# Patient Record
Sex: MALE | Race: WHITE | NOT HISPANIC OR LATINO | Employment: OTHER | URBAN - METROPOLITAN AREA
[De-identification: names, ages, dates, MRNs, and addresses within clinical notes are randomized per-mention and may not be internally consistent; named-entity substitution may affect disease eponyms.]

---

## 2018-01-05 ENCOUNTER — OFFICE VISIT CONVERTED (OUTPATIENT)
Dept: FAMILY MEDICINE CLINIC | Facility: CLINIC | Age: 73
End: 2018-01-05
Attending: NURSE PRACTITIONER

## 2018-01-31 ENCOUNTER — OFFICE VISIT CONVERTED (OUTPATIENT)
Dept: UROLOGY | Facility: CLINIC | Age: 73
End: 2018-01-31
Attending: UROLOGY

## 2018-01-31 ENCOUNTER — CONVERSION ENCOUNTER (OUTPATIENT)
Dept: SURGERY | Facility: CLINIC | Age: 73
End: 2018-01-31

## 2018-02-16 ENCOUNTER — OFFICE VISIT CONVERTED (OUTPATIENT)
Dept: UROLOGY | Facility: CLINIC | Age: 73
End: 2018-02-16
Attending: UROLOGY

## 2018-04-06 ENCOUNTER — OFFICE VISIT CONVERTED (OUTPATIENT)
Dept: FAMILY MEDICINE CLINIC | Facility: CLINIC | Age: 73
End: 2018-04-06
Attending: NURSE PRACTITIONER

## 2018-05-09 ENCOUNTER — OFFICE VISIT CONVERTED (OUTPATIENT)
Dept: NEUROSURGERY | Facility: CLINIC | Age: 73
End: 2018-05-09
Attending: PHYSICIAN ASSISTANT

## 2018-05-23 ENCOUNTER — OFFICE VISIT CONVERTED (OUTPATIENT)
Dept: UROLOGY | Facility: CLINIC | Age: 73
End: 2018-05-23
Attending: UROLOGY

## 2018-05-23 ENCOUNTER — CONVERSION ENCOUNTER (OUTPATIENT)
Dept: SURGERY | Facility: CLINIC | Age: 73
End: 2018-05-23

## 2018-07-20 ENCOUNTER — OFFICE VISIT CONVERTED (OUTPATIENT)
Dept: UROLOGY | Facility: CLINIC | Age: 73
End: 2018-07-20
Attending: UROLOGY

## 2018-09-17 ENCOUNTER — OFFICE VISIT CONVERTED (OUTPATIENT)
Dept: FAMILY MEDICINE CLINIC | Facility: CLINIC | Age: 73
End: 2018-09-17
Attending: NURSE PRACTITIONER

## 2018-10-08 ENCOUNTER — OFFICE VISIT CONVERTED (OUTPATIENT)
Dept: FAMILY MEDICINE CLINIC | Facility: CLINIC | Age: 73
End: 2018-10-08
Attending: NURSE PRACTITIONER

## 2018-10-23 ENCOUNTER — OFFICE VISIT CONVERTED (OUTPATIENT)
Dept: FAMILY MEDICINE CLINIC | Facility: CLINIC | Age: 73
End: 2018-10-23
Attending: NURSE PRACTITIONER

## 2018-10-31 ENCOUNTER — OFFICE VISIT CONVERTED (OUTPATIENT)
Dept: ORTHOPEDIC SURGERY | Facility: CLINIC | Age: 73
End: 2018-10-31
Attending: ORTHOPAEDIC SURGERY

## 2018-11-30 ENCOUNTER — CONVERSION ENCOUNTER (OUTPATIENT)
Dept: OTOLARYNGOLOGY | Facility: CLINIC | Age: 73
End: 2018-11-30

## 2018-11-30 ENCOUNTER — OFFICE VISIT CONVERTED (OUTPATIENT)
Dept: OTOLARYNGOLOGY | Facility: CLINIC | Age: 73
End: 2018-11-30
Attending: OTOLARYNGOLOGY

## 2018-12-14 ENCOUNTER — OFFICE VISIT CONVERTED (OUTPATIENT)
Dept: FAMILY MEDICINE CLINIC | Facility: CLINIC | Age: 73
End: 2018-12-14
Attending: NURSE PRACTITIONER

## 2018-12-14 ENCOUNTER — CONVERSION ENCOUNTER (OUTPATIENT)
Dept: FAMILY MEDICINE CLINIC | Facility: CLINIC | Age: 73
End: 2018-12-14

## 2019-01-15 ENCOUNTER — OFFICE VISIT CONVERTED (OUTPATIENT)
Dept: NEUROLOGY | Facility: CLINIC | Age: 74
End: 2019-01-15
Attending: PSYCHIATRY & NEUROLOGY

## 2019-01-15 ENCOUNTER — HOSPITAL ENCOUNTER (OUTPATIENT)
Dept: LAB | Facility: HOSPITAL | Age: 74
Discharge: HOME | End: 2019-01-15

## 2019-01-15 LAB
ALBUMIN SERPL-MCNC: 4.3 G/DL (ref 3.5–5)
ALBUMIN/GLOB SERPL: 1.4 {RATIO} (ref 1.4–2.6)
ALP SERPL-CCNC: 102 U/L (ref 56–155)
ALT SERPL-CCNC: 29 U/L (ref 10–40)
ANION GAP SERPL CALC-SCNC: 17 MMOL/L (ref 8–19)
AST SERPL-CCNC: 21 U/L (ref 15–50)
BASOPHILS # BLD AUTO: 0.05 10*3/UL (ref 0–0.2)
BASOPHILS NFR BLD AUTO: 0.64 % (ref 0–3)
BILIRUB SERPL-MCNC: 0.3 MG/DL (ref 0.2–1.3)
BUN SERPL-MCNC: 15 MG/DL (ref 5–25)
BUN/CREAT SERPL: 17 {RATIO} (ref 6–20)
CALCIUM SERPL-MCNC: 9.1 MG/DL (ref 8.7–10.4)
CHLORIDE SERPL-SCNC: 99 MMOL/L (ref 99–111)
CONV CO2: 28 MMOL/L (ref 22–32)
CONV TOTAL PROTEIN: 7.4 G/DL (ref 6.3–8.2)
CREAT UR-MCNC: 0.86 MG/DL (ref 0.7–1.2)
EOSINOPHIL # BLD AUTO: 0.33 10*3/UL (ref 0–0.7)
EOSINOPHIL # BLD AUTO: 4.18 % (ref 0–7)
ERYTHROCYTE [DISTWIDTH] IN BLOOD BY AUTOMATED COUNT: 11.8 % (ref 11.5–14.5)
GFR SERPLBLD BASED ON 1.73 SQ M-ARVRAT: >60 ML/MIN/{1.73_M2}
GLOBULIN UR ELPH-MCNC: 3.1 G/DL (ref 2–3.5)
GLUCOSE SERPL-MCNC: 106 MG/DL (ref 70–99)
HBA1C MFR BLD: 14.8 G/DL (ref 14–18)
HCT VFR BLD AUTO: 42.8 % (ref 42–52)
LYMPHOCYTES # BLD AUTO: 1.6 10*3/UL (ref 1–5)
MCH RBC QN AUTO: 32.3 PG (ref 27–31)
MCHC RBC AUTO-ENTMCNC: 34.6 G/DL (ref 33–37)
MCV RBC AUTO: 93.3 FL (ref 80–96)
MONOCYTES # BLD AUTO: 0.71 10*3/UL (ref 0.2–1.2)
MONOCYTES NFR BLD AUTO: 8.95 % (ref 3–10)
NEUTROPHILS # BLD AUTO: 5.23 10*3/UL (ref 2–8)
NEUTROPHILS NFR BLD AUTO: 66.1 % (ref 30–85)
NRBC BLD AUTO-RTO: 0 % (ref 0–0.01)
OSMOLALITY SERPL CALC.SUM OF ELEC: 291 MOSM/KG (ref 273–304)
PLATELET # BLD AUTO: 270 10*3/UL (ref 130–400)
PMV BLD AUTO: 7.3 FL (ref 7.4–10.4)
POTASSIUM SERPL-SCNC: 3.8 MMOL/L (ref 3.5–5.3)
RBC # BLD AUTO: 4.59 10*6/UL (ref 4.7–6.1)
SODIUM SERPL-SCNC: 140 MMOL/L (ref 135–147)
TSH SERPL-ACNC: 5.35 M[IU]/L (ref 0.27–4.2)
VARIANT LYMPHS NFR BLD MANUAL: 20.2 % (ref 20–45)
WBC # BLD AUTO: 7.92 10*3/UL (ref 4.8–10.8)

## 2019-02-08 ENCOUNTER — OFFICE VISIT CONVERTED (OUTPATIENT)
Dept: UROLOGY | Facility: CLINIC | Age: 74
End: 2019-02-08
Attending: UROLOGY

## 2019-02-08 ENCOUNTER — HOSPITAL ENCOUNTER (OUTPATIENT)
Dept: LAB | Facility: HOSPITAL | Age: 74
Discharge: HOME | End: 2019-02-08
Attending: UROLOGY

## 2019-02-08 LAB — PSA SERPL-MCNC: 1.4 NG/ML (ref 0–4)

## 2019-03-29 ENCOUNTER — CONVERSION ENCOUNTER (OUTPATIENT)
Dept: FAMILY MEDICINE CLINIC | Facility: CLINIC | Age: 74
End: 2019-03-29

## 2019-03-29 ENCOUNTER — OFFICE VISIT CONVERTED (OUTPATIENT)
Dept: FAMILY MEDICINE CLINIC | Facility: CLINIC | Age: 74
End: 2019-03-29
Attending: NURSE PRACTITIONER

## 2019-04-10 ENCOUNTER — CONVERSION ENCOUNTER (OUTPATIENT)
Dept: FAMILY MEDICINE CLINIC | Facility: CLINIC | Age: 74
End: 2019-04-10

## 2019-04-10 ENCOUNTER — HOSPITAL ENCOUNTER (OUTPATIENT)
Dept: GENERAL RADIOLOGY | Facility: HOSPITAL | Age: 74
Discharge: HOME | End: 2019-04-10
Attending: NURSE PRACTITIONER

## 2019-04-10 ENCOUNTER — OFFICE VISIT CONVERTED (OUTPATIENT)
Dept: FAMILY MEDICINE CLINIC | Facility: CLINIC | Age: 74
End: 2019-04-10
Attending: NURSE PRACTITIONER

## 2019-05-16 ENCOUNTER — CONVERSION ENCOUNTER (OUTPATIENT)
Dept: NEUROLOGY | Facility: CLINIC | Age: 74
End: 2019-05-16

## 2019-06-26 ENCOUNTER — HOSPITAL ENCOUNTER (OUTPATIENT)
Dept: LAB | Facility: HOSPITAL | Age: 74
Discharge: HOME | End: 2019-06-26
Attending: NURSE PRACTITIONER

## 2019-06-26 ENCOUNTER — CONVERSION ENCOUNTER (OUTPATIENT)
Dept: FAMILY MEDICINE CLINIC | Facility: CLINIC | Age: 74
End: 2019-06-26

## 2019-06-26 ENCOUNTER — OFFICE VISIT CONVERTED (OUTPATIENT)
Dept: FAMILY MEDICINE CLINIC | Facility: CLINIC | Age: 74
End: 2019-06-26
Attending: NURSE PRACTITIONER

## 2019-06-26 LAB
BASOPHILS # BLD AUTO: 0.08 10*3/UL (ref 0–0.2)
BASOPHILS NFR BLD AUTO: 0.9 % (ref 0–3)
CHOLEST SERPL-MCNC: 241 MG/DL (ref 107–200)
CHOLEST/HDLC SERPL: 6.2 {RATIO} (ref 3–6)
CONV ABS IMM GRAN: 0.08 10*3/UL (ref 0–0.2)
CONV IMMATURE GRAN: 0.9 % (ref 0–1.8)
DEPRECATED RDW RBC AUTO: 43.8 FL (ref 35.1–43.9)
EOSINOPHIL # BLD AUTO: 0.4 10*3/UL (ref 0–0.7)
EOSINOPHIL # BLD AUTO: 4.6 % (ref 0–7)
ERYTHROCYTE [DISTWIDTH] IN BLOOD BY AUTOMATED COUNT: 12.9 % (ref 11.6–14.4)
HBA1C MFR BLD: 14.4 G/DL (ref 14–18)
HCT VFR BLD AUTO: 43.1 % (ref 42–52)
HDLC SERPL-MCNC: 39 MG/DL (ref 40–60)
LDLC SERPL CALC-MCNC: 170 MG/DL (ref 70–100)
LYMPHOCYTES # BLD AUTO: 1.5 10*3/UL (ref 1–5)
MCH RBC QN AUTO: 31.1 PG (ref 27–31)
MCHC RBC AUTO-ENTMCNC: 33.4 G/DL (ref 33–37)
MCV RBC AUTO: 93.1 FL (ref 80–96)
MONOCYTES # BLD AUTO: 0.72 10*3/UL (ref 0.2–1.2)
MONOCYTES NFR BLD AUTO: 8.3 % (ref 3–10)
NEUTROPHILS # BLD AUTO: 5.9 10*3/UL (ref 2–8)
NEUTROPHILS NFR BLD AUTO: 68 % (ref 30–85)
NRBC CBCN: 0 % (ref 0–0.7)
PLATELET # BLD AUTO: 303 10*3/UL (ref 130–400)
PMV BLD AUTO: 10.3 FL (ref 9.4–12.4)
RBC # BLD AUTO: 4.63 10*6/UL (ref 4.7–6.1)
TRIGL SERPL-MCNC: 158 MG/DL (ref 40–150)
VARIANT LYMPHS NFR BLD MANUAL: 17.3 % (ref 20–45)
VLDLC SERPL-MCNC: 32 MG/DL (ref 5–37)
WBC # BLD AUTO: 8.68 10*3/UL (ref 4.8–10.8)

## 2019-07-03 ENCOUNTER — HOSPITAL ENCOUNTER (OUTPATIENT)
Dept: GENERAL RADIOLOGY | Facility: HOSPITAL | Age: 74
Discharge: HOME | End: 2019-07-03
Attending: NURSE PRACTITIONER

## 2019-07-03 LAB
CREAT BLD-MCNC: 0.8 MG/DL (ref 0.6–1.4)
GFR SERPLBLD BASED ON 1.73 SQ M-ARVRAT: >60 ML/MIN/{1.73_M2}

## 2019-07-31 ENCOUNTER — OFFICE VISIT CONVERTED (OUTPATIENT)
Dept: ORTHOPEDIC SURGERY | Facility: CLINIC | Age: 74
End: 2019-07-31
Attending: ORTHOPAEDIC SURGERY

## 2019-07-31 ENCOUNTER — CONVERSION ENCOUNTER (OUTPATIENT)
Dept: ORTHOPEDIC SURGERY | Facility: CLINIC | Age: 74
End: 2019-07-31

## 2019-08-16 ENCOUNTER — OFFICE VISIT CONVERTED (OUTPATIENT)
Dept: UROLOGY | Facility: CLINIC | Age: 74
End: 2019-08-16
Attending: UROLOGY

## 2019-08-20 ENCOUNTER — HOSPITAL ENCOUNTER (OUTPATIENT)
Dept: LAB | Facility: HOSPITAL | Age: 74
Discharge: HOME | End: 2019-08-20
Attending: UROLOGY

## 2019-08-20 LAB — CORTIS AM PEAK SERPL-MCNC: <1 UG/DL (ref 6–18.4)

## 2019-08-26 ENCOUNTER — HOSPITAL ENCOUNTER (OUTPATIENT)
Dept: GENERAL RADIOLOGY | Facility: HOSPITAL | Age: 74
Discharge: HOME | End: 2019-08-26
Attending: UROLOGY

## 2019-08-26 LAB
CREAT BLD-MCNC: 0.7 MG/DL (ref 0.6–1.4)
GFR SERPLBLD BASED ON 1.73 SQ M-ARVRAT: >60 ML/MIN/{1.73_M2}

## 2019-08-27 ENCOUNTER — HOSPITAL ENCOUNTER (OUTPATIENT)
Dept: PHYSICAL THERAPY | Facility: CLINIC | Age: 74
Setting detail: RECURRING SERIES
Discharge: HOME | End: 2019-11-08
Attending: ORTHOPAEDIC SURGERY

## 2019-08-27 LAB
METANEPH FREE SERPL-SCNC: <10 PG/ML (ref 0–62)
NORMETANEPHRINE, PL: 10 PG/ML (ref 0–145)

## 2019-09-09 ENCOUNTER — OFFICE VISIT CONVERTED (OUTPATIENT)
Dept: GASTROENTEROLOGY | Facility: CLINIC | Age: 74
End: 2019-09-09
Attending: PHYSICIAN ASSISTANT

## 2019-10-02 ENCOUNTER — OFFICE VISIT CONVERTED (OUTPATIENT)
Dept: UROLOGY | Facility: CLINIC | Age: 74
End: 2019-10-02
Attending: UROLOGY

## 2019-10-18 ENCOUNTER — HOSPITAL ENCOUNTER (OUTPATIENT)
Dept: LAB | Facility: HOSPITAL | Age: 74
Discharge: HOME | End: 2019-10-18
Attending: UROLOGY

## 2019-10-18 LAB — PSA SERPL-MCNC: 0.83 NG/ML (ref 0–4)

## 2019-10-22 ENCOUNTER — HOSPITAL ENCOUNTER (OUTPATIENT)
Dept: GASTROENTEROLOGY | Facility: HOSPITAL | Age: 74
Setting detail: HOSPITAL OUTPATIENT SURGERY
Discharge: HOME | End: 2019-10-22
Attending: INTERNAL MEDICINE

## 2019-12-27 ENCOUNTER — OFFICE VISIT CONVERTED (OUTPATIENT)
Dept: SURGERY | Facility: CLINIC | Age: 74
End: 2019-12-27
Attending: PHYSICIAN ASSISTANT

## 2019-12-27 ENCOUNTER — HOSPITAL ENCOUNTER (OUTPATIENT)
Dept: SURGERY | Facility: CLINIC | Age: 74
Discharge: HOME | End: 2019-12-27
Attending: PHYSICIAN ASSISTANT

## 2019-12-27 ENCOUNTER — CONVERSION ENCOUNTER (OUTPATIENT)
Dept: SURGERY | Facility: CLINIC | Age: 74
End: 2019-12-27

## 2019-12-29 LAB — BACTERIA UR CULT: NORMAL

## 2019-12-30 ENCOUNTER — OFFICE VISIT CONVERTED (OUTPATIENT)
Dept: FAMILY MEDICINE CLINIC | Facility: CLINIC | Age: 74
End: 2019-12-30
Attending: NURSE PRACTITIONER

## 2020-01-29 ENCOUNTER — HOSPITAL ENCOUNTER (OUTPATIENT)
Dept: LAB | Facility: HOSPITAL | Age: 75
Discharge: HOME | End: 2020-01-29
Attending: UROLOGY

## 2020-01-29 LAB — PSA SERPL-MCNC: 0.91 NG/ML (ref 0–4)

## 2020-01-30 ENCOUNTER — OFFICE VISIT CONVERTED (OUTPATIENT)
Dept: OTOLARYNGOLOGY | Facility: CLINIC | Age: 75
End: 2020-01-30
Attending: OTOLARYNGOLOGY

## 2020-02-05 ENCOUNTER — OFFICE VISIT CONVERTED (OUTPATIENT)
Dept: UROLOGY | Facility: CLINIC | Age: 75
End: 2020-02-05
Attending: UROLOGY

## 2020-04-02 ENCOUNTER — TELEMEDICINE CONVERTED (OUTPATIENT)
Dept: GASTROENTEROLOGY | Facility: CLINIC | Age: 75
End: 2020-04-02
Attending: PHYSICIAN ASSISTANT

## 2020-06-30 ENCOUNTER — OFFICE VISIT CONVERTED (OUTPATIENT)
Dept: GASTROENTEROLOGY | Facility: CLINIC | Age: 75
End: 2020-06-30
Attending: PHYSICIAN ASSISTANT

## 2020-07-02 ENCOUNTER — HOSPITAL ENCOUNTER (OUTPATIENT)
Dept: GENERAL RADIOLOGY | Facility: HOSPITAL | Age: 75
Discharge: HOME | End: 2020-07-02
Attending: PHYSICIAN ASSISTANT

## 2020-08-10 ENCOUNTER — HOSPITAL ENCOUNTER (OUTPATIENT)
Dept: LAB | Facility: HOSPITAL | Age: 75
Discharge: HOME | End: 2020-08-10
Attending: UROLOGY

## 2020-08-10 LAB — PSA SERPL-MCNC: 3.56 NG/ML (ref 0–4)

## 2020-08-20 ENCOUNTER — OFFICE VISIT CONVERTED (OUTPATIENT)
Dept: INTERNAL MEDICINE | Facility: CLINIC | Age: 75
End: 2020-08-20
Attending: INTERNAL MEDICINE

## 2020-08-20 ENCOUNTER — HOSPITAL ENCOUNTER (OUTPATIENT)
Dept: OTHER | Facility: HOSPITAL | Age: 75
Discharge: HOME | End: 2020-08-20
Attending: INTERNAL MEDICINE

## 2020-08-20 LAB
APPEARANCE UR: CLEAR
BILIRUB UR QL: NEGATIVE
COLOR UR: YELLOW
CONV COLLECTION SOURCE (UA): ABNORMAL
CONV UROBILINOGEN IN URINE BY AUTOMATED TEST STRIP: 0.2 {EHRLICHU}/DL (ref 0.1–1)
GLUCOSE UR QL: >=1000 MG/DL
HGB UR QL STRIP: NEGATIVE
KETONES UR QL STRIP: NEGATIVE MG/DL
LEUKOCYTE ESTERASE UR QL STRIP: NEGATIVE
NITRITE UR QL STRIP: NEGATIVE
PH UR STRIP.AUTO: 5.5 [PH] (ref 5–8)
PROT UR QL: NEGATIVE MG/DL
SP GR UR: 1.03 (ref 1–1.03)

## 2020-08-21 ENCOUNTER — HOSPITAL ENCOUNTER (OUTPATIENT)
Dept: GENERAL RADIOLOGY | Facility: HOSPITAL | Age: 75
Discharge: HOME | End: 2020-08-21
Attending: INTERNAL MEDICINE

## 2020-08-21 LAB
ALBUMIN SERPL-MCNC: 4.3 G/DL (ref 3.5–5)
ALBUMIN/GLOB SERPL: 1.4 {RATIO} (ref 1.4–2.6)
ALP SERPL-CCNC: 103 U/L (ref 56–155)
ALT SERPL-CCNC: 30 U/L (ref 10–40)
ANION GAP SERPL CALC-SCNC: 19 MMOL/L (ref 8–19)
AST SERPL-CCNC: 23 U/L (ref 15–50)
BILIRUB SERPL-MCNC: 0.37 MG/DL (ref 0.2–1.3)
BUN SERPL-MCNC: 17 MG/DL (ref 5–25)
BUN/CREAT SERPL: 18 {RATIO} (ref 6–20)
CALCIUM SERPL-MCNC: 10 MG/DL (ref 8.7–10.4)
CHLORIDE SERPL-SCNC: 97 MMOL/L (ref 99–111)
CHOLEST SERPL-MCNC: 244 MG/DL (ref 107–200)
CHOLEST/HDLC SERPL: 6.6 {RATIO} (ref 3–6)
CONV CO2: 26 MMOL/L (ref 22–32)
CONV TOTAL PROTEIN: 7.4 G/DL (ref 6.3–8.2)
CREAT BLD-MCNC: 0.8 MG/DL (ref 0.6–1.4)
CREAT UR-MCNC: 0.92 MG/DL (ref 0.7–1.2)
GFR SERPLBLD BASED ON 1.73 SQ M-ARVRAT: >60 ML/MIN/{1.73_M2}
GFR SERPLBLD BASED ON 1.73 SQ M-ARVRAT: >60 ML/MIN/{1.73_M2}
GLOBULIN UR ELPH-MCNC: 3.1 G/DL (ref 2–3.5)
GLUCOSE SERPL-MCNC: 279 MG/DL (ref 70–99)
HDLC SERPL-MCNC: 37 MG/DL (ref 40–60)
LDLC SERPL CALC-MCNC: 173 MG/DL (ref 70–100)
OSMOLALITY SERPL CALC.SUM OF ELEC: 296 MOSM/KG (ref 273–304)
POTASSIUM SERPL-SCNC: 4.8 MMOL/L (ref 3.5–5.3)
SODIUM SERPL-SCNC: 137 MMOL/L (ref 135–147)
TRIGL SERPL-MCNC: 169 MG/DL (ref 40–150)
TSH SERPL-ACNC: 5.91 M[IU]/L (ref 0.27–4.2)
VLDLC SERPL-MCNC: 34 MG/DL (ref 5–37)

## 2020-08-22 LAB — BACTERIA UR CULT: NORMAL

## 2020-08-24 ENCOUNTER — OFFICE VISIT CONVERTED (OUTPATIENT)
Dept: UROLOGY | Facility: CLINIC | Age: 75
End: 2020-08-24
Attending: UROLOGY

## 2020-08-24 ENCOUNTER — CONVERSION ENCOUNTER (OUTPATIENT)
Dept: SURGERY | Facility: CLINIC | Age: 75
End: 2020-08-24

## 2020-08-28 ENCOUNTER — HOSPITAL ENCOUNTER (OUTPATIENT)
Dept: CARDIOLOGY | Facility: HOSPITAL | Age: 75
Discharge: HOME | End: 2020-08-28
Attending: INTERNAL MEDICINE

## 2020-09-08 ENCOUNTER — HOSPITAL ENCOUNTER (OUTPATIENT)
Dept: LAB | Facility: HOSPITAL | Age: 75
Discharge: HOME | End: 2020-09-08
Attending: INTERNAL MEDICINE

## 2020-09-08 LAB
EST. AVERAGE GLUCOSE BLD GHB EST-MCNC: 229 MG/DL
HBA1C MFR BLD: 9.6 % (ref 3.5–5.7)

## 2020-09-14 ENCOUNTER — PROCEDURE VISIT CONVERTED (OUTPATIENT)
Dept: UROLOGY | Facility: CLINIC | Age: 75
End: 2020-09-14
Attending: UROLOGY

## 2020-09-21 ENCOUNTER — OFFICE VISIT CONVERTED (OUTPATIENT)
Dept: INTERNAL MEDICINE | Facility: CLINIC | Age: 75
End: 2020-09-21
Attending: INTERNAL MEDICINE

## 2020-09-25 ENCOUNTER — PROCEDURE VISIT CONVERTED (OUTPATIENT)
Dept: UROLOGY | Facility: CLINIC | Age: 75
End: 2020-09-25
Attending: UROLOGY

## 2020-09-25 ENCOUNTER — HOSPITAL ENCOUNTER (OUTPATIENT)
Dept: SURGERY | Facility: CLINIC | Age: 75
Discharge: HOME | End: 2020-09-25
Attending: UROLOGY

## 2020-10-02 ENCOUNTER — OFFICE VISIT CONVERTED (OUTPATIENT)
Dept: INTERNAL MEDICINE | Facility: CLINIC | Age: 75
End: 2020-10-02
Attending: INTERNAL MEDICINE

## 2020-10-06 ENCOUNTER — OFFICE VISIT CONVERTED (OUTPATIENT)
Dept: UROLOGY | Facility: CLINIC | Age: 75
End: 2020-10-06
Attending: UROLOGY

## 2020-10-19 ENCOUNTER — OFFICE VISIT CONVERTED (OUTPATIENT)
Dept: INTERNAL MEDICINE | Facility: CLINIC | Age: 75
End: 2020-10-19
Attending: INTERNAL MEDICINE

## 2020-10-19 ENCOUNTER — CONVERSION ENCOUNTER (OUTPATIENT)
Dept: INTERNAL MEDICINE | Facility: CLINIC | Age: 75
End: 2020-10-19

## 2020-11-11 ENCOUNTER — HOSPITAL ENCOUNTER (OUTPATIENT)
Dept: LAB | Facility: HOSPITAL | Age: 75
Discharge: HOME | End: 2020-11-11
Attending: INTERNAL MEDICINE

## 2020-11-11 LAB
ALBUMIN SERPL-MCNC: 4.3 G/DL (ref 3.5–5)
ALBUMIN/GLOB SERPL: 1.5 {RATIO} (ref 1.4–2.6)
ALP SERPL-CCNC: 102 U/L (ref 56–155)
ALT SERPL-CCNC: 24 U/L (ref 10–40)
ANION GAP SERPL CALC-SCNC: 17 MMOL/L (ref 8–19)
AST SERPL-CCNC: 16 U/L (ref 15–50)
BASOPHILS # BLD AUTO: 0.06 10*3/UL (ref 0–0.2)
BASOPHILS NFR BLD AUTO: 0.8 % (ref 0–3)
BILIRUB SERPL-MCNC: 0.27 MG/DL (ref 0.2–1.3)
BUN SERPL-MCNC: 23 MG/DL (ref 5–25)
BUN/CREAT SERPL: 21 {RATIO} (ref 6–20)
CALCIUM SERPL-MCNC: 9.1 MG/DL (ref 8.7–10.4)
CHLORIDE SERPL-SCNC: 103 MMOL/L (ref 99–111)
CHOLEST SERPL-MCNC: 168 MG/DL (ref 107–200)
CHOLEST/HDLC SERPL: 4.5 {RATIO} (ref 3–6)
CONV ABS IMM GRAN: 0.06 10*3/UL (ref 0–0.2)
CONV CO2: 22 MMOL/L (ref 22–32)
CONV IMMATURE GRAN: 0.8 % (ref 0–1.8)
CONV TOTAL PROTEIN: 7.2 G/DL (ref 6.3–8.2)
CREAT UR-MCNC: 1.1 MG/DL (ref 0.7–1.2)
DEPRECATED RDW RBC AUTO: 43.3 FL (ref 35.1–43.9)
EOSINOPHIL # BLD AUTO: 0.39 10*3/UL (ref 0–0.7)
EOSINOPHIL # BLD AUTO: 5.2 % (ref 0–7)
ERYTHROCYTE [DISTWIDTH] IN BLOOD BY AUTOMATED COUNT: 12.6 % (ref 11.6–14.4)
EST. AVERAGE GLUCOSE BLD GHB EST-MCNC: 217 MG/DL
GFR SERPLBLD BASED ON 1.73 SQ M-ARVRAT: >60 ML/MIN/{1.73_M2}
GLOBULIN UR ELPH-MCNC: 2.9 G/DL (ref 2–3.5)
GLUCOSE SERPL-MCNC: 161 MG/DL (ref 70–99)
HBA1C MFR BLD: 9.2 % (ref 3.5–5.7)
HCT VFR BLD AUTO: 40.9 % (ref 42–52)
HDLC SERPL-MCNC: 37 MG/DL (ref 40–60)
HGB BLD-MCNC: 13.5 G/DL (ref 14–18)
LDLC SERPL CALC-MCNC: 98 MG/DL (ref 70–100)
LYMPHOCYTES # BLD AUTO: 1.17 10*3/UL (ref 1–5)
LYMPHOCYTES NFR BLD AUTO: 15.5 % (ref 20–45)
MCH RBC QN AUTO: 30.9 PG (ref 27–31)
MCHC RBC AUTO-ENTMCNC: 33 G/DL (ref 33–37)
MCV RBC AUTO: 93.6 FL (ref 80–96)
MONOCYTES # BLD AUTO: 0.66 10*3/UL (ref 0.2–1.2)
MONOCYTES NFR BLD AUTO: 8.7 % (ref 3–10)
NEUTROPHILS # BLD AUTO: 5.21 10*3/UL (ref 2–8)
NEUTROPHILS NFR BLD AUTO: 69 % (ref 30–85)
NRBC CBCN: 0 % (ref 0–0.7)
OSMOLALITY SERPL CALC.SUM OF ELEC: 293 MOSM/KG (ref 273–304)
PLATELET # BLD AUTO: 273 10*3/UL (ref 130–400)
PMV BLD AUTO: 10.2 FL (ref 9.4–12.4)
POTASSIUM SERPL-SCNC: 4.1 MMOL/L (ref 3.5–5.3)
RBC # BLD AUTO: 4.37 10*6/UL (ref 4.7–6.1)
SODIUM SERPL-SCNC: 138 MMOL/L (ref 135–147)
TRIGL SERPL-MCNC: 163 MG/DL (ref 40–150)
TSH SERPL-ACNC: 1.61 M[IU]/L (ref 0.27–4.2)
VLDLC SERPL-MCNC: 33 MG/DL (ref 5–37)
WBC # BLD AUTO: 7.55 10*3/UL (ref 4.8–10.8)

## 2020-11-19 ENCOUNTER — OFFICE VISIT CONVERTED (OUTPATIENT)
Dept: INTERNAL MEDICINE | Facility: CLINIC | Age: 75
End: 2020-11-19
Attending: INTERNAL MEDICINE

## 2020-12-21 ENCOUNTER — OFFICE VISIT CONVERTED (OUTPATIENT)
Dept: INTERNAL MEDICINE | Facility: CLINIC | Age: 75
End: 2020-12-21
Attending: INTERNAL MEDICINE

## 2021-01-11 ENCOUNTER — HOSPITAL ENCOUNTER (OUTPATIENT)
Dept: GENERAL RADIOLOGY | Facility: HOSPITAL | Age: 76
Discharge: HOME | End: 2021-01-11
Attending: INTERNAL MEDICINE

## 2021-02-15 ENCOUNTER — OFFICE VISIT CONVERTED (OUTPATIENT)
Dept: UROLOGY | Facility: CLINIC | Age: 76
End: 2021-02-15
Attending: UROLOGY

## 2021-02-19 ENCOUNTER — OFFICE VISIT CONVERTED (OUTPATIENT)
Dept: UROLOGY | Facility: CLINIC | Age: 76
End: 2021-02-19
Attending: UROLOGY

## 2021-02-23 ENCOUNTER — OFFICE VISIT CONVERTED (OUTPATIENT)
Dept: UROLOGY | Facility: CLINIC | Age: 76
End: 2021-02-23
Attending: UROLOGY

## 2021-02-24 ENCOUNTER — HOSPITAL ENCOUNTER (OUTPATIENT)
Dept: SURGERY | Facility: CLINIC | Age: 76
Discharge: HOME | End: 2021-02-24
Attending: UROLOGY

## 2021-02-27 LAB
BACTERIA UR CULT: ABNORMAL
CIPROFLOXACIN SUSC ISLT: 2
CIPROFLOXACIN SUSC ISLT: <=0.5
DAPTOMYCIN SUSC ISLT: 0.5
DAPTOMYCIN SUSC ISLT: 1
DOXYCYCLINE SUSC ISLT: 1
DOXYCYCLINE SUSC ISLT: <=0.5
ERYTHROMYCIN SUSC ISLT: <=0.25
ERYTHROMYCIN SUSC ISLT: >=8
GENTAMICIN SUSC ISLT: <=0.5
GENTAMICIN SUSC ISLT: <=0.5
LEVOFLOXACIN SUSC ISLT: 0.25
LEVOFLOXACIN SUSC ISLT: 0.25
NITROFURANTOIN SUSC ISLT: <=16
NITROFURANTOIN SUSC ISLT: <=16
OXACILLIN SUSC ISLT: <=0.25
OXACILLIN SUSC ISLT: >=4
RIFAMPIN SUSC ISLT: <=0.5
RIFAMPIN SUSC ISLT: <=0.5
TETRACYCLINE SUSC ISLT: 2
TETRACYCLINE SUSC ISLT: <=1
TIGECYCLINE SUSC ISLT: 0.25
TIGECYCLINE SUSC ISLT: <=0.12
TMP SMX SUSC ISLT: <=10
TMP SMX SUSC ISLT: <=10
VANCOMYCIN SUSC ISLT: 1
VANCOMYCIN SUSC ISLT: 2

## 2021-03-19 ENCOUNTER — HOSPITAL ENCOUNTER (OUTPATIENT)
Dept: PREADMISSION TESTING | Facility: HOSPITAL | Age: 76
Discharge: HOME | End: 2021-03-19
Attending: UROLOGY

## 2021-03-21 LAB — SARS-COV-2 RNA SPEC QL NAA+PROBE: NOT DETECTED

## 2021-03-25 ENCOUNTER — HOSPITAL ENCOUNTER (OUTPATIENT)
Dept: PERIOP | Facility: HOSPITAL | Age: 76
Setting detail: HOSPITAL OUTPATIENT SURGERY
Discharge: HOME | End: 2021-03-26
Attending: UROLOGY

## 2021-03-25 LAB
ANION GAP SERPL CALC-SCNC: 14 MMOL/L (ref 8–19)
BUN SERPL-MCNC: 16 MG/DL (ref 5–25)
BUN/CREAT SERPL: 17 {RATIO} (ref 6–20)
CALCIUM SERPL-MCNC: 10 MG/DL (ref 8.7–10.4)
CHLORIDE SERPL-SCNC: 100 MMOL/L (ref 99–111)
CONV CO2: 26 MMOL/L (ref 22–32)
CREAT UR-MCNC: 0.96 MG/DL (ref 0.7–1.2)
GFR SERPLBLD BASED ON 1.73 SQ M-ARVRAT: >60 ML/MIN/{1.73_M2}
GLUCOSE BLD-MCNC: 145 MG/DL (ref 70–99)
GLUCOSE BLD-MCNC: 95 MG/DL (ref 70–99)
GLUCOSE SERPL-MCNC: 104 MG/DL (ref 70–99)
OSMOLALITY SERPL CALC.SUM OF ELEC: 281 MOSM/KG (ref 273–304)
POTASSIUM SERPL-SCNC: 4.6 MMOL/L (ref 3.5–5.3)
SODIUM SERPL-SCNC: 135 MMOL/L (ref 135–147)

## 2021-03-26 LAB — GLUCOSE BLD-MCNC: 146 MG/DL (ref 70–99)

## 2021-04-06 ENCOUNTER — HOSPITAL ENCOUNTER (OUTPATIENT)
Dept: GENERAL RADIOLOGY | Facility: HOSPITAL | Age: 76
Discharge: HOME | End: 2021-04-06
Attending: UROLOGY

## 2021-04-09 ENCOUNTER — OFFICE VISIT CONVERTED (OUTPATIENT)
Dept: UROLOGY | Facility: CLINIC | Age: 76
End: 2021-04-09
Attending: UROLOGY

## 2021-04-09 ENCOUNTER — CONVERSION ENCOUNTER (OUTPATIENT)
Dept: SURGERY | Facility: CLINIC | Age: 76
End: 2021-04-09

## 2021-04-13 ENCOUNTER — OFFICE VISIT CONVERTED (OUTPATIENT)
Dept: INTERNAL MEDICINE | Facility: CLINIC | Age: 76
End: 2021-04-13
Attending: INTERNAL MEDICINE

## 2021-04-14 ENCOUNTER — HOSPITAL ENCOUNTER (OUTPATIENT)
Dept: INTERNAL MEDICINE | Facility: CLINIC | Age: 76
Discharge: HOME | End: 2021-04-14
Attending: INTERNAL MEDICINE

## 2021-04-23 ENCOUNTER — OFFICE VISIT CONVERTED (OUTPATIENT)
Dept: UROLOGY | Facility: CLINIC | Age: 76
End: 2021-04-23
Attending: UROLOGY

## 2021-05-10 NOTE — H&P
History and Physical      Patient Name: Jamir Porter   Patient ID: [id number]   Sex: Male   YOB: 1945    Primary Care Provider: Juan GONZALEZ   Referring Provider: Tonya Nelson PA-C    Visit Date: August 20, 2020    Provider: Amaury Mcdonough MD   Location: [facility]   Location Address: [address] [place] [address]  [id number]   Location Phone: [phone]          Chief Complaint  · New Patient/ Establish care  · Balance and memory      History Of Present Illness  Jamir Porter is a 75 year old /White male who presents for evaluation and treatment of:      Last PCP:Juan Chin  Last: labs:6/2019  PSA:8/2020 WNL  Flu 19/20: yes  PCV13 done 12/2019  Colonoscopy:9/2019    pt reports having difficulty with balance frequently. pt reports trying to work out at [employer] occassionally. pt reports unable to walk across a room without getting dizzy. pt has to stand slowly due to dizziness.    depression - pt reports Prozac helps a lot. pt denies HI and SI.   prostate Ca- pt f/u with Dr. Ray. pt reports having difficulty with urinary incontinence. pt reports having gas problems as well as difficulty with BMs. pt is taking psyllium without much help. pt has had prostate biopsies, but has not had prostate removed. pt reports frequent urination during day and night. pt previously on finasteride, but is no longer taking.    elevated BP - pt has not been taking HCTZ for BP. pt reports home BPs 130-140s/80-90s. pt denies HAs, CP.    insomnia- pt take ibuprofen nearly every evening as well as during day to help with pain in back and knees. pt denies any help with melatonin and antihistamines.       Past Medical History  Disease Name Date Onset Notes   Allergic rhinitis --  --    Arthritis --  --    Balance problem --  --    Broken Bones --  --    Cancer --  --    Cataracts, bilateral --  --    Chronic Sinusitis --  --    Colon Polyps --  --     Degenerative Disc Disease  --  --    Diverticulitis --  --    Forgetfulness --  --    Hyperlipemia --  --    Hypertension --  --    Limb Swelling --  --    Lumbago --  --    Pacemaker --  --    Prostate CA --  --    Prostate Disorder --  --    Shortness of Breath --  --    Sleep apnea --  --          Past Surgical History  Procedure Name Date Notes   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   Lumbar puncture --  --    Penile implant --  --    Pilonidal Cyst Excision --  --    Rotator Cuff repair --  --    UPPP (uvulopalatopharyngoplasty) --  --          Medication List  Name Date Started Instructions   fluoxetine 40 mg oral capsule 06/03/2020 TAKE 1 CAPSULE(40 MG) BY MOUTH EVERY DAY IN THE EVENING   Focus Factor  --    ibuprofen 600 mg oral tablet  take 1 tablet (600 mg) by oral route 3 times per day with food   VSL#3 112.5 billion cell oral capsule 06/30/2020 take 1 capsule by oral route daily for 30 days         Allergy List  Allergen Name Date Reaction Notes   Demerol --  --  --    SULFA (SULFONAMIDES) --  --  --        Allergies Reconciled  Family Medical History  Disease Name Relative/Age Notes   Family history of Arthritis Brother/  Father/  Mother/   Mother; Brother   Family history of cancer Brother/  Father/  Mother/   Mother; Father; Brother         Social History  Finding Status Start/Stop Quantity Notes   Alcohol Use Current some day --/-- --  rarely drinks   Claustophobic Unknown --/-- --  yes   lives with spouse --  --/-- --  --    . --  --/-- --  --    Recreational Drug Use Never --/-- --  no  05/16/2019 - 01/15/2019 - no   Retired. --  --/-- --  --    Tobacco Former --/-- 1.5 ppd former smoker quit 1979, 17 years total         Immunizations  NameDate Admin  "Mfg Trade Name Lot Number Route Inj VIS Given VIS Publication   Xjgyoajid15/01/2019 SKB Fluarix, quadrivalent, preservative free [id number] NE NE 12/30/2019    Comments:    Prevnar 1312/30/2019 [facility] PREVNAR 13 [id number] IM  12/30/2019    Comments: Pt tolerated the injection well.         Review of Systems  · Constitutional  o Denies  o : fever, fatigue, weight loss, weight gain  · HENT  o Admits  o : lightheadedness  o Denies  o : headaches  · Cardiovascular  o Denies  o : lower extremity edema, chest pressure, palpitations  · Respiratory  o Denies  o : shortness of breath, wheezing, frequent cough, dyspnea on exertion  · Gastrointestinal  o Denies  o : nausea, vomiting, diarrhea, constipation, abdominal pain  · Psychiatric  o Admits  o : depression  o Denies  o : suicidal ideation, homicidal ideation      Vitals  Date Time BP Position Site L\R Cuff Size HR RR TEMP (F) WT  HT  BMI kg/m2 BSA m2 O2 Sat HC       02/05/2020 09:57 AM       16  240lbs 0oz 5'  10\" 34.44 2.32     06/30/2020 01:08 /72 Sitting    103 - R 16  246lbs 0oz 5'  10\" 35.3 2.35 99 %    08/20/2020 08:23 /84 Sitting    86 - R  97.6 243lbs 6oz 5'  10\" 34.92 2.33 92 %          Physical Examination  · Constitutional  o Appearance  o : no acute distress, well-nourished  · Head and Face  o Head  o :   § Inspection  § : atraumatic, normocephalic  · Eyes  o Eyes  o : extraocular movements intact, no scleral icterus, no conjunctival injection  · Ears, Nose, Mouth and Throat  o Ears  o :   § External Ears  § : normal  o Nose  o :   § Intranasal Exam  § : nares patent  o Oral Cavity  o :   § Oral Mucosa  § : moist mucous membranes  · Respiratory  o Respiratory Effort  o : breathing comfortably, symmetric chest rise  o Auscultation of Lungs  o : clear to asculatation bilaterally, no wheezes, rales, or rhonchii  · Cardiovascular  o Heart  o :   § Auscultation of Heart  § : regular rate and rhythm, 2/6 FARA heard best at RUSB. No rubs, or gallops  o Peripheral " Vascular System  o :   § Extremities  § : no edema  · Neurologic  o Mental Status Examination  o :   § Orientation  § : grossly oriented to person, place and time  o Gait and Station  o :   § Gait Screening  § : normal gait  · Psychiatric  o General  o : normal mood and affect          Results  · In-Office Procedures  o Lab procedure  § IOP - Urinalysis without Microscopy (Clinitek) [facility] (32329)   § Color Ur: Yellow   § Clarity Ur: Clear   § Glucose Ur Ql Strip: 500 mg/dL   § Bilirub Ur Ql Strip: Negative   § Ketones Ur Ql Strip: Negative   § Sp Gr Ur Qn: 1.025   § Hgb Ur Ql Strip: Negative   § pH Ur-LsCnc: 6.0   § Prot Ur Ql Strip: Negative   § Urobilinogen Ur Strip-mCnc: 0.2 E.U./dL   § Nitrite Ur Ql Strip: Negative   § WBC Est Ur Ql Strip: Negative       Assessment  · Prostate CA     185/C61  with symptoms of prostate enlargement. will Rx Flomax and finasteride to help. cont f/u with urology for management.   · Hyperlipemia     272.4/E78.5  check labs.   · Hypertension     401.9/I10  elevated today in clinic. hope for improvement with initiation of Flomax. f/u in 1month for repeat starr;/   · Depression     311/F32.9  doing wlel on Prozac. pt denies HI and SI.   · GERD (gastroesophageal reflux disease)     530.81/K21.9  gastric distress may be due to multiple use of NSAIDs. pt to stop Celebrex. may cont ibuprofen as needed for pain.   · Insomnia, unspecified     780.52/G47.00  antihistamine usage may be associated with multiple side effects including pt's dry mouth, bowel/bladder issues, and dizziness. will Rx silenor and pt to stop use of OTC sleep aids. f/u in 1 month for repeat eval.   · Dizziness     780.4/R42  obtain labs to eval. echo pending given exam findings. given duration and h/o cancer, will also obtain brain MRI to further eval. consider carotid US if workup negative.   · Murmur     785.2/R01.1  exam concerning for possible AS and possible etiology for dizziness. will obtain echo to further  eval  · Glucose found in urine on examination     791.5/R81    Problems Reconciled  Plan  · Orders  o Physical, Primary Care Panel (CBC, CMP, Lipid, TSH) [facility] (19217, 72453, 79322, 42900) - 272.4/E78.5, 401.9/I10, 185/C61 - 08/20/2020  o Urinalysis with Reflex Microscopy if abnormal ([facility]) (87827) - 791.5/R81 - 08/20/2020  o Urine culture (27029, 36664) - 791.5/R81 - 08/20/2020  o ACO-39: Current medications updated and reviewed () - - 08/20/2020  o ACO-15: Pneumococcal Vaccine Administered or Previously Received (4040F) - - 08/20/2020  o ACO-19: Colorectal cancer screening results documented and reviewed (3017F) - - 08/20/2020  o MRI brain multi-sequence wo then w contrast (05227) - 272.4/E78.5, 401.9/I10, 185/C61, 780.4/R42 - 08/20/2020   Decision Support Number: [id number] NPI: [id number] Ordering Provider Name: Hussein Mcdonough Patient Age: 75 Patient Gender: Male Selected Service: MR HEAD/BRAIN W WO CONTRAST Selected Indication(s): Dizziness, persistent/recurrent, cardiac or vascular cause suspected Appropriateness Score: 5 Consultation Results: Adheres [facility] Code:  HCP Modifier: ME  o Echocardiogram - Complete [facility] (90008, 00038, 84519) - 780.4/R42, 785.2/R01.1 - 08/20/2020  o Hgb A1c [facility] (46812) - 791.5/R81 - 08/20/2020  · Medications  o tamsulosin 0.4 mg oral capsule   SIG: take 1 capsule (0.4 mg) by oral route once daily 1/2 hour following the same meal each day for 90 days   DISP: (90) capsules with 1 refills  Prescribed on 08/20/2020     o finasteride 5 mg oral tablet   SIG: take 1 tablet (5 mg) by oral route once daily for 90 days   DISP: (90) tablets with 1 refills  Prescribed on 08/20/2020     o Silenor 3 mg oral tablet   SIG: take 1 tablet by oral route once a day (at bedtime) for 30 days   DISP: (30) tablets with 1 refills  Prescribed on 08/20/2020     o celecoxib 200 mg oral capsule   SIG: TAKE 1 CAPSULE(200 MG) BY MOUTH EVERY DAY   DISP: (90) Capsule with 1 refills  Discontinued on 08/20/2020      o Medications have been Reconciled  o Transition of Care or Provider Policy  · Instructions  o Patient was educated/instructed on their diagnosis, treatment and medications prior to discharge from the clinic today.  o 40 Minutes spent with patient including greater than 50% in Education/Counseling/Care Coordination.  · Disposition  o f/u in 1 month  o labs done in clinic            Electronically Signed by: Amaury Mcdonough MD -Author on August 20, 2020 11:42:41 AM

## 2021-05-10 NOTE — PROCEDURES
Procedure Note      Patient Name: Jamir Porter   Patient ID: [id number]   Sex: Male   YOB: 1945    Primary Care Provider: Juan GONZALEZ    Visit Date: September 14, 2020    Provider: Lenard Ray MD   Location: [facility]   Location Address: [address] [place] [address]  [id number]   Location Phone: [phone]          Cystoscopy Procedure:  The patients urine was viewe d under a microscope during his clinical visit: no RBC present, no WBC present, no Bacteria present.          PROCEDURE: Flexible cystoscope was passed per urethra into the bladder without difficulty after proper consent.     4 centimeters prostate with obstructive looking lateral lobes    Moderate trabeculations throughout.    The bladder was inspected in a systematic meridian fashion. There were no tumors, lesions, stones, or other abnormalities noted within the bladder. Of note, there was no increased vascularity as well. Both ureteral orifices were identified and were normal in appearance. The flexible cystoscope was removed. The patient tolerated the procedure well.           Assessment  · BPH (benign prostatic hyperplasia)     600.00/N40.0      Plan  · Orders  o Cystoscopy (05114) - 600.00/N40.0 - 09/14/2020  · Medications  o Medications have been Reconciled  o Transition of Care or Provider Policy            Electronically Signed by: Lenard Ray MD -Author on September 14, 2020 02:58:48 PM

## 2021-05-10 NOTE — PROCEDURES
Procedure Note      Patient Name: Jamir Porter   Patient ID: [id number]   Sex: Male   YOB: 1945    Primary Care Provider: Juan GONZALEZ    Visit Date: September 25, 2020    Provider: Lenard Ray MD   Location: [facility]   Location Address: [address] [place] [address]  [id number]   Location Phone: [phone]          --------------------Transrectal Ultrasound of the Prostate and/or Prostate Needle Biopsy------------------    The procedure is done for the indication of an elevated PSA. The PSA is known. The patient received an enema prior to the procedure. The patient has also taken Cipro prior to the procedure. Medications that have been stopped include no medications.   He was placed in the left lateral decubitus position. Rectal exam revealed no suspicious lesions. A transrectal ultrasound probe was then lubricated, covered with a condom, and placed into the rectum. The [employer] ultrasound machine at 7.5 MHz was used to perform the ultrasound exam. The prostate was scanned in both transverse and longitudinal fashion. Multiple images were obtained. Local anesthesia was used to infiltrate the neurovascular bundles bilaterally. This consisted of 10 cc of 1% Lidocaine.   The prostate height is 4.4 mm.   The prostate width is 4.7 mm.   The prostate length is 4.8 mm.   The total prostate volume is 53.5 gms.   The appearance of the prostate is normal.   Biopsies were done. 12 biopsies are done using the Biopty gun with an 18 gauge needle, representing the peripheral and transitional zones.   The patient tolerated the procedure well without apparent complications. There was no bleeding at the end of the procedure.   The patients urine was viewed under a microscope during his clinical visit: no RBC present, no WBC present, no Bacteria present.           Assessment  · Elevated prostate specific antigen (PSA)     790.93/R97.20      Plan  · Orders  o Ultrasound guided  biopsy of extremity (96130) - 790.93/R97.20 - 09/25/2020  o Transrectal ultrasound examination (95311) - 790.93/R97.20 - 09/25/2020  o Needle biopsy of prostate (91361) - 790.93/R97.20 - 09/25/2020  o Rocephin Injection 1 Gram (1000mg) (-1) - 790.93/R97.20 - 09/25/2020   Injection - Rocephin 1 Gram; Dose: 1 gram; Site: Right Gluteus; Route: intramuscular; Date: 09/25/2020 13:15:10; Exp: 12/01/2021; Lot: [id number]; Mfg: SANDOZ; TradeName: ceftriaxone; Location: [facility]; Administered By: Esha Marie RN; Comment: Pt tolerated well. AS  o IM/SQ - Injection Fee [facility] (51579) - 790.93/R97.20 - 09/25/2020  · Medications  o Transition of Care or Provider Policy  · Instructions  o Post-op instructions: Avoid strenuous activity and intercourse for 48 hours, resume aspirin-like products in 24 hours as needed and call for any problems.            Electronically Signed by: Lenard Ray MD -Author on September 25, 2020 02:26:53 PM

## 2021-05-10 NOTE — H&P
History and Physical      Patient Name: Jamir Porter   Patient ID: [id number]   Sex: Male   YOB: 1945    Primary Care Provider: Juan GONZALEZ    Visit Date: February 23, 2021    Provider: Lenard Ray MD   Location: [facility]   Location Address: [address] [place] [address]  [id number]   Location Phone: [phone]          Chief Complaint  · Outpatient History & Physical / Surgical Orders      History Of Present Illness  [facility]  Outpatient History and Physical Surgical Orders  Preadmission Location: Phone Preadmission Time: 09:00 AM   Which Facility: [facility] Date: 03/25/2021 Preadmission Testing Date: 02/18/2021   Patient's Name: Jamir Porter YOB: 1945   Chief complaint/history present illness: prostate cancer and urinary retention   Current Medication List: Blood Glucose Monitoring miscellaneous kit, Blood Glucose Test miscellaneous strip, Cipro 500 mg oral tablet, cyclobenzaprine 10 mg oral tablet, FAMOTIDINE 40MG TABLETS, finasteride 5 mg oral tablet, fluoxetine 40 mg oral capsule, Jardiance 25 mg oral tablet, lisinopril 20 mg oral tablet, Synthroid 75 mcg oral tablet, tamsulosin 0.4 mg oral capsule, Trulicity 1.5 mg/0.5 mL subcutaneous pen injector, VSL#3 112.5 billion cell oral capsule, and Zetia 10 mg oral tablet   Allergies: Demerol and SULFA (SULFONAMIDES)   Significant past medical: Allergic rhinitis, Arthritis, Balance problem, Broken Bones, Cancer, Cataracts, bilateral, Chronic Sinusitis, Colon Polyps, Degenerative Disc Disease , Diverticulitis, Forgetfulness, Headache, Hyperlipemia, Hypertension, Limb Swelling, Lumbago, Pacemaker, Prostate CA, Prostate Disorder, Shortness of Breath, and Sleep apnea   Past Surgical History: Amputation, Artificial Joints/Limbs, Back surgery, Colonoscopy, Dental Surgery, Eye Implant, Foot surgery, Joint Surgery, Knee replacement, right, Lumbar fusion, Lumbar  "puncture, Penile implant, Pilonidal Cyst Excision, Rotator Cuff repair, and UPPP (uvulopalatopharyngoplasty)   Examination of heart and lungs: Regular rate, rhythm, no murmur, gallop, rub, Breath sounds normal, no distress, and Abdomen soft, non-tender, BSx4 are positive         Allergy List    Allergies Reconciled  Vitals  Date Time BP Position Site L\R Cuff Size HR RR TEMP (F) WT  HT  BMI kg/m2 BSA m2 O2 Sat FR L/min FiO2        02/23/2021 02:23 PM       15  231lbs 4oz 5'  10\" 33.18 2.28                 Assessment  · Pre-Surgical Orders     V72.84      Plan  · Orders  o General Urology Surgery Order ([facility]) - V72.84 - 03/25/2021  o [facility] Pre-Op Covid-19 Screening (59847) - V72.84 - 03/19/2021  · Medications  o Medications have been Reconciled  o Transition of Care or Provider Policy  · Instructions  o *****Surgical Orders******  o Pre-Operative Orders: Sign permit for Transurethral Resection of the Prostate  o Outpatient   o Apply NATALIO hose Pre-Operatively.  o Levaquin 500 mg IV OCTOR.  o RISK AND BENEFITS:  o Possible risks/complications, benefits and alternatives to surgical or invasive procedure have been explained to patient and/or legal guardian.            Electronically Signed by: Lenard Ray MD -Author on February 23, 2021 04:07:33 PM  "

## 2021-05-12 NOTE — PROGRESS NOTES
Quick Note      Patient Name: Jamir Porter   Patient ID: [id number]   Sex: Male   YOB: 1945    Primary Care Provider: Juan GONZALEZ   Referring Provider: Tonya Nelson PA-C    Visit Date: April 2, 2020    Provider: Syd Gutierrez PA-C   Location: [facility]   Location Address: [address] [place] [address]  [id number]   Location Phone: [phone]          History Of Present Illness  TELEHEALTH VISIT  Chief Complaint: Follow up colonoscopy   Jamir Porter is a 75 year old /White male who is presenting for evaluation via telehealth visit. Verbal consent obtained before beginning visit.   Provider spent 11 minutes with the patient during telehealth visit.   The following staff were present during this visit: Richard Tai MA   Past Medical History/Overview of Patient Symptoms     75-year-old male with history of colon polyps, diverticulitis, and sleep apnea agrees to telehealth visit for follow-up of his colonoscopy results.  He underwent the procedure for bloating, constipation, and a history of colon polyps. Review of the colonoscopy by Dr. Moreno 10/22/2019 showed internal hemorrhoids, long redundant colon, and a 10 mm tubular adenoma successfully removed from the rectum.  Recommended colonoscopy in 3 years.  Overall, the patient reports that he is doing well and denies any GI issues today.           Assessment  · Personal history of colonic polyps     V12.72/Z86.010      Plan  · Orders  o Physician Telephone Evaluation, 11-20 minutes (24125) - V12.72/Z86.010 - 04/02/2020  · Medications  o Medications have been Reconciled  o Transition of Care or Provider Policy  · Instructions  o Plan Of Care:   o Chronic conditions reviewed and taken into consideration for today's treatment plan.  o Patient instructed to seek medical attention urgently for new or worsening symptoms.  o Patient was educated/instructed on their diagnosis, treatment and medications prior to discharge from  the clinic today.  o Overall, the patient is doing well and denies any new GI issues. He will be due for a colon cancer screening in 3 years. He will follow-up as needed.            Electronically Signed by: Syd Gutierrez PA-C -Author on April 2, 2020 08:12:33 AM  Electronically Co-signed by: Demetri Moreno MD -Reviewer on April 6, 2020 10:57:01 AM

## 2021-05-13 NOTE — PROGRESS NOTES
Progress Note      Patient Name: Jamir Porter   Patient ID: [id number]   Sex: Male   YOB: 1945    Primary Care Provider: Juan GONZALEZ    Visit Date: September 21, 2020    Provider: Amaury Mcdonough MD   [facility]   Location Address: [address] [place] [address], [address]  [place], [place]  [id number]   Location Phone: [phone]          Chief Complaint  · Annual Wellness Exam      History Of Present Illness  The patient is a 75 year old /White male who has come to this office for his Annual Wellness Visit.   His Primary Care Provider is Amaury Mcdonough MD. His comprehensive Care Team list, including suppliers, has been updated on the Facesheet. His medical/family history, height, weight, BMI, and blood pressure have been reviewed and are in the chart. The Health Risk Assessment has been completed and scanned in the chart.   Medications are listed in the medication list.   The active problem list includes: Allergic rhinitis, Arthritis, Balance problem, Cataracts, bilateral, Chronic Sinusitis, Degenerative Disc Disease , Hyperlipemia, Hypertension, Prostate CA, and Prostate Disorder   The patient does not have a history of substance use.   Patient reports his diet is adequate.   The Mini-Cog has been administered and is scanned in chart. The results are negative. His cognitive function is without limitation.   A hearing loss screen was completed today and the result is negative.   Patient does not have any risk factors for depression. Patient completed the PHQ-9 today and it has been scanned in the chart. The total score is 1-4.   The Timed Up and Go screen was administered today and the result is positive: Loss of balance with turning.   The [place] Index of [place] in ADLs indicated moderate impairment (score of 3-5).   A Falls Risk Assessment has been completed, including a review of home fall hazards and medication review.   Overall, the patient's  functional ability is noted by this provider to be within normal limits. His level of safety is noted to be within normal limits. His balance/gait is within normal limits. There have been two or more falls in the past year. Patient-specific home safety recommendations have been reviewed and a copy has been given to patient.   He admits issues with leaking urine. This happens frequently and he would not like to discuss this further today.   There are no additional risk factors identified.   Living Will/Advanced Directive previously executed but not in chart.   Personalized health advice was given to the patient and a written health screening schedule was established; see Plan for details.       Past Medical History  Disease Name Date Onset Notes   Allergic rhinitis --  --    Arthritis --  --    Balance problem --  --    Broken Bones --  --    Cancer --  --    Cataracts, bilateral --  --    Chronic Sinusitis --  --    Colon Polyps --  --    Degenerative Disc Disease  --  --    Diverticulitis --  --    Forgetfulness --  --    Headache --  --    Hyperlipemia --  --    Hypertension --  --    Limb Swelling --  --    Lumbago --  --    Pacemaker --  --    Prostate CA --  --    Prostate Disorder --  --    Shortness of Breath --  --    Sleep apnea --  --          Past Surgical History  Procedure Name Date Notes   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    Artificial Joints/Limbs 2012 rt knee   Back surgery --  --    Colonoscopy 2019 --    Dental Surgery --  --    Eye Implant --  yes   Foot surgery --  left   Joint Surgery --  --    Knee replacement, right --  --    Lumbar fusion 2016 [facility]   Lumbar puncture --  --    Penile implant --  --    Pilonidal Cyst Excision --  --    Rotator Cuff repair --  --    UPPP (uvulopalatopharyngoplasty) --  --          Medication List  Name Date Started Instructions   finasteride 5 mg oral tablet 08/20/2020 take 1 tablet (5 mg) by oral route once daily for 90 days   fluoxetine 40 mg oral capsule  06/03/2020 TAKE 1 CAPSULE(40 MG) BY MOUTH EVERY DAY IN THE EVENING   Focus Factor  --    lisinopril 10 mg oral tablet 09/21/2020 take 1 tablet (10 mg) by oral route once daily for 90 days   metformin 500 mg oral tablet 09/21/2020 take 1 tablet (500 mg) by oral route 2 times per day with morning and evening meals for 90 days   Synthroid 75 mcg oral tablet 09/21/2020 take 1 tablet (75 mcg) by oral route once daily for 90 days   tamsulosin 0.4 mg oral capsule 08/20/2020 take 1 capsule (0.4 mg) by oral route once daily 1/2 hour following the same meal each day for 90 days   VSL#3 112.5 billion cell oral capsule 06/30/2020 take 1 capsule by oral route daily for 30 days   Zetia 10 mg oral tablet 09/21/2020 take 1 tablet (10 mg) by oral route once daily for 90 days         Allergy List  Allergen Name Date Reaction Notes   Demerol --  --  --    SULFA (SULFONAMIDES) --  --  --        Allergies Reconciled  Family Medical History  Disease Name Relative/Age Notes   Family history of Arthritis Brother/  Father/  Mother/   Mother; Brother   Family history of cancer Brother/  Father/  Mother/   Mother; Father; Brother         Social History  Finding Status Start/Stop Quantity Notes   Alcohol Use Current some day --/-- --  rarely drinks   Claustophobic Unknown --/-- --  yes   lives with spouse --  --/-- --  --    . --  --/-- --  --    Recreational Drug Use Never --/-- --  no  05/16/2019 - 01/15/2019 - no   Retired. --  --/-- --  --    Tobacco Former --/-- 1.5 ppd former smoker quit 1979, 17 years total         Immunizations  NameDate Admin Mfg Trade Name Lot Number Route Inj VIS Given VIS Publication   Dsyewssor60/21/2020 PMC FLUZONE-HIGH DOSE [id number] IM RD 09/21/2020    Comments: Pt tolerated well and left office in stable condition   Dbynscfxo77/01/2019 SKB Fluarix, quadrivalent, preservative free [id number] NE NE 12/30/2019    Comments:    Prevnar 1312/30/2019 [facility] PREVNAR 13 [id number] IM  12/30/2019    Comments: Pt tolerated the  "injection well.         Vitals  Date Time BP Position Site L\R Cuff Size HR RR TEMP (F) WT  HT  BMI kg/m2 BSA m2 O2 Sat HC       09/21/2020 10:27 /90 Sitting    93 - R  97.1 245lbs 4oz 5'  10\" 35.19 2.34 94 %              Assessment  · Prostate CA     185/C61  · Chronic Sinusitis     473.8  · Prostate Disorder     602.9/N42.9  · Arthritis     V13.4/V13.4  · Cataracts, bilateral     366.9/H26.9  · Hyperlipemia     272.4/E78.5  · Allergic rhinitis     477.9/J30.9  · Degenerative Disc Disease      722.6  · Balance problem     781.99/R26.89  · Hypertension     401.9/I10  · Encounter for Medicare annual wellness exam     V70.0/Z00.00  · Screening for depression     V79.0/Z13.89  · Screening for alcoholism     V79.1/Z13.39  · Advanced care planning/counseling discussion     V65.49/Z71.89    Problems Reconciled  Plan  · Orders  o Falls Risk Assessment Completed (3288F) - V70.0/Z00.00 - 09/21/2020  o Brief hearing screening (written) [facility] () - V70.0/Z00.00 - 09/21/2020  o Annual Wellness Visit-includes a Personalized Prevention Plan of Service (PPS), SUBSEQUENT VISIT (Medicare) () - V70.0/Z00.00 - 09/21/2020  o ACO-13: Fall Risk Screening with 2 or more falls in past year or any fall with injury in the past year (1100F) - V70.0/Z00.00 - 09/21/2020  o Presence or absence of urinary incontinence assessed (ESE) (1090F) - V70.0/Z00.00 - 09/21/2020  o ACO-18: Negative screen for clinical depression using a standardized tool () - V79.0/Z13.89 - 09/21/2020  o Negative alcohol screening () - V79.1/Z13.39 - 09/21/2020  o ACO-39: Current medications updated and reviewed () - - 09/21/2020  o ACO-19: Colorectal cancer screening results documented and reviewed (3017F) - - 09/21/2020  o Influenza immunization was ordered or administered () - - 09/21/2020  o ACO-15: Pneumococcal Vaccine Administered or Previously Received (4040F) - - 09/21/2020  o ACO - Pt declines to or was not able to provide an " Advance Care Plan or name a Surrogate Decision Maker (1124F) - - 09/21/2020   has but not scanned in chart  · Medications  o Medications have been Reconciled  o Transition of Care or Provider Policy  · Instructions  o Health Risk Assessment has been reviewed with the patient.  o Written health screening schedule for next 5-10 years was established with patient; information scanned in chart and given/mailed to patient.  o Fall prevention methods discussed and a copy of recommendations given/mailed to patient.  o Today's PHQ-9 score is: 3___            Electronically Signed by: Amaury Mcdonough MD -Author on September 21, 2020 04:40:43 PM

## 2021-05-13 NOTE — PROGRESS NOTES
"   Progress Note      Patient Name: Jamir Porter   Patient ID: [id number]   Sex: Male   YOB: 1945    Primary Care Provider: Juan GONZALEZ    Visit Date: September 21, 2020    Provider: Amaury Mcdonough MD   Location: [facility]   Location Address: [address] [place] [address], [address]  [place], [place]  [id number]   Location Phone: [phone]          Chief Complaint  · \" high a1c and concerned with the number \"      History Of Present Illness  Jamir Porter is a 75 year old /White male who presents for evaluation and treatment of:      HTN- pt previously on HCTZ, but stopped it 2/2 side effects. pt denies HAs, but is dizzy. no CP.    DM2- recent Dx. not previously on meds.   HLD- pt previously on statins x3. pt not previously on zetia.   hypothyroid- pt has not started synthroid.   prostate Ca- cont f/u with urology. recent check-up most reassuring. pt reports possible upcoming prostate Bx.   lumbago - s/p spinal laminectomy in 2015. cont inued intermittent back pain.    pt cont to have balance issues. echo and brain MRI were reassuring.       Past Medical History  Disease Name Date Onset Notes   Allergic rhinitis --  --    Arthritis --  --    Balance problem --  --    Broken Bones --  --    Cancer --  --    Cataracts, bilateral --  --    Chronic Sinusitis --  --    Colon Polyps --  --    Degenerative Disc Disease  --  --    Diverticulitis --  --    Forgetfulness --  --    Headache --  --    Hyperlipemia --  --    Hypertension --  --    Limb Swelling --  --    Lumbago --  --    Pacemaker --  --    Prostate CA --  --    Prostate Disorder --  --    Shortness of Breath --  --    Sleep apnea --  --          Past Surgical History  Procedure Name Date Notes   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    Artificial Joints/Limbs 2012 rt knee   Back surgery --  --    Colonoscopy 2019 --    Dental Surgery --  --    Eye Implant --  yes   Foot surgery --  left   Joint Surgery --  --    Knee replacement, " right --  --    Lumbar fusion 2016 [facility]   Lumbar puncture --  --    Penile implant --  --    Pilonidal Cyst Excision --  --    Rotator Cuff repair --  --    UPPP (uvulopalatopharyngoplasty) --  --          Medication List  Name Date Started Instructions   finasteride 5 mg oral tablet 08/20/2020 take 1 tablet (5 mg) by oral route once daily for 90 days   fluoxetine 40 mg oral capsule 06/03/2020 TAKE 1 CAPSULE(40 MG) BY MOUTH EVERY DAY IN THE EVENING   Focus Factor  --    tamsulosin 0.4 mg oral capsule 08/20/2020 take 1 capsule (0.4 mg) by oral route once daily 1/2 hour following the same meal each day for 90 days   VSL#3 112.5 billion cell oral capsule 06/30/2020 take 1 capsule by oral route daily for 30 days         Allergy List  Allergen Name Date Reaction Notes   Demerol --  --  --    SULFA (SULFONAMIDES) --  --  --          Family Medical History  Disease Name Relative/Age Notes   Family history of Arthritis Brother/  Father/  Mother/   Mother; Brother   Family history of cancer Brother/  Father/  Mother/   Mother; Father; Brother         Social History  Finding Status Start/Stop Quantity Notes   Alcohol Use Current some day --/-- --  rarely drinks   Claustophobic Unknown --/-- --  yes   lives with spouse --  --/-- --  --    . --  --/-- --  --    Recreational Drug Use Never --/-- --  no  05/16/2019 - 01/15/2019 - no   Retired. --  --/-- --  --    Tobacco Former --/-- 1.5 ppd former smoker quit 1979, 17 years total         Immunizations  NameDate Admin Mfg Trade Name Lot Number Route Inj VIS Given VIS Publication   Mpcmcomyz18/21/2020 PMC FLUZONE-HIGH DOSE [id number] IM RD 09/21/2020    Comments: Pt tolerated well and left office in stable condition   Cidclhmoz97/01/2019 SKB Fluarix, quadrivalent, preservative free 2A2KX NE NE 12/30/2019    Comments:    Prevnar 1312/30/2019 [facility] PREVNAR 13 [id number] IM  12/30/2019    Comments: Pt tolerated the injection well.         Review of  "Systems  · Constitutional  o Denies  o : fever, fatigue, weight loss, weight gain  · HENT  o Admits  o : lightheadedness  o Denies  o : headaches  · Cardiovascular  o Denies  o : lower extremity edema, chest pressure, palpitations  · Respiratory  o Denies  o : shortness of breath, wheezing, frequent cough, dyspnea on exertion  · Gastrointestinal  o Denies  o : nausea, vomiting, diarrhea, constipation, abdominal pain      Vitals  Date Time BP Position Site L\R Cuff Size HR RR TEMP (F) WT  HT  BMI kg/m2 BSA m2 O2 Sat        09/21/2020 10:27 /90 Sitting    93 - R  97.1 245lbs 4oz 5'  10\" 35.19 2.34 94 %          Physical Examination  · Constitutional  o Appearance  o : no acute distress, well-nourished  · Head and Face  o Head  o :   § Inspection  § : atraumatic, normocephalic  · Eyes  o Eyes  o : extraocular movements intact, no scleral icterus, no conjunctival injection  · Ears, Nose, Mouth and Throat  o Ears  o :   § External Ears  § : normal  o Nose  o :   § Intranasal Exam  § : nares patent  o Oral Cavity  o :   § Oral Mucosa  § : moist mucous membranes  · Respiratory  o Respiratory Effort  o : breathing comfortably, symmetric chest rise  o Auscultation of Lungs  o : clear to asculatation bilaterally, no wheezes, rales, or rhonchii  · Cardiovascular  o Heart  o :   § Auscultation of Heart  § : regular rate and rhythm, no murmurs, rubs, or gallops  o Peripheral Vascular System  o :   § Extremities  § : no edema  · Neurologic  o Mental Status Examination  o :   § Orientation  § : grossly oriented to person, place and time  o Gait and Station  o :   § Gait Screening  § : normal gait  · Psychiatric  o General  o : normal mood and affect              Assessment  · Prostate CA     185/C61  cont f/u with urology. cont finasteride and Flomax.   · Hyperlipemia     272.4/E78.5  intolerant to statins previously. will Rx zetia and monitor for effectiveness.   · Hypertension     401.9/I10  uncontrolled today. will " start Lisinopril with concurrent DM and monitor. f/u in 2-3 weeks for repeat eval. consider repeat BMP at that time.   · Need for influenza vaccination     V04.81/Z23  · Diabetes mellitus, type 2     250.00/E11.9  start metformin. 500mg po BID with meals. prolonged discussion regarding disease, prognosis, and diet goals with intention for eight loss. f/u in 2 weeks for repeat eval.   · Hypothyroidism     244.9/E03.9  start synthroid. recheck TSH in 2 months.   · Imbalance     781.2/R26.89  possibly 2/2 DM, dehydration, HTN, hypothyroid. will work to correct these issues. brain MRI and echo reviewed and reassuring. cont monitoring.     Problems Reconciled  Plan  · Orders  o Immunization Admin Fee (Single) ([facility]) (49748) - V04.81/Z23 - 09/21/2020  o Fluzone High Dose Flu Vaccine (35142) - V04.81/Z23 - 09/21/2020   Vaccine - Influenza; Dose: 0.5; Site: Right Deltoid; Route: Intramuscular; Date: 09/21/2020 11:55:00; Exp: 06/30/2021; Lot: [id number]; Mfg: sanofi pasteur; TradeName: FLUZONE-HIGH DOSE; Administered By: Dede Mitchell MA; Comment: Pt tolerated well and left office in stable condition  o ACO-14: Influenza immunization administered or previously received () - V04.81/Z23 - 09/21/2020  o Diabetic Dilated Eye Exam Consult with Ophthalmology/Optometry (DMEYE) - 250.00/E11.9 - 09/21/2020  o ACO-39: Current medications updated and reviewed () - - 09/21/2020  o ACO-15: Pneumococcal Vaccine Administered or Previously Received (4040F) - - 09/21/2020  o ACO-19: Colorectal cancer screening results documented and reviewed (3017F) - - 09/21/2020  · Medications  o metformin 500 mg oral tablet   SIG: take 1 tablet (500 mg) by oral route 2 times per day with morning and evening meals for 90 days   DISP: (180) tablets with 3 refills  Prescribed on 09/21/2020     o Zetia 10 mg oral tablet   SIG: take 1 tablet (10 mg) by oral route once daily for 90 days   DISP: (90) tablets with 3 refills  Prescribed on 09/21/2020      o Synthroid 75 mcg oral tablet   SIG: take 1 tablet (75 mcg) by oral route once daily for 90 days   DISP: (90) tablets with 1 refills  Prescribed on 09/21/2020     o lisinopril 10 mg oral tablet   SIG: take 1 tablet (10 mg) by oral route once daily for 90 days   DISP: (90) tablets with 1 refills  Prescribed on 09/21/2020     o Cipro 500 mg oral tablet   SIG: take 1 tablet (500 mg) by oral route 2 times per day for 3 days   DISP: (6) tablets with 0 refills  Discontinued on 09/21/2020     o Medications have been Reconciled  o Transition of Care or Provider Policy  · Instructions  o Patient was educated/instructed on their diagnosis, treatment and medications prior to discharge from the clinic today.  o 40 Minutes spent with patient including greater than 50% in Education/Counseling/Care Coordination.  · Disposition  o f/u 2 weeks  o Care Transition  o [facility] Sent            Electronically Signed by: Amaury Mcdonough MD -Author on September 21, 2020 04:48:48 PM

## 2021-05-13 NOTE — PROGRESS NOTES
"   Progress Note      Patient Name: Jamir Porter   Patient ID: [id number]   Sex: Male   YOB: 1945    Primary Care Provider: Juan GONZALEZ   Referring Provider: Tonya Nelson PA-C    Visit Date: August 24, 2020    Provider: Lenard Ray MD   Location: [facility]   Location Address: [address] [place] [address]  [id number]   Location Phone: [phone]          Chief Complaint  · pt here for urologic issues      History Of Present Illness     60    PVR    5/18   115  2/18   84    H/o prostate CA      8/20  3.56  8/20  MRI  prostate - 58 g , neg  1/20  0.91    8/28/2019 MRI ogfkerjj02 g -negative    2/19  1.40   7/18  2.32 -on finasteride  7/18 MRI gwnylues22 g, no targetable lesion seen  5/18  5.09  1/18  5.41  10/17 5.5  4/17  prostate dtyoas27 g - left, 3+3, 50% of core, 5 mm; right - high-grade PIN  1/17  5.5  10/16 4.9    10/16 MRI cjrksmjg60 g, no targetable lesions seen.    5/16 4.4  4/16 prostate voxryo61 g   Pathology  Right basenegative  Right base lateral 3+3, 20%, 4 mm  Right mid, mid lateral, apex, apex lateralnegative  Left basenegative  Left mid3+3, 10%  Left mid lateralnegative  Left apex3+3, 15%   left apex3+3, 10%    2/16 5.39    1/14 3.68    \">75-year-old  gentleman with a history of prostate cancer clinical T1c, on active surveillance, patient also with ED, penile prosthesis and recently found right adrenal nodule    Patient is still having some trouble with frequency and had some episodes of incontinence.   still on Flomax and  finasteride.  Nocturia  X 3.  InContinence is bothersome,  Is not wearing pads at this time    2 caffeinated beverages daily    oxybutynin  - did stop his incontinence but did give him side effects that he could not tolerate, GI.  Myrebetriq Cause diarrhea    Did not try Vesicare    Patient is very bothered by urination at this time, things are little worse.  Wanting something more aggressive done with her knees treating his " prostate cancer.      PVR        25    Previous    2019 CT abdomen with and withoutadrenal mass protocolsmall right 0.9 cm adrenal nodule.  Favor adrenal adenoma.   cortisol less than 1.0, plasma metanephrines negative  7/3/2019 CT abdomen/pelvis with9 mm right adrenal nodule.  Which demonstrates enhancement above the background of the adrenal gland.  Penile prosthesis reservoir partially herniates the left inguinal ring.      flomax - side effects, could not tolerated    Father  pancreatic CA,  brother with prostate cancer.      Penile prothesis - functions, he does not use.    No cardiopulmonary history.  Patient does not smoke.  He is on ibuprofen for back pain. No DM.  Father  at 64, grandfather  at 85      creatinine 0.9, GFR > 60    PVR       115     84    H/o prostate CA        3.56    MRI  prostate - 58 g , neg    0.91    2019 MRI hfreohov36 g -negative      1.40     2.32 -on finasteride   MRI ukeunajd30 g, no targetable lesion seen    5.09    5.41  10/17 5.5    prostate wmirvw30 g - left, 3+3, 50% of core, 5 mm; right - high-grade PIN    5.5  10/16 4.9    10/16 MRI msnjessr26 g, no targetable lesions seen.     4.4   prostate zogaij44 g   Pathology  Right basenegative  Right base lateral 3+3, 20%, 4 mm  Right mid, mid lateral, apex, apex lateralnegative  Left basenegative  Left mid3+3, 10%  Left mid lateralnegative  Left apex3+3, 15%   left apex3+3, 10%     5.39     3.68           Past Medical History  Allergic rhinitis; Arthritis; Balance problem; Broken Bones; Cancer; Cataracts, bilateral; Chronic Sinusitis; Colon Polyps; Degenerative Disc Disease ; Diverticulitis; Forgetfulness; Headache; Hyperlipemia; Hypertension; Limb Swelling; Lumbago; Pacemaker; Prostate CA; Prostate Disorder; Shortness of Breath; Sleep apnea         Past Surgical History  Amputation; Artificial Joints/Limbs; Back surgery; Colonoscopy;  "Dental Surgery; Eye Implant; Foot surgery; Joint Surgery; Knee replacement, right; Lumbar fusion; Lumbar puncture; Penile implant; Pilonidal Cyst Excision; Rotator Cuff repair; UPPP (uvulopalatopharyngoplasty)         Medication List  Cipro 500 mg oral tablet; finasteride 5 mg oral tablet; fluoxetine 40 mg oral capsule; Focus Factor; ibuprofen 600 mg oral tablet; Silenor 3 mg oral tablet; tamsulosin 0.4 mg oral capsule; VSL#3 112.5 billion cell oral capsule         Allergy List  Demerol; SULFA (SULFONAMIDES)         Family Medical History  Family history of Arthritis; Family history of cancer         Social History  Alcohol Use (Current some day); Claustophobic (Unknown); lives with spouse; .; Recreational Drug Use (Never); Retired.; Tobacco (Former)         Immunizations  Name Date Admin   Influenza    Prevnar 13          Review of Systems  · Constitutional  o Denies  o : chills  · Respiratory  o Denies  o : cough  · Gastrointestinal  o Denies  o : nausea      Vitals  Date Time BP Position Site L\R Cuff Size HR RR TEMP (F) WT  HT  BMI kg/m2 BSA m2 O2 Sat        08/24/2020 10:07 AM       18  245lbs 0oz 5'  10\" 35.15 2.34           Physical Examination  · Constitutional  o Appearance  o : Well-appearing, well-developed, in no acute distress  · Respiratory  o Respiratory Effort  o : Unlabored breathing  · Neurologic  o Mental Status Examination  o :   § Orientation  § : Grossly oriented to person, place and time, judgment and insight intact, normal mood and affect          Results  · In-Office Procedures  o Surgical procedure  § IOP - Bladder Scan/Residual Urine (05262)   § Specimen vol Ur: 31       Assessment  · Prostate CA     185/C61  · Benign prostatic hyperplasia with weak urinary stream       Benign prostatic hyperplasia with lower urinary tract symptoms     600.01/N40.1  Poor urinary stream     600.01/R39.12  · Urge incontinence     788.31/N39.41  · Elevated PSA     790.93/R97.20  · Adrenal " abnormality     255.9/E27.9    Problems Reconciled  Plan  · Medications  o Medications have been Reconciled  o Transition of Care or Provider Policy  · Instructions  o Electronically Identified Patient Education Materials Provided Electronically             Urge incontinence/BPH    Patient more bothered, continue Flomax and finasteride.  Refilled today    Not wanting any more medication for overactive bladder as he is had some problems with this in the past    Patient is interested in Rezum or TURP.  I did discuss we have to see what is going on with his prostate cancer before we can consider this as this would leave him with only radiation is an option for treating prostate cancer in the future.    Prostate cancer on active surveillance    MRI and PSA reviewed, stable.  I will have patient follow-up for prostate biopsy per active surveillance protocol    Discussed the natural history of prostate cancer and also prostate cancer screening.  We discussed his elevated PSA.  After risk and benefits were discussed the patient would like to proceed with prostate biopsy.  Risk of bleeding in the urine/semen/stool was discussed and also the 3% risk of sepsis.  We discussed the risk of severe rectal bleeding and also the risk of urinary retention. Patient voiced understanding and would like to proceed.    Pt to come in 1 hour early for abx inj    3 days ciprofloxacin given to be taken olaf-procedural    Patient will come in for cystoscopy to assess for BPH/prostate size and candidate for Rezum and also prostate biopsy.  Once his pathology comes back we can discuss whether or not we need to treat his prostate cancer.  Patient has a lot of trouble with urgency and bother from frequency and urgency which would likely give him more trouble if he had radiation unless he had a TURP before.  We also discussed that prostatectomy has  several months to a year of incontinence and patient is  bothered by incontinence so would have to  discuss this in detail before we move forward either course of action.  Patient voiced understanding    adrenal lesion    CT abdomen/pelvis adrenal mass protocol toward the end of this year.      Greater than 20minutes was used in counseling and coordination of care, with greater than 51% of this in face-to-face counseling             Electronically Signed by: Lenard Ray MD -Author on August 24, 2020 04:35:01 PM

## 2021-05-13 NOTE — PROGRESS NOTES
Progress Note      Patient Name: Jamir Porter   Patient ID: [id number]   Sex: Male   YOB: 1945    Primary Care Provider: Juan GONZALEZ   Referring Provider: Tonya Nelson PA-C    Visit Date: June 30, 2020    Provider: Syd Gutierrez PA-C   Location: [facility]   Location Address: [address] [place] [address]  [id number]   Location Phone: [phone]          Chief Complaint  · Follow-up      History Of Present Illness     75-year-old male with history of colon polyps, diverticulitis, sleep apnea returns for follow-up.  He had a recent colonoscopy by Dr. Moreno 10/2019 showed internal hemorrhoids, long redundant colon, and a 10 mm tubular adenoma sessile removed in the rectum.  Recommended colonoscopy in 3 years.  He has developed flatulence over the last few months.  He is on a fiber pill 2 pills a day.  He reports that he is moderately constipated.  He is concerned about possible cancer.  He has a bowel movement daily, but does have occasional constipation.       Past Medical History  Allergic rhinitis; Arthritis; Balance problem; Broken Bones; Cancer; Cataracts, bilateral; Chronic Sinusitis; Colon Polyps; Degenerative Disc Disease ; Diverticulitis; Forgetfulness; Hyperlipemia; Hypertension; Limb Swelling; Lumbago; Prostate CA; Prostate Disorder; Shortness of Breath; Sleep apnea         Past Surgical History  Amputation; Artificial Joints/Limbs; Back surgery; Colonoscopy; Dental Surgery; Eye Implant; Foot surgery; Joint Surgery; Knee replacement, right; Lumbar fusion; Lumbar puncture; Penile implant; Pilonidal Cyst Excision; Rotator Cuff repair; UPPP (uvulopalatopharyngoplasty)         Medication List  celecoxib 200 mg oral capsule; finasteride 5 mg oral tablet; fluoxetine 40 mg oral capsule; hydrochlorothiazide 25 mg oral tablet; ibuprofen 600 mg oral tablet; Prozac 40 mg oral capsule; Vesicare 5 mg oral tablet         Allergy List  Demerol; SULFA (SULFONAMIDES)       Allergies  "Reconciled  Family Medical History  Family history of Arthritis; Family history of cancer         Social History  Alcohol Use (Current some day); Claustophobic (Unknown); lives with spouse; .; Recreational Drug Use (Never); Retired.; Tobacco (Former)         Review of Systems  · Constitutional  o Denies  o : chills, fever  · Cardiovascular  o Denies  o : chest pain  · Respiratory  o Denies  o : shortness of breath  · Gastrointestinal  o Denies  o : nausea, vomiting, dysphagia  · Endocrine  o Denies  o : weight gain, weight loss      Vitals  Date Time BP Position Site L\R Cuff Size HR RR TEMP (F) WT  HT  BMI kg/m2 BSA m2 O2 Sat HC       06/30/2020 01:08 /72 Sitting    103 - R 16  246lbs 0oz 5'  10\" 35.3 2.35 99 %          Physical Examination  · Constitutional  o Appearance  o : Healthy-appearing, awake and alert in no acute distress  · Head and Face  o Head  o : Normocephalic with no worriesome skin lesions  · Eyes  o Vision  o :   § Visual Fields  § : eyes move symmetrical in all directions  o Sclerae  o : sclerae anicteric  · Neck  o Inspection/Palpation  o : Trachea is midline, no adenopathy  · Respiratory  o Respiratory Effort  o : Breathing is unlabored.  o Inspection of Chest  o : normal appearance  o Auscultation of Lungs  o : Chest is clear to auscultation bilaterally.  · Cardiovascular  o Heart  o :   § Auscultation of Heart  § : no murmurs, rubs, or gallops  o Peripheral Vascular System  o :   § Extremities  § : no cyanosis, clubbing or edema;   · Gastrointestinal  o Abdominal Examination  o : Abdomen is soft, nontender to palpation, with normal active bowel sounds, no appreciable hepatosplenomegaly.          Assessment  · Abdominal Pain, Generalized     789.07/R10.84  · Bloating     787.3/R14.0      Plan  · Medications  o VSL#3 112.5 billion cell oral capsule   SIG: take 1 capsule by oral route daily for 30 days   DISP: (30) capsules with 3 refills  Prescribed on 06/30/2020     o Medications " have been Reconciled  o Transition of Care or Provider Policy  · Instructions  o 75-year-old with generalized abdominal pain excessive flatulence. I recommend he increase his fiber to 3 pills daily. I will add a trial of VSL. I will schedule the patient for abdominal ultrasound. His follow-up will be determined off imaging. We discussed etiology of bloating such dietary contributions, constipation, and GERD.             Electronically Signed by: Syd Gutierrez PA-C -Author on June 30, 2020 01:29:52 PM  Electronically Co-signed by: Demetri Moreno MD -Reviewer on July 1, 2020 04:47:06 PM

## 2021-05-13 NOTE — PROGRESS NOTES
"   Progress Note      Patient Name: Jamir Porter   Patient ID: [id number]   Sex: Male   YOB: 1945    Primary Care Provider: Juan GONZALEZ    Visit Date: October 2, 2020    Provider: Amaury Mcdonough MD   Location: [facility]   Location Address: [address] [place] [address], [address]  [place], [place]  [id number]   Location Phone: [phone]          Chief Complaint  · \"were following up on medication\"  · \"the metformin isnt working for me\"      History Of Present Illness  Jamir Porter is a 75 year old /White male who presents for evaluation and treatment of:      DM- pt reports metformin causing GI side effects. pt has been focusing on dietary regimen. pt with goals to lose weight.   HTN- pt denies HAs, dizziness, CP  HLD- doing well on zetia.   hypothyroid- doing well with levothyroxine       Past Medical History  Disease Name Date Onset Notes   Allergic rhinitis --  --    Arthritis --  --    Balance problem --  --    Broken Bones --  --    Cancer --  --    Cataracts, bilateral --  --    Chronic Sinusitis --  --    Colon Polyps --  --    Degenerative Disc Disease  --  --    Diverticulitis --  --    Forgetfulness --  --    Headache --  --    Hyperlipemia --  --    Hypertension --  --    Limb Swelling --  --    Lumbago --  --    Pacemaker --  --    Prostate CA --  --    Prostate Disorder --  --    Shortness of Breath --  --    Sleep apnea --  --          Past Surgical History  Procedure Name Date Notes   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    Artificial Joints/Limbs 2012 rt knee   Back surgery --  --    Colonoscopy 2019 --    Dental Surgery --  --    Eye Implant --  yes   Foot surgery --  left   Joint Surgery --  --    Knee replacement, right --  --    Lumbar fusion 2016 [facility]   Lumbar puncture --  --    Penile implant --  --    Pilonidal Cyst Excision --  --    Rotator Cuff repair --  --    UPPP (uvulopalatopharyngoplasty) --  --          Medication List  Name Date Started " Instructions   finasteride 5 mg oral tablet 08/20/2020 take 1 tablet (5 mg) by oral route once daily for 90 days   fluoxetine 40 mg oral capsule 09/28/2020 TAKE 1 CAPSULE(40 MG) BY MOUTH EVERY DAY IN THE EVENING   Focus Factor  --    lisinopril 10 mg oral tablet 09/21/2020 take 1 tablet (10 mg) by oral route once daily for 90 days   metformin 500 mg oral tablet 09/21/2020 take 1 tablet (500 mg) by oral route 2 times per day with morning and evening meals for 90 days   Synthroid 75 mcg oral tablet 09/21/2020 take 1 tablet (75 mcg) by oral route once daily for 90 days   tamsulosin 0.4 mg oral capsule 08/20/2020 take 1 capsule (0.4 mg) by oral route once daily 1/2 hour following the same meal each day for 90 days   VSL#3 112.5 billion cell oral capsule 06/30/2020 take 1 capsule by oral route daily for 30 days   Zetia 10 mg oral tablet 09/21/2020 take 1 tablet (10 mg) by oral route once daily for 90 days         Allergy List  Allergen Name Date Reaction Notes   Demerol --  --  --    SULFA (SULFONAMIDES) --  --  --        Allergies Reconciled  Family Medical History  Disease Name Relative/Age Notes   Family history of Arthritis Brother/  Father/  Mother/   Mother; Brother   Family history of cancer Brother/  Father/  Mother/   Mother; Father; Brother         Social History  Finding Status Start/Stop Quantity Notes   Alcohol Use Current some day --/-- --  rarely drinks   Claustophobic Unknown --/-- --  yes   lives with spouse --  --/-- --  --    . --  --/-- --  --    Recreational Drug Use Never --/-- --  no  05/16/2019 - 01/15/2019 - no   Retired. --  --/-- --  --    Tobacco Former --/-- 1.5 ppd former smoker quit 1979, 17 years total         Immunizations  NameDate Admin Mfg Trade Name Lot Number Route Inj VIS Given VIS Publication   [employer]urz[employer]x20/21/2020 [facility] FLUZONE-HIGH DOSE [id number] IM RD 09/21/2020    Comments: Pt tolerated well and left office in stable condition   Prevnar 1312/30/2019 [facility] PREVNAR 13 [id number] IM   "12/30/2019    Comments: Pt tolerated the injection well.         Review of Systems  · Constitutional  o Denies  o : fever, fatigue, weight loss, weight gain  · HENT  o Denies  o : headaches, lightheadedness  · Cardiovascular  o Denies  o : lower extremity edema, chest pressure, palpitations  · Respiratory  o Denies  o : shortness of breath, wheezing, cough, dyspnea on exertion  · Gastrointestinal  o Denies  o : nausea, vomiting, diarrhea, constipation, abdominal pain      Vitals  Date Time BP Position Site L\R Cuff Size HR RR TEMP (F) WT  HT  BMI kg/m2 BSA m2 O2 Sat FR L/min FiO2 HC       08/24/2020 10:07 AM       18  245lbs 0oz 5'  10\" 35.15 2.34       09/21/2020 10:27 /90 Sitting    93 - R  97.1 245lbs 4oz 5'  10\" 35.19 2.34 94 %      10/02/2020 09:55 /98 Sitting    98 - R  97.9 242lbs 16oz 5'  10\" 34.87 2.33 99 %  21%          Physical Examination  · Constitutional  o Appearance  o : no acute distress, well-nourished  · Head and Face  o Head  o :   § Inspection  § : atraumatic, normocephalic  · Eyes  o Eyes  o : extraocular movements intact, no scleral icterus, no conjunctival injection  · Ears, Nose, Mouth and Throat  o Ears  o :   § External Ears  § : normal  o Nose  o :   § Intranasal Exam  § : nares patent  o Oral Cavity  o :   § Oral Mucosa  § : moist mucous membranes  · Respiratory  o Respiratory Effort  o : breathing comfortably, symmetric chest rise  o Auscultation of Lungs  o : clear to asculatation bilaterally, no wheezes, rales, or rhonchii  · Cardiovascular  o Heart  o :   § Auscultation of Heart  § : regular rate and rhythm, no murmurs, rubs, or gallops  o Peripheral Vascular System  o :   § Extremities  § : no edema  · Neurologic  o Mental Status Examination  o :   § Orientation  § : grossly oriented to person, place and time  o Gait and Station  o :   § Gait Screening  § : normal gait  · Psychiatric  o General  o : normal mood and affect          Assessment  · Diabetes mellitus, type " 2     250.00/E11.9  add jardiance. educated on potential risk of UTIs and genital skin infections with SGLT-2. side effects with metofrmin IR. encouraged cont diet control and weight loss.   · Hypothyroidism     244.9/E03.9  check TSH with next labs. doing well currently.   · Hyperlipemia     272.4/E78.5  cont zetia. check lipids with next labs  · Hypertension     401.9/I10  elevated again today. will inc Lisinopril to 20mg and hope for improvement with addition of jardiance as well.       Plan  · Orders  o ACO-39: Current medications updated and reviewed (1159F, ) - - 10/02/2020  o ACO-15: Pneumococcal Vaccine Administered or Previously Received [facility] (4040F) - - 10/02/2020  o ACO-14: Influenza immunization administered or previously received [facility] () - - 10/02/2020  · Medications  o lisinopril 20 mg oral tablet   SIG: take 1 tablet (20 mg) by oral route once daily for 90 days   DISP: (90) Tablet with 1 refills  Adjusted on 10/02/2020     o Jardiance 25 mg oral tablet   SIG: take 1 tablet (25 mg) by oral route once daily in the morning for 90 days   DISP: (90) Tablet with 3 refills  Adjusted on 10/02/2020     o Medications have been Reconciled  o Transition of Care or Provider Policy  · Instructions  o Patient was educated/instructed on their diagnosis, treatment and medications prior to discharge from the clinic today.  · Disposition  o f/u 2 weeks            Electronically Signed by: Amaury Mcdonough MD -Author on October 2, 2020 10:38:26 AM

## 2021-05-13 NOTE — PROGRESS NOTES
Progress Note      Patient Name: Jamir Porter   Patient ID: [id number]   Sex: Male   YOB: 1945    Primary Care Provider: Juan GONZALEZ    Visit Date: November 19, 2020    Provider: Amaury Mcdonough MD   Location: [facility]   Location Address: [address] [place] [address], [address]  [place], [place]  [id number]   Location Phone: [phone]          Chief Complaint  · cough x 2 weeks, no other symptoms. mostly at night when lying down      History Of Present Illness  Jamir Porter is a 75 year old /White male who presents for evaluation and treatment of:      pt reports having cough for 2-3 weeks with clear sputum production. pt denies fevers, chest pains, SOB, orthopnea. pt denies sick contacts, but pt attends [employer]. pt notices cough moreso at nighttime when lying down.   DM2- most recent HgbA1c still elevated. pt has lost a few pounds. pt requests glucometer and test strips.   pt cont to reports dizziness intermittently and having poor balance. pt reports worse with quick turn around and orthostatics.    HTN- pt denies HAs, CP. dizziness has been persistent even before starting antihypertensives.    prostate Ca- cont to f/u with urology.       Past Medical History  Disease Name Date Onset Notes   Allergic rhinitis --  --    Arthritis --  --    Balance problem --  --    Broken Bones --  --    Cancer --  --    Cataracts, bilateral --  --    Chronic Sinusitis --  --    Colon Polyps --  --    Degenerative Disc Disease  --  --    Diverticulitis --  --    Forgetfulness --  --    Headache --  --    Hyperlipemia --  --    Hypertension --  --    Limb Swelling --  --    Lumbago --  --    Pacemaker --  --    Prostate CA --  --    Prostate Disorder --  --    Shortness of Breath --  --    Sleep apnea --  --          Past Surgical History  Procedure Name Date Notes   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    Artificial Joints/Limbs 2012 rt knee   Back surgery --  --    Colonoscopy 2019 --    Dental  Surgery --  --    Eye Implant --  yes   Foot surgery --  left   Joint Surgery --  --    Knee replacement, right --  --    Lumbar fusion 2016 [facility]   Lumbar puncture --  --    Penile implant --  --    Pilonidal Cyst Excision --  --    Rotator Cuff repair --  --    UPPP (uvulopalatopharyngoplasty) --  --          Medication List  Name Date Started Instructions   finasteride 5 mg oral tablet 08/20/2020 take 1 tablet (5 mg) by oral route once daily for 90 days   fluoxetine 40 mg oral capsule 09/28/2020 TAKE 1 CAPSULE(40 MG) BY MOUTH EVERY DAY IN THE EVENING   Focus Factor  --    Jardiance 25 mg oral tablet 10/02/2020 take 1 tablet (25 mg) by oral route once daily in the morning for 90 days   lisinopril 20 mg oral tablet 10/02/2020 take 1 tablet (20 mg) by oral route once daily for 90 days   Synthroid 75 mcg oral tablet 09/21/2020 take 1 tablet (75 mcg) by oral route once daily for 90 days   tamsulosin 0.4 mg oral capsule 08/20/2020 take 1 capsule (0.4 mg) by oral route once daily 1/2 hour following the same meal each day for 90 days   Trulicity 1.5 mg/0.5 mL subcutaneous pen injector 11/19/2020 inject 0.5mL (1.5mg) subQ every 7 days in the abdomen, thigh, or upper arm rotating injection sites for 30 days   VSL#3 112.5 billion cell oral capsule 06/30/2020 take 1 capsule by oral route daily for 30 days   Zetia 10 mg oral tablet 09/21/2020 take 1 tablet (10 mg) by oral route once daily for 90 days         Allergy List  Allergen Name Date Reaction Notes   Demerol --  --  --    SULFA (SULFONAMIDES) --  --  --        Allergies Reconciled  Family Medical History  Disease Name Relative/Age Notes   Family history of Arthritis Brother/  Father/  Mother/   Mother; Brother   Family history of cancer Brother/  Father/  Mother/   Mother; Father; Brother         Social History  Finding Status Start/Stop Quantity Notes   Alcohol Use Current some day --/-- --  rarely drinks   Claustophobic Unknown --/-- --  yes   lives with  "spouse --  --/-- --  --    . --  --/-- --  --    Recreational Drug Use Never --/-- --  no  05/16/2019 - 01/15/2019 - no   Retired. --  --/-- --  --    Tobacco Former --/-- 1.5 ppd former smoker quit 1979, 17 years total         Immunizations  NameDate Admin Mfg Trade Name Lot Number Route Inj VIS Given VIS Publication   Whujyomiw81/21/2020 [facility] FLUZONE-HIGH DOSE [id number] IM RD 09/21/2020    Comments: Pt tolerated well and left office in stable condition   Prevnar 1312/30/2019 [facility] PREVNAR 13 [id number]  12/30/2019    Comments: Pt tolerated the injection well.         Review of Systems  · Constitutional  o Denies  o : fever, fatigue, weight loss, weight gain  · HENT  o Admits  o : vertigo  o Denies  o : headaches  · Cardiovascular  o Denies  o : lower extremity edema, chest pressure, palpitations  · Respiratory  o Admits  o : frequent cough  o Denies  o : shortness of breath, wheezing, dyspnea on exertion  · Gastrointestinal  o Denies  o : nausea, vomiting, diarrhea, constipation, abdominal pain      Vitals  Date Time BP Position Site L\R Cuff Size HR RR TEMP (F) WT  HT  BMI kg/m2 BSA m2 O2 Sat FR L/min FiO2 HC       10/02/2020 09:55 /98 Sitting    98 - R  97.9 242lbs 16oz 5'  10\" 34.87 2.33 99 %  21%    10/19/2020 10:00 /82 Sitting    84 - R  98.4 240lbs 0oz 5'  10\" 34.44 2.32 96 %  21%    11/19/2020 10:41 /76 Sitting    56 - R 16 97.1 240lbs 0oz 5'  10\" 34.44 2.32 97 %  21%          Physical Examination  · Constitutional  o Appearance  o : no acute distress, well-nourished  · Head and Face  o Head  o :   § Inspection  § : atraumatic, normocephalic  · Eyes  o Eyes  o : extraocular movements intact, no scleral icterus, no conjunctival injection  · Ears, Nose, Mouth and Throat  o Ears  o :   § External Ears  § : normal  o Nose  o :   § Intranasal Exam  § : nares patent  o Oral Cavity  o :   § Oral Mucosa  § : moist mucous membranes  · Respiratory  o Respiratory Effort  o : breathing comfortably, " symmetric chest rise  o Auscultation of Lungs  o : clear to asculatation bilaterally, no wheezes, rales, or rhonchii  · Cardiovascular  o Heart  o :   § Auscultation of Heart  § : regular rate and rhythm, no murmurs, rubs, or gallops  o Peripheral Vascular System  o :   § Extremities  § : no edema  · Neurologic  o Mental Status Examination  o :   § Orientation  § : grossly oriented to person, place and time  o Gait and Station  o :   § Gait Screening  § : normal gait  · Psychiatric  o General  o : normal mood and affect      Figure 1.0: Diabetic Foot Screen         Assessment  · Prostate CA     185/C61  cont f/u with urology. cont Tamsulosin and finasteride.  · Hyperlipemia     272.4/E78.5  cont zetia. intolerant to statins previously.  · Hypertension     401.9/I10  well controlled on current regimen including ACEi with concurrent DM  · Cough     786.2/R05  thought to be GERD related based on history and exam. ill Rx famotidine and monitor. consider switch off lisniopril if cough persists.   · Diabetes mellitus, type 2     250.00/E11.9  add Trulicity to regimen and increase to treatment dose of 1.5mg weekly. encouraged to check sugars BID. f/u in 1 month for repeat eval.   · BPPV (benign paroxysmal positional vertigo)     386.11/H81.10  labs, echo, and brain MRI all reviewed and reassuring. discussed Dx and prognosis.     Problems Reconciled  Plan  · Orders  o Diabetic Dilated Eye Exam Consult with Ophthalmology/Optometry (DMEYE) - 250.00/E11.9 - 11/19/2020  o Diabetic Foot (Motor and Sensory) Exam Completed [facility] (, , 2028F) - 250.00/E11.9 - 11/19/2020  o ACO-41: Dilated Diabetic eye exam completed this year and results in chart/reviewed (2022F) - 250.00/E11.9 - 11/19/2020  o ACO-39: Current medications updated and reviewed (, 1159F) - - 11/19/2020  o ACO-15: Pneumococcal Vaccine Administered or Previously Received [facility] (6760F) - - 11/19/2020  o ACO-14: Influenza immunization administered or previously  received [facility] () - - 11/19/2020  o ACO-19: Colorectal cancer screening results documented and reviewed (3017F) - - 11/19/2020  o Diabetic Foot Exam (DMFOT) - - 11/19/2020  · Medications  o famotidine 40 mg oral tablet   SIG: take 1 tablet by oral route 2 times a day for 90 days   DISP: (180) Tablet with 1 refills  Prescribed on 11/19/2020     o Blood Glucose Test miscellaneous strip   SIG: use as directed   DISP: (1) Box with 3 refills  Prescribed on 11/19/2020     o Blood Glucose Monitoring miscellaneous kit   SIG: use as directed   DISP: (1) Kit with 0 refills  Prescribed on 11/19/2020     o Trulicity 1.5 mg/0.5 mL subcutaneous pen injector   SIG: inject 0.5mL (1.5mg) subQ every 7 days in the abdomen, thigh, or upper arm rotating injection sites for 30 days   DISP: (13) Milliliter with 3 refills  Adjusted on 11/19/2020     o Medications have been Reconciled  o Transition of Care or Provider Policy  · Instructions  o Patient was educated/instructed on their diagnosis, treatment and medications prior to discharge from the clinic today.  · Disposition  o f/u in 1 month            Electronically Signed by: Amaury Mcdonough MD -Author on November 19, 2020 12:49:23 PM

## 2021-05-13 NOTE — PROGRESS NOTES
"   Progress Note      Patient Name: Jamir Porter   Patient ID: [id number]   Sex: Male   YOB: 1945    Primary Care Provider: Juan GONZALEZ    Visit Date: October 19, 2020    Provider: Amaury Mcdonough MD   [facility]   Location Address: [address] [place] [address], [address]  [place], [place]  [id number]   Location Phone: [phone]          Chief Complaint  · \"im following up with him about my A1C\"      History Of Present Illness  Jamir Porter is a 75 year old /White male who presents for evaluation and treatment of:      DM2- pt has lost 5 lbs. doing well on jardiance. pt has not been checking blood glucose.   HLD- doing well on zetia  HTN- pt denies dizziness, CP. pt reports home readings.   pt reports more frequent HAs recently. HAs described as frontal and dull achy. pt reports intermittent congestion, rhinorrhea, and nonproductive cough.   BPH - pt had f/u with urology with good results. pt doing well on finasteride and tamsulosin.       Past Medical History  Disease Name Date Onset Notes   Allergic rhinitis --  --    Arthritis --  --    Balance problem --  --    Broken Bones --  --    Cancer --  --    Cataracts, bilateral --  --    Chronic Sinusitis --  --    Colon Polyps --  --    Degenerative Disc Disease  --  --    Diverticulitis --  --    Forgetfulness --  --    Headache --  --    Hyperlipemia --  --    Hypertension --  --    Limb Swelling --  --    Lumbago --  --    Pacemaker --  --    Prostate CA --  --    Prostate Disorder --  --    Shortness of Breath --  --    Sleep apnea --  --          Past Surgical History  Procedure Name Date Notes   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    Artificial Joints/Limbs 2012 rt knee   Back surgery --  --    Colonoscopy 2019 --    Dental Surgery --  --    Eye Implant --  yes   Foot surgery --  left   Joint Surgery --  --    Knee replacement, right --  --    Lumbar fusion 2016 [facility]   Lumbar puncture --  --    Penile " implant --  --    Pilonidal Cyst Excision --  --    Rotator Cuff repair --  --    UPPP (uvulopalatopharyngoplasty) --  --          Medication List  Name Date Started Instructions   finasteride 5 mg oral tablet 08/20/2020 take 1 tablet (5 mg) by oral route once daily for 90 days   fluoxetine 40 mg oral capsule 09/28/2020 TAKE 1 CAPSULE(40 MG) BY MOUTH EVERY DAY IN THE EVENING   Focus Factor  --    Jardiance 25 mg oral tablet 10/02/2020 take 1 tablet (25 mg) by oral route once daily in the morning for 90 days   lisinopril 20 mg oral tablet 10/02/2020 take 1 tablet (20 mg) by oral route once daily for 90 days   Synthroid 75 mcg oral tablet 09/21/2020 take 1 tablet (75 mcg) by oral route once daily for 90 days   tamsulosin 0.4 mg oral capsule 08/20/2020 take 1 capsule (0.4 mg) by oral route once daily 1/2 hour following the same meal each day for 90 days   VSL#3 112.5 billion cell oral capsule 06/30/2020 take 1 capsule by oral route daily for 30 days   Zetia 10 mg oral tablet 09/21/2020 take 1 tablet (10 mg) by oral route once daily for 90 days         Allergy List  Allergen Name Date Reaction Notes   Demerol --  --  --    SULFA (SULFONAMIDES) --  --  --        Allergies Reconciled  Family Medical History  Disease Name Relative/Age Notes   Family history of Arthritis Brother/  Father/  Mother/   Mother; Brother   Family history of cancer Brother/  Father/  Mother/   Mother; Father; Brother         Social History  Finding Status Start/Stop Quantity Notes   Alcohol Use Current some day --/-- --  rarely drinks   Claustophobic Unknown --/-- --  yes   lives with spouse --  --/-- --  --    . --  --/-- --  --    Recreational Drug Use Never --/-- --  no  05/16/2019 - 01/15/2019 - no   Retired. --  --/-- --  --    Tobacco Former --/-- 1.5 ppd former smoker quit 1979, 17 years total         Immunizations  NameDate Admin Mfg Trade Name Lot Number Route Inj VIS Given VIS Publication   Ylwgikmtb40/21/2020 [facility] FLUZONE-HIGH  "DOSE [id number] IM RD 09/21/2020    Comments: Pt tolerated well and left office in stable condition   Prevnar 1312/30/2019 [facility] PREVNAR 13 [id number] IM  12/30/2019    Comments: Pt tolerated the injection well.         Review of Systems  · Constitutional  o Denies  o : fever, fatigue, weight loss, weight gain  · HENT  o Admits  o : headaches  o Denies  o : lightheadedness  · Cardiovascular  o Denies  o : lower extremity edema, chest pressure, palpitations  · Respiratory  o Denies  o : shortness of breath, wheezing, cough, dyspnea on exertion  · Gastrointestinal  o Denies  o : nausea, vomiting, diarrhea, constipation, abdominal pain      Vitals  Date Time BP Position Site L\R Cuff Size HR RR TEMP (F) WT  HT  BMI kg/m2 BSA m2 O2 Sat FR L/min FiO2 HC       09/21/2020 10:27 /90 Sitting    93 - R  97.1 245lbs 4oz 5'  10\" 35.19 2.34 94 %      10/02/2020 09:55 /98 Sitting    98 - R  97.9 242lbs 16oz 5'  10\" 34.87 2.33 99 %  21%    10/19/2020 10:00 /82 Sitting    84 - R  98.4 240lbs 0oz 5'  10\" 34.44 2.32 96 %  21%          Physical Examination  · Constitutional  o Appearance  o : no acute distress, well-nourished  · Head and Face  o Head  o :   § Inspection  § : atraumatic, normocephalic  · Eyes  o Eyes  o : extraocular movements intact, no scleral icterus, no conjunctival injection  · Ears, Nose, Mouth and Throat  o Ears  o :   § External Ears  § : normal  o Nose  o :   § Intranasal Exam  § : nares patent  o Oral Cavity  o :   § Oral Mucosa  § : moist mucous membranes  · Respiratory  o Respiratory Effort  o : breathing comfortably, symmetric chest rise  o Auscultation of Lungs  o : clear to asculatation bilaterally, no wheezes, rales, or rhonchii  · Cardiovascular  o Heart  o :   § Auscultation of Heart  § : regular rate and rhythm, no murmurs, rubs, or gallops  o Peripheral Vascular System  o :   § Extremities  § : no edema  · Neurologic  o Mental Status Examination  o :   § Orientation  § : grossly oriented to " person, place and time  o Gait and Station  o :   § Gait Screening  § : normal gait  · Psychiatric  o General  o : normal mood and affect          Assessment  · BPH (benign prostatic hyperplasia)     600.00/N40.0  cont f/u with urology. doing much better on Flomax and finasteride.   · Diabetes mellitus, type 2     250.00/E11.9  encouraged by weight loss. cont low carb, high protein diet. cont jardiance.   · Essential hypertension     401.9/I10  improved control on current regimen. encouraged at-home monitoring.   · Headache     784.0/R51  thought related to addition of medication and adjusting to new BP and blood sugar.   · Hyperlipidemia     272.4/E78.5  cont zetia. check labs at next visit.   · Hypothyroidism     244.9/E03.9  cont synthroid. check TSH with next labs.       Plan  · Orders  o Diabetes 2 Panel (Urine Microalbumin, CMP, Lipid, A1c, ) [facility] (62555, 63712, 98059, 73043) - 250.00/E11.9 - 10/19/2020  o CBC with Auto Diff [facility] (42176) - 250.00/E11.9 - 10/19/2020  o TSH [facility] (51756) - 244.9/E03.9 - 10/19/2020  o ACO-39: Current medications updated and reviewed (, 1159F) - - 10/19/2020  o ACO-14: Influenza immunization administered or previously received [facility] () - - 10/19/2020  · Medications  o Medications have been Reconciled  o Transition of Care or Provider Policy  · Instructions  o Patient was educated/instructed on their diagnosis, treatment and medications prior to discharge from the clinic today.  o 25 Minutes spent with patient including greater than 50% in Education/Counseling/Care Coordination.  · Disposition  o f/u in 1 month  o labs/x-ray ordered, but not done in clinic            Electronically Signed by: Amaury Mcdonough MD -Author on October 19, 2020 10:28:59 AM

## 2021-05-13 NOTE — PROGRESS NOTES
"   Progress Note      Patient Name: Jamir Porter   Patient ID: [id number]   Sex: Male   YOB: 1945    Primary Care Provider: Juan GONZALEZ    Visit Date: October 6, 2020    Provider: Lenard Ray MD   Location: [facility]   Location Address: [address] [place] [address]  [id number]   Location Phone: [phone]          Chief Complaint  · pt here for urologic issues      History Of Present Illness     60    PVR    5/18   115  2/18   84    9/20 cystoscopy4 cm prostate, moderate trabeculations.  Negative otherwise.    H/o prostate CA    9/20 prostate znvkrr02 gright apex3+3, 1/2, 6%, negative otherwise  8/20  3.56  8/20  MRI  prostate - 58 g , neg  1/20  0.91    8/28/2019 MRI ubxrnhed56 g -negative    2/19  1.40   7/18  2.32 -on finasteride  7/18 MRI gcorqgrh50 g, no targetable lesion seen  5/18  5.09  1/18  5.41  10/17 5.5  4/17  prostate mfblke03 g - left, 3+3, 50% of core, 5 mm; right - high-grade PIN  1/17  5.5  10/16 4.9    10/16 MRI tusxvfxx81 g, no targetable lesions seen.    5/16 4.4  4/16 prostate ynmktn98 g   Pathology  Right basenegative  Right base lateral 3+3, 20%, 4 mm  Right mid, mid lateral, apex, apex lateralnegative  Left basenegative  Left mid3+3, 10%  Left mid lateralnegative  Left apex3+3, 15%   left apex3+3, 10%    2/16 5.39    1/14 3.68    \">75-year-old  gentleman with a history of prostate cancer clinical T1c, on active surveillance, patient also with ED, penile prosthesis and recently found right adrenal nodule    Patient is doing better, he is having less frequency.  Nocturia x1.  On Flomax 0.4 mg and finasteride for    Pt was having some numbness, but this is gone away over the last few weeks     2 caffeinated beverages daily    Previous    oxybutynin  - did stop his incontinence but did give him side effects that he could not tolerate, GI.  Myrebetriq Cause diarrhea    Did not try Vesicare    Patient is very bothered by urination at " this time, things are little worse.  Wanting something more aggressive done with her knees treating his prostate cancer.    PVR        25    Previous    2019 CT abdomen with and withoutadrenal mass protocolsmall right 0.9 cm adrenal nodule.  Favor adrenal adenoma.   cortisol less than 1.0, plasma metanephrines negative  7/3/2019 CT abdomen/pelvis with9 mm right adrenal nodule.  Which demonstrates enhancement above the background of the adrenal gland.  Penile prosthesis reservoir partially herniates the left inguinal ring.      flomax - side effects, could not tolerated    Father  pancreatic CA,  brother with prostate cancer.      Penile prothesis - functions, he does not use.    No cardiopulmonary history.  Patient does not smoke.  He is on ibuprofen for back pain. No DM.  Father  at 64, grandfather  at 85      creatinine 0.9, GFR > 60    PVR       115     84     cystoscopy4 cm prostate, moderate trabeculations.  Negative otherwise.    H/o prostate CA     prostate szilqe27 gright apex3+3, 1/, 6%, negative otherwise    3.56    MRI  prostate - 58 g , neg    0.91    2019 MRI zuhpsqxt19 g -negative      1.40     2.32 -on finasteride   MRI acbgmudb08 g, no targetable lesion seen    5.09    5.41  10/17 5.5    prostate pmtrov98 g - left, 3+3, 50% of core, 5 mm; right - high-grade PIN    5.5  10/16 4.9    10/16 MRI zqlzhamt90 g, no targetable lesions seen.     4.4   prostate fapjhy93 g   Pathology  Right basenegative  Right base lateral 3+3, 20%, 4 mm  Right mid, mid lateral, apex, apex lateralnegative  Left basenegative  Left mid3+3, 10%  Left mid lateralnegative  Left apex3+3, 15%   left apex3+3, 10%     5.39     3.68           Past Medical History  Allergic rhinitis; Arthritis; Balance problem; Broken Bones; Cancer; Cataracts, bilateral; Chronic Sinusitis; Colon Polyps; Degenerative Disc Disease ; Diverticulitis;  Forgetfulness; Headache; Hyperlipemia; Hypertension; Limb Swelling; Lumbago; Pacemaker; Prostate CA; Prostate Disorder; Shortness of Breath; Sleep apnea         Past Surgical History  Amputation; Artificial Joints/Limbs; Back surgery; Colonoscopy; Dental Surgery; Eye Implant; Foot surgery; Joint Surgery; Knee replacement, right; Lumbar fusion; Lumbar puncture; Penile implant; Pilonidal Cyst Excision; Rotator Cuff repair; UPPP (uvulopalatopharyngoplasty)         Medication List  finasteride 5 mg oral tablet; fluoxetine 40 mg oral capsule; Focus Factor; Jardiance 25 mg oral tablet; lisinopril 20 mg oral tablet; Synthroid 75 mcg oral tablet; tamsulosin 0.4 mg oral capsule; VSL#3 112.5 billion cell oral capsule; Zetia 10 mg oral tablet         Allergy List  Demerol; SULFA (SULFONAMIDES)         Family Medical History  Family history of Arthritis; Family history of cancer         Social History  Alcohol Use (Current some day); Claustophobic (Unknown); lives with spouse; .; Recreational Drug Use (Never); Retired.; Tobacco (Former)         Immunizations  Name Date Admin   Influenza 09/21/2020   Influenza 10/01/2019   Prevnar 13 12/30/2019         Review of Systems  · Constitutional  o Denies  o : chills, fever  · Gastrointestinal  o Denies  o : nausea, vomiting      Physical Examination  · Constitutional  o Appearance  o : Well-appearing, well-developed, in no acute distress  · Respiratory  o Respiratory Effort  o : Unlabored breathing  · Cardiovascular  o Heart  o :   § Auscultation of Heart  § : Regular rate and rhythm, no murmurs  · Neurologic  o Mental Status Examination  o :   § Orientation  § : Grossly oriented to person, place and time, judgment and insight intact, normal mood and affect              Assessment  · Prostate CA     185/C61  · Benign prostatic hyperplasia with weak urinary stream       Benign prostatic hyperplasia with lower urinary tract symptoms     600.01/N40.1  Poor urinary  stream     600.01/R39.12  · Urge incontinence     788.31/N39.41  · Elevated PSA     790.93/R97.20  · Adrenal abnormality     255.9/E27.9      Plan  · Orders  o CT Abdomen without and with IV Contrast [facility]; no Oral Prep (61143) - 255.9/E27.9 - 04/06/2021   Adrenal Mass Protocol Scheduled at [facility] on 4/6/2021 @ 10:45am   o PSA ultrasensitive DIAGNOSTIC [facility] (66277) - 185/C61, 790.93/R97.20 - 04/06/2021  · Medications  o Medications have been Reconciled  o Transition of Care or Provider Policy  · Instructions  o Electronically Identified Patient Education Materials Provided Electronically         BPH    Patient having less bothersome symptoms recently.  Not interested in procedures currently.  Will continue Flomax and finasteride    Prostate cancer on active surveillance    Reviewed recent biopsy, patient still with low-grade disease and after discussion of risk and benefits would like to stay on active surveillance    Follow-up in 6 months with PSA, will plan on MRI in about a year    Adrenal mass    Follow-up in 6 months with CT abdomen with and without contrast adrenal mass protocol    Greater than 15 minutes was used in counseling and coordination of care, with greater than 51% of this in face-to-face counseling             Electronically Signed by: Lenard Ray MD -Author on October 6, 2020 04:05:22 PM

## 2021-05-14 VITALS
DIASTOLIC BLOOD PRESSURE: 82 MMHG | TEMPERATURE: 98.4 F | SYSTOLIC BLOOD PRESSURE: 132 MMHG | OXYGEN SATURATION: 96 % | HEIGHT: 70 IN | HEART RATE: 84 BPM | WEIGHT: 240 LBS | BODY MASS INDEX: 34.36 KG/M2

## 2021-05-14 VITALS
HEIGHT: 70 IN | RESPIRATION RATE: 16 BRPM | SYSTOLIC BLOOD PRESSURE: 112 MMHG | BODY MASS INDEX: 34.36 KG/M2 | WEIGHT: 240 LBS | OXYGEN SATURATION: 97 % | HEART RATE: 56 BPM | DIASTOLIC BLOOD PRESSURE: 76 MMHG | TEMPERATURE: 97.1 F

## 2021-05-14 VITALS
HEART RATE: 65 BPM | WEIGHT: 237 LBS | SYSTOLIC BLOOD PRESSURE: 100 MMHG | DIASTOLIC BLOOD PRESSURE: 64 MMHG | TEMPERATURE: 96.6 F | OXYGEN SATURATION: 98 % | BODY MASS INDEX: 33.93 KG/M2 | HEIGHT: 70 IN

## 2021-05-14 VITALS
BODY MASS INDEX: 34.84 KG/M2 | SYSTOLIC BLOOD PRESSURE: 160 MMHG | TEMPERATURE: 97.6 F | HEIGHT: 70 IN | WEIGHT: 243.37 LBS | DIASTOLIC BLOOD PRESSURE: 84 MMHG | OXYGEN SATURATION: 92 % | HEART RATE: 86 BPM

## 2021-05-14 VITALS — HEIGHT: 70 IN | RESPIRATION RATE: 13 BRPM | WEIGHT: 232.12 LBS | BODY MASS INDEX: 33.23 KG/M2

## 2021-05-14 VITALS — WEIGHT: 231.25 LBS | HEIGHT: 70 IN | RESPIRATION RATE: 15 BRPM | BODY MASS INDEX: 33.11 KG/M2

## 2021-05-14 VITALS
WEIGHT: 243 LBS | BODY MASS INDEX: 34.79 KG/M2 | OXYGEN SATURATION: 99 % | DIASTOLIC BLOOD PRESSURE: 98 MMHG | SYSTOLIC BLOOD PRESSURE: 162 MMHG | HEIGHT: 70 IN | HEART RATE: 98 BPM | TEMPERATURE: 97.9 F

## 2021-05-14 VITALS
WEIGHT: 245.25 LBS | BODY MASS INDEX: 35.11 KG/M2 | TEMPERATURE: 97.1 F | HEIGHT: 70 IN | HEART RATE: 93 BPM | OXYGEN SATURATION: 94 % | SYSTOLIC BLOOD PRESSURE: 182 MMHG | DIASTOLIC BLOOD PRESSURE: 90 MMHG

## 2021-05-14 VITALS — WEIGHT: 231 LBS | BODY MASS INDEX: 33.07 KG/M2 | HEIGHT: 70 IN | RESPIRATION RATE: 17 BRPM

## 2021-05-14 VITALS — RESPIRATION RATE: 18 BRPM | BODY MASS INDEX: 35.07 KG/M2 | WEIGHT: 245 LBS | HEIGHT: 70 IN

## 2021-05-14 VITALS — HEIGHT: 70 IN | RESPIRATION RATE: 16 BRPM | WEIGHT: 232.37 LBS | BODY MASS INDEX: 33.27 KG/M2

## 2021-05-14 VITALS
DIASTOLIC BLOOD PRESSURE: 64 MMHG | BODY MASS INDEX: 31.78 KG/M2 | OXYGEN SATURATION: 95 % | WEIGHT: 222 LBS | TEMPERATURE: 98.1 F | SYSTOLIC BLOOD PRESSURE: 97 MMHG | HEIGHT: 70 IN | HEART RATE: 70 BPM

## 2021-05-14 NOTE — PROGRESS NOTES
"   Progress Note      Patient Name: Jamir Porter   Patient ID: [id number]   Sex: Male   YOB: 1945    Primary Care Provider: Amaury Mcdonough MD    Visit Date: April 13, 2021    Provider: Amaury Mcdonough MD   Location: [facility] [place]   Location Address: [address] [place] [address]; [address]  [place], [place]  [address]   Location Phone: [phone]          Chief Complaint  · \"F/U Prosthesis RX- loss weight\"      History Of Present Illness  Jamir Porter is a 76 year old /White male who presents for evaluation and treatment of:      with weight loss, pt needs fitted for new RLE prosthesis.   pt with recent labs that revealed elevated liver enzymes. this has not been followed-up. pt denies any recent abdominal imaging.   HTN- pt reports some lightheadedness recently. pt denies HAs, CP.   DM2- pt is encouraged by sugar control. pt denies hypoglycemic events.  hypothyroid- due for recheck.       Vitals  Date Time BP Position Site L\R Cuff Size HR RR TEMP (F) WT  HT  BMI kg/m2 BSA m2 O2 Sat FR L/min FiO2 HC       02/23/2021 02:23 PM       15  231lbs 4oz 5'  10\" 33.18 2.28       04/09/2021 10:16 AM       17  231lbs 0oz 5'  10\" 33.14 2.27       04/13/2021 03:53 PM 97/64 Sitting    70 - R  98.1 222lbs 0oz 5'  10\" 31.85 2.23 95 %  21%          Physical Examination  · Constitutional  o Appearance  o : no acute distress, well-nourished  · Head and Face  o Head  o :   § Inspection  § : atraumatic, normocephalic  · Eyes  o Eyes  o : extraocular movements intact, no scleral icterus, no conjunctival injection  · Ears, Nose, Mouth and Throat  o Ears  o :   § External Ears  § : normal  o Nose  o :   § Intranasal Exam  § : nares patent  o Oral Cavity  o :   § Oral Mucosa  § : moist mucous membranes  · Respiratory  o Respiratory Effort  o : breathing comfortably, symmetric chest rise  · Cardiovascular  o Heart  o :   § Auscultation of Heart  § : regular rate  o Peripheral " Vascular System  o :   § Extremities  § : no edema  · Neurologic  o Mental Status Examination  o :   § Orientation  § : grossly oriented to person, place and time  o Gait and Station  o :   § Gait Screening  § : normal gait  · Psychiatric  o General  o : normal mood and affect          Assessment  · Diabetes mellitus, type 2     250.00/E11.9  encouraged by weight loss. cont regimen. check labs.   · Hyperlipidemia     272.4/E78.5  check labs. intolerant to statin and zetia.   · Hypothyroidism     244.9/E03.9  check TSH and adjust Synthroid accorindgly.   · Hypertension     401.9/I10  possibly too low given weight loss and lightheadedness. will reduce lisinopril. cont monitoring. f/u in 3 months for repeat eval.   · Elevated liver enzymes     790.5/R74.8  recheck along with coags to assess liver function.   · Prosthesis adjustments     V52.9/Z44.9  pt given script for new fitting.     Problems Reconciled  Plan  · Orders  o Diabetes 1 Panel (CMP, Lipid, A1c) [facility] (80346, 90980, 51142) - 250.00/E11.9 - 04/13/2021  o CBC with Auto Diff [facility] (70270) - 250.00/E11.9 - 04/13/2021  o Thyroid Profile (14001, 04687, THYII) - 250.00/E11.9, 244.9/E03.9 - 04/13/2021  o ACO-41: Dilated Diabetic eye exam completed this year and results in chart/reviewed (2022F) - 250.00/E11.9 - 04/15/2021  o ACO-19: Colorectal cancer screening results documented and reviewed (3017F) - - 04/15/2021  o ACO-39: Current medications updated and reviewed (1159F, ) - - 04/13/2021  o PT & PTT (prothrombin time and partial thromboplastin time) (71095) - 790.5/R74.8 - 04/13/2021  · Medications  o lisinopril 10 mg oral tablet   SIG: take 1 tablet (10 mg) by oral route once daily for 90 days   DISP: (90) Tablet with 1 refills  Adjusted on 04/13/2021     o Medications have been Reconciled  o Transition of Care or Provider Policy  · Instructions  o Patient was educated/instructed on their diagnosis, treatment and medications prior to discharge from the clinic  today.  · Disposition  o f/u in 3 months  o labs done in clinic            Electronically Signed by: Amaury Mcdonough MD -Author on April 15, 2021 07:49:23 AM

## 2021-05-14 NOTE — PROGRESS NOTES
"   Progress Note      Patient Name: Jamir Porter   Patient ID: [id number]   Sex: Male   YOB: 1945    Primary Care Provider: Juan GONZALEZ    Visit Date: February 19, 2021    Provider: Lenard Ray MD   Location: [facility]   Location Address: [address] [place] [address]  [id number]   Location Phone: [phone]          Chief Complaint  · urological issues      History Of Present Illness     60    PVR    5/18   115  2/18   84    H/o prostate CA - on finasteride    9/20 cystoscopy -4 cm prostate, moderate trabeculations, negative otherwise    9/20 prostate biopsy -53 g, right apex 3+3, 1/2, 6%, negative otherwise  8/20  3.56  8/20  MRI  prostate - 58 g , neg  1/20  0.91    8/28/2019 MRI mndmwvpj68 g -negative    2/19  1.40   7/18  2.32 -on finasteride  7/18 MRI qigzldxz17 g, no targetable lesion seen  5/18  5.09  1/18  5.41  10/17 5.5  4/17  prostate bbcqiv10 g - left, 3+3, 50% of core, 5 mm; right - high-grade PIN  1/17  5.5  10/16 4.9    10/16 MRI zcpdtmql14 g, no targetable lesions seen.    5/16 4.4  4/16 prostate udqnbf75 g   Pathology  Right basenegative  Right base lateral 3+3, 20%, 4 mm  Right mid, mid lateral, apex, apex lateralnegative  Left basenegative  Left mid3+3, 10%  Left mid lateralnegative  Left apex3+3, 15%   left apex3+3, 10%    2/16 5.39    1/14 3.68    \">76-year-old  gentleman with a history of prostate cancer clinical T1c, on active surveillance, patient also with ED, penile prosthesis and recently found right adrenal nodule    Patient went into retention 2/11/21 , had a catheter placed in the ER, he had not 1.5 L retention.    Orozco catheter in place currently.  Draining without issue.  He did start Flomax 0.8, he was on 0.4 before.  He is also on finasteride 5 mg for    Patient was having a lot of trouble with constipation, he is not having trouble with constipation now    Pain, no gross hematuria.  Catheter draining well.    PVR "        25    Previous    oxybutynin  - did stop his incontinence but did give him side effects that he could not tolerate, GI.  Myrebetriq Cause diarrhea    2019 CT abdomen with and withoutadrenal mass protocolsmall right 0.9 cm adrenal nodule.  Favor adrenal adenoma.   cortisol less than 1.0, plasma metanephrines negative  7/3/2019 CT abdomen/pelvis with9 mm right adrenal nodule.  Which demonstrates enhancement above the background of the adrenal gland.  Penile prosthesis reservoir partially herniates the left inguinal ring.    Father  pancreatic CA,  brother with prostate cancer.      Penile prothesis - functions, he does not use.    No cardiopulmonary history.  Patient does not smoke.  He is on ibuprofen for back pain. No DM.  Father  at 64, grandfather  at 85      creatinine 0.9, GFR > 60    PVR       115     84    H/o prostate CA - on finasteride     cystoscopy -4 cm prostate, moderate trabeculations, negative otherwise     prostate biopsy -53 g, right apex 3+3, 1/, 6%, negative otherwise    3.56    MRI  prostate - 58 g , neg    0.91    2019 MRI eflvoxsx46 g -negative      1.40     2.32 -on finasteride   MRI wfmqeroh92 g, no targetable lesion seen    5.09    5.41  10/17 5.5    prostate khmrdz05 g - left, 3+3, 50% of core, 5 mm; right - high-grade PIN    5.5  10/16 4.9    10/16 MRI nkglmgxk77 g, no targetable lesions seen.     4.4   prostate zsvmys56 g   Pathology  Right basenegative  Right base lateral 3+3, 20%, 4 mm  Right mid, mid lateral, apex, apex lateralnegative  Left basenegative  Left mid3+3, 10%  Left mid lateralnegative  Left apex3+3, 15%   left apex3+3, 10%     5.39     3.68           Past Medical History  Allergic rhinitis; Arthritis; Balance problem; Broken Bones; Cancer; Cataracts, bilateral; Chronic Sinusitis; Colon Polyps; Degenerative Disc Disease ; Diverticulitis; Forgetfulness; Headache;  Hyperlipemia; Hypertension; Limb Swelling; Lumbago; Pacemaker; Prostate CA; Prostate Disorder; Shortness of Breath; Sleep apnea         Past Surgical History  Amputation; Artificial Joints/Limbs; Back surgery; Colonoscopy; Dental Surgery; Eye Implant; Foot surgery; Joint Surgery; Knee replacement, right; Lumbar fusion; Lumbar puncture; Penile implant; Pilonidal Cyst Excision; Rotator Cuff repair; UPPP (uvulopalatopharyngoplasty)         Medication List  Blood Glucose Monitoring miscellaneous kit; Blood Glucose Test miscellaneous strip; cyclobenzaprine 10 mg oral tablet; finasteride 5 mg oral tablet; fluoxetine 40 mg oral capsule; Jardiance 25 mg oral tablet; lisinopril 20 mg oral tablet; Synthroid 75 mcg oral tablet; tamsulosin 0.4 mg oral capsule; Trulicity 1.5 mg/0.5 mL subcutaneous pen injector; VSL#3 112.5 billion cell oral capsule; Zetia 10 mg oral tablet         Allergy List  Demerol; SULFA (SULFONAMIDES)         Family Medical History  Family history of Arthritis; Family history of cancer         Social History  Alcohol Use; Claustophobic (Unknown); lives with spouse; .; Recreational Drug Use (Never); Retired.; Tobacco (Former)         Immunizations  Name Date Admin   Influenza 09/21/2020   Influenza 10/01/2019   Fmtwodojd14 12/21/2020   Prevnar 13 12/30/2019         Review of Systems  · Constitutional  o Denies  o : fatigue, night sweats  · Eyes  o Denies  o : double vision, blurred vision  · HENT  o Denies  o : vertigo, recent head injury  · Breasts  o Denies  o : abnormal changes in breast size, additional breast symptoms except as noted in the HPI  · Cardiovascular  o Denies  o : chest pain, irregular heart beats  · Respiratory  o Denies  o : shortness of breath, productive cough  · Gastrointestinal  o Denies  o : nausea, vomiting  · Genitourinary  o Denies  o : dysuria, urinary retention  · Integument  o Denies  o : hair growth change, new skin lesions  · Neurologic  o Denies  o : altered mental  "status, seizures  · Musculoskeletal  o Denies  o : joint swelling, limitation of motion  · Endocrine  o Denies  o : cold intolerance, heat intolerance  · Heme-Lymph  o Denies  o : petechiae, lymph node enlargement or tenderness  · Allergic-Immunologic  o Denies  o : frequent illnesses      Vitals  Date Time BP Position Site L\R Cuff Size HR RR TEMP (F) WT  HT  BMI kg/m2 BSA m2 O2 Sat FR L/min FiO2        02/19/2021 07:52 AM       13  232lbs 2oz 5'  10\" 33.31 2.28             Physical Examination  · Constitutional  o Appearance  o : Well-appearing, well-developed, in no acute distress  · Respiratory  o Respiratory Effort  o : Unlabored breathing  · Gastrointestinal  o Abdominal Examination  o : Nontender, nondistended, no rigidity or guarding, no hepatosplenomegaly  · Neurologic  o Mental Status Examination  o :   § Orientation  § : Grossly oriented to person, place and time, judgment and insight intact, normal mood and affect              Assessment  · Prostate CA     185/C61  · Benign prostatic hyperplasia with weak urinary stream       Benign prostatic hyperplasia with lower urinary tract symptoms     600.01/N40.1  Poor urinary stream     600.01/R39.12  · Urge incontinence     788.31/N39.41  · Elevated PSA     790.93/R97.20  · Adrenal abnormality     255.9/E27.9  · Urinary retention     788.20/R33.9      Plan  · Medications  o Medications have been Reconciled  o Transition of Care or Provider Policy  · Instructions  o Electronically Identified Patient Education Materials Provided Electronically     Prostate cancer on active surveillance    Patient still with low-grade prostate cancer that we are following.  We have discussed because he is having a lot of trouble with urinary retention we should consider prostatectomy or TURP followed by continued active surveillance and possible XRT in the future.  At this time patient is leaning toward TURP.  Risk and benefits were discussed today.  He understands there is " always some risk of making it hard to follow him on active surveillance with TURP but he is really not interested in a prostatectomy after risk and benefits were discussed.      Urinary retention/BPH    continue Flomax 0.8 mg daily and finasteride 5 mg daily.    Voiding trial today    Patient understands if cannot void to come back to the office    Patient will follow up in 1 to 2 weeks for an oncology appointment to discuss TURP versus prostatectomy    Adrenal mass    Patient will need CT abdomen/pelvis with and without contrast adrenal mass protocol in 3/21    Patient in supine position.  Disconnected catheter from bag.  Instilled 60ml of sterile water into the bladder.  Deflated balloon and removed catheter.  Patient tolerated well.  Informed patient that if unable to void in the next 4-6 hours or discomfort is too much to come back into the office.             Electronically Signed by: Lenard Ray MD -Author on February 19, 2021 08:32:11 AM

## 2021-05-14 NOTE — PROGRESS NOTES
"   Progress Note      Patient Name: Jamir Porter   Patient ID: [id number]   Sex: Male   YOB: 1945    Primary Care Provider: Juan GONZALEZ    Visit Date: April 23, 2021    Provider: Lenard Ray MD   Location: [facility]   Location Address: [address] [place] [address]  [id number]   Location Phone: [phone]          Chief Complaint  · pt here for urologic issues      History Of Present Illness     60    PVR    5/18   115  2/18   84    H/o prostate CA - on finasteride    9/20 cystoscopy -4 cm prostate, moderate trabeculations, negative otherwise    9/20 prostate biopsy -53 g, right apex 3+3, 1/2, 6%, negative otherwise  8/20  3.56  8/20  MRI  prostate - 58 g , neg  1/20  0.91    8/28/2019 MRI aaswkmcx78 g -negative    2/19  1.40   7/18  2.32 -on finasteride  7/18 MRI mreenjzu03 g, no targetable lesion seen  5/18  5.09  1/18  5.41  10/17 5.5  4/17  prostate sdvfky47 g - left, 3+3, 50% of core, 5 mm; right - high-grade PIN  1/17  5.5  10/16 4.9    10/16 MRI burmtksu14 g, no targetable lesions seen.    5/16 4.4  4/16 prostate vcoord54 g   Pathology  Right basenegative  Right base lateral 3+3, 20%, 4 mm  Right mid, mid lateral, apex, apex lateralnegative  Left basenegative  Left mid3+3, 10%  Left mid lateralnegative  Left apex3+3, 15%   left apex3+3, 10%    2/16 5.39    1/14 3.68    \">76-year-old  gentleman with a history of prostate cancer clinical T1c, on active surveillance, s/p TURP patient also with ED, penile prosthesis and recently found right adrenal nodule    Patient is much better than before surgery.  Still with a slow stream.  No burning or dysuria.  Frequency, nocturia x1  Continence.  No pads.  off prostate meds    PVR     3/21  TURP -3+3, 3/130, < 1%  4/21  200  2/20   25    could not void today.    Previous    No longer following adrenal mass.    4/21 CT abdomen/pelvis with and without -9 mm benign lipid poor adrenal adenoma, no change since " .    No erections. Has IPP.    H/o retention  - 1.5 L    oxybutynin  - did stop his incontinence but did give him side effects that he could not tolerate, GI.  Myrebetriq Cause diarrhea    2019 CT abdomen with and withoutadrenal mass protocolsmall right 0.9 cm adrenal nodule.  Favor adrenal adenoma.   cortisol less than 1.0, plasma metanephrines negative  7/3/2019 CT abdomen/pelvis with9 mm right adrenal nodule.  Which demonstrates enhancement above the background of the adrenal gland.  Penile prosthesis reservoir partially herniates the left inguinal ring.    Father  pancreatic CA,  brother with prostate cancer.      Penile prothesis - functions, he does not use.    No cardiopulmonary history.  Patient does not smoke.  He is on ibuprofen for back pain. No DM.  Father  at 64, grandfather  at 85      creatinine 0.9, GFR > 60    PVR       115     84    H/o prostate CA - on finasteride     cystoscopy -4 cm prostate, moderate trabeculations, negative otherwise     prostate biopsy -53 g, right apex 3+3, 1/, 6%, negative otherwise    3.56    MRI  prostate - 58 g , neg    0.91    2019 MRI gqzxohjj69 g -negative      1.40     2.32 -on finasteride   MRI yxyeotxv92 g, no targetable lesion seen    5.09    5.41  10/17 5.5    prostate pbkase77 g - left, 3+3, 50% of core, 5 mm; right - high-grade PIN    5.5  10/16 4.9    10/16 MRI kvjoimng55 g, no targetable lesions seen.     4.4   prostate qyqhyo95 g   Pathology  Right basenegative  Right base lateral 3+3, 20%, 4 mm  Right mid, mid lateral, apex, apex lateralnegative  Left basenegative  Left mid3+3, 10%  Left mid lateralnegative  Left apex3+3, 15%   left apex3+3, 10%     5.39     3.68           Past Medical History  Allergic rhinitis; Arthritis; Balance problem; Broken Bones; Cancer; Cataracts, bilateral; Chronic Sinusitis; Colon Polyps; Degenerative Disc Disease ;  Diverticulitis; Forgetfulness; Headache; Hyperlipemia; Hypertension; Limb Swelling; Lumbago; Pacemaker; Prostate CA; Prostate Disorder; Shortness of Breath; Sleep apnea         Past Surgical History  Amputation; Artificial Joints/Limbs; Back surgery; Colonoscopy; Dental Surgery; Eye Implant; Foot surgery; Joint Surgery; Knee replacement, right; Lumbar fusion; Lumbar puncture; Penile implant; Pilonidal Cyst Excision; Rotator Cuff repair; UPPP (uvulopalatopharyngoplasty)         Medication List  Blood Glucose Monitoring miscellaneous kit; Blood Glucose Test miscellaneous strip; cyclobenzaprine 10 mg oral tablet; fluoxetine 40 mg oral capsule; lisinopril 10 mg oral tablet; solifenacin 5 mg oral tablet; Synthroid 75 mcg oral tablet; Trulicity 1.5 mg/0.5 mL subcutaneous pen injector         Allergy List  Demerol; SULFA (SULFONAMIDES)       Allergies Reconciled  Family Medical History  Family history of Arthritis; Family history of cancer         Social History  Alcohol Use; Claustophobic (Unknown); lives with spouse; .; Recreational Drug Use (Never); Retired.; Tobacco (Former)         Immunizations  Name Date Admin   Influenza 09/21/2020   Influenza 10/01/2019   Kyufyodew04 12/21/2020   Prevnar 13 12/30/2019         Review of Systems  · Constitutional  o Denies  o : chills, fever  · Gastrointestinal  o Denies  o : nausea, vomiting      Physical Examination  · Constitutional  o Appearance  o : Well-appearing, well-developed, in no acute distress  · Respiratory  o Respiratory Effort  o : Unlabored breathing  · Neurologic  o Mental Status Examination  o :   § Orientation  § : Grossly oriented to person, place and time, judgment and insight intact, normal mood and affect          Assessment  · Prostate CA     185/C61  · Benign prostatic hyperplasia with weak urinary stream       Benign prostatic hyperplasia with lower urinary tract symptoms     600.01/N40.1  Poor urinary stream     600.01/R39.12  · Urge  incontinence     788.31/N39.41  · Elevated PSA     790.93/R97.20  · Adrenal abnormality     255.9/E27.9  · Urinary retention     788.20/R33.9      Plan  · Medications  o Medications have been Reconciled  o Transition of Care or Provider Policy  · Instructions  o Electronically Identified Patient Education Materials Provided Electronically     BPH    DOing well after his TURP, cannot void today but feels like he is doing okay    Prostate cancer on active surveillance       Follow-up in 9/21 with MRI prostate PSA    PVR follow-up                 Electronically Signed by: Lenard Ray MD -Author on April 23, 2021 10:35:08 AM

## 2021-05-14 NOTE — PROGRESS NOTES
"   Progress Note      Patient Name: Jamir Porter   Patient ID: [id number]   Sex: Male   YOB: 1945    Primary Care Provider: Juan GONZALEZ    Visit Date: February 15, 2021    Provider: Lenard Ray MD   Location: [facility]   Location Address: [address] [place] [address]  [id number]   Location Phone: [phone]          Chief Complaint  · urological issues      History Of Present Illness     60    PVR    5/18   115  2/18   84    H/o prostate CA - on finasteride    9/20 cystoscopy -4 cm prostate, moderate trabeculations, negative otherwise    9/20 prostate biopsy -53 g, right apex 3+3, 1/2, 6%, negative otherwise  8/20  3.56  8/20  MRI  prostate - 58 g , neg  1/20  0.91    8/28/2019 MRI xkpkpqms80 g -negative    2/19  1.40   7/18  2.32 -on finasteride  7/18 MRI nsvwxdtt31 g, no targetable lesion seen  5/18  5.09  1/18  5.41  10/17 5.5  4/17  prostate xmombq44 g - left, 3+3, 50% of core, 5 mm; right - high-grade PIN  1/17  5.5  10/16 4.9    10/16 MRI yghqgcps42 g, no targetable lesions seen.    5/16 4.4  4/16 prostate cdjyou58 g   Pathology  Right basenegative  Right base lateral 3+3, 20%, 4 mm  Right mid, mid lateral, apex, apex lateralnegative  Left basenegative  Left mid3+3, 10%  Left mid lateralnegative  Left apex3+3, 15%   left apex3+3, 10%    2/16 5.39    1/14 3.68    \">76-year-old  gentleman with a history of prostate cancer clinical T1c, on active surveillance, patient also with ED, penile prosthesis and recently found right adrenal nodule    Patient went into retention last week, had a catheter placed in the ER, he had not 1.5 L retention.    Patient was having a lot of trouble with constipation, he is not having trouble with constipation now    Catheter currently in place, or around the head of the penis.    Before catheter  on Flomax 0.4 and  finasteride.  Patient even before he went into retention was not voiding that well.  He was having a lot of " frequency with slow stream.  Nocturia  X 3.  InContinence is bothersome,  Is not wearing pads at this time      PVR        25    Previous    oxybutynin  - did stop his incontinence but did give him side effects that he could not tolerate, GI.  Myrebetriq Cause diarrhea    2019 CT abdomen with and withoutadrenal mass protocolsmall right 0.9 cm adrenal nodule.  Favor adrenal adenoma.   cortisol less than 1.0, plasma metanephrines negative  7/3/2019 CT abdomen/pelvis with9 mm right adrenal nodule.  Which demonstrates enhancement above the background of the adrenal gland.  Penile prosthesis reservoir partially herniates the left inguinal ring.    Father  pancreatic CA,  brother with prostate cancer.      Penile prothesis - functions, he does not use.    No cardiopulmonary history.  Patient does not smoke.  He is on ibuprofen for back pain. No DM.  Father  at 64, grandfather  at 85      creatinine 0.9, GFR > 60    PVR       115     84    H/o prostate CA - on finasteride     cystoscopy -4 cm prostate, moderate trabeculations, negative otherwise     prostate biopsy -53 g, right apex 3+3, 1/, 6%, negative otherwise    3.56    MRI  prostate - 58 g , neg    0.91    2019 MRI iahmweti68 g -negative      1.40     2.32 -on finasteride   MRI lodeawbv73 g, no targetable lesion seen    5.09    5.41  10/17 5.5    prostate kpslyj25 g - left, 3+3, 50% of core, 5 mm; right - high-grade PIN    5.5  10/16 4.9    10/16 MRI ycvgcwgm50 g, no targetable lesions seen.     4.4   prostate jwydsp17 g   Pathology  Right basenegative  Right base lateral 3+3, 20%, 4 mm  Right mid, mid lateral, apex, apex lateralnegative  Left basenegative  Left mid3+3, 10%  Left mid lateralnegative  Left apex3+3, 15%   left apex3+3, 10%     5.39     3.68           Past Medical History  Allergic rhinitis; Arthritis; Balance problem; Broken Bones; Cancer; Cataracts,  "bilateral; Chronic Sinusitis; Colon Polyps; Degenerative Disc Disease ; Diverticulitis; Forgetfulness; Headache; Hyperlipemia; Hypertension; Limb Swelling; Lumbago; Pacemaker; Prostate CA; Prostate Disorder; Shortness of Breath; Sleep apnea         Past Surgical History  Amputation; Artificial Joints/Limbs; Back surgery; Colonoscopy; Dental Surgery; Eye Implant; Foot surgery; Joint Surgery; Knee replacement, right; Lumbar fusion; Lumbar puncture; Penile implant; Pilonidal Cyst Excision; Rotator Cuff repair; UPPP (uvulopalatopharyngoplasty)         Medication List  Blood Glucose Monitoring miscellaneous kit; Blood Glucose Test miscellaneous strip; cyclobenzaprine 10 mg oral tablet; finasteride 5 mg oral tablet; fluoxetine 40 mg oral capsule; Jardiance 25 mg oral tablet; lisinopril 20 mg oral tablet; Synthroid 75 mcg oral tablet; tamsulosin 0.4 mg oral capsule; Trulicity 1.5 mg/0.5 mL subcutaneous pen injector; VSL#3 112.5 billion cell oral capsule; Zetia 10 mg oral tablet         Allergy List  Demerol; SULFA (SULFONAMIDES)         Family Medical History  Family history of Arthritis; Family history of cancer         Social History  Alcohol Use (Current some day); Claustophobic (Unknown); lives with spouse; .; Recreational Drug Use (Never); Retired.; Tobacco (Former)         Immunizations  Name Date Admin   Influenza 09/21/2020   Influenza 10/01/2019   Opvszvbqq38 12/21/2020   Prevnar 13 12/30/2019         Review of Systems  · Constitutional  o Denies  o : fatigue, chills  · HENT  o Denies  o : headaches  · Respiratory  o Denies  o : shortness of breath  · Gastrointestinal  o Denies  o : nausea, vomiting, diarrhea      Vitals  Date Time BP Position Site L\R Cuff Size HR RR TEMP (F) WT  HT  BMI kg/m2 BSA m2 O2 Sat FR L/min FiO2 HC       02/15/2021 10:59 AM       16  232lbs 6oz 5'  10\" 33.34 2.28             Physical Examination  · Constitutional  o Appearance  o : Well-appearing, well-developed, in no acute " distress  · Respiratory  o Respiratory Effort  o : Unlabored breathing  · Gastrointestinal  o Abdominal Examination  o : Nontender, nondistended, no rigidity or guarding, no hepatosplenomegaly  · Neurologic  o Mental Status Examination  o :   § Orientation  § : Grossly oriented to person, place and time, judgment and insight intact, normal mood and affect       cath in place draining clear/yellow urine           Assessment  · Prostate CA     185/C61  · Benign prostatic hyperplasia with weak urinary stream       Benign prostatic hyperplasia with lower urinary tract symptoms     600.01/N40.1  Poor urinary stream     600.01/R39.12  · Urge incontinence     788.31/N39.41  · Elevated PSA     790.93/R97.20  · Adrenal abnormality     255.9/E27.9  · Urinary retention     788.20/R33.9      Plan  · Medications  o Medications have been Reconciled  o Transition of Care or Provider Policy  · Instructions  o Electronically Identified Patient Education Materials Provided Electronically     Prostate cancer on active surveillance    Patient still with low-grade cancer that we have continued to follow, we did discuss b/c at this point because he is going into retention we probably needing to make a decision about moving forward with some sort of treatment for BPH and prostate cancer.  At this point we did this briefly discuss prostatectomy versus TURP and radiation after .  Patient after discussion is very worried about incontinence and would lean toward TURP with more active surveillance or XRT of the prostate.  We will discuss more in the coming weeks    Urinary retention/BPH    After discussion of risk /benefits patient will follow up on Friday morning 8 AM for voiding trial. Increase Flomax to 0.8 mg daily.  Risks/ benefits and side effects discussed today.  Cont Finasteride  Cont sol    Adrenal mass    will need CT abdomen with and without contrast adrenal mass protocol 3/21                   Electronically Signed by: Lenard BISWAS  MD Cory -Author on February 15, 2021 01:55:33 PM

## 2021-05-14 NOTE — PROGRESS NOTES
"   Progress Note      Patient Name: Jamir Porter   Patient ID: [id number]   Sex: Male   YOB: 1945    Primary Care Provider: Juan GONZALEZ    Visit Date: February 23, 2021    Provider: Lenard Ray MD   Location: [facility]   Location Address: [address] [place] [address]  [id number]   Location Phone: [phone]          Chief Complaint  · urological issues      History Of Present Illness     60    PVR    5/18   115  2/18   84    H/o prostate CA - on finasteride    9/20 cystoscopy -4 cm prostate, moderate trabeculations, negative otherwise    9/20 prostate biopsy -53 g, right apex 3+3, 1/2, 6%, negative otherwise  8/20  3.56  8/20  MRI  prostate - 58 g , neg  1/20  0.91    8/28/2019 MRI nrdsofgn69 g -negative    2/19  1.40   7/18  2.32 -on finasteride  7/18 MRI rbwlensq22 g, no targetable lesion seen  5/18  5.09  1/18  5.41  10/17 5.5  4/17  prostate xkwrbi57 g - left, 3+3, 50% of core, 5 mm; right - high-grade PIN  1/17  5.5  10/16 4.9    10/16 MRI gasedpqz69 g, no targetable lesions seen.    5/16 4.4  4/16 prostate jhfula53 g   Pathology  Right basenegative  Right base lateral 3+3, 20%, 4 mm  Right mid, mid lateral, apex, apex lateralnegative  Left basenegative  Left mid3+3, 10%  Left mid lateralnegative  Left apex3+3, 15%   left apex3+3, 10%    2/16 5.39    1/14 3.68    \">76-year-old  gentleman with a history of prostate cancer clinical T1c, on active surveillance, patient also with ED, penile prosthesis and recently found right adrenal nodule    Patient went into retention last week, had a catheter placed in the ER, he had not 1.5 L retention.  He failed a voiding trial last week.  He came in today this morning has catheter removed and failed another voiding trial today.  Patient was actually much better with catheter in place and was having better function of his balance and sleeping better.    Has had a lot of trouble with constipation urinary " retention recent    We did increase Flomax to 0.8, this made him very dizzy cannot tolerate this    Before catheter  on Flomax 0.4 and  finasteride.  Patient even before he went into retention was not voiding that well.  He was having a lot of frequency with slow stream.  Nocturia  X 3.  InContinence is bothersome,  Is not wearing pads at this time    No erections. Has IPP.    PVR        25    Previous    oxybutynin  - did stop his incontinence but did give him side effects that he could not tolerate, GI.  Myrebetriq Cause diarrhea    2019 CT abdomen with and withoutadrenal mass protocolsmall right 0.9 cm adrenal nodule.  Favor adrenal adenoma.   cortisol less than 1.0, plasma metanephrines negative  7/3/2019 CT abdomen/pelvis with9 mm right adrenal nodule.  Which demonstrates enhancement above the background of the adrenal gland.  Penile prosthesis reservoir partially herniates the left inguinal ring.    Father  pancreatic CA,  brother with prostate cancer.      Penile prothesis - functions, he does not use.    No cardiopulmonary history.  Patient does not smoke.  He is on ibuprofen for back pain. No DM.  Father  at 64, grandfather  at 85      creatinine 0.9, GFR > 60    PVR       115     84    H/o prostate CA - on finasteride     cystoscopy -4 cm prostate, moderate trabeculations, negative otherwise     prostate biopsy -53 g, right apex 3+3, 1/2, 6%, negative otherwise    3.56    MRI  prostate - 58 g , neg    0.91    2019 MRI krdcvsph02 g -negative      1.40     2.32 -on finasteride   MRI oqgszioh96 g, no targetable lesion seen    5.09    5.41  10/17 5.5    prostate bcztak55 g - left, 3+3, 50% of core, 5 mm; right - high-grade PIN    5.5  10/16 4.9    10/16 MRI ewxlbykz22 g, no targetable lesions seen.     4.4   prostate uaxerl81 g   Pathology  Right basenegative  Right base lateral 3+3, 20%, 4 mm  Right mid, mid lateral,  apex, apex lateralnegative  Left basenegative  Left mid3+3, 10%  Left mid lateralnegative  Left apex3+3, 15%   left apex3+3, 10%    2/16 5.39    1/14 3.68           Past Medical History  Allergic rhinitis; Arthritis; Balance problem; Broken Bones; Cancer; Cataracts, bilateral; Chronic Sinusitis; Colon Polyps; Degenerative Disc Disease ; Diverticulitis; Forgetfulness; Headache; Hyperlipemia; Hypertension; Limb Swelling; Lumbago; Pacemaker; Prostate CA; Prostate Disorder; Shortness of Breath; Sleep apnea         Past Surgical History  Amputation; Artificial Joints/Limbs; Back surgery; Colonoscopy; Dental Surgery; Eye Implant; Foot surgery; Joint Surgery; Knee replacement, right; Lumbar fusion; Lumbar puncture; Penile implant; Pilonidal Cyst Excision; Rotator Cuff repair; UPPP (uvulopalatopharyngoplasty)         Medication List  Blood Glucose Monitoring miscellaneous kit; Blood Glucose Test miscellaneous strip; Cipro 500 mg oral tablet; cyclobenzaprine 10 mg oral tablet; FAMOTIDINE 40MG TABLETS; finasteride 5 mg oral tablet; fluoxetine 40 mg oral capsule; Jardiance 25 mg oral tablet; lisinopril 20 mg oral tablet; Synthroid 75 mcg oral tablet; tamsulosin 0.4 mg oral capsule; Trulicity 1.5 mg/0.5 mL subcutaneous pen injector; VSL#3 112.5 billion cell oral capsule; Zetia 10 mg oral tablet         Allergy List  Demerol; SULFA (SULFONAMIDES)       Allergies Reconciled  Family Medical History  Family history of Arthritis; Family history of cancer         Social History  Alcohol Use; Claustophobic (Unknown); lives with spouse; .; Recreational Drug Use (Never); Retired.; Tobacco (Former)         Immunizations  Name Date Admin   Influenza 09/21/2020   Influenza 10/01/2019   Unvqxywpg14 12/21/2020   Prevnar 13 12/30/2019         Review of Systems  · Constitutional  o Denies  o : fatigue  · HENT  o Admits  o : lightheadedness  o Denies  o : headaches  · Cardiovascular  o Denies  o : chest pain  · Respiratory  o Denies  o :  "shortness of breath  · Gastrointestinal  o Denies  o : nausea, vomiting, diarrhea      Vitals  Date Time BP Position Site L\R Cuff Size HR RR TEMP (F) WT  HT  BMI kg/m2 BSA m2 O2 Sat FR L/min FiO2 HC       02/23/2021 02:23 PM       15  231lbs 4oz 5'  10\" 33.18 2.28             Physical Examination  · Constitutional  o Appearance  o : Well-appearing, well-developed, in no acute distress  · Respiratory  o Respiratory Effort  o : Unlabored breathing  · Cardiovascular  o Heart  o :   § Auscultation of Heart  § : Regular rate and rhythm, no murmurs  · Neurologic  o Mental Status Examination  o :   § Orientation  § : Grossly oriented to person, place and time, judgment and insight intact, normal mood and affect          Results  · In-Office Procedures  o Surgical procedure  § IOP - Bladder Scan/Residual Urine (91823)   § Specimen vol Ur: 450       Assessment  · Prostate CA     185/C61  · Benign prostatic hyperplasia with weak urinary stream       Benign prostatic hyperplasia with lower urinary tract symptoms     600.01/N40.1  Poor urinary stream     600.01/R39.12  · Urge incontinence     788.31/N39.41  · Elevated PSA     790.93/R97.20  · Adrenal abnormality     255.9/E27.9  · Urinary retention     788.20/R33.9      Plan  · Orders  o EKG (Recording only) [facility] ([id number]) - 185/C61, 255.9/E27.9 - 02/23/2021  o Urine Culture (Clean Catch) [facility] ([id number]) - 600.01/N40.1, 600.01/R39.12, 185/C61, 788.20/R33.9 - 02/23/2021  o BMP Non-fasting [facility] ([id number]) - 185/C61, 790.93/R97.20, 255.9/E27.9, 788.20/R33.9 - 02/23/2021  · Medications  o Medications have been Reconciled  o Transition of Care or Provider Policy  · Instructions  o Electronically Identified Patient Education Materials Provided Electronically     Urinary retention/BPH    Patient failed 2 voiding trials.  He is very bothered, we went and placed the catheter today.  After discussion risk benefits patient like to give his catheter in until surgery      History of prostate cancer " on active surveillance    We did again discuss options including radical prostatectomy for cancer versus TURP and continued active surveillance.  We did discuss if he does a TURP we would likely consider radiation in the future if he needed treatment for his prostate cancer.  After discussion of risk and benefits of all complication side effects and problems with each he is decided he would rather have a TURP and stay on active surveillance.      Continue Orozco catheter, start Cipro 5 mg p.o. twice daily x500 days leading up to surgery    Adrenal mass    Will need CT abdomen with and without contrast adrenal mass protocol 3/21             Electronically Signed by: Lenard Ray MD -Author on February 27, 2021 08:28:46 AM

## 2021-05-14 NOTE — PROGRESS NOTES
Progress Note      Patient Name: Jamir Porter   Patient ID: [id number]   Sex: Male   YOB: 1945    Primary Care Provider: Juan GONZALEZ    Visit Date: December 21, 2020    Provider: Amaury Mcdonough MD   Location: [facility]   Location Address: [address] [place] [address], [address]  [place], [place]  [id number]   Location Phone: [phone]          Chief Complaint  · Follow up  · Lower back pain on the right side      History Of Present Illness  Jamir Porter is a 75 year old /White male who presents for evaluation and treatment of:      DM2- pt has lost 10 lbs over last 6 months. pt reports home readings typically . pt doing well on Trulicity and jardiance.   HTN- pt denies HAs, dizziness, CP  HLD- doing well on zetia. intolerant to statins previously.   h/o spinal laminectomy in 2011. pt reports having 5 discs repaired. pt reports continued back pain despite surgical correction. pt reports having exacerbation recently when taking something out of the fridge. pt has been taking ibuprofen for pain intermittently. pt reports limited ambulation due to pain.       Past Medical History  Disease Name Date Onset Notes   Allergic rhinitis --  --    Arthritis --  --    Balance problem --  --    Broken Bones --  --    Cancer --  --    Cataracts, bilateral --  --    Chronic Sinusitis --  --    Colon Polyps --  --    Degenerative Disc Disease  --  --    Diverticulitis --  --    Forgetfulness --  --    Headache --  --    Hyperlipemia --  --    Hypertension --  --    Limb Swelling --  --    Lumbago --  --    Pacemaker --  --    Prostate CA --  --    Prostate Disorder --  --    Shortness of Breath --  --    Sleep apnea --  --          Past Surgical History  Procedure Name Date Notes   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    Artificial Joints/Limbs 2012 rt knee   Back surgery --  --    Colonoscopy 2019 --    Dental Surgery --  --    Eye Implant --  yes   Foot surgery --  left   Joint Surgery --   --    Knee replacement, right --  --    Lumbar fusion 2016 [facility]   Lumbar puncture --  --    Penile implant --  --    Pilonidal Cyst Excision --  --    Rotator Cuff repair --  --    UPPP (uvulopalatopharyngoplasty) --  --          Medication List  Name Date Started Instructions   Blood Glucose Monitoring miscellaneous kit 11/19/2020 use as directed   Blood Glucose Test miscellaneous strip 11/19/2020 use as directed   finasteride 5 mg oral tablet 08/20/2020 take 1 tablet (5 mg) by oral route once daily for 90 days   fluoxetine 40 mg oral capsule 09/28/2020 TAKE 1 CAPSULE(40 MG) BY MOUTH EVERY DAY IN THE EVENING   Focus Factor  --    Jardiance 25 mg oral tablet 10/02/2020 take 1 tablet (25 mg) by oral route once daily in the morning for 90 days   lisinopril 20 mg oral tablet 10/02/2020 take 1 tablet (20 mg) by oral route once daily for 90 days   Synthroid 75 mcg oral tablet 09/21/2020 take 1 tablet (75 mcg) by oral route once daily for 90 days   tamsulosin 0.4 mg oral capsule 08/20/2020 take 1 capsule (0.4 mg) by oral route once daily 1/2 hour following the same meal each day for 90 days   Trulicity 1.5 mg/0.5 mL subcutaneous pen injector 11/19/2020 inject 0.5mL (1.5mg) subQ every 7 days in the abdomen, thigh, or upper arm rotating injection sites for 30 days   VSL#3 112.5 billion cell oral capsule 06/30/2020 take 1 capsule by oral route daily for 30 days   Zetia 10 mg oral tablet 09/21/2020 take 1 tablet (10 mg) by oral route once daily for 90 days         Allergy List  Allergen Name Date Reaction Notes   Demerol --  --  --    SULFA (SULFONAMIDES) --  --  --          Family Medical History  Disease Name Relative/Age Notes   Family history of Arthritis Brother/  Father/  Mother/   Mother; Brother   Family history of cancer Brother/  Father/  Mother/   Mother; Father; Brother         Social History  Finding Status Start/Stop Quantity Notes   Alcohol Use Current some day --/-- --  rarely drinks  "  Claustophobic Unknown --/-- --  yes   lives with spouse --  --/-- --  --    . --  --/-- --  --    Recreational Drug Use Never --/-- --  no  05/16/2019 - 01/15/2019 - no   Retired. --  --/-- --  --    Tobacco Former --/-- 1.5 ppd former smoker quit 1979, 17 years total         Immunizations  NameDate Admin Mfg Trade Name Lot Number Route Inj VIS Given VIS Publication   Zbxklmmhm54/21/2020 PMC FLUZONE-HIGH DOSE [id number] IM RD 09/21/2020    Comments: Pt tolerated well and left office in stable condition   Cgiepqvck9782/21/2020 [facility]B PNEUMOVAX 23 [id number] IM LD 12/21/2020    Comments: patient tolerated well. left office in stable condition.   Prevnar 1312/30/2019 [facility] PREVNAR 13 [id number] IM  12/30/2019    Comments: Pt tolerated the injection well.         Review of Systems  · Constitutional  o Denies  o : fever, fatigue, weight loss, weight gain  · Cardiovascular  o Denies  o : lower extremity edema, chest pressure, palpitations  · Respiratory  o Denies  o : shortness of breath, wheezing, cough, dyspnea on exertion  · Gastrointestinal  o Denies  o : nausea, vomiting, diarrhea, constipation, abdominal pain      Vitals  Date Time BP Position Site L\R Cuff Size HR RR TEMP (F) WT  HT  BMI kg/m2 BSA m2 O2 Sat FR L/min FiO2 HC       10/19/2020 10:00 /82 Sitting    84 - R  98.4 240lbs 0oz 5'  10\" 34.44 2.32 96 %  21%    11/19/2020 10:41 /76 Sitting    56 - R 16 97.1 240lbs 0oz 5'  10\" 34.44 2.32 97 %  21%    12/21/2020 10:41 /64 Sitting    65 - R  96.6 237lbs 0oz 5'  10\" 34.01 2.3 98 %  21%          Physical Examination  · Constitutional  o Appearance  o : no acute distress, well-nourished  · Head and Face  o Head  o :   § Inspection  § : atraumatic, normocephalic  · Eyes  o Eyes  o : extraocular movements intact, no scleral icterus, no conjunctival injection  · Ears, Nose, Mouth and Throat  o Ears  o :   § External Ears  § : normal  o Nose  o :   § Intranasal Exam  § : nares patent  o Oral Cavity  o : "   § Oral Mucosa  § : moist mucous membranes  · Respiratory  o Respiratory Effort  o : breathing comfortably, symmetric chest rise  · Cardiovascular  o Heart  o :   § Auscultation of Heart  § : regular rate  o Peripheral Vascular System  o :   § Extremities  § : no edema  · Neurologic  o Mental Status Examination  o :   § Orientation  § : grossly oriented to person, place and time  o Gait and Station  o :   § Gait Screening  § : normal gait  · Psychiatric  o General  o : normal mood and affect              Assessment  · Diabetes mellitus, type 2     250.00/E11.9  home readings indicate good control. recheck labs with next blood draw. due for ophtho exam.   · Hyperlipidemia     272.4/E78.5  cont zetia. check lipids with next draw  · Hypothyroidism     244.9/E03.9  check TSH and adjust Synthroid accordingly  · Lumbago     724.2/M54.5  will Rx flexeril and monitor for effectiveness. obtain MRI and refer to PT. also cont NSAIDs as needed.   · Prostate CA     185/C61  f/u with urology.   · Hypertension     401.9/I10  well controlled. cont regimen.     Problems Reconciled  Plan  · Orders  o Diabetes 2 Panel (Urine Microalbumin, CMP, Lipid, A1c, ) [facility] (05077, 09698, 94474, 26629) - 250.00/E11.9 - 12/21/2020  o CBC with Auto Diff [facility] (56723) - 250.00/E11.9 - 12/21/2020  o Diabetic Dilated Eye Exam Consult with Ophthalmology/Optometry (DMEYE) - 250.00/E11.9 - 12/21/2020  o ACO-41: Dilated Diabetic eye exam completed this year and results in chart/reviewed (2022F) - 250.00/E11.9 - 12/21/2020  o Thyroid Profile (54299, 68341, THYII) - 244.9/E03.9 - 12/21/2020  o MRI lumbar spine w/o contrast (33014) - 724.2/M54.5 - 12/21/2020  o PHYSICAL THERAPY/OCCUPATIONAL THERAPY CONSULTATION (Evaluate and Treat) (PTOTR) - 724.2/M54.5 - 12/21/2020  o ACO-39: Current medications updated and reviewed (, 1159F) - - 12/21/2020  o Qgxeqmoan57 Vaccine (30402) - - 12/21/2020   Vaccine - Mxpgfbhwh06; Dose: 0.5; Site: Left Deltoid; Route:  Intramuscular; Date: 12/21/2020 11:55:00; Exp: 05/08/2022; Lot: [id number]; Mfg: UniYu; TradeName: PNEUMOVAX 23; Administered By: Brandy Chambers MA; Comment: patient tolerated well. left office in stable condition.  · Medications  o cyclobenzaprine 10 mg oral tablet   SIG: take 1 tablet by oral route 3 times a day prn pain   DISP: (60) Tablet with 1 refills  Prescribed on 12/21/2020     o Medications have been Reconciled  o Transition of Care or Provider Policy  · Instructions  o Patient was educated/instructed on their diagnosis, treatment and medications prior to discharge from the clinic today.  · Disposition  o f/u in 3 months  o Care Transition  o NISH Sent            Electronically Signed by: Amaury Mcdonough MD -Author on December 21, 2020 07:50:11 PM

## 2021-05-14 NOTE — PROGRESS NOTES
"   Progress Note      Patient Name: Jamir Porter   Patient ID: [id number]   Sex: Male   YOB: 1945    Primary Care Provider: Juan GONZALEZ    Visit Date: April 9, 2021    Provider: Lenard Ray MD   Location: [facility]   Location Address: [address] [place] [address]  [id number]   Location Phone: [phone]          Chief Complaint  · pt here for urologic issues      History Of Present Illness     60    PVR    5/18   115  2/18   84    H/o prostate CA - on finasteride    9/20 cystoscopy -4 cm prostate, moderate trabeculations, negative otherwise    9/20 prostate biopsy -53 g, right apex 3+3, 1/2, 6%, negative otherwise  8/20  3.56  8/20  MRI  prostate - 58 g , neg  1/20  0.91    8/28/2019 MRI lowmdwuh29 g -negative    2/19  1.40   7/18  2.32 -on finasteride  7/18 MRI lnybsuix60 g, no targetable lesion seen  5/18  5.09  1/18  5.41  10/17 5.5  4/17  prostate oupfjo37 g - left, 3+3, 50% of core, 5 mm; right - high-grade PIN  1/17  5.5  10/16 4.9    10/16 MRI fakogiif26 g, no targetable lesions seen.    5/16 4.4  4/16 prostate dmuyrt89 g   Pathology  Right basenegative  Right base lateral 3+3, 20%, 4 mm  Right mid, mid lateral, apex, apex lateralnegative  Left basenegative  Left mid3+3, 10%  Left mid lateralnegative  Left apex3+3, 15%   left apex3+3, 10%    2/16 5.39    1/14 3.68    \">76-year-old  gentleman with a history of prostate cancer clinical T1c, on active surveillance, patient also with ED, penile prosthesis and recently found right adrenal nodule    Status post TURP, he been having some slow stream, this may be marginally better than before surgery.  No gross hematuria.  Some minor burning and dysuria.  Nocturia x2.  No night daytime frequency.    PVR today greater than 200    3/21  TURP -3+3, 3/130, < 1%    4/21 CT abdomen/pelvis with and without -9 mm benign lipid poor adrenal adenoma, no change since 8/19.    off prostate meds    PVR     2/20   " 25    Previous    No erections. Has IPP.    H/o retention  - 1.5 L    oxybutynin  - did stop his incontinence but did give him side effects that he could not tolerate, GI.  Myrebetriq Cause diarrhea    2019 CT abdomen with and withoutadrenal mass protocolsmall right 0.9 cm adrenal nodule.  Favor adrenal adenoma.   cortisol less than 1.0, plasma metanephrines negative  7/3/2019 CT abdomen/pelvis with9 mm right adrenal nodule.  Which demonstrates enhancement above the background of the adrenal gland.  Penile prosthesis reservoir partially herniates the left inguinal ring.    Father  pancreatic CA,  brother with prostate cancer.      Penile prothesis - functions, he does not use.    No cardiopulmonary history.  Patient does not smoke.  He is on ibuprofen for back pain. No DM.  Father  at 64, grandfather  at 85      creatinine 0.9, GFR > 60    PVR       115     84    H/o prostate CA - on finasteride     cystoscopy -4 cm prostate, moderate trabeculations, negative otherwise     prostate biopsy -53 g, right apex 3+3, 1/, 6%, negative otherwise    3.56    MRI  prostate - 58 g , neg    0.91    2019 MRI xzwbgfti45 g -negative      1.40     2.32 -on finasteride   MRI vxgtvluc07 g, no targetable lesion seen    5.09    5.41  10/17 5.5    prostate ryqdzw29 g - left, 3+3, 50% of core, 5 mm; right - high-grade PIN    5.5  10/16 4.9    10/16 MRI xxrgyxyz52 g, no targetable lesions seen.     4.4   prostate whwhlf06 g   Pathology  Right basenegative  Right base lateral 3+3, 20%, 4 mm  Right mid, mid lateral, apex, apex lateralnegative  Left basenegative  Left mid3+3, 10%  Left mid lateralnegative  Left apex3+3, 15%   left apex3+3, 10%     5.39     3.68           Past Medical History  Allergic rhinitis; Arthritis; Balance problem; Broken Bones; Cancer; Cataracts, bilateral; Chronic Sinusitis; Colon Polyps; Degenerative Disc Disease ;  "Diverticulitis; Forgetfulness; Headache; Hyperlipemia; Hypertension; Limb Swelling; Lumbago; Pacemaker; Prostate CA; Prostate Disorder; Shortness of Breath; Sleep apnea         Past Surgical History  Amputation; Artificial Joints/Limbs; Back surgery; Colonoscopy; Dental Surgery; Eye Implant; Foot surgery; Joint Surgery; Knee replacement, right; Lumbar fusion; Lumbar puncture; Penile implant; Pilonidal Cyst Excision; Rotator Cuff repair; UPPP (uvulopalatopharyngoplasty)         Medication List  Blood Glucose Monitoring miscellaneous kit; Blood Glucose Test miscellaneous strip; cyclobenzaprine 10 mg oral tablet; doxycycline hyclate 100 mg oral tablet; finasteride 5 mg oral tablet; fluoxetine 40 mg oral capsule; Jardiance 25 mg oral tablet; lisinopril 20 mg oral tablet; Synthroid 75 mcg oral tablet; tamsulosin 0.4 mg oral capsule; Trulicity 1.5 mg/0.5 mL subcutaneous pen injector; VSL#3 112.5 billion cell oral capsule; Zetia 10 mg oral tablet         Allergy List  Demerol; SULFA (SULFONAMIDES)       Allergies Reconciled  Family Medical History  Family history of Arthritis; Family history of cancer         Social History  Alcohol Use; Claustophobic (Unknown); lives with spouse; .; Recreational Drug Use (Never); Retired.; Tobacco (Former)         Immunizations  Name Date Admin   Influenza 09/21/2020   Influenza 10/01/2019   Femsomkbl57 12/21/2020   Prevnar 13 12/30/2019         Review of Systems  · Constitutional  o Denies  o : chills, fever  · Gastrointestinal  o Denies  o : nausea, vomiting      Vitals  Date Time BP Position Site L\R Cuff Size HR RR TEMP (F) WT  HT  BMI kg/m2 BSA m2 O2 Sat FR L/min FiO2 HC       04/09/2021 10:16 AM       17  231lbs 0oz 5'  10\" 33.14 2.27             Physical Examination  · Constitutional  o Appearance  o : Well-appearing, well-developed, in no acute distress  · Respiratory  o Respiratory Effort  o : Unlabored breathing  · Neurologic  o Mental Status Examination  o : "   § Orientation  § : Grossly oriented to person, place and time, judgment and insight intact, normal mood and affect              Assessment  · Prostate CA     185/C61  · Benign prostatic hyperplasia with weak urinary stream       Benign prostatic hyperplasia with lower urinary tract symptoms     600.01/N40.1  Poor urinary stream     600.01/R39.12  · Urge incontinence     788.31/N39.41  · Elevated PSA     790.93/R97.20  · Adrenal abnormality     255.9/E27.9  · Urinary retention     788.20/R33.9      Plan  · Orders  o PSA ultrasensitive DIAGNOSTIC [facility] (83066) - 185/C61, 790.93/R97.20 - 09/09/2021  o MRI prostate wo then w contrast (55557) - 185/C61, 790.93/R97.20 - 09/07/2021   Scheduled on 9/7/21 @ 12:00 with an 11:30 arrival time at [facility]. When you arrive, park in parking garage, up to second floor entrance and they will take you to registration for your MRI  · Medications  o Medications have been Reconciled  o Transition of Care or Provider Policy  · Instructions  o Electronically Identified Patient Education Materials Provided Electronically     I will see him back in a few weeks with bladder scan because of his higher residual today      BPH/ urinary retention    off meds    urine ctx today.    Prostate cancer on active surveillance    Follow-up in 9/21 with MRI prostate and PSA    PVR at follow-up    Adrenal mass    Stable and benign looking.  Patient given reassurance.  No further follow-up needed.             Electronically Signed by: Lenard Ray MD -Author on April 10, 2021 06:59:11 AM

## 2021-05-15 VITALS — BODY MASS INDEX: 34.5 KG/M2 | WEIGHT: 241 LBS | RESPIRATION RATE: 14 BRPM | HEIGHT: 70 IN

## 2021-05-15 VITALS
DIASTOLIC BLOOD PRESSURE: 88 MMHG | HEIGHT: 69 IN | HEART RATE: 68 BPM | OXYGEN SATURATION: 94 % | TEMPERATURE: 96.7 F | BODY MASS INDEX: 35.55 KG/M2 | RESPIRATION RATE: 16 BRPM | SYSTOLIC BLOOD PRESSURE: 132 MMHG | WEIGHT: 240 LBS

## 2021-05-15 VITALS
HEART RATE: 92 BPM | HEIGHT: 70 IN | SYSTOLIC BLOOD PRESSURE: 130 MMHG | TEMPERATURE: 96.6 F | BODY MASS INDEX: 34.65 KG/M2 | OXYGEN SATURATION: 91 % | DIASTOLIC BLOOD PRESSURE: 78 MMHG | WEIGHT: 242 LBS | RESPIRATION RATE: 16 BRPM

## 2021-05-15 VITALS
HEART RATE: 75 BPM | HEIGHT: 70 IN | OXYGEN SATURATION: 97 % | DIASTOLIC BLOOD PRESSURE: 63 MMHG | SYSTOLIC BLOOD PRESSURE: 149 MMHG | BODY MASS INDEX: 35 KG/M2 | WEIGHT: 244.44 LBS | RESPIRATION RATE: 22 BRPM | TEMPERATURE: 95.6 F

## 2021-05-15 VITALS — WEIGHT: 242 LBS | BODY MASS INDEX: 34.65 KG/M2 | HEIGHT: 70 IN | RESPIRATION RATE: 16 BRPM

## 2021-05-15 VITALS
DIASTOLIC BLOOD PRESSURE: 81 MMHG | BODY MASS INDEX: 35.84 KG/M2 | HEART RATE: 73 BPM | SYSTOLIC BLOOD PRESSURE: 135 MMHG | WEIGHT: 242 LBS | HEIGHT: 69 IN

## 2021-05-15 VITALS
HEIGHT: 70 IN | SYSTOLIC BLOOD PRESSURE: 158 MMHG | HEART RATE: 103 BPM | RESPIRATION RATE: 16 BRPM | DIASTOLIC BLOOD PRESSURE: 72 MMHG | BODY MASS INDEX: 35.22 KG/M2 | OXYGEN SATURATION: 99 % | WEIGHT: 246 LBS

## 2021-05-15 VITALS
BODY MASS INDEX: 34.98 KG/M2 | RESPIRATION RATE: 20 BRPM | HEIGHT: 70 IN | OXYGEN SATURATION: 94 % | TEMPERATURE: 97.4 F | WEIGHT: 244.37 LBS | HEART RATE: 95 BPM

## 2021-05-15 VITALS
SYSTOLIC BLOOD PRESSURE: 151 MMHG | TEMPERATURE: 96.5 F | BODY MASS INDEX: 35.7 KG/M2 | WEIGHT: 241 LBS | HEART RATE: 95 BPM | DIASTOLIC BLOOD PRESSURE: 71 MMHG | OXYGEN SATURATION: 93 % | HEIGHT: 69 IN

## 2021-05-15 VITALS
HEIGHT: 69 IN | TEMPERATURE: 97.1 F | HEART RATE: 66 BPM | SYSTOLIC BLOOD PRESSURE: 100 MMHG | DIASTOLIC BLOOD PRESSURE: 62 MMHG | BODY MASS INDEX: 35.74 KG/M2 | RESPIRATION RATE: 16 BRPM | OXYGEN SATURATION: 95 % | WEIGHT: 241.31 LBS

## 2021-05-15 VITALS — HEIGHT: 70 IN | WEIGHT: 240 LBS | BODY MASS INDEX: 34.36 KG/M2 | RESPIRATION RATE: 16 BRPM

## 2021-05-15 VITALS
SYSTOLIC BLOOD PRESSURE: 158 MMHG | OXYGEN SATURATION: 96 % | RESPIRATION RATE: 16 BRPM | HEART RATE: 101 BPM | HEIGHT: 70 IN | DIASTOLIC BLOOD PRESSURE: 81 MMHG | BODY MASS INDEX: 34.5 KG/M2 | WEIGHT: 241 LBS

## 2021-05-15 VITALS — WEIGHT: 240 LBS | HEIGHT: 69 IN | BODY MASS INDEX: 35.55 KG/M2 | RESPIRATION RATE: 14 BRPM

## 2021-05-15 VITALS — BODY MASS INDEX: 36.36 KG/M2 | HEIGHT: 69 IN | HEART RATE: 106 BPM | OXYGEN SATURATION: 92 % | WEIGHT: 245.5 LBS

## 2021-05-15 VITALS
HEIGHT: 70 IN | WEIGHT: 248 LBS | HEART RATE: 59 BPM | DIASTOLIC BLOOD PRESSURE: 41 MMHG | SYSTOLIC BLOOD PRESSURE: 108 MMHG | BODY MASS INDEX: 35.5 KG/M2

## 2021-05-15 VITALS — RESPIRATION RATE: 16 BRPM | BODY MASS INDEX: 36.05 KG/M2 | WEIGHT: 243.37 LBS | HEIGHT: 69 IN

## 2021-05-16 VITALS
SYSTOLIC BLOOD PRESSURE: 141 MMHG | OXYGEN SATURATION: 97 % | WEIGHT: 247 LBS | BODY MASS INDEX: 36.58 KG/M2 | HEART RATE: 75 BPM | TEMPERATURE: 97.3 F | DIASTOLIC BLOOD PRESSURE: 73 MMHG | RESPIRATION RATE: 16 BRPM | HEIGHT: 69 IN

## 2021-05-16 VITALS
OXYGEN SATURATION: 95 % | TEMPERATURE: 97.1 F | HEIGHT: 69 IN | SYSTOLIC BLOOD PRESSURE: 148 MMHG | WEIGHT: 244 LBS | BODY MASS INDEX: 36.14 KG/M2 | DIASTOLIC BLOOD PRESSURE: 85 MMHG | HEART RATE: 73 BPM

## 2021-05-16 VITALS — HEART RATE: 76 BPM | OXYGEN SATURATION: 97 % | HEIGHT: 69 IN | BODY MASS INDEX: 36.36 KG/M2 | WEIGHT: 245.5 LBS

## 2021-05-16 VITALS
BODY MASS INDEX: 34.36 KG/M2 | SYSTOLIC BLOOD PRESSURE: 112 MMHG | HEIGHT: 69 IN | WEIGHT: 232 LBS | DIASTOLIC BLOOD PRESSURE: 76 MMHG

## 2021-05-16 VITALS
BODY MASS INDEX: 34.86 KG/M2 | RESPIRATION RATE: 18 BRPM | WEIGHT: 243.5 LBS | HEART RATE: 74 BPM | TEMPERATURE: 97.4 F | OXYGEN SATURATION: 97 % | HEIGHT: 70 IN

## 2021-05-16 VITALS
HEIGHT: 69 IN | HEART RATE: 84 BPM | OXYGEN SATURATION: 94 % | TEMPERATURE: 95.8 F | WEIGHT: 240.31 LBS | RESPIRATION RATE: 16 BRPM | DIASTOLIC BLOOD PRESSURE: 77 MMHG | BODY MASS INDEX: 35.59 KG/M2 | SYSTOLIC BLOOD PRESSURE: 130 MMHG

## 2021-05-16 VITALS — HEIGHT: 69 IN | RESPIRATION RATE: 14 BRPM | WEIGHT: 235 LBS | BODY MASS INDEX: 34.8 KG/M2

## 2021-05-16 VITALS
TEMPERATURE: 97.6 F | OXYGEN SATURATION: 96 % | DIASTOLIC BLOOD PRESSURE: 89 MMHG | HEIGHT: 69 IN | SYSTOLIC BLOOD PRESSURE: 146 MMHG | HEART RATE: 73 BPM | RESPIRATION RATE: 16 BRPM | BODY MASS INDEX: 36.29 KG/M2 | WEIGHT: 245 LBS

## 2021-05-16 VITALS
WEIGHT: 245 LBS | HEART RATE: 67 BPM | SYSTOLIC BLOOD PRESSURE: 138 MMHG | BODY MASS INDEX: 35.07 KG/M2 | HEIGHT: 70 IN | DIASTOLIC BLOOD PRESSURE: 69 MMHG

## 2021-05-16 VITALS — BODY MASS INDEX: 34.8 KG/M2 | WEIGHT: 235 LBS | RESPIRATION RATE: 16 BRPM | HEIGHT: 69 IN

## 2021-05-16 VITALS — RESPIRATION RATE: 16 BRPM | HEIGHT: 69 IN | WEIGHT: 243 LBS | BODY MASS INDEX: 35.99 KG/M2

## 2021-05-16 VITALS
SYSTOLIC BLOOD PRESSURE: 131 MMHG | BODY MASS INDEX: 35.15 KG/M2 | RESPIRATION RATE: 16 BRPM | TEMPERATURE: 97.2 F | HEART RATE: 73 BPM | DIASTOLIC BLOOD PRESSURE: 64 MMHG | WEIGHT: 237.31 LBS | OXYGEN SATURATION: 93 % | HEIGHT: 69 IN

## 2021-06-22 ENCOUNTER — TELEPHONE (OUTPATIENT)
Dept: INTERNAL MEDICINE | Facility: CLINIC | Age: 76
End: 2021-06-22

## 2021-06-22 RX ORDER — EMPAGLIFLOZIN 25 MG/1
TABLET, FILM COATED ORAL
COMMUNITY
Start: 2021-04-01 | End: 2022-01-27

## 2021-06-22 RX ORDER — SOLIFENACIN SUCCINATE 5 MG/1
TABLET, FILM COATED ORAL
COMMUNITY
End: 2022-01-27

## 2021-06-22 RX ORDER — EZETIMIBE 10 MG/1
10 TABLET ORAL DAILY
COMMUNITY
Start: 2021-04-01 | End: 2022-02-16 | Stop reason: SDUPTHER

## 2021-06-22 RX ORDER — DULAGLUTIDE 1.5 MG/.5ML
INJECTION, SOLUTION SUBCUTANEOUS
COMMUNITY
Start: 2021-05-05 | End: 2021-06-22 | Stop reason: ALTCHOICE

## 2021-06-22 RX ORDER — DULAGLUTIDE 3 MG/.5ML
3 INJECTION, SOLUTION SUBCUTANEOUS WEEKLY
Qty: 13 PEN | Refills: 3 | Status: SHIPPED | OUTPATIENT
Start: 2021-06-22 | End: 2022-05-08

## 2021-06-22 RX ORDER — FINASTERIDE 5 MG/1
TABLET, FILM COATED ORAL
COMMUNITY
Start: 2021-05-26 | End: 2022-01-27

## 2021-06-22 RX ORDER — FLUOXETINE HYDROCHLORIDE 40 MG/1
CAPSULE ORAL
COMMUNITY
Start: 2021-03-29 | End: 2021-11-01 | Stop reason: SDUPTHER

## 2021-06-22 RX ORDER — LEVOTHYROXINE SODIUM 0.07 MG/1
TABLET ORAL
COMMUNITY
Start: 2021-03-28 | End: 2022-01-27

## 2021-06-22 NOTE — TELEPHONE ENCOUNTER
Caller: Jamir Porter    Relationship: Self    Best call back number: [phone]    What medication are you requesting: DIABETIC MEDICATION     What are your current symptoms:GLUCOSE NUMBERS HAVE BEEN RUNNING BETWEEN 170-173    How long have you been experiencing symptoms: ALMOST A WEEK     Have you had these symptoms before:    [x] Yes  [] No    Have you been treated for these symptoms before:   [x] Yes  [] No    If a prescription is needed, what is your preferred pharmacy and phone number:    [facility] [place] [address]  PHONE NUMBER [phone]    Additional notes:PATIENT STATES THAT HE PREVIOUSLY WAS ON A DIABETIC MEDICATION BUT CAN'T RECALL THE NAME OF IT. PATIENT WANTED PROVIDER TO LOOK IN HIS CHART AND REVIEW THE MEDICATION.

## 2021-06-22 NOTE — TELEPHONE ENCOUNTER
If patient is open to it, trulicity is approved for higher doses now, and he may inc trulicity to 3mg weekly. That would likely be the best option to improve diabetic control. Will Rx.

## 2021-06-23 NOTE — TELEPHONE ENCOUNTER
Called to notify patient.  Left voicemail.      Hub may advise. May increase trulicity to 3mg weekly, Rx has been sent to the pharmacy.

## 2021-06-28 NOTE — TELEPHONE ENCOUNTER
ATTEMPTED TO CONTACT PT PER PROVIDER'S INSTRUCTIONS     NO ANSWER/LEFT VOICEMAIL WITH INSTRUCTIONS TO RETURN CALL TO OFFICE [phone]    OK FOR HUB TO ADVISE

## 2021-06-28 NOTE — TELEPHONE ENCOUNTER
Patient called returning a phone call. Informed patient of message. Patient verbalized understanding.

## 2021-07-01 ENCOUNTER — TRANSCRIBE ORDERS (OUTPATIENT)
Dept: ADMINISTRATIVE | Facility: HOSPITAL | Age: 76
End: 2021-07-01

## 2021-07-01 DIAGNOSIS — D36.10 NEUROMA: Primary | ICD-10-CM

## 2021-07-09 ENCOUNTER — HOSPITAL ENCOUNTER (OUTPATIENT)
Dept: MRI IMAGING | Facility: HOSPITAL | Age: 76
Discharge: HOME | End: 2021-07-09

## 2021-07-09 DIAGNOSIS — D36.10 NEUROMA: ICD-10-CM

## 2021-07-12 ENCOUNTER — HOSPITAL ENCOUNTER (OUTPATIENT)
Dept: MRI IMAGING | Facility: HOSPITAL | Age: 76
Discharge: HOME | End: 2021-07-12
Admitting: PODIATRIST

## 2021-07-12 PROCEDURE — 73718 MRI LOWER EXTREMITY W/O DYE: CPT

## 2021-07-12 PROCEDURE — 73718 MRI LOWER EXTREMITY W/O DYE: CPT | Performed by: RADIOLOGY

## 2021-07-27 RX ORDER — HYDROCHLOROTHIAZIDE 12.5 MG/1
TABLET ORAL
COMMUNITY
End: 2021-07-27 | Stop reason: SDUPTHER

## 2021-07-28 RX ORDER — HYDROCHLOROTHIAZIDE 12.5 MG/1
25 TABLET ORAL DAILY
Qty: 90 TABLET | Refills: 2 | Status: SHIPPED | OUTPATIENT
Start: 2021-07-28 | End: 2021-12-08

## 2021-08-02 ENCOUNTER — HOSPITAL ENCOUNTER (OUTPATIENT)
Dept: GENERAL RADIOLOGY | Facility: HOSPITAL | Age: 76
Discharge: HOME | End: 2021-08-02

## 2021-08-02 ENCOUNTER — HOSPITAL ENCOUNTER (OUTPATIENT)
Dept: CARDIOLOGY | Facility: HOSPITAL | Age: 76
Discharge: HOME | End: 2021-08-02

## 2021-08-02 ENCOUNTER — TRANSCRIBE ORDERS (OUTPATIENT)
Dept: GENERAL RADIOLOGY | Facility: HOSPITAL | Age: 76
End: 2021-08-02

## 2021-08-02 ENCOUNTER — TRANSCRIBE ORDERS (OUTPATIENT)
Dept: LAB | Facility: HOSPITAL | Age: 76
End: 2021-08-02

## 2021-08-02 ENCOUNTER — LAB (OUTPATIENT)
Dept: LAB | Facility: HOSPITAL | Age: 76
End: 2021-08-02

## 2021-08-02 ENCOUNTER — TRANSCRIBE ORDERS (OUTPATIENT)
Dept: CARDIOLOGY | Facility: HOSPITAL | Age: 76
End: 2021-08-02

## 2021-08-02 DIAGNOSIS — M79.672 PAIN IN LEFT FOOT: ICD-10-CM

## 2021-08-02 DIAGNOSIS — R07.9 CHEST PAIN, UNSPECIFIED TYPE: Primary | ICD-10-CM

## 2021-08-02 DIAGNOSIS — R26.2 DIFFICULTY WALKING: Primary | ICD-10-CM

## 2021-08-02 DIAGNOSIS — R07.9 CHEST PAIN, UNSPECIFIED TYPE: ICD-10-CM

## 2021-08-02 DIAGNOSIS — R26.2 DIFFICULTY WALKING: ICD-10-CM

## 2021-08-02 LAB
ALBUMIN SERPL-MCNC: 4.5 G/DL (ref 3.5–5.2)
ALBUMIN/GLOB SERPL: 1.5 G/DL
ALP SERPL-CCNC: 103 U/L (ref 39–117)
ALT SERPL W P-5'-P-CCNC: 34 U/L (ref 1–41)
ANION GAP SERPL CALCULATED.3IONS-SCNC: 9.9 MMOL/L (ref 5–15)
AST SERPL-CCNC: 27 U/L (ref 1–40)
BASOPHILS # BLD AUTO: 0.1 10*3/MM3 (ref 0–0.2)
BASOPHILS NFR BLD AUTO: 1.1 % (ref 0–1.5)
BILIRUB SERPL-MCNC: 0.6 MG/DL (ref 0–1.2)
BUN SERPL-MCNC: 22 MG/DL (ref 8–23)
BUN/CREAT SERPL: 23.7 (ref 7–25)
CALCIUM SPEC-SCNC: 9.5 MG/DL (ref 8.6–10.5)
CHLORIDE SERPL-SCNC: 101 MMOL/L (ref 98–107)
CO2 SERPL-SCNC: 26.1 MMOL/L (ref 22–29)
CREAT SERPL-MCNC: 0.93 MG/DL (ref 0.76–1.27)
DEPRECATED RDW RBC AUTO: 43.3 FL (ref 37–54)
EOSINOPHIL # BLD AUTO: 0.51 10*3/MM3 (ref 0–0.4)
EOSINOPHIL NFR BLD AUTO: 5.5 % (ref 0.3–6.2)
ERYTHROCYTE [DISTWIDTH] IN BLOOD BY AUTOMATED COUNT: 12.5 % (ref 12.3–15.4)
GFR SERPL CREATININE-BSD FRML MDRD: 79 ML/MIN/1.73
GLOBULIN UR ELPH-MCNC: 3.1 GM/DL
GLUCOSE SERPL-MCNC: 87 MG/DL (ref 65–99)
HCT VFR BLD AUTO: 46 % (ref 37.5–51)
HGB BLD-MCNC: 15 G/DL (ref 13–17.7)
IMM GRANULOCYTES # BLD AUTO: 0.05 10*3/MM3 (ref 0–0.05)
IMM GRANULOCYTES NFR BLD AUTO: 0.5 % (ref 0–0.5)
LYMPHOCYTES # BLD AUTO: 1.46 10*3/MM3 (ref 0.7–3.1)
LYMPHOCYTES NFR BLD AUTO: 15.7 % (ref 19.6–45.3)
MCH RBC QN AUTO: 30.7 PG (ref 26.6–33)
MCHC RBC AUTO-ENTMCNC: 32.6 G/DL (ref 31.5–35.7)
MCV RBC AUTO: 94.1 FL (ref 79–97)
MONOCYTES # BLD AUTO: 0.91 10*3/MM3 (ref 0.1–0.9)
MONOCYTES NFR BLD AUTO: 9.8 % (ref 5–12)
NEUTROPHILS NFR BLD AUTO: 6.28 10*3/MM3 (ref 1.7–7)
NEUTROPHILS NFR BLD AUTO: 67.4 % (ref 42.7–76)
NRBC BLD AUTO-RTO: 0 /100 WBC (ref 0–0.2)
PLATELET # BLD AUTO: 263 10*3/MM3 (ref 140–450)
PMV BLD AUTO: 11.1 FL (ref 6–12)
POTASSIUM SERPL-SCNC: 3.7 MMOL/L (ref 3.5–5.2)
PROT SERPL-MCNC: 7.6 G/DL (ref 6–8.5)
RBC # BLD AUTO: 4.89 10*6/MM3 (ref 4.14–5.8)
SODIUM SERPL-SCNC: 137 MMOL/L (ref 136–145)
WBC # BLD AUTO: 9.31 10*3/MM3 (ref 3.4–10.8)

## 2021-08-02 PROCEDURE — 85025 COMPLETE CBC W/AUTO DIFF WBC: CPT

## 2021-08-02 PROCEDURE — 36415 COLL VENOUS BLD VENIPUNCTURE: CPT

## 2021-08-02 PROCEDURE — 93005 ELECTROCARDIOGRAM TRACING: CPT

## 2021-08-02 PROCEDURE — 71046 X-RAY EXAM CHEST 2 VIEWS: CPT

## 2021-08-02 PROCEDURE — 80053 COMPREHEN METABOLIC PANEL: CPT

## 2021-08-10 LAB — QT INTERVAL: 363 MS

## 2021-09-10 PROCEDURE — 88304 TISSUE EXAM BY PATHOLOGIST: CPT | Performed by: PODIATRIST

## 2021-09-11 ENCOUNTER — LAB REQUISITION (OUTPATIENT)
Dept: LAB | Facility: HOSPITAL | Age: 76
End: 2021-09-11

## 2021-09-11 DIAGNOSIS — G57.62 LESION OF PLANTAR NERVE, LEFT LOWER LIMB: ICD-10-CM

## 2021-09-11 DIAGNOSIS — M79.672 PAIN IN LEFT FOOT: ICD-10-CM

## 2021-09-13 PROBLEM — C61 PROSTATE CANCER: Status: ACTIVE | Noted: 2021-09-13

## 2021-09-13 PROBLEM — N40.0 BENIGN PROSTATIC HYPERPLASIA WITHOUT LOWER URINARY TRACT SYMPTOMS: Status: ACTIVE | Noted: 2021-09-13

## 2021-09-14 LAB
LAB AP CASE REPORT: NORMAL
PATH REPORT.FINAL DX SPEC: NORMAL
PATH REPORT.GROSS SPEC: NORMAL

## 2021-09-21 ENCOUNTER — LAB (OUTPATIENT)
Dept: LAB | Facility: HOSPITAL | Age: 76
End: 2021-09-21

## 2021-09-21 DIAGNOSIS — E11.65 TYPE 2 DIABETES MELLITUS WITH HYPERGLYCEMIA, WITHOUT LONG-TERM CURRENT USE OF INSULIN (HCC): ICD-10-CM

## 2021-09-21 DIAGNOSIS — E78.5 HYPERLIPIDEMIA, UNSPECIFIED HYPERLIPIDEMIA TYPE: ICD-10-CM

## 2021-09-21 DIAGNOSIS — E11.65 TYPE 2 DIABETES MELLITUS WITH HYPERGLYCEMIA, WITHOUT LONG-TERM CURRENT USE OF INSULIN (HCC): Primary | ICD-10-CM

## 2021-09-21 LAB — ALBUMIN UR-MCNC: 1.8 MG/DL

## 2021-09-21 PROCEDURE — 82043 UR ALBUMIN QUANTITATIVE: CPT

## 2021-09-22 ENCOUNTER — LAB (OUTPATIENT)
Dept: LAB | Facility: HOSPITAL | Age: 76
End: 2021-09-22

## 2021-09-22 DIAGNOSIS — E78.5 HYPERLIPIDEMIA, UNSPECIFIED HYPERLIPIDEMIA TYPE: ICD-10-CM

## 2021-09-22 DIAGNOSIS — E11.65 TYPE 2 DIABETES MELLITUS WITH HYPERGLYCEMIA, WITHOUT LONG-TERM CURRENT USE OF INSULIN (HCC): ICD-10-CM

## 2021-09-22 LAB
ALBUMIN SERPL-MCNC: 4.1 G/DL (ref 3.5–5.2)
ALBUMIN/GLOB SERPL: 1.4 G/DL
ALP SERPL-CCNC: 92 U/L (ref 39–117)
ALT SERPL W P-5'-P-CCNC: 23 U/L (ref 1–41)
ANION GAP SERPL CALCULATED.3IONS-SCNC: 8.6 MMOL/L (ref 5–15)
AST SERPL-CCNC: 19 U/L (ref 1–40)
BASOPHILS # BLD AUTO: 0.08 10*3/MM3 (ref 0–0.2)
BASOPHILS NFR BLD AUTO: 1 % (ref 0–1.5)
BILIRUB SERPL-MCNC: 0.3 MG/DL (ref 0–1.2)
BUN SERPL-MCNC: 14 MG/DL (ref 8–23)
BUN/CREAT SERPL: 14 (ref 7–25)
CALCIUM SPEC-SCNC: 9.1 MG/DL (ref 8.6–10.5)
CHLORIDE SERPL-SCNC: 101 MMOL/L (ref 98–107)
CHOLEST SERPL-MCNC: 169 MG/DL (ref 0–200)
CO2 SERPL-SCNC: 29.4 MMOL/L (ref 22–29)
CREAT SERPL-MCNC: 1 MG/DL (ref 0.76–1.27)
DEPRECATED RDW RBC AUTO: 43.9 FL (ref 37–54)
EOSINOPHIL # BLD AUTO: 0.77 10*3/MM3 (ref 0–0.4)
EOSINOPHIL NFR BLD AUTO: 9.6 % (ref 0.3–6.2)
ERYTHROCYTE [DISTWIDTH] IN BLOOD BY AUTOMATED COUNT: 12.8 % (ref 12.3–15.4)
GFR SERPL CREATININE-BSD FRML MDRD: 72 ML/MIN/1.73
GLOBULIN UR ELPH-MCNC: 3 GM/DL
GLUCOSE SERPL-MCNC: 102 MG/DL (ref 65–99)
HBA1C MFR BLD: 6.28 % (ref 4.8–5.6)
HCT VFR BLD AUTO: 43.3 % (ref 37.5–51)
HDLC SERPL-MCNC: 36 MG/DL (ref 40–60)
HGB BLD-MCNC: 14.4 G/DL (ref 13–17.7)
IMM GRANULOCYTES # BLD AUTO: 0.05 10*3/MM3 (ref 0–0.05)
IMM GRANULOCYTES NFR BLD AUTO: 0.6 % (ref 0–0.5)
LDLC SERPL CALC-MCNC: 115 MG/DL (ref 0–100)
LDLC/HDLC SERPL: 3.14 {RATIO}
LYMPHOCYTES # BLD AUTO: 1.96 10*3/MM3 (ref 0.7–3.1)
LYMPHOCYTES NFR BLD AUTO: 24.4 % (ref 19.6–45.3)
MCH RBC QN AUTO: 31.2 PG (ref 26.6–33)
MCHC RBC AUTO-ENTMCNC: 33.3 G/DL (ref 31.5–35.7)
MCV RBC AUTO: 93.9 FL (ref 79–97)
MONOCYTES # BLD AUTO: 0.9 10*3/MM3 (ref 0.1–0.9)
MONOCYTES NFR BLD AUTO: 11.2 % (ref 5–12)
NEUTROPHILS NFR BLD AUTO: 4.26 10*3/MM3 (ref 1.7–7)
NEUTROPHILS NFR BLD AUTO: 53.2 % (ref 42.7–76)
NRBC BLD AUTO-RTO: 0 /100 WBC (ref 0–0.2)
PLATELET # BLD AUTO: 283 10*3/MM3 (ref 140–450)
PMV BLD AUTO: 10.3 FL (ref 6–12)
POTASSIUM SERPL-SCNC: 3.7 MMOL/L (ref 3.5–5.2)
PROT SERPL-MCNC: 7.1 G/DL (ref 6–8.5)
RBC # BLD AUTO: 4.61 10*6/MM3 (ref 4.14–5.8)
SODIUM SERPL-SCNC: 139 MMOL/L (ref 136–145)
TRIGL SERPL-MCNC: 99 MG/DL (ref 0–150)
VLDLC SERPL-MCNC: 18 MG/DL (ref 5–40)
WBC # BLD AUTO: 8.02 10*3/MM3 (ref 3.4–10.8)

## 2021-09-22 PROCEDURE — 80061 LIPID PANEL: CPT

## 2021-09-22 PROCEDURE — 85025 COMPLETE CBC W/AUTO DIFF WBC: CPT

## 2021-09-22 PROCEDURE — 80053 COMPREHEN METABOLIC PANEL: CPT

## 2021-09-22 PROCEDURE — 83036 HEMOGLOBIN GLYCOSYLATED A1C: CPT

## 2021-09-22 PROCEDURE — 36415 COLL VENOUS BLD VENIPUNCTURE: CPT

## 2021-09-23 DIAGNOSIS — R97.20 ELEVATED PROSTATE SPECIFIC ANTIGEN (PSA): Primary | ICD-10-CM

## 2021-09-24 ENCOUNTER — OFFICE VISIT (OUTPATIENT)
Dept: INTERNAL MEDICINE | Facility: CLINIC | Age: 76
End: 2021-09-24

## 2021-09-24 VITALS
BODY MASS INDEX: 32.93 KG/M2 | SYSTOLIC BLOOD PRESSURE: 116 MMHG | DIASTOLIC BLOOD PRESSURE: 77 MMHG | TEMPERATURE: 97.1 F | HEART RATE: 65 BPM | WEIGHT: 230 LBS | OXYGEN SATURATION: 96 % | HEIGHT: 70 IN

## 2021-09-24 DIAGNOSIS — L03.317 CELLULITIS OF BUTTOCK: ICD-10-CM

## 2021-09-24 DIAGNOSIS — Z23 ENCOUNTER FOR IMMUNIZATION: ICD-10-CM

## 2021-09-24 DIAGNOSIS — I10 ESSENTIAL HYPERTENSION: ICD-10-CM

## 2021-09-24 DIAGNOSIS — E11.65 TYPE 2 DIABETES MELLITUS WITH HYPERGLYCEMIA, WITHOUT LONG-TERM CURRENT USE OF INSULIN (HCC): ICD-10-CM

## 2021-09-24 DIAGNOSIS — E78.5 HYPERLIPIDEMIA, UNSPECIFIED HYPERLIPIDEMIA TYPE: ICD-10-CM

## 2021-09-24 DIAGNOSIS — Z00.00 ENCOUNTER FOR ANNUAL WELLNESS VISIT (AWV) IN MEDICARE PATIENT: Primary | ICD-10-CM

## 2021-09-24 PROBLEM — M54.50 LOW BACK PAIN: Status: ACTIVE | Noted: 2021-09-24

## 2021-09-24 PROBLEM — IMO0002 DEGENERATION OF INTERVERTEBRAL DISC: Status: ACTIVE | Noted: 2021-09-24

## 2021-09-24 PROBLEM — R26.89 BALANCE PROBLEM: Status: ACTIVE | Noted: 2021-09-24

## 2021-09-24 PROBLEM — K57.92 DIVERTICULITIS: Status: ACTIVE | Noted: 2021-09-24

## 2021-09-24 PROBLEM — M79.89 LIMB SWELLING: Status: ACTIVE | Noted: 2021-09-24

## 2021-09-24 PROBLEM — R06.02 SHORTNESS OF BREATH: Status: ACTIVE | Noted: 2021-09-24

## 2021-09-24 PROBLEM — J32.9 CHRONIC SINUSITIS: Status: ACTIVE | Noted: 2021-09-24

## 2021-09-24 PROBLEM — J30.9 ALLERGIC RHINITIS: Status: ACTIVE | Noted: 2021-09-24

## 2021-09-24 PROBLEM — M19.90 ARTHRITIS: Status: ACTIVE | Noted: 2021-09-24

## 2021-09-24 PROBLEM — T14.8XXA BROKEN BONES: Status: ACTIVE | Noted: 2021-09-24

## 2021-09-24 PROBLEM — K63.5 COLON POLYPS: Status: ACTIVE | Noted: 2021-09-24

## 2021-09-24 PROBLEM — C80.1 CANCER: Status: ACTIVE | Noted: 2021-09-24

## 2021-09-24 PROBLEM — Z95.0 PACEMAKER: Status: ACTIVE | Noted: 2021-09-24

## 2021-09-24 PROBLEM — G47.30 SLEEP APNEA: Status: ACTIVE | Noted: 2021-09-24

## 2021-09-24 PROBLEM — N42.9 PROSTATE DISORDER: Status: ACTIVE | Noted: 2021-09-24

## 2021-09-24 PROBLEM — H26.9 CATARACTS, BILATERAL: Status: ACTIVE | Noted: 2021-09-24

## 2021-09-24 PROBLEM — R51.9 HEADACHE: Status: ACTIVE | Noted: 2021-09-24

## 2021-09-24 PROCEDURE — G0008 ADMIN INFLUENZA VIRUS VAC: HCPCS | Performed by: INTERNAL MEDICINE

## 2021-09-24 PROCEDURE — 90686 IIV4 VACC NO PRSV 0.5 ML IM: CPT | Performed by: INTERNAL MEDICINE

## 2021-09-24 PROCEDURE — G0439 PPPS, SUBSEQ VISIT: HCPCS | Performed by: INTERNAL MEDICINE

## 2021-09-24 PROCEDURE — 99213 OFFICE O/P EST LOW 20 MIN: CPT | Performed by: INTERNAL MEDICINE

## 2021-09-24 RX ORDER — CLINDAMYCIN HYDROCHLORIDE 300 MG/1
300 CAPSULE ORAL 3 TIMES DAILY
COMMUNITY
Start: 2021-09-09 | End: 2021-10-28

## 2021-09-24 RX ORDER — HYDROCODONE BITARTRATE AND ACETAMINOPHEN 5; 325 MG/1; MG/1
1 TABLET ORAL EVERY 6 HOURS
COMMUNITY
Start: 2021-09-09 | End: 2021-10-28

## 2021-09-28 PROBLEM — E11.65 TYPE 2 DIABETES MELLITUS WITH HYPERGLYCEMIA, WITHOUT LONG-TERM CURRENT USE OF INSULIN (HCC): Status: ACTIVE | Noted: 2021-09-28

## 2021-10-11 ENCOUNTER — TELEPHONE (OUTPATIENT)
Dept: INTERNAL MEDICINE | Facility: CLINIC | Age: 76
End: 2021-10-11

## 2021-10-11 NOTE — TELEPHONE ENCOUNTER
Caller: Jamir Porter    Relationship: Self    Best call back number:  [phone]    What medication are you requesting: GABAPENTIN 500MG     What are your current symptoms:  RIGHT LEG,   AMPUTEE NERVE AND PAIN          Have you had these symptoms before:    _ Yes     Have you been treated for these symptoms before:   [x] Yes  [] No    If a prescription is needed, what is your preferred pharmacy and phone number:    [id number] - [place], [place] - [address] AT [facility] - [phone] [facility] [phone]   [phone]    Additional notes: PATIENT STATED HE HAS USED  RX IN PAST, PRESCRIBED [place] [id number] [address] [place] [id number] , AS WELL GOT APPOINTMENT FOR 10/28/2021

## 2021-10-13 NOTE — TELEPHONE ENCOUNTER
PT VERIFIED     CONTACTED PT PER PROVIDER'S INSTRUCTIONS    PT STATES HE HAS AN UPCOMING APPT   Appointment with Amaury Mcdonough Jr., MD (10/28/2021)

## 2021-10-25 RX ORDER — FLUOXETINE HYDROCHLORIDE 40 MG/1
CAPSULE ORAL
Qty: 90 CAPSULE | OUTPATIENT
Start: 2021-10-25

## 2021-10-28 ENCOUNTER — OFFICE VISIT (OUTPATIENT)
Dept: INTERNAL MEDICINE | Facility: CLINIC | Age: 76
End: 2021-10-28

## 2021-10-28 VITALS
SYSTOLIC BLOOD PRESSURE: 122 MMHG | HEIGHT: 69 IN | BODY MASS INDEX: 33.83 KG/M2 | OXYGEN SATURATION: 96 % | DIASTOLIC BLOOD PRESSURE: 72 MMHG | WEIGHT: 228.4 LBS | HEART RATE: 72 BPM

## 2021-10-28 DIAGNOSIS — E78.5 HYPERLIPIDEMIA, UNSPECIFIED HYPERLIPIDEMIA TYPE: ICD-10-CM

## 2021-10-28 DIAGNOSIS — I10 PRIMARY HYPERTENSION: ICD-10-CM

## 2021-10-28 DIAGNOSIS — Z79.899 ENCOUNTER FOR LONG-TERM (CURRENT) USE OF OTHER MEDICATIONS: ICD-10-CM

## 2021-10-28 DIAGNOSIS — G62.9 NEUROPATHY: Primary | ICD-10-CM

## 2021-10-28 DIAGNOSIS — E11.65 TYPE 2 DIABETES MELLITUS WITH HYPERGLYCEMIA, WITHOUT LONG-TERM CURRENT USE OF INSULIN (HCC): ICD-10-CM

## 2021-10-28 PROBLEM — R06.02 SHORTNESS OF BREATH: Status: RESOLVED | Noted: 2021-09-24 | Resolved: 2021-10-28

## 2021-10-28 PROCEDURE — 80305 DRUG TEST PRSMV DIR OPT OBS: CPT | Performed by: INTERNAL MEDICINE

## 2021-10-28 PROCEDURE — 99214 OFFICE O/P EST MOD 30 MIN: CPT | Performed by: INTERNAL MEDICINE

## 2021-10-28 RX ORDER — GABAPENTIN 100 MG/1
100 CAPSULE ORAL 2 TIMES DAILY PRN
Qty: 60 CAPSULE | Refills: 0 | Status: SHIPPED | OUTPATIENT
Start: 2021-10-28 | End: 2022-08-01 | Stop reason: SDUPTHER

## 2021-10-28 NOTE — PROGRESS NOTES
"Chief Complaint  Follow-up, Med Refill, and medication dicuss (want to talk gabapentin 100 mg - he has had it before )    Subjective          Jamir Porter presents to [facility]     History of Present Illness  Pt requests refill for Gabapentin 100 mg that he has had filled by another provider in the past.  States that he takes this at night as needed for limb pain from prosthesis of right leg. Pt reports it works well.   DIABETES MELLITUS- pt is on trulicity and jardiance with good control.       Objective   Vital Signs:   /72 (BP Location: Right arm, Patient Position: Sitting, Cuff Size: Large Adult) Comment: leandra  Pulse 72   Ht 175.3 cm (69\")   Wt 104 kg (228 lb 6.4 oz)   SpO2 96%   BMI 33.73 kg/m²    Wt Readings from Last 3 Encounters:   10/28/21 104 kg (228 lb 6.4 oz)   09/24/21 104 kg (230 lb)   04/13/21 101 kg (222 lb)        Physical Exam   Appearance: No acute distress, well-nourished  Head: normocephalic, atraumatic  Eyes: extraocular movements intact, no scleral icterus, no conjunctival injection  Ears, Nose, and Throat: external ears normal, nares patent, moist mucous membranes  Cardiovascular: regular rate and rhythm. no murmurs, rales, or rhonchi. no edema  Respiratory: breathing comfortably, symmetric chest rise, clear to auscultation bilaterally. No wheezes, rales, or rhonchi.  Neuro: alert and oriented to time, place, and person. Normal gait  Psych: normal mood and affect     Result Review :   The following data was reviewed by: Amaury Mcdonough Jr, MD on 10/28/2021:  Common labs    Common Labsle 8/2/21 8/2/21 9/21/21 9/22/21 9/22/21 9/22/21 9/22/21    1440 1440  0845 0845 0845 0845   Glucose  87     102 (A)   BUN  22     14   Creatinine  0.93     1.00   eGFR Non African Am  79     72   Sodium  137     139   Potassium  3.7     3.7   Chloride  101     101   Calcium  9.5     9.1   Albumin  4.50     4.10   Total Bilirubin  0.6     0.3 "   Alkaline Phosphatase  103     92   AST (SGOT)  27     19   ALT (SGPT)  34     23   WBC 9.31   8.02      Hemoglobin 15.0   14.4      Hematocrit 46.0   43.3      Platelets 263   283      Total Cholesterol      169    Triglycerides      99    HDL Cholesterol      36 (A)    LDL Cholesterol       115 (A)    Hemoglobin A1C     6.28 (A)     Microalbumin, Urine   1.8       (A) Abnormal value            Pain Management Panel     Pain Management Panel Latest Ref Rng & Units 10/28/2021    AMPHETAMINES SCREEN, URINE Negative Negative    BARBITURATES SCREEN Negative Negative    BENZODIAZEPINE SCREEN, URINE Negative Negative    BUPRENORPHINEUR Negative Negative    COCAINE SCREEN, URINE Negative Negative    METHADONE SCREEN, URINE Negative Negative    METHAMPHETAMINEUR Negative Negative            Assessment and Plan    Diagnoses and all orders for this visit:    1. Neuropathy (Primary)  -     gabapentin (NEURONTIN) 100 MG capsule; Take 1 capsule by mouth 2 (Two) Times a Day As Needed (pain).  Dispense: 60 capsule; Refill: 0    2. Hyperlipidemia, unspecified hyperlipidemia type  Comments:  intolerant to statins and added to allergy list. cont zetia.     3. Primary hypertension  Comments:  well controlled today. cont current regimen.     4. Type 2 diabetes mellitus with hyperglycemia, without long-term current use of insulin (HCC)  Comments:  well controlled on current regimen.     5. Encounter for long-term (current) use of other medications  -     POC Urine Drug Screen [facility]        Follow Up   Return in about 3 months (around 1/28/2022) for Recheck.  Patient was given instructions and counseling regarding his condition or for health maintenance advice. Please see specific information pulled into the AVS if appropriate.

## 2021-11-01 NOTE — TELEPHONE ENCOUNTER
FLUoxetine (PROzac) 40 MG capsule (03/29/2021)    PER ISAMAR    fluoxetine 40 mg oral capsule   SIG: TAKE 1 CAPSULE(40 MG) BY MOUTH EVERY DAY IN THE EVENING   DISP: (90) Capsule with -1 refills     Juan GONZALEZ   Last Rx: 9/28/2020              NEW MEDICATION REFILL REQUEST FOR PT WITH CORRECTED PRESCRIBER NEEDED FOR PHARMACY TO REFILL     PROVIDER PLEASE ADVISE

## 2021-11-02 RX ORDER — FLUOXETINE HYDROCHLORIDE 40 MG/1
40 CAPSULE ORAL DAILY
Qty: 90 CAPSULE | Refills: 3 | Status: SHIPPED | OUTPATIENT
Start: 2021-11-02 | End: 2022-11-30

## 2021-11-10 NOTE — PROGRESS NOTES
Chief Complaint    Urologic complaint    Subjective          Jamir Porter presents to [facility] UROLOG[facility]  History of Present Illness     76-year-old  gentleman with a history of prostate cancer clinical T1c, on active surveillance, s/p TURP patient also with ED, penile prosthesis     Patient with okay stream.  Before surgery but still a little weak.  Patient dealing with a lot urge incontinence.  No pads.  Nocturia x2.  Urgency is the most bothersome thing.    Bowels are better since surgery.  Urgency is bothersome but still better since surgery     No burning or dysuria. No GH/UTI  No stress urinary incontinence      PVR       65  3/21  TURP -3+3, 3130, < 1%    200     25      Previous    No longer following adrenal mass.     CT abdomen/pelvis with and without -9 mm benign lipid poor adrenal adenoma, no change since .    No erections. Has IPP.    H/o retention  - 1.5 L    oxybutynin  - did stop his incontinence but did give him side effects that he could not tolerate, GI.  Myrebetriq Cause -  diarrhea    2019 CT abdomen with and withoutadrenal mass protocolsmall right 0.9 cm adrenal nodule.  Favor adrenal adenoma.   cortisol less than 1.0, plasma metanephrines negative  7/3/2019 CT abdomen/pelvis with9 mm right adrenal nodule.  Which demonstrates enhancement above the background of the adrenal gland.  Penile prosthesis reservoir partially herniates the left inguinal ring.    Father  pancreatic CA,  brother with prostate cancer.      Penile prothesis - functions, he does not use.    No cardiopulmonary history.  Patient does not smoke.  He is on ibuprofen for back pain. No DM.  Father  at 64, grandfather  at 85      creatinine 0.9, GFR > 60    PVR       115     84      H/o prostate CA - on finasteride    10/21 MRI prostate-17 g, previously 58, negative otherwise    3/21  TURP -3+3, 3130, < 1%     cystoscopy -4 cm prostate, moderate  trabeculations, negative otherwise    9/20 prostate biopsy -53 g, right apex 3+3, 1/2, 6%, negative otherwise  8/20  3.56  8/20  MRI  prostate - 58 g , neg  1/20  0.91    8/28/2019 MRI iebglsvn20 g -negative    2/19  1.40   7/18  2.32 -on finasteride  7/18 MRI dheliued12 g, no targetable lesion seen  5/18  5.09  1/18  5.41  10/17 5.5  4/17  prostate bvcvtp15 g - left, 3+3, 50% of core, 5 mm; right - high-grade PIN  1/17  5.5  10/16 4.9    10/16 MRI fiaonfck46 g, no targetable lesions seen.    5/16 4.4  4/16 prostate xiwdjt80 g   Pathology  Right basenegative  Right base lateral 3+3, 20%, 4 mm  Right mid, mid lateral, apex, apex lateralnegative  Left basenegative  Left mid3+3, 10%  Left mid lateralnegative  Left apex3+3, 15%   left apex3+3, 10%    2/16 5.39    1/14 3.68       Results for orders placed or performed in visit on 11/12/21   Bladder Scan   Result Value Ref Range    Urine Volume 62    POC Urinalysis Dipstick    Specimen: Urine   Result Value Ref Range    Color Yellow Yellow, Straw, Dark Yellow, Esha    Clarity, UA Clear Clear    Glucose, UA Negative Negative, 1000 mg/dL (3+) mg/dL    Bilirubin Negative Negative    Ketones, UA Negative Negative    Specific Gravity  1.025 1.005 - 1.030    Blood, UA Trace (A) Negative    pH, Urine 5.5 5.0 - 8.0    Protein, POC Negative Negative mg/dL    Urobilinogen, UA Normal Normal    Leukocytes Negative Negative    Nitrite, UA Negative Negative           Past History:  Medical History: has a past medical history of Allergic rhinitis, Arthritis, Balance problem, Broken bones, Cancer (HCC), Cataracts, bilateral, Chronic sinusitis, Claustrophobia, Colon polyps, DDD (degenerative disc disease), cervical, Diverticulitis, Forgetfulness, H/O degenerative disc disease, Headache, Hyperlipemia, Hypertension, Limb swelling, Lumbago, Pacemaker, Prostate CA (HCC), Prostate disorder, Shortness of breath, and Sleep apnea.   Surgical History: has a past surgical history that includes  Other surgical history (Right, 2012); Dental surgery; Eye surgery; Joint replacement; Lumbar fusion (2016); Pilonidal cyst / sinus excision; Amputation (Right, 2012); Other surgical history (Right, 2012); Back surgery; Colonoscopy (2019); Dental surgery; Other surgical history; Replacement total knee (Right); Eye surgery; Foot surgery (Left); Lumbar fusion (2016); Lumbar puncture; Penile prosthesis implant; Excision; Rotator cuff repair; and Uvulopalatopharyngoplasty.   Family History: family history includes Arthritis in his brother, father, and mother; Cancer in his brother, father, and mother.   Social History: reports that he has quit smoking. He smoked 1.50 packs per day. He has quit using smokeless tobacco. He reports current alcohol use. He reports that he does not use drugs.  Allergies: Meperidine, Statins, and Sulfate       Current Outpatient Medications:   •  aspirin (aspirin) 81 MG EC tablet, aspirin 81 mg oral tablet,delayed release (DR/EC) take 1 tablet (81 mg) by oral route once daily   Suspended, Disp: , Rfl:   •  Dulaglutide (Trulicity) 3 MG/0.5ML solution pen-injector, Inject 3 mg under the skin into the appropriate area as directed 1 (One) Time Per Week., Disp: 13 pen, Rfl: 3  •  ezetimibe (ZETIA) 10 MG tablet, , Disp: , Rfl:   •  finasteride (PROSCAR) 5 MG tablet, finasteride 5 mg oral tablet take 1 tablet (5 mg) by oral route once daily for 90 days 5/26/2021  Active, Disp: , Rfl:   •  FLUoxetine (PROzac) 40 MG capsule, Take 1 capsule by mouth Daily., Disp: 90 capsule, Rfl: 3  •  gabapentin (NEURONTIN) 100 MG capsule, Take 1 capsule by mouth 2 (Two) Times a Day As Needed (pain)., Disp: 60 capsule, Rfl: 0  •  hydroCHLOROthiazide (HYDRODIURIL) 12.5 MG tablet, Take 2 tablets by mouth Daily., Disp: 90 tablet, Rfl: 2  •  Jardiance 25 MG tablet, , Disp: , Rfl:   •  levothyroxine (SYNTHROID, LEVOTHROID) 75 MCG tablet, , Disp: , Rfl:   •  solifenacin (VESICARE) 5 MG tablet, , Disp: , Rfl:      Physical  exam       Alert and orient x3  Well appearing, well developed, in no acute distress   Unlabored respirations        Grossly oriented to person, place and time, judgment is intact, normal mood and affect        Objective     Vital Signs:   There were no vitals taken for this visit.             Assessment and Plan    Diagnoses and all orders for this visit:    1. Prostate cancer (HCC) (Primary)    2. Benign prostatic hyperplasia without lower urinary tract symptoms      Urge incontinence     we did discuss trying samples of Myrbetriq again or trying a different anticholinergic.  At this time after discussion of risk and benefits patient is not interested in this because he is worried about side effects.  We will see how things go moving forward.    Prostate cancer on active surveillance     We discussed that active surveillance is an option for a patient with low risk prostate cancer.  The risks of surveillance include progression of disease, failure of clinical staging to detect advanced disease, need for strict followup, need for repeat prostate biopsies, and patient anxiety related to PSA.  We did discuss that the evidence suggests that patient's who progress to treatment on surveillance have survival similar to those treated at diagnosis.     PSA now  Follow-up in 11/22 with MRI prostate and PSA    PVR at  follow-up

## 2021-11-12 ENCOUNTER — OFFICE VISIT (OUTPATIENT)
Dept: UROLOGY | Facility: CLINIC | Age: 76
End: 2021-11-12

## 2021-11-12 ENCOUNTER — LAB (OUTPATIENT)
Dept: LAB | Facility: HOSPITAL | Age: 76
End: 2021-11-12

## 2021-11-12 VITALS — WEIGHT: 230 LBS | HEIGHT: 69 IN | BODY MASS INDEX: 34.07 KG/M2 | RESPIRATION RATE: 18 BRPM

## 2021-11-12 DIAGNOSIS — N40.0 BENIGN PROSTATIC HYPERPLASIA WITHOUT LOWER URINARY TRACT SYMPTOMS: ICD-10-CM

## 2021-11-12 DIAGNOSIS — C61 PROSTATE CANCER (HCC): Primary | ICD-10-CM

## 2021-11-12 DIAGNOSIS — C61 PROSTATE CANCER (HCC): ICD-10-CM

## 2021-11-12 DIAGNOSIS — N39.41 URGE INCONTINENCE: ICD-10-CM

## 2021-11-12 LAB
BILIRUB BLD-MCNC: NEGATIVE MG/DL
CLARITY, POC: CLEAR
COLOR UR: YELLOW
GLUCOSE UR STRIP-MCNC: NEGATIVE MG/DL
KETONES UR QL: NEGATIVE
LEUKOCYTE EST, POC: NEGATIVE
NITRITE UR-MCNC: NEGATIVE MG/ML
PH UR: 5.5 [PH] (ref 5–8)
PROT UR STRIP-MCNC: NEGATIVE MG/DL
PSA SERPL-MCNC: 2.19 NG/ML (ref 0–4)
RBC # UR STRIP: ABNORMAL /UL
SP GR UR: 1.02 (ref 1–1.03)
URINE VOLUME: 62
UROBILINOGEN UR QL: NORMAL

## 2021-11-12 PROCEDURE — 81003 URINALYSIS AUTO W/O SCOPE: CPT | Performed by: UROLOGY

## 2021-11-12 PROCEDURE — 84153 ASSAY OF PSA TOTAL: CPT

## 2021-11-12 PROCEDURE — 51798 US URINE CAPACITY MEASURE: CPT | Performed by: UROLOGY

## 2021-11-12 PROCEDURE — 99213 OFFICE O/P EST LOW 20 MIN: CPT | Performed by: UROLOGY

## 2021-11-12 PROCEDURE — 36415 COLL VENOUS BLD VENIPUNCTURE: CPT

## 2021-12-08 RX ORDER — HYDROCHLOROTHIAZIDE 12.5 MG/1
25 TABLET ORAL DAILY
Qty: 90 TABLET | Refills: 2 | Status: SHIPPED | OUTPATIENT
Start: 2021-12-08

## 2021-12-28 ENCOUNTER — TELEPHONE (OUTPATIENT)
Dept: UROLOGY | Facility: CLINIC | Age: 76
End: 2021-12-28

## 2021-12-28 NOTE — TELEPHONE ENCOUNTER
Called patient to provide scheduled MRI appointment information. LVM for patient to call back to receive information. (MRI Prostate scheduled at [facility] 11/07/2022 at 1140, arrive at 1110).

## 2021-12-28 NOTE — TELEPHONE ENCOUNTER
Patient returned my call regarding MRI Prostate. Provided him with appointment information (NDT 11/07/22 at 1140, arrive at 1110). Reminded him of follow up appointment with Dr Ray already scheduled for 11/14/22 at 1100. Patient verbalized understanding of all information via teach back method.

## 2022-01-27 ENCOUNTER — OFFICE VISIT (OUTPATIENT)
Dept: INTERNAL MEDICINE | Facility: CLINIC | Age: 77
End: 2022-01-27

## 2022-01-27 VITALS
SYSTOLIC BLOOD PRESSURE: 152 MMHG | WEIGHT: 234.5 LBS | OXYGEN SATURATION: 98 % | TEMPERATURE: 97.2 F | BODY MASS INDEX: 34.73 KG/M2 | DIASTOLIC BLOOD PRESSURE: 86 MMHG | HEIGHT: 69 IN | HEART RATE: 63 BPM

## 2022-01-27 DIAGNOSIS — F41.9 ANXIETY: ICD-10-CM

## 2022-01-27 DIAGNOSIS — Z11.59 NEED FOR HEPATITIS C SCREENING TEST: ICD-10-CM

## 2022-01-27 DIAGNOSIS — R79.89 ABNORMAL THYROID BLOOD TEST: ICD-10-CM

## 2022-01-27 DIAGNOSIS — G89.29 CHRONIC RIGHT-SIDED LOW BACK PAIN WITHOUT SCIATICA: ICD-10-CM

## 2022-01-27 DIAGNOSIS — G25.81 RLS (RESTLESS LEGS SYNDROME): ICD-10-CM

## 2022-01-27 DIAGNOSIS — M54.50 CHRONIC RIGHT-SIDED LOW BACK PAIN WITHOUT SCIATICA: ICD-10-CM

## 2022-01-27 DIAGNOSIS — E78.5 HYPERLIPIDEMIA, UNSPECIFIED HYPERLIPIDEMIA TYPE: ICD-10-CM

## 2022-01-27 DIAGNOSIS — E11.65 TYPE 2 DIABETES MELLITUS WITH HYPERGLYCEMIA, WITHOUT LONG-TERM CURRENT USE OF INSULIN: Primary | ICD-10-CM

## 2022-01-27 DIAGNOSIS — I10 PRIMARY HYPERTENSION: ICD-10-CM

## 2022-01-27 LAB
ALBUMIN SERPL-MCNC: 4.2 G/DL (ref 3.5–5.2)
ALBUMIN/GLOB SERPL: 1.3 G/DL
ALP SERPL-CCNC: 96 U/L (ref 39–117)
ALT SERPL W P-5'-P-CCNC: 23 U/L (ref 1–41)
ANION GAP SERPL CALCULATED.3IONS-SCNC: 10.6 MMOL/L (ref 5–15)
AST SERPL-CCNC: 16 U/L (ref 1–40)
BASOPHILS # BLD AUTO: 0.08 10*3/MM3 (ref 0–0.2)
BASOPHILS NFR BLD AUTO: 1 % (ref 0–1.5)
BILIRUB SERPL-MCNC: 0.3 MG/DL (ref 0–1.2)
BUN SERPL-MCNC: 17 MG/DL (ref 8–23)
BUN/CREAT SERPL: 19.1 (ref 7–25)
CALCIUM SPEC-SCNC: 9.3 MG/DL (ref 8.6–10.5)
CHLORIDE SERPL-SCNC: 102 MMOL/L (ref 98–107)
CHOLEST SERPL-MCNC: 264 MG/DL (ref 0–200)
CO2 SERPL-SCNC: 24.4 MMOL/L (ref 22–29)
CREAT SERPL-MCNC: 0.89 MG/DL (ref 0.76–1.27)
DEPRECATED RDW RBC AUTO: 42.1 FL (ref 37–54)
EOSINOPHIL # BLD AUTO: 0.45 10*3/MM3 (ref 0–0.4)
EOSINOPHIL NFR BLD AUTO: 5.8 % (ref 0.3–6.2)
ERYTHROCYTE [DISTWIDTH] IN BLOOD BY AUTOMATED COUNT: 12.5 % (ref 12.3–15.4)
FERRITIN SERPL-MCNC: 203 NG/ML (ref 30–400)
GFR SERPL CREATININE-BSD FRML MDRD: 83 ML/MIN/1.73
GLOBULIN UR ELPH-MCNC: 3.3 GM/DL
GLUCOSE SERPL-MCNC: 68 MG/DL (ref 65–99)
HCT VFR BLD AUTO: 46.8 % (ref 37.5–51)
HCV AB SER DONR QL: NORMAL
HDLC SERPL-MCNC: 38 MG/DL (ref 40–60)
HGB BLD-MCNC: 16.1 G/DL (ref 13–17.7)
IMM GRANULOCYTES # BLD AUTO: 0.05 10*3/MM3 (ref 0–0.05)
IMM GRANULOCYTES NFR BLD AUTO: 0.6 % (ref 0–0.5)
IRON 24H UR-MRATE: 101 MCG/DL (ref 59–158)
IRON SATN MFR SERPL: 34 % (ref 20–50)
LDLC SERPL CALC-MCNC: 198 MG/DL (ref 0–100)
LDLC/HDLC SERPL: 5.15 {RATIO}
LYMPHOCYTES # BLD AUTO: 1.95 10*3/MM3 (ref 0.7–3.1)
LYMPHOCYTES NFR BLD AUTO: 25 % (ref 19.6–45.3)
MAGNESIUM SERPL-MCNC: 2.2 MG/DL (ref 1.6–2.4)
MCH RBC QN AUTO: 31.9 PG (ref 26.6–33)
MCHC RBC AUTO-ENTMCNC: 34.4 G/DL (ref 31.5–35.7)
MCV RBC AUTO: 92.7 FL (ref 79–97)
MONOCYTES # BLD AUTO: 0.88 10*3/MM3 (ref 0.1–0.9)
MONOCYTES NFR BLD AUTO: 11.3 % (ref 5–12)
NEUTROPHILS NFR BLD AUTO: 4.4 10*3/MM3 (ref 1.7–7)
NEUTROPHILS NFR BLD AUTO: 56.3 % (ref 42.7–76)
NRBC BLD AUTO-RTO: 0 /100 WBC (ref 0–0.2)
PLATELET # BLD AUTO: 250 10*3/MM3 (ref 140–450)
PMV BLD AUTO: 11.4 FL (ref 6–12)
POTASSIUM SERPL-SCNC: 4 MMOL/L (ref 3.5–5.2)
PROT SERPL-MCNC: 7.5 G/DL (ref 6–8.5)
RBC # BLD AUTO: 5.05 10*6/MM3 (ref 4.14–5.8)
SODIUM SERPL-SCNC: 137 MMOL/L (ref 136–145)
TIBC SERPL-MCNC: 301 MCG/DL (ref 298–536)
TRANSFERRIN SERPL-MCNC: 202 MG/DL (ref 200–360)
TRIGL SERPL-MCNC: 151 MG/DL (ref 0–150)
TSH SERPL DL<=0.05 MIU/L-ACNC: 4.76 UIU/ML (ref 0.27–4.2)
VLDLC SERPL-MCNC: 28 MG/DL (ref 5–40)
WBC NRBC COR # BLD: 7.81 10*3/MM3 (ref 3.4–10.8)

## 2022-01-27 PROCEDURE — 80053 COMPREHEN METABOLIC PANEL: CPT | Performed by: INTERNAL MEDICINE

## 2022-01-27 PROCEDURE — 99214 OFFICE O/P EST MOD 30 MIN: CPT | Performed by: INTERNAL MEDICINE

## 2022-01-27 PROCEDURE — 83735 ASSAY OF MAGNESIUM: CPT | Performed by: INTERNAL MEDICINE

## 2022-01-27 PROCEDURE — 82728 ASSAY OF FERRITIN: CPT | Performed by: INTERNAL MEDICINE

## 2022-01-27 PROCEDURE — 85025 COMPLETE CBC W/AUTO DIFF WBC: CPT | Performed by: INTERNAL MEDICINE

## 2022-01-27 PROCEDURE — 84443 ASSAY THYROID STIM HORMONE: CPT | Performed by: INTERNAL MEDICINE

## 2022-01-27 PROCEDURE — 86803 HEPATITIS C AB TEST: CPT | Performed by: INTERNAL MEDICINE

## 2022-01-27 PROCEDURE — 84466 ASSAY OF TRANSFERRIN: CPT | Performed by: INTERNAL MEDICINE

## 2022-01-27 PROCEDURE — 83540 ASSAY OF IRON: CPT | Performed by: INTERNAL MEDICINE

## 2022-01-27 PROCEDURE — 80061 LIPID PANEL: CPT | Performed by: INTERNAL MEDICINE

## 2022-01-27 RX ORDER — ASPIRIN 81 MG/1
81 TABLET ORAL DAILY
Qty: 90 TABLET | Refills: 3 | Status: SHIPPED | OUTPATIENT
Start: 2022-01-27 | End: 2022-03-24

## 2022-01-27 RX ORDER — LIDOCAINE 50 MG/G
1 PATCH TOPICAL EVERY 24 HOURS
Qty: 90 PATCH | Refills: 1 | Status: SHIPPED | OUTPATIENT
Start: 2022-01-27 | End: 2022-02-16

## 2022-01-27 NOTE — PROGRESS NOTES
"Chief Complaint  Diabetes, Follow-up, and Back Pain (pt states this is still causing him pain, feels like he can not \"get a \" on this)    Subjective          Jamir FARR Porter presents to [facility]  History of Present Illness  hypertension- patient denies Has, dizziness, chest pain. No recent home readings.   DM2- patient had OdfW0e=5.7 on 12/20/21. Patient is on trulicity. Patient is no longer on jardiance.   hyperlipidemia- patient intolerant to statins. Needs refill on zetia.   Hypothyroid- patient has been off synthroid for approx 4 weeks.  Chronic pain - patient reports gabapentin helps with pain. Patient reports pain seems to be worsening. prozac seems to help with anxiety and pain. History of spinal surgery. Patient reports chronic    Current Outpatient Medications   Medication Instructions   • aspirin 81 mg, Oral, Daily   • ezetimibe (ZETIA) 10 mg, Oral, Daily   • FLUoxetine (PROZAC) 40 mg, Oral, Daily   • gabapentin (NEURONTIN) 100 mg, Oral, 2 Times Daily PRN   • hydroCHLOROthiazide (HYDRODIURIL) 25 mg, Oral, Daily   • lidocaine (LIDODERM) 5 % 1 patch, Transdermal, Every 24 Hours, Remove & Discard patch within 12 hours or as directed by MD   • Trulicity 3 mg, Subcutaneous, Weekly       The following portions of the patient's history were reviewed and updated as appropriate: allergies, current medications, past family history, past medical history, past social history, past surgical history, and problem list.    Objective   Vital Signs:   /86   Pulse 63   Temp 97.2 °F (36.2 °C) (Temporal)   Ht 175.3 cm (69\")   Wt 106 kg (234 lb 8 oz)   SpO2 98%   BMI 34.63 kg/m²     Wt Readings from Last 3 Encounters:   01/27/22 106 kg (234 lb 8 oz)   11/12/21 104 kg (230 lb)   10/28/21 104 kg (228 lb 6.4 oz)     BP Readings from Last 3 Encounters:   01/27/22 152/86   10/28/21 122/72   09/24/21 116/77     Physical Exam   Appearance: No acute distress, " well-nourished  Head: normocephalic, atraumatic  Eyes: extraocular movements intact, no scleral icterus, no conjunctival injection  Ears, Nose, and Throat: external ears normal, nares patent, moist mucous membranes  Cardiovascular: regular rate and rhythm. no murmurs, rales, or rhonchi. no edema  Respiratory: breathing comfortably, symmetric chest rise, clear to auscultation bilaterally. No wheezes, rales, or rhonchi.  Neuro: alert and oriented to time, place, and person. Normal gait  Psych: normal mood and affect     Result Review :   The following data was reviewed by: Amaury Mcdonough Jr, MD on 01/27/2022:  Common labs    Common Labsle 9/21/21 9/22/21 9/22/21 9/22/21 9/22/21 11/12/21     0845 0845 0845 0845    Glucose     102 (A)    BUN     14    Creatinine     1.00    eGFR Non African Am     72    Sodium     139    Potassium     3.7    Chloride     101    Calcium     9.1    Albumin     4.10    Total Bilirubin     0.3    Alkaline Phosphatase     92    AST (SGOT)     19    ALT (SGPT)     23    WBC  8.02       Hemoglobin  14.4       Hematocrit  43.3       Platelets  283       Total Cholesterol    169     Triglycerides    99     HDL Cholesterol    36 (A)     LDL Cholesterol     115 (A)     Hemoglobin A1C   6.28 (A)      Microalbumin, Urine 1.8        PSA      2.190   (A) Abnormal value              Lab Results   Component Value Date    COVID19 NOT DETECTED 03/19/2021    BILIRUBINUR Negative 11/12/2021        Assessment and Plan    Diagnoses and all orders for this visit:    1. Type 2 diabetes mellitus with hyperglycemia, without long-term current use of insulin (HCC) (Primary)  Comments:  off jardiance. doing well. cont trulicity for now. may consider weaning in the future.   Orders:  -     Hemoglobin A1c  -     aspirin (aspirin) 81 MG EC tablet; Take 1 tablet by mouth Daily.  Dispense: 90 tablet; Refill: 3    2. Hyperlipidemia, unspecified hyperlipidemia type  Comments:  check labs. consider DC zetia if  doing well  Orders:  -     Comprehensive Metabolic Panel  -     Lipid Panel  -     aspirin (aspirin) 81 MG EC tablet; Take 1 tablet by mouth Daily.  Dispense: 90 tablet; Refill: 3    3. Primary hypertension  Comments:  elevated today. encouraged home monitoring. goal BP <130/80  Orders:  -     CBC & Differential  -     Comprehensive Metabolic Panel    4. Need for hepatitis C screening test  -     Hepatitis C Antibody    5. Abnormal thyroid blood test  Comments:  check TSH. if normal. may stay without synthroid.   Orders:  -     TSH    6. Anxiety  Comments:  doing well on current regimen.     7. RLS (restless legs syndrome)  Comments:  check iron levels, magnesium, CBC  Orders:  -     Magnesium  -     Ferritin  -     Iron Profile    8. Chronic right-sided low back pain without sciatica  Comments:  trial lidocaine patches  Orders:  -     lidocaine (LIDODERM) 5 %; Place 1 patch on the skin as directed by provider Daily. Remove & Discard patch within 12 hours or as directed by MD  Dispense: 90 patch; Refill: 1        Medications Discontinued During This Encounter   Medication Reason   • aspirin (aspirin) 81 MG EC tablet *Therapy completed   • finasteride (PROSCAR) 5 MG tablet *Therapy completed   • Jardiance 25 MG tablet *Therapy completed   • levothyroxine (SYNTHROID, LEVOTHROID) 75 MCG tablet *Therapy completed   • solifenacin (VESICARE) 5 MG tablet *Therapy completed        Follow Up   Return in about 6 months (around 7/27/2022) for Recheck.  Patient was given instructions and counseling regarding his condition or for health maintenance advice. Please see specific information pulled into the AVS if appropriate.

## 2022-01-28 DIAGNOSIS — E11.65 TYPE 2 DIABETES MELLITUS WITH HYPERGLYCEMIA, WITHOUT LONG-TERM CURRENT USE OF INSULIN: Primary | ICD-10-CM

## 2022-02-16 DIAGNOSIS — E78.5 HYPERLIPIDEMIA, UNSPECIFIED HYPERLIPIDEMIA TYPE: Primary | ICD-10-CM

## 2022-02-16 RX ORDER — EZETIMIBE 10 MG/1
10 TABLET ORAL DAILY
Qty: 90 TABLET | Refills: 3 | Status: SHIPPED | OUTPATIENT
Start: 2022-02-16 | End: 2023-02-22

## 2022-02-16 NOTE — TELEPHONE ENCOUNTER
Spoke with patient regarding MyChart lab results that had not been reviewed.     Patient states that he is feeling more tired/fatigue than normal. Patient is okay with restarting thyroid medication if this will help him Patient stated he could use the boost.     Patient also has not been taking his Zetia [facility] I have pended this order.     Patient is also aware he needs to have his A1c drawn. I told him he can go to outpatient lab or come to the office to have it drawn. I did explain that the better days to come to office are Monday, Tuesday, Wednesday as these are our slower days.

## 2022-03-10 ENCOUNTER — OFFICE VISIT (OUTPATIENT)
Dept: ORTHOPEDIC SURGERY | Facility: CLINIC | Age: 77
End: 2022-03-10

## 2022-03-10 VITALS — HEIGHT: 69 IN | WEIGHT: 214 LBS | BODY MASS INDEX: 31.7 KG/M2 | OXYGEN SATURATION: 97 % | HEART RATE: 69 BPM

## 2022-03-10 DIAGNOSIS — Z96.652 HISTORY OF LEFT KNEE REPLACEMENT: ICD-10-CM

## 2022-03-10 DIAGNOSIS — M25.562 LEFT KNEE PAIN, UNSPECIFIED CHRONICITY: Primary | ICD-10-CM

## 2022-03-10 PROCEDURE — 99213 OFFICE O/P EST LOW 20 MIN: CPT | Performed by: ORTHOPAEDIC SURGERY

## 2022-03-10 NOTE — PROGRESS NOTES
"Chief Complaint  Initial Evaluation of the Left Knee     Subjective      Jamir Porter presents to [facility] for evaluation of the left knee. He has a left knee replacement in 2010. He reports his knee was never great since then. He has stiffness, pain and instability to the knee. He uses volatren gel at night. He ambulates with a cane. Patient has a right leg BK prosthesis. He seen Dr. Godwin in 2018 and was sent for physical therapy and offered to be sent for a second opinion.     Allergies   Allergen Reactions   • Meperidine Unknown - High Severity   • Statins Myalgia   • Sulfate Unknown - High Severity        Social History     Socioeconomic History   • Marital status:    Tobacco Use   • Smoking status: Former Smoker     Packs/day: 1.50   • Smokeless tobacco: Former User   • Tobacco comment: 1.5 ppd quit 1979, 17 years total    Substance and Sexual Activity   • Alcohol use: Yes     Comment: rarely drinks 02/19/2021   • Drug use: Never   • Sexual activity: Defer        Review of Systems     Objective   Vital Signs:   Pulse 69   Ht 175.3 cm (69\")   Wt 97.1 kg (214 lb)   SpO2 97%   BMI 31.60 kg/m²       Physical Exam  Constitutional:       Appearance: Normal appearance. The patient is well-developed and normal weight.   HENT:      Head: Normocephalic.      Right Ear: Hearing and external ear normal.      Left Ear: Hearing and external ear normal.      Nose: Nose normal.   Eyes:      Conjunctiva/sclera: Conjunctivae normal.   Cardiovascular:      Rate and Rhythm: Normal rate.   Pulmonary:      Effort: Pulmonary effort is normal.      Breath sounds: No wheezing or rales.   Abdominal:      Palpations: Abdomen is soft.      Tenderness: There is no abdominal tenderness.   Musculoskeletal:      Cervical back: Normal range of motion.   Skin:     Findings: No rash.   Neurological:      Mental Status: The patient is alert and oriented to person, place, and time.   Psychiatric:        "  Mood and Affect: Mood and affect normal.         Judgment: Judgment normal.       Ortho Exam      Left knee- well healed scar. Neurovascularly intact. Positive EHL, FHL, GS and TA. Sensation intact to all 5 nerves of the foot. Positive pulses. Tender to the medial knee. Antalgic gait. Stable to varus/valgus stress. Stable to anterior/posterior drawer. ROM -3 to 125 degrees.     Procedures    X-Ray Report:  Left knee(s) X-Ray  Indication: Evaluation of left knee pain  AP, Lateral, Standing and Sunrise view(s)  Findings: intact left knee replacement. No signs of loosening or subsidence.   Prior studies available for comparison: no         Imaging Results (Most Recent)     Procedure Component Value Units Date/Time    XR Knee 3 View Left [[id number]] Resulted: 03/10/22 1038     Updated: 03/10/22 1040           Result Review :       No results found.           Assessment and Plan     DX: Left knee pain, History of left knee replacement    Discussed the treatment plan with the patient.  Plan for bone scan to evaluate for replacement failure due to having pain since his knee replacement. The patient expressed understanding and wished to proceed.     Call or return if worsening symptoms.    Follow Up     After Bone scan.       Patient was given instructions and counseling regarding his condition or for health maintenance advice. Please see specific information pulled into the AVS if appropriate.     Scribed for Ken Miranda MD by Keiry Aaron.  03/10/22   11:02 EST    I have personally performed the services described in this document as scribed by the above individual and it is both accurate and complete. Ken Miranda MD 03/10/22

## 2022-03-18 ENCOUNTER — HOSPITAL ENCOUNTER (OUTPATIENT)
Dept: NUCLEAR MEDICINE | Facility: HOSPITAL | Age: 77
Discharge: HOME | End: 2022-03-18

## 2022-03-18 DIAGNOSIS — Z96.652 HISTORY OF LEFT KNEE REPLACEMENT: ICD-10-CM

## 2022-03-18 DIAGNOSIS — M25.562 LEFT KNEE PAIN, UNSPECIFIED CHRONICITY: ICD-10-CM

## 2022-03-18 PROCEDURE — 78306 BONE IMAGING WHOLE BODY: CPT

## 2022-03-18 PROCEDURE — 78315 BONE IMAGING 3 PHASE: CPT

## 2022-03-18 PROCEDURE — A9503 TC99M MEDRONATE: HCPCS | Performed by: ORTHOPAEDIC SURGERY

## 2022-03-18 PROCEDURE — 0 TECHNETIUM MEDRONATE KIT: Performed by: ORTHOPAEDIC SURGERY

## 2022-03-18 RX ORDER — TC 99M MEDRONATE 20 MG/10ML
22.1 INJECTION, POWDER, LYOPHILIZED, FOR SOLUTION INTRAVENOUS
Status: COMPLETED | OUTPATIENT
Start: 2022-03-18 | End: 2022-03-18

## 2022-03-18 RX ADMIN — TC 99M MEDRONATE 22.1 MILLICURIE: 20 INJECTION, POWDER, LYOPHILIZED, FOR SOLUTION INTRAVENOUS at 13:15

## 2022-03-24 ENCOUNTER — OFFICE VISIT (OUTPATIENT)
Dept: ORTHOPEDIC SURGERY | Facility: CLINIC | Age: 77
End: 2022-03-24

## 2022-03-24 VITALS — HEIGHT: 70 IN | BODY MASS INDEX: 34.99 KG/M2 | OXYGEN SATURATION: 96 % | WEIGHT: 244.4 LBS | HEART RATE: 61 BPM

## 2022-03-24 DIAGNOSIS — Z96.652 HISTORY OF LEFT KNEE REPLACEMENT: Primary | ICD-10-CM

## 2022-03-24 DIAGNOSIS — M25.562 LEFT KNEE PAIN, UNSPECIFIED CHRONICITY: ICD-10-CM

## 2022-03-24 PROCEDURE — 99213 OFFICE O/P EST LOW 20 MIN: CPT | Performed by: ORTHOPAEDIC SURGERY

## 2022-03-24 RX ORDER — IBUPROFEN 200 MG
800 TABLET ORAL EVERY 6 HOURS PRN
COMMUNITY

## 2022-03-24 NOTE — PROGRESS NOTES
"Chief Complaint  Pain of the Left Knee (NM BONE SCAN FOLLOW UP.)     Subjective      Jamir Porter presents to [facility] for follow up evaluation of the left knee. The patient recently had a bone scan and is here today for those results. To review, He has a left knee replacement in 2010. He reports his knee was never great since then. He has stiffness, pain and instability to the knee. He uses volatren gel at night. He ambulates with a cane. Patient has a right leg BK prosthesis. He seen Dr. Godwin in 2018 and was sent for physical therapy and offered to be sent for a second opinion.     Allergies   Allergen Reactions   • Meperidine Unknown - High Severity   • Statins Myalgia   • Sulfate Unknown - High Severity        Social History     Socioeconomic History   • Marital status:    Tobacco Use   • Smoking status: Former Smoker     Packs/day: 1.50   • Smokeless tobacco: Former User   • Tobacco comment: 1.5 ppd quit 1979, 17 years total    Substance and Sexual Activity   • Alcohol use: Yes     Comment: rarely drinks 02/19/2021   • Drug use: Never   • Sexual activity: Defer        Review of Systems     Objective   Vital Signs:   Pulse 61   Ht 177.8 cm (70\")   Wt 111 kg (244 lb 6.4 oz)   SpO2 96%   BMI 35.07 kg/m²       Physical Exam  Constitutional:       Appearance: Normal appearance. The patient is well-developed and normal weight.   HENT:      Head: Normocephalic.      Right Ear: Hearing and external ear normal.      Left Ear: Hearing and external ear normal.      Nose: Nose normal.   Eyes:      Conjunctiva/sclera: Conjunctivae normal.   Cardiovascular:      Rate and Rhythm: Normal rate.   Pulmonary:      Effort: Pulmonary effort is normal.      Breath sounds: No wheezing or rales.   Abdominal:      Palpations: Abdomen is soft.      Tenderness: There is no abdominal tenderness.   Musculoskeletal:      Cervical back: Normal range of motion.   Skin:     Findings: No rash. "   Neurological:      Mental Status: The patient is alert and oriented to person, place, and time.   Psychiatric:         Mood and Affect: Mood and affect normal.         Judgment: Judgment normal.       Ortho Exam      Left knee- well healed scar. Neurovascularly intact. Positive EHL, FHL, GS and TA. Sensation intact to all 5 nerves of the foot. Positive pulses. Tender to the medial knee. Antalgic gait. Stable to varus/valgus stress. Stable to anterior/posterior drawer. ROM -3 to 125 degrees.    Procedures    Imaging Results (Most Recent)     None           Result Review :       XR Knee 3 View Left    Result Date: 3/10/2022  Narrative: X-Ray Report: Left knee(s) X-Ray Indication: Evaluation of left knee pain AP, Lateral, Standing and Sunrise view(s) Findings: intact left knee replacement. No signs of loosening or subsidence. Prior studies available for comparison: no     NM Bone Scan 3 Ph Whole Body    Result Date: 3/18/2022  Narrative: PROCEDURE: NM BONE SCAN 3 PH WHOLE BODY  COMPARISON: [facility], CT, CT ABDOMEN W/WO CONTRAST, 4/06/2021, 11:21.  [facility] , CR, XR KNEE 3 VW LEFT, 3/10/2022, 10:42.  INDICATIONS: left knee pain S/P L TKA  FINDINGS:  The patient is post right below knee amputation.  Photopenic defect in the left knee region is consistent with total articular prosthesis seen on left knee series 3/10/2022.  Three-phase evaluation of the knees demonstrates no abnormal activity on the initial blood flow and blood pool phases.  No abnormal activity is seen on the delayed phase.  Mild, symmetrical uptake is seen in the shoulders and sternoclavicular joints, which is probably degenerative. Mild uptake in the wrists enhances consistent with degenerative change.  Mild multifocal uptake in the spine is consistent with degenerative change.   Pedicle screws are seen in the L4, L5, and S1 vertebral elements.  Interbody prostheses are seen at L4-5 and L5 S1 on CT scan 4/6/2021.   Bilateral renal excretion is seen.      Impression:   Total body bone scan with 3 phase evaluation of the knees demonstrating multifocal uptake in the axial and appendicular skeleton consistent with degenerative change.  Post right below knee amputation.      ELZA DAMIAN MD       Electronically Signed and Approved By: ELZA DAMIAN MD on 3/18/2022 at 16:36                      Assessment and Plan     DX: Left knee pain, History of left knee replacement    Discussed the treatment plan with the patient.  I reviewed the bone scan with the patient today. Plan to continue the lidocaine patches, Voltaren gel and treating his symptoms. I do not see anything wrong with his knee replacement.     Call or return if worsening symptoms.    Follow Up     PRN      Patient was given instructions and counseling regarding his condition or for health maintenance advice. Please see specific information pulled into the AVS if appropriate.     Scribed for Ken Miranda MD by Keriy Aaron.  03/24/22   11:29 EDT    I have personally performed the services described in this document as scribed by the above individual and it is both accurate and complete. Ken Miranda MD 03/24/22

## 2022-05-04 ENCOUNTER — TREATMENT (OUTPATIENT)
Dept: PHYSICAL THERAPY | Facility: CLINIC | Age: 77
End: 2022-05-04

## 2022-05-04 DIAGNOSIS — R26.2 DIFFICULTY WALKING: ICD-10-CM

## 2022-05-04 DIAGNOSIS — M54.50 CHRONIC BILATERAL LOW BACK PAIN, UNSPECIFIED WHETHER SCIATICA PRESENT: Primary | ICD-10-CM

## 2022-05-04 DIAGNOSIS — M25.60 STIFFNESS IN JOINT: ICD-10-CM

## 2022-05-04 DIAGNOSIS — G89.29 CHRONIC BILATERAL LOW BACK PAIN, UNSPECIFIED WHETHER SCIATICA PRESENT: Primary | ICD-10-CM

## 2022-05-04 PROCEDURE — 97110 THERAPEUTIC EXERCISES: CPT | Performed by: PHYSICAL THERAPIST

## 2022-05-04 PROCEDURE — 97161 PT EVAL LOW COMPLEX 20 MIN: CPT | Performed by: PHYSICAL THERAPIST

## 2022-05-04 NOTE — PROGRESS NOTES
Physical Therapy Initial Evaluation and Plan of Care    Patient: Jamir Porter   : 1945  Diagnosis/ICD-10 Code:  Chronic bilateral low back pain, unspecified whether sciatica present [M54.50, G89.29]  Referring practitioner: John Thorne MD   Date of Initial Visit: 2022  Today's Date: 2022  Patient seen for 1 sessions           Subjective Questionnaire: Oswestry: 22/45 = 48.9% Disability      Subjective Evaluation    History of Present Illness  Mechanism of injury: The patient presents to physical therapy with complaints of low back pain that has been present for about 20 years. Per patient report, he had a spinal laminectomy performed in 2013 from L1-4 and S1 as well as a fusion from L4-S1. His relief after surgery was minimal. He has tried injections and radiofrequency ablation. He experienced no more than 3-4 days of relief with these interventions. His pain is located mainly in the center of his spine without radicular symptoms. He has a trigger point in the right side of his low back. His pain is at its best in the morning (after sleeping in a recliner). His pain is increased with standing, walking, bending forward, twisting, and lifting items. He denies any numbness/tingling.      Patient Occupation: Retired Pain  Current pain ratin  At best pain ratin  At worst pain rating: 10    Patient Goals  Patient goal: Jose patient would like to have less pain, improved mobility, and be able to stand longer for ADLs in the home and to perform woodworking hobby.           Objective          Static Posture     Lumbar Spine   Flattened.     Tenderness     Additional Tenderness Details  Tenderness in lumbar paraspinals and centrally over spine from L2-L5    Neurological Testing     Additional Neurological Details  Supine passive SLR: (-) for increased sciatic neural tension    Active Range of Motion     Lumbar   Left rotation: Active left lumbar rotation: 15%   Right rotation: Active right lumbar  rotation: 25%     Strength/Myotome Testing     Left Hip   Planes of Motion   Flexion: 4-  Abduction: 3+    Right Hip   Planes of Motion   Flexion: 4-  Abduction: 3+    Left Knee   Flexion: 4  Extension: 4+    Right Knee   Flexion: 4  Extension: 4+    Ambulation     Ambulation: Level Surfaces     Additional Level Surfaces Ambulation Details  The patient ambulates with a single tipped cane with minimal arm swing and minimal trunk rotation.      See Exercise, Manual, and Modality Logs for complete treatment.     Assessment & Plan     Assessment  Impairments: abnormal gait, abnormal muscle firing, abnormal muscle tone, abnormal or restricted ROM, activity intolerance, impaired balance, impaired physical strength, lacks appropriate home exercise program, pain with function and safety issue  Functional Limitations: carrying objects, lifting, sleeping, walking, pulling, pushing, uncomfortable because of pain, moving in bed, sitting, standing, stooping and unable to perform repetitive tasks  Assessment details: The patient presents to physical therapy with complaints of midline low back pain which has been present for years, but recently worsened. He has a history of lumbar fusion from L4-S1. He presents with associated lumbar stiffness, hip weakness, tenderness to palpation, and functional deficits (SHAWN). He would benefit from skilled physical therapy as described below to address these limitations and allow the patient to return to his prior level of function.  Prognosis: good    Goals  Plan Goals: LOW BACK PROBLEMS:    1. The patient complains of low back pain.  LTG 1: 12 weeks:  The patient will report a pain rating of 3/10 or better in order to improve  tolerance to activities of daily living and improve sleep quality.  STATUS:  New  STG 1a: 6 weeks:  The patient will report a pain rating of 5/10 or better.  STATUS:  New  TREATMENT:  Therapeutic exercises, manual therapy, aquatic therapy, home exercise   instruction,  and modalities as needed for pain to include:  electrical stimulation, moist heat, ice,   ultrasound, and diathermy.      2. The patient demonstrates weakness of the bilateral hips.  LTG 2: 12 weeks:  The patient will demonstrate 4+ /5 strength for bilateral hip flexion andabduction  in order to improve hip stability.  STATUS:  New  STG 2a: 6 weeks:  The patient will demonstrate 4 /5 strength for bilateral hip flexion and abduction  STATUS:  New  TREATMENT: Therapeutic exercises, manual therapy, aquatic therapy, home exercise instruction,  and modalities as needed for pain to include:  electrical stimulation, moist heat, ice, ultrasound, and   diathermy.    3. Mobility: Walking/Moving Around Functional Limitation    LTG 3: 12 weeks:  The patient will demonstrate 22.2 % limitation by achieving a score of 10/45 on the SHAWN.  STATUS:  New  STG 3 a: 6 weeks:  The patient will demonstrate 33.3 % limitation by achieving a score of 15/45 on the SHAWN.    STATUS:  New  TREATMENT:  Manual therapy, therapeutic exercise, home exercise instruction, and modalities as needed to include: moist heat, electrical stimulation, and ultrasound.    Plan  Therapy options: will be seen for skilled therapy services  Planned modality interventions: cryotherapy, electrical stimulation/Russian stimulation, TENS, dry needling and traction  Planned therapy interventions: balance/weight-bearing training, ADL retraining, soft tissue mobilization, strengthening, stretching, therapeutic activities, joint mobilization, home exercise program, functional ROM exercises, flexibility, body mechanics training, postural training, neuromuscular re-education, manual therapy, abdominal trunk stabilization, IADL retraining and spinal/joint mobilization  Frequency: 2x week  Duration in weeks: 12  Treatment plan discussed with: patient        Visit Diagnoses:    ICD-10-CM ICD-9-CM   1. Chronic bilateral low back pain, unspecified whether sciatica present  M54.50  724.2    G89.29 338.29   2. Stiffness in joint  M25.60 719.50   3. Difficulty walking  R26.2 719.7       History # of Personal Factors and/or Comorbidities: MODERATE (1-2)  Examination of Body System(s): # of elements: MODERATE (3)  Clinical Presentation: STABLE   Clinical Decision Making: LOW       Timed:         Manual Therapy:    0     mins  38605;     Therapeutic Exercise:    8     mins  74668;     Neuromuscular Atiya:    0    mins  23407;    Therapeutic Activity:     0     mins  27810;     Gait Trainin     mins  15658;     Ultrasound:     0     mins  74083;    Ionto                               0    mins   78041  Self Care                       0     mins   93030  Canalith Repos    0     mins 23283      Un-Timed:  Electrical Stimulation:    0     mins  82792 ( );  Dry Needling     0     mins self-pay  Traction     0     mins 48404  Low Eval     30     Mins  08739  Mod Eval     0     Mins  59228  High Eval                       0     Mins  29836  Re-Eval                           0    mins  34156    Timed Treatment:   8   mins   Total Treatment:     38   mins    PT SIGNATURE: Jeff Mccollum PT    Electronically signed 2022    [place] License: PT - [id number]      Initial Certification  Certification Period: 2022 thru 8/3/2022  I certify that the therapy services are furnished while this patient is under my care.  The services outlined above are required by this patient, and will be reviewed every 90 days.     PHYSICIAN: John Thorne  NPI: [id number]     DATE:     Please sign and return via fax to [phone]. Thank you, [facility].

## 2022-05-06 ENCOUNTER — TRANSCRIBE ORDERS (OUTPATIENT)
Dept: PHYSICAL THERAPY | Facility: CLINIC | Age: 77
End: 2022-05-06

## 2022-05-06 ENCOUNTER — TREATMENT (OUTPATIENT)
Dept: PHYSICAL THERAPY | Facility: CLINIC | Age: 77
End: 2022-05-06

## 2022-05-06 DIAGNOSIS — M25.60 STIFFNESS IN JOINT: ICD-10-CM

## 2022-05-06 DIAGNOSIS — R26.2 DIFFICULTY WALKING: ICD-10-CM

## 2022-05-06 DIAGNOSIS — G89.29 CHRONIC BILATERAL LOW BACK PAIN, UNSPECIFIED WHETHER SCIATICA PRESENT: Primary | ICD-10-CM

## 2022-05-06 DIAGNOSIS — M54.2 CERVICALGIA: ICD-10-CM

## 2022-05-06 DIAGNOSIS — M54.50 CHRONIC BILATERAL LOW BACK PAIN, UNSPECIFIED WHETHER SCIATICA PRESENT: Primary | ICD-10-CM

## 2022-05-06 DIAGNOSIS — M54.50 LOW BACK PAIN, UNSPECIFIED BACK PAIN LATERALITY, UNSPECIFIED CHRONICITY, UNSPECIFIED WHETHER SCIATICA PRESENT: Primary | ICD-10-CM

## 2022-05-06 PROCEDURE — 97530 THERAPEUTIC ACTIVITIES: CPT | Performed by: PHYSICAL THERAPIST

## 2022-05-06 PROCEDURE — 97110 THERAPEUTIC EXERCISES: CPT | Performed by: PHYSICAL THERAPIST

## 2022-05-06 NOTE — PROGRESS NOTES
Physical Therapy Daily Progress Note    Patient: Jamir Porter   : 1945  Diagnosis/ICD-10 Code:  Chronic bilateral low back pain, unspecified whether sciatica present [M54.50, G89.29]  Referring practitioner: John Thorne MD  Date of Initial Visit: Type: THERAPY  Noted: 2022  Today's Date: 2022  Patient seen for 2 sessions           Subjective   The patient reported that his pain level is a 2-3/10 at rest. His pain increases to a 7-8/10 with standing and gets up to a 10/10 with walking.    Objective   See Exercise, Manual, and Modality Logs for complete treatment.     Assessment/Plan  The patient demonstrated good tolerance to functional hip/core strengthening exercise today. Continue to progress per patient tolerance.       Timed:  Manual Therapy:    0     mins  15377;  Therapeutic Exercise:    15     mins  82663;     Neuromuscular Atiya:   0    mins  31914;    Therapeutic Activity:     23     mins  31322;     Gait Trainin     mins  00022;     Aquatics                         0      mins  75860    Un-timed:  Mechanical Traction      0     mins  61215  Dry Needling     0     mins self-pay  Electrical Stimulation:    0     mins  58931 ( );      Timed Treatment:   38   mins   Total Treatment:     38   mins    Jeff Mccollum PT  Physical Therapist    Electronically signed 2022    [place] License: PT - [id number]

## 2022-05-08 RX ORDER — DULAGLUTIDE 3 MG/.5ML
INJECTION, SOLUTION SUBCUTANEOUS
Qty: 6 ML | Refills: 3 | Status: SHIPPED | OUTPATIENT
Start: 2022-05-08 | End: 2022-06-30

## 2022-05-10 ENCOUNTER — TREATMENT (OUTPATIENT)
Dept: PHYSICAL THERAPY | Facility: CLINIC | Age: 77
End: 2022-05-10

## 2022-05-10 DIAGNOSIS — M25.60 STIFFNESS IN JOINT: ICD-10-CM

## 2022-05-10 DIAGNOSIS — M54.50 CHRONIC BILATERAL LOW BACK PAIN, UNSPECIFIED WHETHER SCIATICA PRESENT: Primary | ICD-10-CM

## 2022-05-10 DIAGNOSIS — G89.29 CHRONIC BILATERAL LOW BACK PAIN, UNSPECIFIED WHETHER SCIATICA PRESENT: Primary | ICD-10-CM

## 2022-05-10 DIAGNOSIS — R26.2 DIFFICULTY WALKING: ICD-10-CM

## 2022-05-10 PROCEDURE — 97530 THERAPEUTIC ACTIVITIES: CPT | Performed by: PHYSICAL THERAPIST

## 2022-05-10 PROCEDURE — 97110 THERAPEUTIC EXERCISES: CPT | Performed by: PHYSICAL THERAPIST

## 2022-05-10 NOTE — PROGRESS NOTES
Physical Therapy Daily Progress Note    Patient: Jamir Porter   : 1945  Diagnosis/ICD-10 Code:  Chronic bilateral low back pain, unspecified whether sciatica present [M54.50, G89.29]  Referring practitioner: John Thorne MD  Date of Initial Visit: Type: THERAPY  Noted: 2022  Today's Date: 5/10/2022  Patient seen for 3 sessions           Subjective   The patient reported that his back is hurting more than normal today. He wasn't overly sore after his last PT session.    Objective   See Exercise, Manual, and Modality Logs for complete treatment.     Assessment/Plan  The patient demonstrated good tolerance to all functional core/hip strengthening exercise today. Continue to progress per patient tolerance.       Timed:  Manual Therapy:    0     mins  01041;  Therapeutic Exercise:    18     mins  14750;     Neuromuscular Atiya:   0    mins  88107;    Therapeutic Activity:     20     mins  14287;     Gait Trainin     mins  15844;     Aquatics                         0      mins  17163    Un-timed:  Mechanical Traction      0     mins  42230  Dry Needling     0     mins self-pay  Electrical Stimulation:    0     mins  83018 ( );      Timed Treatment:   38   mins   Total Treatment:     38   mins    Jeff Mccollum PT  Physical Therapist    Electronically signed 5/10/2022    [place] License: PT - [id number]

## 2022-05-12 ENCOUNTER — TREATMENT (OUTPATIENT)
Dept: PHYSICAL THERAPY | Facility: CLINIC | Age: 77
End: 2022-05-12

## 2022-05-12 DIAGNOSIS — R26.2 DIFFICULTY WALKING: ICD-10-CM

## 2022-05-12 DIAGNOSIS — M54.50 CHRONIC BILATERAL LOW BACK PAIN, UNSPECIFIED WHETHER SCIATICA PRESENT: Primary | ICD-10-CM

## 2022-05-12 DIAGNOSIS — M25.60 STIFFNESS IN JOINT: ICD-10-CM

## 2022-05-12 DIAGNOSIS — G89.29 CHRONIC BILATERAL LOW BACK PAIN, UNSPECIFIED WHETHER SCIATICA PRESENT: Primary | ICD-10-CM

## 2022-05-12 PROCEDURE — 97110 THERAPEUTIC EXERCISES: CPT | Performed by: PHYSICAL THERAPIST

## 2022-05-12 PROCEDURE — 97530 THERAPEUTIC ACTIVITIES: CPT | Performed by: PHYSICAL THERAPIST

## 2022-05-12 NOTE — PROGRESS NOTES
Physical Therapy Daily Progress Note    Patient: Jamir Porter   : 1945  Diagnosis/ICD-10 Code:  Chronic bilateral low back pain, unspecified whether sciatica present [M54.50, G89.29]  Referring practitioner: John Thorne MD  Date of Initial Visit: Type: THERAPY  Noted: 2022  Today's Date: 2022  Patient seen for 4 sessions           Subjective   The patient reported that his back pain is about an 8/10 today when he is up on his feet. His knees are both feeling better.     Objective   See Exercise, Manual, and Modality Logs for complete treatment.     Assessment/Plan  The patient demonstrated good tolerance to all hip/core strengthening interventions today. Continue to progress per patient tolerance.       Timed:  Manual Therapy:    0     mins  97609;  Therapeutic Exercise:    18     mins  43857;     Neuromuscular Atiya:   0    mins  19954;    Therapeutic Activity:     10     mins  43173;     Gait Trainin     mins  48696;     Aquatics                         0      mins  30048    Un-timed:  Mechanical Traction      0     mins  95911  Dry Needling     0     mins self-pay  Electrical Stimulation:    0     mins  77495 ( );      Timed Treatment:   28   mins   Total Treatment:     28   mins    Jeff Mccollum PT  Physical Therapist    Electronically signed 2022    [place] License: PT - [id number]

## 2022-05-16 ENCOUNTER — TREATMENT (OUTPATIENT)
Dept: PHYSICAL THERAPY | Facility: CLINIC | Age: 77
End: 2022-05-16

## 2022-05-16 DIAGNOSIS — G89.29 CHRONIC BILATERAL LOW BACK PAIN, UNSPECIFIED WHETHER SCIATICA PRESENT: Primary | ICD-10-CM

## 2022-05-16 DIAGNOSIS — M54.50 CHRONIC BILATERAL LOW BACK PAIN, UNSPECIFIED WHETHER SCIATICA PRESENT: Primary | ICD-10-CM

## 2022-05-16 DIAGNOSIS — M25.60 STIFFNESS IN JOINT: ICD-10-CM

## 2022-05-16 DIAGNOSIS — R26.2 DIFFICULTY WALKING: ICD-10-CM

## 2022-05-16 PROCEDURE — 97110 THERAPEUTIC EXERCISES: CPT | Performed by: PHYSICAL THERAPIST

## 2022-05-16 PROCEDURE — 97530 THERAPEUTIC ACTIVITIES: CPT | Performed by: PHYSICAL THERAPIST

## 2022-05-16 NOTE — PROGRESS NOTES
Physical Therapy Daily Progress Note    Patient: Jamir Porter   : 1945  Diagnosis/ICD-10 Code:  Chronic bilateral low back pain, unspecified whether sciatica present [M54.50, G89.29]  Referring practitioner: John Thorne MD  Date of Initial Visit: Type: THERAPY  Noted: 2022  Today's Date: 2022  Patient seen for 5 sessions           Subjective   The patient reported no new complaints today. He was a little sore after his last visit.    Objective   See Exercise, Manual, and Modality Logs for complete treatment.     Assessment/Plan  The patient demonstrated good tolerance to all functional lower extremity strengthening exercise today. Continue to progress per patient tolerance.       Timed:  Manual Therapy:    0     mins  02000;  Therapeutic Exercise:    18     mins  58944;     Neuromuscular Atiya:   0    mins  25377;    Therapeutic Activity:     20     mins  18991;     Gait Trainin     mins  80698;     Aquatics                         0      mins  30292    Un-timed:  Mechanical Traction      0     mins  62056  Dry Needling     0     mins self-pay  Electrical Stimulation:    0     mins  90056 ( );      Timed Treatment:   38   mins   Total Treatment:     38   mins    Jeff Mccollum PT  Physical Therapist    Electronically signed 2022    [place] License: PT - [id number]

## 2022-05-20 ENCOUNTER — TREATMENT (OUTPATIENT)
Dept: PHYSICAL THERAPY | Facility: CLINIC | Age: 77
End: 2022-05-20

## 2022-05-20 DIAGNOSIS — M54.50 CHRONIC BILATERAL LOW BACK PAIN, UNSPECIFIED WHETHER SCIATICA PRESENT: Primary | ICD-10-CM

## 2022-05-20 DIAGNOSIS — R26.2 DIFFICULTY WALKING: ICD-10-CM

## 2022-05-20 DIAGNOSIS — M25.60 STIFFNESS IN JOINT: ICD-10-CM

## 2022-05-20 DIAGNOSIS — G89.29 CHRONIC BILATERAL LOW BACK PAIN, UNSPECIFIED WHETHER SCIATICA PRESENT: Primary | ICD-10-CM

## 2022-05-20 PROCEDURE — 97110 THERAPEUTIC EXERCISES: CPT | Performed by: PHYSICAL THERAPIST

## 2022-05-20 PROCEDURE — 97530 THERAPEUTIC ACTIVITIES: CPT | Performed by: PHYSICAL THERAPIST

## 2022-05-20 NOTE — PROGRESS NOTES
"   Physical Therapy Daily Progress Note    Patient: Jamir Porter   : 1945  Diagnosis/ICD-10 Code:  Chronic bilateral low back pain, unspecified whether sciatica present [M54.50, G89.29]  Referring practitioner: John Thorne MD  Date of Initial Visit: Type: THERAPY  Noted: 2022  Today's Date: 2022  Patient seen for 6 sessions           Subjective   The patient reported that his pain is \"a good -8/10\" today. He doesn't recall doing anything in particular to bother it.    Objective   See Exercise, Manual, and Modality Logs for complete treatment.     Assessment/Plan  The patient continues to present with a high level of pain, but good tolerance to all functional strengthening exercise. Continue to progress per patient tolerance.       Timed:  Manual Therapy:    0     mins  51803;  Therapeutic Exercise:    18     mins  02771;     Neuromuscular Atiya:   0    mins  65397;    Therapeutic Activity:     20     mins  47374;     Gait Trainin     mins  25917;     Aquatics                         0      mins  02453    Un-timed:  Mechanical Traction      0     mins  02629  Dry Needling     0     mins self-pay  Electrical Stimulation:    0     mins  78638 ( );      Timed Treatment:   38   mins   Total Treatment:     38   mins    Jeff Mccollum PT  Physical Therapist    Electronically signed 2022    [place] License: PT - [id number]                      "

## 2022-05-24 ENCOUNTER — TREATMENT (OUTPATIENT)
Dept: PHYSICAL THERAPY | Facility: CLINIC | Age: 77
End: 2022-05-24

## 2022-05-24 DIAGNOSIS — R26.2 DIFFICULTY WALKING: ICD-10-CM

## 2022-05-24 DIAGNOSIS — M25.60 STIFFNESS IN JOINT: ICD-10-CM

## 2022-05-24 DIAGNOSIS — G89.29 CHRONIC BILATERAL LOW BACK PAIN, UNSPECIFIED WHETHER SCIATICA PRESENT: Primary | ICD-10-CM

## 2022-05-24 DIAGNOSIS — M54.50 CHRONIC BILATERAL LOW BACK PAIN, UNSPECIFIED WHETHER SCIATICA PRESENT: Primary | ICD-10-CM

## 2022-05-24 PROCEDURE — 97110 THERAPEUTIC EXERCISES: CPT | Performed by: PHYSICAL THERAPIST

## 2022-05-24 PROCEDURE — 97530 THERAPEUTIC ACTIVITIES: CPT | Performed by: PHYSICAL THERAPIST

## 2022-05-24 NOTE — PROGRESS NOTES
Physical Therapy Daily Progress Note    Patient: Jamir Porter   : 1945  Diagnosis/ICD-10 Code:  Chronic bilateral low back pain, unspecified whether sciatica present [M54.50, G89.29]  Referring practitioner: John Thorne MD  Date of Initial Visit: Type: THERAPY  Noted: 2022  Today's Date: 2022  Patient seen for 7 sessions           Subjective   The patient reported that he fell yesterday and his right lower back and left shoulder are each sore today. He didn't trip over anything in particular. He just lost his balance.    Objective   See Exercise, Manual, and Modality Logs for complete treatment.     Assessment/Plan  The patient demonstrated good tolerance to all interventions today, even with some increased soreness from falling yesterday. He was educated on strategies for getting back up from the floor in case this happens again. Education was just verbal. The patient did not transition down to the floor to practice.       Timed:  Manual Therapy:    0     mins  25883;  Therapeutic Exercise:    8     mins  73877;     Neuromuscular Atiya:   0    mins  10553;    Therapeutic Activity:     15     mins  06005;     Gait Trainin     mins  51552;     Aquatics                         0      mins  66651    Un-timed:  Mechanical Traction      0     mins  43470  Dry Needling     0     mins self-pay  Electrical Stimulation:    0     mins  52369 ( );      Timed Treatment:   23   mins   Total Treatment:     32   mins    Jeff Mccollum PT  Physical Therapist    Electronically signed 2022    [place] License: PT - [id number]

## 2022-05-27 ENCOUNTER — TREATMENT (OUTPATIENT)
Dept: PHYSICAL THERAPY | Facility: CLINIC | Age: 77
End: 2022-05-27

## 2022-05-27 DIAGNOSIS — R26.2 DIFFICULTY WALKING: ICD-10-CM

## 2022-05-27 DIAGNOSIS — M25.60 STIFFNESS IN JOINT: ICD-10-CM

## 2022-05-27 DIAGNOSIS — G89.29 CHRONIC BILATERAL LOW BACK PAIN, UNSPECIFIED WHETHER SCIATICA PRESENT: Primary | ICD-10-CM

## 2022-05-27 DIAGNOSIS — M54.50 CHRONIC BILATERAL LOW BACK PAIN, UNSPECIFIED WHETHER SCIATICA PRESENT: Primary | ICD-10-CM

## 2022-05-27 PROCEDURE — 97110 THERAPEUTIC EXERCISES: CPT | Performed by: PHYSICAL THERAPIST

## 2022-05-27 PROCEDURE — 97530 THERAPEUTIC ACTIVITIES: CPT | Performed by: PHYSICAL THERAPIST

## 2022-05-27 NOTE — PROGRESS NOTES
Physical Therapy Daily Progress Note    Patient: Jamir Porter   : 1945  Diagnosis/ICD-10 Code:  Chronic bilateral low back pain, unspecified whether sciatica present [M54.50, G89.29]  Referring practitioner: John Thorne MD  Date of Initial Visit: Type: THERAPY  Noted: 2022  Today's Date: 2022  Patient seen for 8 sessions           Subjective   The patient reported no new complaints today. He plans to start working out at [employer] soon.     Objective   See Exercise, Manual, and Modality Logs for complete treatment.     Assessment/Plan  The patient demonstrated good tolerance to all hip/core strengthening interventions today. He will continue with PT for an additional week and then return to a gym based strengthening program.       Timed:  Manual Therapy:    0     mins  32483;  Therapeutic Exercise:    18   mins  21194;     Neuromuscular Atiya:   0    mins  75107;    Therapeutic Activity:     20   mins  92536;     Gait Trainin     mins  66087;     Aquatics                         0      mins  51334    Un-timed:  Mechanical Traction      0     mins  05314  Dry Needling     0     mins self-pay  Electrical Stimulation:    0     mins  79185 ( );      Timed Treatment:   38   mins   Total Treatment:     38   mins    Jeff Mccollum PT  Physical Therapist    Electronically signed 2022    [place] License: PT - [id number]

## 2022-06-01 ENCOUNTER — TREATMENT (OUTPATIENT)
Dept: PHYSICAL THERAPY | Facility: CLINIC | Age: 77
End: 2022-06-01

## 2022-06-01 DIAGNOSIS — M25.60 STIFFNESS IN JOINT: ICD-10-CM

## 2022-06-01 DIAGNOSIS — R26.2 DIFFICULTY WALKING: ICD-10-CM

## 2022-06-01 DIAGNOSIS — M54.50 CHRONIC BILATERAL LOW BACK PAIN, UNSPECIFIED WHETHER SCIATICA PRESENT: Primary | ICD-10-CM

## 2022-06-01 DIAGNOSIS — G89.29 CHRONIC BILATERAL LOW BACK PAIN, UNSPECIFIED WHETHER SCIATICA PRESENT: Primary | ICD-10-CM

## 2022-06-01 PROCEDURE — 97530 THERAPEUTIC ACTIVITIES: CPT | Performed by: PHYSICAL THERAPIST

## 2022-06-01 PROCEDURE — 97110 THERAPEUTIC EXERCISES: CPT | Performed by: PHYSICAL THERAPIST

## 2022-06-01 NOTE — PROGRESS NOTES
Physical Therapy Daily Progress Note    Patient: Jamir Porter   : 1945  Diagnosis/ICD-10 Code:  Chronic bilateral low back pain, unspecified whether sciatica present [M54.50, G89.29]  Referring practitioner: John Thorne MD  Date of Initial Visit: Type: THERAPY  Noted: 2022  Today's Date: 2022  Patient seen for 9 sessions           Subjective   The patient reported no new complaints today. He plans on returning to [employer] to workout next week.    Objective   See Exercise, Manual, and Modality Logs for complete treatment.     Assessment/Plan  The patient demonstrated good tolerance to all physical therapy interventions today. He is progressing well with PT at this time. Continue with current PT plan of care.       Timed:  Manual Therapy:    0     mins  35516;  Therapeutic Exercise:    18     mins  86964;     Neuromuscular Atiya:   0    mins  86638;    Therapeutic Activity:     20     mins  76171;     Gait Trainin     mins  15745;     Aquatics                         0      mins  29780    Un-timed:  Mechanical Traction      0     mins  00799  Dry Needling     0     mins self-pay  Electrical Stimulation:    0     mins  69496 ( );      Timed Treatment:   38   mins   Total Treatment:     38   mins    Jeff Mccollum PT  Physical Therapist    Electronically signed 2022    [place] License: PT - [id number]

## 2022-06-03 ENCOUNTER — TREATMENT (OUTPATIENT)
Dept: PHYSICAL THERAPY | Facility: CLINIC | Age: 77
End: 2022-06-03

## 2022-06-03 DIAGNOSIS — M25.60 STIFFNESS IN JOINT: ICD-10-CM

## 2022-06-03 DIAGNOSIS — M54.50 CHRONIC BILATERAL LOW BACK PAIN, UNSPECIFIED WHETHER SCIATICA PRESENT: Primary | ICD-10-CM

## 2022-06-03 DIAGNOSIS — G89.29 CHRONIC BILATERAL LOW BACK PAIN, UNSPECIFIED WHETHER SCIATICA PRESENT: Primary | ICD-10-CM

## 2022-06-03 DIAGNOSIS — R26.2 DIFFICULTY WALKING: ICD-10-CM

## 2022-06-03 PROCEDURE — 97110 THERAPEUTIC EXERCISES: CPT | Performed by: PHYSICAL THERAPIST

## 2022-06-03 PROCEDURE — 97530 THERAPEUTIC ACTIVITIES: CPT | Performed by: PHYSICAL THERAPIST

## 2022-06-03 NOTE — TELEPHONE ENCOUNTER
Diuretics Protocol Passed 06/03/2022 06:52 AM   Protocol Details  Normal serum potassium in past 12 months    Normal serum sodium in past 12 months    Recent or future visit with authorizing provider    Blood pressure on record    GFR> 30 ml/min in past year

## 2022-06-03 NOTE — PROGRESS NOTES
Progress Note / Discharge      Patient: Jamir Porter   : 1945  Diagnosis/ICD-10 Code:  Chronic bilateral low back pain, unspecified whether sciatica present [M54.50, G89.29]  Referring practitioner: John Thorne MD  Date of Initial Visit: Type: THERAPY  Noted: 2022  Today's Date: 6/3/2022  Patient seen for 10 sessions      Subjective:   Subjective Questionnaire:Oswestry: 25/45 = 55.5% Disability  Clinical Progress: improved  Home Program Compliance: Yes  Treatment has included: therapeutic exercise and therapeutic activity    Subjective   The patient reported that his back is hurting more than normal today due to having to lay in an MRI machine yesterday for his low back. Prior to that, he was feeling a little better. He has noticed improvements in lower extremity strength and general mobility since starting PT. He feels like he is able to transition to a gym based exercise program at this time. He is agreeable to discharge from PT.    Objective          Static Posture     Lumbar Spine   Flattened.     Tenderness     Additional Tenderness Details  Tenderness in lumbar paraspinals and centrally over spine from L2-L5    Neurological Testing     Additional Neurological Details  Supine passive SLR: (-) for increased sciatic neural tension    Active Range of Motion     Lumbar   Left rotation: Active left lumbar rotation: 15%   Right rotation: Active right lumbar rotation: 25%     Strength/Myotome Testing     Left Hip   Planes of Motion   Flexion: 4-  Abduction: 4-    Right Hip   Planes of Motion   Flexion: 4-  Abduction: 4-    Left Knee   Flexion: 4+  Extension: 4+    Right Knee   Flexion: 4+  Extension: 4+    Ambulation     Ambulation: Level Surfaces     Additional Level Surfaces Ambulation Details  The patient ambulates with a single tipped cane with minimal arm swing and minimal trunk rotation.      See Exercise, Manual, and Modality Logs for complete treatment.     Assessment & Plan      Assessment    Assessment details: The patient was re-evaluated today and presents with improvements in hip strength and knee strength compared to his initial evaluation. He has not progressed in terms of pain, gait, or functional mobility (SHAWN). He is appropriate for discharge from skilled PT at this time due to lack of progress. The patient is capable of safely transitioning to an independent gym based exercise program at this time.    Goals  Plan Goals: LOW BACK PROBLEMS:    1. The patient complains of low back pain.  LTG 1: 12 weeks:  The patient will report a pain rating of 3/10 or better in order to improve  tolerance to activities of daily living and improve sleep quality.  STATUS:  Not met  STG 1a: 6 weeks:  The patient will report a pain rating of 5/10 or better.  STATUS:  Not met  TREATMENT:  Therapeutic exercises, manual therapy, aquatic therapy, home exercise   instruction, and modalities as needed for pain to include:  electrical stimulation, moist heat, ice,   ultrasound, and diathermy.      2. The patient demonstrates weakness of the bilateral hips.  LTG 2: 12 weeks:  The patient will demonstrate 4+ /5 strength for bilateral hip flexion andabduction  in order to improve hip stability.  STATUS:  Not met  STG 2a: 6 weeks:  The patient will demonstrate 4 /5 strength for bilateral hip flexion and abduction  STATUS:  Not met  TREATMENT: Therapeutic exercises, manual therapy, aquatic therapy, home exercise instruction,  and modalities as needed for pain to include:  electrical stimulation, moist heat, ice, ultrasound, and   diathermy.    3. Mobility: Walking/Moving Around Functional Limitation                   LTG 3: 12 weeks:  The patient will demonstrate 22.2 % limitation by achieving a score of 10/45 on the SHAWN.  STATUS:  Not met  STG 3 a: 6 weeks:  The patient will demonstrate 33.3 % limitation by achieving a score of 15/45 on the SHAWN.    STATUS:  Not met  TREATMENT:  Manual therapy, therapeutic  exercise, home exercise instruction, and modalities as needed to include: moist heat, electrical stimulation, and ultrasound.    Plan  Therapy options: will not be seen for skilled therapy services      Progress toward previous goals: Not Met      Recommendations: Discharge    Prognosis to achieve goals: fair    PT Signature: Jeff Mccollum PT    Electronically signed 6/3/2022    [place] License: PT - [id number]       Timed:  Manual Therapy:    0     mins  99313;  Therapeutic Exercise:    15     mins  49488;     Neuromuscular Atiya:    0    mins  32407;    Therapeutic Activity:     15     mins  04179;     Gait Trainin     mins  67537;     Aquatics                         0      mins  18135    Un-timed:  Mechanical Traction      0     mins  31881  Dry Needling     0     mins self-pay  Electrical Stimulation:    0     mins  45451 ( );    Timed Treatment:   30   mins   Total Treatment:     30   mins

## 2022-06-06 RX ORDER — HYDROCHLOROTHIAZIDE 25 MG/1
25 TABLET ORAL DAILY
Qty: 90 TABLET | Refills: 3 | OUTPATIENT
Start: 2022-06-06

## 2022-06-30 RX ORDER — DULAGLUTIDE 3 MG/.5ML
INJECTION, SOLUTION SUBCUTANEOUS
Qty: 6 ML | Refills: 3 | Status: SHIPPED | OUTPATIENT
Start: 2022-06-30

## 2022-08-01 ENCOUNTER — OFFICE VISIT (OUTPATIENT)
Dept: INTERNAL MEDICINE | Facility: CLINIC | Age: 77
End: 2022-08-01

## 2022-08-01 VITALS
WEIGHT: 240.4 LBS | OXYGEN SATURATION: 94 % | HEIGHT: 70 IN | HEART RATE: 57 BPM | SYSTOLIC BLOOD PRESSURE: 133 MMHG | BODY MASS INDEX: 34.41 KG/M2 | TEMPERATURE: 97 F | DIASTOLIC BLOOD PRESSURE: 77 MMHG

## 2022-08-01 DIAGNOSIS — M54.41 CHRONIC BILATERAL LOW BACK PAIN WITH BILATERAL SCIATICA: ICD-10-CM

## 2022-08-01 DIAGNOSIS — E11.65 TYPE 2 DIABETES MELLITUS WITH HYPERGLYCEMIA, WITHOUT LONG-TERM CURRENT USE OF INSULIN: ICD-10-CM

## 2022-08-01 DIAGNOSIS — Z23 NEED FOR PNEUMOCOCCAL VACCINATION: ICD-10-CM

## 2022-08-01 DIAGNOSIS — M54.42 CHRONIC BILATERAL LOW BACK PAIN WITH BILATERAL SCIATICA: ICD-10-CM

## 2022-08-01 DIAGNOSIS — G62.9 NEUROPATHY: ICD-10-CM

## 2022-08-01 DIAGNOSIS — E78.5 HYPERLIPIDEMIA, UNSPECIFIED HYPERLIPIDEMIA TYPE: ICD-10-CM

## 2022-08-01 DIAGNOSIS — L03.115 CELLULITIS OF RIGHT LOWER EXTREMITY: ICD-10-CM

## 2022-08-01 DIAGNOSIS — G89.29 CHRONIC BILATERAL LOW BACK PAIN WITH BILATERAL SCIATICA: ICD-10-CM

## 2022-08-01 DIAGNOSIS — I10 PRIMARY HYPERTENSION: Primary | ICD-10-CM

## 2022-08-01 LAB
BASOPHILS # BLD AUTO: 0.06 10*3/MM3 (ref 0–0.2)
BASOPHILS NFR BLD AUTO: 0.8 % (ref 0–1.5)
DEPRECATED RDW RBC AUTO: 47.4 FL (ref 37–54)
EOSINOPHIL # BLD AUTO: 0.44 10*3/MM3 (ref 0–0.4)
EOSINOPHIL NFR BLD AUTO: 5.9 % (ref 0.3–6.2)
ERYTHROCYTE [DISTWIDTH] IN BLOOD BY AUTOMATED COUNT: 13.3 % (ref 12.3–15.4)
HBA1C MFR BLD: 6.2 % (ref 4.8–5.6)
HCT VFR BLD AUTO: 42.6 % (ref 37.5–51)
HGB BLD-MCNC: 14.2 G/DL (ref 13–17.7)
IMM GRANULOCYTES # BLD AUTO: 0.05 10*3/MM3 (ref 0–0.05)
IMM GRANULOCYTES NFR BLD AUTO: 0.7 % (ref 0–0.5)
LYMPHOCYTES # BLD AUTO: 1.43 10*3/MM3 (ref 0.7–3.1)
LYMPHOCYTES NFR BLD AUTO: 19.1 % (ref 19.6–45.3)
MCH RBC QN AUTO: 31.8 PG (ref 26.6–33)
MCHC RBC AUTO-ENTMCNC: 33.3 G/DL (ref 31.5–35.7)
MCV RBC AUTO: 95.5 FL (ref 79–97)
MONOCYTES # BLD AUTO: 0.71 10*3/MM3 (ref 0.1–0.9)
MONOCYTES NFR BLD AUTO: 9.5 % (ref 5–12)
NEUTROPHILS NFR BLD AUTO: 4.81 10*3/MM3 (ref 1.7–7)
NEUTROPHILS NFR BLD AUTO: 64 % (ref 42.7–76)
NRBC BLD AUTO-RTO: 0 /100 WBC (ref 0–0.2)
PLATELET # BLD AUTO: 255 10*3/MM3 (ref 140–450)
PMV BLD AUTO: 10.3 FL (ref 6–12)
RBC # BLD AUTO: 4.46 10*6/MM3 (ref 4.14–5.8)
WBC NRBC COR # BLD: 7.5 10*3/MM3 (ref 3.4–10.8)

## 2022-08-01 PROCEDURE — 83036 HEMOGLOBIN GLYCOSYLATED A1C: CPT | Performed by: INTERNAL MEDICINE

## 2022-08-01 PROCEDURE — 99214 OFFICE O/P EST MOD 30 MIN: CPT | Performed by: INTERNAL MEDICINE

## 2022-08-01 PROCEDURE — G0009 ADMIN PNEUMOCOCCAL VACCINE: HCPCS | Performed by: INTERNAL MEDICINE

## 2022-08-01 PROCEDURE — 80061 LIPID PANEL: CPT | Performed by: INTERNAL MEDICINE

## 2022-08-01 PROCEDURE — 80053 COMPREHEN METABOLIC PANEL: CPT | Performed by: INTERNAL MEDICINE

## 2022-08-01 PROCEDURE — 85025 COMPLETE CBC W/AUTO DIFF WBC: CPT | Performed by: INTERNAL MEDICINE

## 2022-08-01 PROCEDURE — 90677 PCV20 VACCINE IM: CPT | Performed by: INTERNAL MEDICINE

## 2022-08-01 RX ORDER — GABAPENTIN 100 MG/1
100 CAPSULE ORAL 2 TIMES DAILY PRN
Qty: 60 CAPSULE | Refills: 0 | Status: SHIPPED | OUTPATIENT
Start: 2022-08-01 | End: 2023-03-10 | Stop reason: SDUPTHER

## 2022-08-01 RX ORDER — BACLOFEN 10 MG/1
10 TABLET ORAL 3 TIMES DAILY PRN
Qty: 90 TABLET | Refills: 1 | Status: SHIPPED | OUTPATIENT
Start: 2022-08-01

## 2022-08-01 RX ORDER — CLINDAMYCIN HYDROCHLORIDE 300 MG/1
300 CAPSULE ORAL 2 TIMES DAILY
Qty: 14 CAPSULE | Refills: 0 | Status: SHIPPED | OUTPATIENT
Start: 2022-08-01 | End: 2022-08-08

## 2022-08-01 NOTE — PROGRESS NOTES
"Chief Complaint  Diabetes (Type 2 ) and Back Pain (Patient states he is taking 800 MG 4 times daily of ibuprofen )    Subjective          Jamir Porter presents to [facility]  History of Present Illness  hypertension- patient denies Has, dizziness, chest pain. Patient   DM2- patient reports home blood glucose avg about 130.s patient has lost a few lbs since 3/2022.   hyperlipidemia- doing well on zetia  Patient reports back pain is helped with gabapentin. Patient also uses it for neuropathy. Patient reports previously trying lidocaine patches and flexeril without much effect. Patient has had TENS unit previously during physical therapy.   Patient also reports red lesion on proximal RLE that started with a cat scratch. Patient reports he has bad habit of picking at lesions. Patient reports it was nearly healed, but has progressively worsened with reddening.     Current Outpatient Medications   Medication Instructions   • baclofen (LIORESAL) 10 mg, Oral, 3 Times Daily PRN   • clindamycin (CLEOCIN) 300 mg, Oral, 2 Times Daily   • empagliflozin (JARDIANCE) 25 mg, Oral, Daily   • ezetimibe (ZETIA) 10 mg, Oral, Daily   • FLUoxetine (PROZAC) 40 mg, Oral, Daily   • gabapentin (NEURONTIN) 100 mg, Oral, 2 Times Daily PRN   • hydroCHLOROthiazide (HYDRODIURIL) 25 mg, Oral, Daily   • ibuprofen (ADVIL,MOTRIN) 800 mg, Oral, Every 6 Hours PRN   • Trulicity 3 MG/0.5ML solution pen-injector INJECT 3 MG SUBCUTANEOUSLY ONCE WEEKLY       The following portions of the patient's history were reviewed and updated as appropriate: allergies, current medications, past family history, past medical history, past social history, past surgical history, and problem list.    Objective   Vital Signs:   /77 (BP Location: Left arm)   Pulse 57   Temp 97 °F (36.1 °C) (Temporal)   Ht 177.8 cm (70\")   Wt 109 kg (240 lb 6.4 oz)   SpO2 94%   BMI 34.49 kg/m²     Wt Readings from Last 3 Encounters: "   08/01/22 109 kg (240 lb 6.4 oz)   03/24/22 111 kg (244 lb 6.4 oz)   03/10/22 97.1 kg (214 lb)     BP Readings from Last 3 Encounters:   08/01/22 133/77   01/27/22 152/86   10/28/21 122/72     Physical Exam   Appearance: No acute distress, well-nourished  Head: normocephalic, atraumatic  Eyes: extraocular movements intact, no scleral icterus, no conjunctival injection  Ears, Nose, and Throat: external ears normal, nares patent, moist mucous membranes  Cardiovascular: regular rate and rhythm. no murmurs, rubs, or gallops. no edema  Respiratory: breathing comfortably, symmetric chest rise, clear to auscultation bilaterally. No wheezes, rales, or rhonchi.  Neuro: alert and oriented to time, place, and person.   Psych: normal mood and affect   Skin: +macular erythema and induration approx 3cm diameter, round, circular, lesion on proximal RLE with multiple small excoriations as well.     Result Review :   The following data was reviewed by: Amaury Mcdonough Jr, MD on 08/01/2022:  Common labs    Common Labsle 9/22/21 9/22/21 9/22/21 9/22/21 11/12/21 1/27/22 1/27/22 1/27/22    0845 0845 0845 0845  1340 1340 1340   Glucose    102 (A)   68    BUN    14   17    Creatinine    1.00   0.89    eGFR Non  Am    72   83    Sodium    139   137    Potassium    3.7   4.0    Chloride    101   102    Calcium    9.1   9.3    Albumin    4.10   4.20    Total Bilirubin    0.3   0.3    Alkaline Phosphatase    92   96    AST (SGOT)    19   16    ALT (SGPT)    23   23    WBC 8.02     7.81     Hemoglobin 14.4     16.1     Hematocrit 43.3     46.8     Platelets 283     250     Total Cholesterol   169     264 (A)   Triglycerides   99     151 (A)   HDL Cholesterol   36 (A)     38 (A)   LDL Cholesterol    115 (A)     198 (A)   Hemoglobin A1C  6.28 (A)         PSA     2.190      (A) Abnormal value              Lab Results   Component Value Date    COVID19 NOT DETECTED 03/19/2021    BILIRUBINUR Negative 11/12/2021          Assessment  and Plan    Diagnoses and all orders for this visit:    1. Primary hypertension (Primary)  Comments:  well controlled.   Orders:  -     CBC & Differential  -     Comprehensive Metabolic Panel    2. Hyperlipidemia, unspecified hyperlipidemia type  Comments:  check lipids. consider trial of statin. cont zetia  Orders:  -     Comprehensive Metabolic Panel  -     Lipid Panel    3. Type 2 diabetes mellitus with hyperglycemia, without long-term current use of insulin (HCC)  Comments:  cont regimen. discussed use of higher dose trulicity if needed.   Orders:  -     empagliflozin (JARDIANCE) 25 MG tablet tablet; Take 1 tablet by mouth Daily.  Dispense: 90 tablet; Refill: 3  -     Hemoglobin A1c    4. Neuropathy  Comments:  cont gabapentin. radha reviewed.   Orders:  -     gabapentin (NEURONTIN) 100 MG capsule; Take 1 capsule by mouth 2 (Two) Times a Day As Needed (pain).  Dispense: 60 capsule; Refill: 0    5. Need for pneumococcal vaccination  -     Pneumococcal Conjugate Vaccine 20-Valent All    6. Chronic bilateral low back pain with bilateral sciatica  Comments:  will Rx baclofen to use along with gabpentin. discussed may cause somnolence.   Orders:  -     baclofen (LIORESAL) 10 MG tablet; Take 1 tablet by mouth 3 (Three) Times a Day As Needed for Muscle Spasms.  Dispense: 90 tablet; Refill: 1    7. Cellulitis of right lower extremity  -     clindamycin (CLEOCIN) 300 MG capsule; Take 1 capsule by mouth 2 (Two) Times a Day for 7 days.  Dispense: 14 capsule; Refill: 0          Medications Discontinued During This Encounter   Medication Reason   • gabapentin (NEURONTIN) 100 MG capsule Reorder   • empagliflozin (JARDIANCE) 10 MG tablet tablet Reorder          Follow Up   Return in about 6 months (around 2/1/2023) for HTN, DM.  Patient was given instructions and counseling regarding his condition or for health maintenance advice. Please see specific information pulled into the AVS if appropriate.       Amaury Flanagan  Jr Mcdonough MD  08/01/22  09:55 EDT

## 2022-08-02 LAB
ALBUMIN SERPL-MCNC: 4.2 G/DL (ref 3.5–5.2)
ALBUMIN/GLOB SERPL: 1.6 G/DL
ALP SERPL-CCNC: 74 U/L (ref 39–117)
ALT SERPL W P-5'-P-CCNC: 24 U/L (ref 1–41)
ANION GAP SERPL CALCULATED.3IONS-SCNC: 13.7 MMOL/L (ref 5–15)
AST SERPL-CCNC: 24 U/L (ref 1–40)
BILIRUB SERPL-MCNC: 0.4 MG/DL (ref 0–1.2)
BUN SERPL-MCNC: 16 MG/DL (ref 8–23)
BUN/CREAT SERPL: 17.2 (ref 7–25)
CALCIUM SPEC-SCNC: 8.8 MG/DL (ref 8.6–10.5)
CHLORIDE SERPL-SCNC: 104 MMOL/L (ref 98–107)
CHOLEST SERPL-MCNC: 189 MG/DL (ref 0–200)
CO2 SERPL-SCNC: 22.3 MMOL/L (ref 22–29)
CREAT SERPL-MCNC: 0.93 MG/DL (ref 0.76–1.27)
EGFRCR SERPLBLD CKD-EPI 2021: 84 ML/MIN/1.73
GLOBULIN UR ELPH-MCNC: 2.6 GM/DL
GLUCOSE SERPL-MCNC: 109 MG/DL (ref 65–99)
HDLC SERPL-MCNC: 38 MG/DL (ref 40–60)
LDLC SERPL CALC-MCNC: 129 MG/DL (ref 0–100)
LDLC/HDLC SERPL: 3.33 {RATIO}
POTASSIUM SERPL-SCNC: 3.9 MMOL/L (ref 3.5–5.2)
PROT SERPL-MCNC: 6.8 G/DL (ref 6–8.5)
SODIUM SERPL-SCNC: 140 MMOL/L (ref 136–145)
TRIGL SERPL-MCNC: 123 MG/DL (ref 0–150)
VLDLC SERPL-MCNC: 22 MG/DL (ref 5–40)

## 2022-08-08 ENCOUNTER — TELEPHONE (OUTPATIENT)
Dept: INTERNAL MEDICINE | Facility: CLINIC | Age: 77
End: 2022-08-08

## 2022-08-08 RX ORDER — CIPROFLOXACIN 750 MG/1
750 TABLET, FILM COATED ORAL 2 TIMES DAILY
Qty: 14 TABLET | Refills: 0 | Status: SHIPPED | OUTPATIENT
Start: 2022-08-08 | End: 2022-08-15

## 2022-08-08 NOTE — TELEPHONE ENCOUNTER
Will order ciprofloxacin instead. If no better after this round of Antibiotics, may be good to re-evaluate.

## 2022-08-08 NOTE — TELEPHONE ENCOUNTER
Caller: Jamir Porter    Relationship: Self    Best call back number: [phone]    What medications are you currently taking:   Current Outpatient Medications on File Prior to Visit   Medication Sig Dispense Refill   • baclofen (LIORESAL) 10 MG tablet Take 1 tablet by mouth 3 (Three) Times a Day As Needed for Muscle Spasms. 90 tablet 1   • clindamycin (CLEOCIN) 300 MG capsule Take 1 capsule by mouth 2 (Two) Times a Day for 7 days. 14 capsule 0   • empagliflozin (JARDIANCE) 25 MG tablet tablet Take 1 tablet by mouth Daily. 90 tablet 3   • ezetimibe (ZETIA) 10 MG tablet Take 1 tablet by mouth Daily. 90 tablet 3   • FLUoxetine (PROzac) 40 MG capsule Take 1 capsule by mouth Daily. 90 capsule 3   • gabapentin (NEURONTIN) 100 MG capsule Take 1 capsule by mouth 2 (Two) Times a Day As Needed (pain). 60 capsule 0   • hydroCHLOROthiazide (HYDRODIURIL) 12.5 MG tablet TAKE 2 TABLETS BY MOUTH DAILY (Patient taking differently: Take 12.5 mg by mouth Daily.) 90 tablet 2   • ibuprofen (ADVIL,MOTRIN) 200 MG tablet Take 800 mg by mouth Every 6 (Six) Hours As Needed for Mild Pain .     • Trulicity 3 MG/0.5ML solution pen-injector INJECT 3 MG SUBCUTANEOUSLY ONCE WEEKLY 6 mL 3     No current facility-administered medications on file prior to visit.          When did you start taking these medications: 08/01/22    Which medication are you concerned about: Clindamycin HCl 300 mg Oral 2 Times Daily      Who prescribed you this medication: DR. BRIONES    What are your concerns: THE PATIENT STATED HE WAS PUT ON A 7 DAY MEDICATION FOR HIS CELLULITIS ON HIS RIGHT LEG BUT IT IS NOT IMPROVING. HE WOULD LIKE A CALL BACK WITH PCP RECOMMENDATION FOR AN ALTERNATE MEDICATION.    [id number] - [place], [place] - [address] AT [facility] - [phone] [facility] [phone]   [phone]    How long have you had these concerns: 08/08/22

## 2022-08-18 ENCOUNTER — OFFICE VISIT (OUTPATIENT)
Dept: INTERNAL MEDICINE | Facility: CLINIC | Age: 77
End: 2022-08-18

## 2022-08-18 VITALS
HEART RATE: 59 BPM | DIASTOLIC BLOOD PRESSURE: 81 MMHG | SYSTOLIC BLOOD PRESSURE: 154 MMHG | WEIGHT: 240.6 LBS | BODY MASS INDEX: 34.44 KG/M2 | HEIGHT: 70 IN | TEMPERATURE: 97.5 F | OXYGEN SATURATION: 95 %

## 2022-08-18 DIAGNOSIS — L30.9 DERMATITIS: Primary | ICD-10-CM

## 2022-08-18 PROCEDURE — 99213 OFFICE O/P EST LOW 20 MIN: CPT | Performed by: INTERNAL MEDICINE

## 2022-08-18 RX ORDER — CLOTRIMAZOLE AND BETAMETHASONE DIPROPIONATE 10; .64 MG/G; MG/G
1 CREAM TOPICAL 2 TIMES DAILY
Qty: 45 G | Refills: 0 | Status: SHIPPED | OUTPATIENT
Start: 2022-08-18 | End: 2022-09-01

## 2022-08-18 NOTE — PROGRESS NOTES
"Chief Complaint  Rash (On right upper thigh )    Subjective          Jamir Porter presents to [facility]  History of Present Illness  Patient has completed round of clindamycin and ciprofloxacin. Patient ha snot noticed much improved. Rash has calmed from itching. No fevers.     Current Outpatient Medications   Medication Instructions   • baclofen (LIORESAL) 10 mg, Oral, 3 Times Daily PRN   • clotrimazole-betamethasone (Lotrisone) 1-0.05 % cream 1 application, Topical, 2 Times Daily   • empagliflozin (JARDIANCE) 25 mg, Oral, Daily   • ezetimibe (ZETIA) 10 mg, Oral, Daily   • FLUoxetine (PROZAC) 40 mg, Oral, Daily   • gabapentin (NEURONTIN) 100 mg, Oral, 2 Times Daily PRN   • hydroCHLOROthiazide (HYDRODIURIL) 25 mg, Oral, Daily   • ibuprofen (ADVIL,MOTRIN) 800 mg, Oral, Every 6 Hours PRN   • Trulicity 3 MG/0.5ML solution pen-injector INJECT 3 MG SUBCUTANEOUSLY ONCE WEEKLY       The following portions of the patient's history were reviewed and updated as appropriate: allergies, current medications, past family history, past medical history, past social history, past surgical history, and problem list.    Objective   Vital Signs:   /81 (BP Location: Left arm)   Pulse 59   Temp 97.5 °F (36.4 °C) (Temporal)   Ht 177.8 cm (70\")   Wt 109 kg (240 lb 9.6 oz)   SpO2 95%   BMI 34.52 kg/m²     Wt Readings from Last 3 Encounters:   08/18/22 109 kg (240 lb 9.6 oz)   08/01/22 109 kg (240 lb 6.4 oz)   03/24/22 111 kg (244 lb 6.4 oz)     BP Readings from Last 3 Encounters:   08/18/22 154/81   08/01/22 133/77   01/27/22 152/86     Physical Exam   Appearance: No acute distress, well-nourished  Head: normocephalic, atraumatic  Eyes: extraocular movements intact, no scleral icterus, no conjunctival injection  Ears, Nose, and Throat: external ears normal, nares patent, moist mucous membranes  Cardiovascular: regular rate and rhythm.  Respiratory: breathing comfortably, " symmetric chest rise,   Neuro: alert and oriented to time, place, and person.  Psych: normal mood and affect .  Skin: macular erythema with distinct borders and induration on proximal RUE. Nearly circular approx 4cm in diameter. +multiple small, scattered excoriations centrally.     Result Review :   The following data was reviewed by: Amaury Mcdonough Jr, MD on 08/18/2022:  Common labs    Common Labsle 11/12/21 1/27/22 1/27/22 1/27/22 8/1/22 8/1/22 8/1/22 8/1/22     1340 1340 1340 0938 0938 0938 0938   Glucose   68    109 (A)    BUN   17    16    Creatinine   0.89    0.93    eGFR Non African Am   83        Sodium   137    140    Potassium   4.0    3.9    Chloride   102    104    Calcium   9.3    8.8    Albumin   4.20    4.20    Total Bilirubin   0.3    0.4    Alkaline Phosphatase   96    74    AST (SGOT)   16    24    ALT (SGPT)   23    24    WBC  7.81   7.50      Hemoglobin  16.1   14.2      Hematocrit  46.8   42.6      Platelets  250   255      Total Cholesterol    264 (A)    189   Triglycerides    151 (A)    123   HDL Cholesterol    38 (A)    38 (A)   LDL Cholesterol     198 (A)    129 (A)   Hemoglobin A1C      6.20 (A)     PSA 2.190          (A) Abnormal value               Assessment and Plan    Diagnoses and all orders for this visit:    1. Dermatitis (Primary)  Comments:  no improvement with braod ABx coverage. will Rx lotrisone. If no improvement, consider fixed drug eruption.   Orders:  -     clotrimazole-betamethasone (Lotrisone) 1-0.05 % cream; Apply 1 application topically to the appropriate area as directed 2 (Two) Times a Day for 14 days.  Dispense: 45 g; Refill: 0          There are no discontinued medications.     Follow Up   No follow-ups on file.  Patient was given instructions and counseling regarding his condition or for health maintenance advice. Please see specific information pulled into the AVS if appropriate.       Amaury Mcdonough Jr, MD  08/18/22  13:12 EDT

## 2022-08-23 ENCOUNTER — HOSPITAL ENCOUNTER (EMERGENCY)
Facility: HOSPITAL | Age: 77
Discharge: HOME | End: 2022-08-23
Attending: EMERGENCY MEDICINE | Admitting: EMERGENCY MEDICINE

## 2022-08-23 VITALS
SYSTOLIC BLOOD PRESSURE: 137 MMHG | DIASTOLIC BLOOD PRESSURE: 85 MMHG | HEIGHT: 71 IN | OXYGEN SATURATION: 93 % | BODY MASS INDEX: 33.56 KG/M2 | HEART RATE: 65 BPM | TEMPERATURE: 98.6 F | RESPIRATION RATE: 18 BRPM

## 2022-08-23 DIAGNOSIS — K59.00 CONSTIPATION, UNSPECIFIED CONSTIPATION TYPE: ICD-10-CM

## 2022-08-23 DIAGNOSIS — R33.8 ACUTE URINARY RETENTION: Primary | ICD-10-CM

## 2022-08-23 LAB
ALBUMIN SERPL-MCNC: 4.6 G/DL (ref 3.5–5.2)
ALBUMIN/GLOB SERPL: 1.4 G/DL
ALP SERPL-CCNC: 92 U/L (ref 39–117)
ALT SERPL W P-5'-P-CCNC: 21 U/L (ref 1–41)
ANION GAP SERPL CALCULATED.3IONS-SCNC: 13.1 MMOL/L (ref 5–15)
AST SERPL-CCNC: 21 U/L (ref 1–40)
BACTERIA UR QL AUTO: ABNORMAL /HPF
BASOPHILS # BLD AUTO: 0.08 10*3/MM3 (ref 0–0.2)
BASOPHILS NFR BLD AUTO: 0.7 % (ref 0–1.5)
BILIRUB SERPL-MCNC: 0.5 MG/DL (ref 0–1.2)
BILIRUB UR QL STRIP: NEGATIVE
BUN SERPL-MCNC: 18 MG/DL (ref 8–23)
BUN/CREAT SERPL: 20.2 (ref 7–25)
CALCIUM SPEC-SCNC: 9.4 MG/DL (ref 8.6–10.5)
CHLORIDE SERPL-SCNC: 103 MMOL/L (ref 98–107)
CLARITY UR: CLEAR
CO2 SERPL-SCNC: 22.9 MMOL/L (ref 22–29)
COLOR UR: YELLOW
CREAT SERPL-MCNC: 0.89 MG/DL (ref 0.76–1.27)
D-LACTATE SERPL-SCNC: 0.9 MMOL/L (ref 0.5–2)
DEPRECATED RDW RBC AUTO: 43.5 FL (ref 37–54)
EGFRCR SERPLBLD CKD-EPI 2021: 87.7 ML/MIN/1.73
EOSINOPHIL # BLD AUTO: 0.45 10*3/MM3 (ref 0–0.4)
EOSINOPHIL NFR BLD AUTO: 3.9 % (ref 0.3–6.2)
ERYTHROCYTE [DISTWIDTH] IN BLOOD BY AUTOMATED COUNT: 12.9 % (ref 12.3–15.4)
GLOBULIN UR ELPH-MCNC: 3.2 GM/DL
GLUCOSE SERPL-MCNC: 93 MG/DL (ref 65–99)
GLUCOSE UR STRIP-MCNC: ABNORMAL MG/DL
HCT VFR BLD AUTO: 42.6 % (ref 37.5–51)
HGB BLD-MCNC: 15.2 G/DL (ref 13–17.7)
HGB UR QL STRIP.AUTO: ABNORMAL
HOLD SPECIMEN: NORMAL
HOLD SPECIMEN: NORMAL
HYALINE CASTS UR QL AUTO: ABNORMAL /LPF
IMM GRANULOCYTES # BLD AUTO: 0.07 10*3/MM3 (ref 0–0.05)
IMM GRANULOCYTES NFR BLD AUTO: 0.6 % (ref 0–0.5)
KETONES UR QL STRIP: NEGATIVE
LEUKOCYTE ESTERASE UR QL STRIP.AUTO: NEGATIVE
LIPASE SERPL-CCNC: 56 U/L (ref 13–60)
LYMPHOCYTES # BLD AUTO: 1.71 10*3/MM3 (ref 0.7–3.1)
LYMPHOCYTES NFR BLD AUTO: 14.9 % (ref 19.6–45.3)
MCH RBC QN AUTO: 32.8 PG (ref 26.6–33)
MCHC RBC AUTO-ENTMCNC: 35.7 G/DL (ref 31.5–35.7)
MCV RBC AUTO: 91.8 FL (ref 79–97)
MONOCYTES # BLD AUTO: 0.89 10*3/MM3 (ref 0.1–0.9)
MONOCYTES NFR BLD AUTO: 7.8 % (ref 5–12)
NEUTROPHILS NFR BLD AUTO: 72.1 % (ref 42.7–76)
NEUTROPHILS NFR BLD AUTO: 8.26 10*3/MM3 (ref 1.7–7)
NITRITE UR QL STRIP: NEGATIVE
NRBC BLD AUTO-RTO: 0 /100 WBC (ref 0–0.2)
PH UR STRIP.AUTO: 5.5 [PH] (ref 5–8)
PLATELET # BLD AUTO: 282 10*3/MM3 (ref 140–450)
PMV BLD AUTO: 9.6 FL (ref 6–12)
POTASSIUM SERPL-SCNC: 3.8 MMOL/L (ref 3.5–5.2)
PROT SERPL-MCNC: 7.8 G/DL (ref 6–8.5)
PROT UR QL STRIP: NEGATIVE
RBC # BLD AUTO: 4.64 10*6/MM3 (ref 4.14–5.8)
RBC # UR STRIP: ABNORMAL /HPF
REF LAB TEST METHOD: ABNORMAL
SODIUM SERPL-SCNC: 139 MMOL/L (ref 136–145)
SP GR UR STRIP: 1.01 (ref 1–1.03)
SQUAMOUS #/AREA URNS HPF: ABNORMAL /HPF
UROBILINOGEN UR QL STRIP: ABNORMAL
WBC # UR STRIP: ABNORMAL /HPF
WBC NRBC COR # BLD: 11.46 10*3/MM3 (ref 3.4–10.8)
WHOLE BLOOD HOLD COAG: NORMAL
WHOLE BLOOD HOLD SPECIMEN: NORMAL

## 2022-08-23 PROCEDURE — 80053 COMPREHEN METABOLIC PANEL: CPT

## 2022-08-23 PROCEDURE — 85025 COMPLETE CBC W/AUTO DIFF WBC: CPT

## 2022-08-23 PROCEDURE — 99283 EMERGENCY DEPT VISIT LOW MDM: CPT

## 2022-08-23 PROCEDURE — 81001 URINALYSIS AUTO W/SCOPE: CPT | Performed by: EMERGENCY MEDICINE

## 2022-08-23 PROCEDURE — 83690 ASSAY OF LIPASE: CPT

## 2022-08-23 PROCEDURE — 83605 ASSAY OF LACTIC ACID: CPT

## 2022-08-23 RX ORDER — SODIUM CHLORIDE 0.9 % (FLUSH) 0.9 %
10 SYRINGE (ML) INJECTION AS NEEDED
Status: DISCONTINUED | OUTPATIENT
Start: 2022-08-23 | End: 2022-08-23 | Stop reason: HOSPADM

## 2022-08-23 NOTE — DISCHARGE INSTRUCTIONS
Drink plenty of fluids.  Take MiraLAX as directed.  Take citrate of magnesia for severe constipation.  Follow-up Orozco catheter instructions as directed.  Follow-up Dr. Ray by calling tomorrow for an appointment.

## 2022-08-23 NOTE — ED PROVIDER NOTES
Time: 4:52 PM EDT  Arrived by: ambulance  Chief Complaint: urinary retention  History provided by: Pt  History is limited by: N/A     History of Present Illness:  Patient is a 77 y.o. year old male who presents to the emergency department with urinary retention. Pt also reports being unable to pass bowel movements. Pt has a hx of frequent constipation issues and thinks his symptoms are a side effect of medication. Pt has taken Miralax for his symptoms previously.     HPI    Similar Symptoms Previously: no  Recently seen: no      Patient Care Team  Primary Care Provider: Lenard Ray MD    Past Medical History:     Allergies   Allergen Reactions   • Statins Myalgia   • Meperidine Unknown - High Severity   • Sulfate Unknown - High Severity     Past Medical History:   Diagnosis Date   • Allergic rhinitis    • Arthritis    • Balance problem    • Broken bones    • Cancer (HCC)    • Cataracts, bilateral    • Chronic sinusitis    • Claustrophobia    • Colon polyps    • DDD (degenerative disc disease), cervical    • Diabetes mellitus (HCC)    • Diverticulitis    • Forgetfulness    • H/O degenerative disc disease    • Headache    • Hyperlipemia    • Hypertension    • Limb swelling    • Lumbago    • Pacemaker    • Prostate CA (HCC)    • Prostate disorder    • Shortness of breath    • Sleep apnea      Past Surgical History:   Procedure Laterality Date   • AMPUTATION Right 2012    knee   • BACK SURGERY     • COLONOSCOPY  2019   • DENTAL PROCEDURE      dental surgery    • DENTAL PROCEDURE      dental surgery   • EXCISION      pilonidal Cyst   • EYE SURGERY      eye implant, yes   • EYE SURGERY      implant yes   • FOOT SURGERY Left    • JOINT REPLACEMENT      joint surger   • LUMBAR FUSION  2016    [facility]    • LUMBAR FUSION  2016    [facility]   • LUMBAR PUNCTURE     • OTHER SURGICAL HISTORY Right 2012    artificial joints/limbs , knee   • OTHER SURGICAL HISTORY Right 2012    rt knee// artificial  joints/limbs   • OTHER SURGICAL HISTORY      joint surgery   • PENILE PROSTHESIS IMPLANT     • PILONIDAL CYST / SINUS EXCISION     • REPLACEMENT TOTAL KNEE Right    • ROTATOR CUFF REPAIR     • TURP / TRANSURETHRAL INCISION / DRAINAGE PROSTATE      following prostate cancer   • UVULOPALATOPHARYNGOPLASTY       Family History   Problem Relation Age of Onset   • Arthritis Mother    • Cancer Mother    • Arthritis Father    • Cancer Father    • Arthritis Brother    • Cancer Brother        Home Medications:  Prior to Admission medications    Medication Sig Start Date End Date Taking? Authorizing Provider   clotrimazole-betamethasone (Lotrisone) 1-0.05 % cream Apply 1 application topically to the appropriate area as directed 2 (Two) Times a Day for 14 days. 8/18/22 9/1/22 Yes Amaury Mcdonough Jr., MD   empagliflozin (JARDIANCE) 25 MG tablet tablet Take 1 tablet by mouth Daily. 8/1/22  Yes Amaury Mcdonough Jr., MD   ezetimibe (ZETIA) 10 MG tablet Take 1 tablet by mouth Daily. 2/16/22  Yes Amaury Mcdonough Jr., MD   FLUoxetine (PROzac) 40 MG capsule Take 1 capsule by mouth Daily. 11/2/21  Yes Amaury Mcdonough Jr., MD   gabapentin (NEURONTIN) 100 MG capsule Take 1 capsule by mouth 2 (Two) Times a Day As Needed (pain). 8/1/22  Yes Amaury Mcdonough Jr., MD   hydroCHLOROthiazide (HYDRODIURIL) 12.5 MG tablet TAKE 2 TABLETS BY MOUTH DAILY  Patient taking differently: Take 12.5 mg by mouth Daily. 12/8/21  Yes Amaury Mcdonough Jr., MD   ibuprofen (ADVIL,MOTRIN) 200 MG tablet Take 800 mg by mouth Every 6 (Six) Hours As Needed for Mild Pain .   Yes Provider, MD Ria   Trulicity 3 MG/0.5ML solution pen-injector INJECT 3 MG SUBCUTANEOUSLY ONCE WEEKLY 6/30/22  Yes Amaury Mcdonough Jr., MD   baclofen (LIORESAL) 10 MG tablet Take 1 tablet by mouth 3 (Three) Times a Day As Needed for Muscle Spasms. 8/1/22   Amaury Mcdonough Jr., MD        Social History:   Social History  "    Tobacco Use   • Smoking status: Former Smoker     Packs/day: 1.50     Years: 17.00     Pack years: 25.50     Start date:      Quit date:      Years since quittin.6   • Smokeless tobacco: Former User   • Tobacco comment: 1.5 ppd quit , 17 years total    Substance Use Topics   • Alcohol use: Yes     Comment: rarely drinks 2021   • Drug use: Never     Recent travel: no     Review of Systems:  Review of Systems   Constitutional: Negative for activity change, chills, fatigue and unexpected weight change.   HENT: Negative for congestion, sinus pressure, sore throat and trouble swallowing.    Eyes: Negative for pain, discharge, redness and visual disturbance.   Respiratory: Negative for cough, chest tightness, shortness of breath and wheezing.    Cardiovascular: Negative for chest pain and palpitations.   Gastrointestinal: Positive for constipation. Negative for abdominal pain, diarrhea, nausea and vomiting.   Endocrine: Negative for cold intolerance and polydipsia.   Genitourinary: Positive for difficulty urinating. Negative for dysuria, frequency, hematuria and urgency.   Musculoskeletal: Negative for arthralgias, joint swelling, neck pain and neck stiffness.   Skin: Negative for color change and rash.   Allergic/Immunologic: Negative for environmental allergies and immunocompromised state.   Neurological: Negative for dizziness, weakness and light-headedness.   Hematological: Does not bruise/bleed easily.   Psychiatric/Behavioral: Negative for agitation, confusion, dysphoric mood and suicidal ideas.        Physical Exam:  /85   Pulse 65   Temp 98.6 °F (37 °C) (Oral)   Resp 18   Ht 180.3 cm (71\")   SpO2 93%   BMI 33.56 kg/m²     Physical Exam  Vitals and nursing note reviewed.   Constitutional:       General: He is not in acute distress.     Appearance: Normal appearance. He is not toxic-appearing.   HENT:      Head: Normocephalic and atraumatic.      Jaw: There is normal jaw " occlusion.   Eyes:      General: Lids are normal.      Extraocular Movements: Extraocular movements intact.      Conjunctiva/sclera: Conjunctivae normal.      Pupils: Pupils are equal, round, and reactive to light.   Cardiovascular:      Rate and Rhythm: Normal rate and regular rhythm.      Pulses: Normal pulses.      Heart sounds: Normal heart sounds.   Pulmonary:      Effort: Pulmonary effort is normal. No respiratory distress.      Breath sounds: Normal breath sounds. No wheezing or rhonchi.   Abdominal:      General: Abdomen is flat. Bowel sounds are normal.      Palpations: Abdomen is soft.      Tenderness: There is no abdominal tenderness. There is no guarding or rebound.   Musculoskeletal:         General: Normal range of motion.      Cervical back: Normal range of motion and neck supple.      Right lower leg: No edema.      Left lower leg: No edema.      Comments: Right knee BKA   Skin:     General: Skin is warm and dry.   Neurological:      Mental Status: He is alert and oriented to person, place, and time. Mental status is at baseline.   Psychiatric:         Mood and Affect: Mood normal.                Medications in the Emergency Department:  Medications - No data to display     Labs    Labs Reviewed   URINALYSIS W/ MICROSCOPIC IF INDICATED (NO CULTURE) - Abnormal; Notable for the following components:       Result Value    Glucose, UA >=1000 mg/dL (3+) (*)     Blood, UA Trace (*)     All other components within normal limits   CBC WITH AUTO DIFFERENTIAL - Abnormal; Notable for the following components:    WBC 11.46 (*)     Lymphocyte % 14.9 (*)     Immature Grans % 0.6 (*)     Neutrophils, Absolute 8.26 (*)     Eosinophils, Absolute 0.45 (*)     Immature Grans, Absolute 0.07 (*)     All other components within normal limits   URINALYSIS, MICROSCOPIC ONLY - Abnormal; Notable for the following components:    RBC, UA 6-12 (*)     WBC, UA 0-2 (*)     All other components within normal limits   LIPASE -  Normal   LACTIC ACID, PLASMA - Normal   RAINBOW DRAW    Narrative:     The following orders were created for panel order [place] Draw.  Procedure                               Abnormality         Status                     ---------                               -----------         ------                     Green Top (Gel)[[id number]]                                  Final result               Lavender Top[[id number]]                                     Final result               Gold Top - SST[[id number]]                                   Final result               Light Blue Top[[id number]]                                   Final result                 Please view results for these tests on the individual orders.   COMPREHENSIVE METABOLIC PANEL    Narrative:     GFR Normal >60  Chronic Kidney Disease <60  Kidney Failure <15     CBC AND DIFFERENTIAL    Narrative:     The following orders were created for panel order CBC & Differential.  Procedure                               Abnormality         Status                     ---------                               -----------         ------                     CBC Auto Differential[[id number]]        Abnormal            Final result                 Please view results for these tests on the individual orders.   GREEN TOP   LAVENDER TOP   GOLD TOP - SST   LIGHT BLUE TOP       Lab Results (last 24 hours)     ** No results found for the last 24 hours. **           Imaging:    No results found.    No Radiology Exams Resulted Within Past 24 Hours    Procedures:  Procedures    Progress                            Medical Decision Making:  [facility]     Final diagnoses:   Acute urinary retention   Constipation, unspecified constipation type        Disposition:  ED Disposition     ED Disposition   Discharge    Condition   Stable    Comment   --             This medical record created using voice recognition software.           Pierre Muhammad  08/23/22 [id number]       Deshaun Castaneda,  DO  08/24/22 1641

## 2022-08-24 ENCOUNTER — TELEPHONE (OUTPATIENT)
Dept: UROLOGY | Facility: CLINIC | Age: 77
End: 2022-08-24

## 2022-08-24 NOTE — TELEPHONE ENCOUNTER
[facility] ER 08/23/22.  Patient said he was very constipated and had low bladder pain.  He couldn't urinate but a dribble. He said they hooked him to a catheter and drained 1000 cc from his bladder.  He has slo now with leg bag.  He was able to have bowel movement when he got home from ER, and urine is flowing into the bag.  He has emptied it twice this morning.      His discharge paper said to f/u with Dr. Ray in about 1 day.    Patient said he still has issues with urethra from TURP about a year ago.  He doesn't have sensation of urine flow.  He has to sit on toilet and wait to urinate, the shift around and sit an wait to urinate.  He said it is an uncomfortable process.

## 2022-08-26 NOTE — PROGRESS NOTES
Chief Complaint    Urologic complaint    Subjective          Jamir Porter presents to [facility] UROLOGY  History of Present Illness     77-year-old  gentleman        history of prostate cancer clinical T1c, on active surveillance, s/p TURP patient also with ED, penile prosthesis   Urinary retention      Patient's had 2 episodes specifically 1 last week where he had severe constipation and needed a catheter.  1 L  Retention.    Catheter was placed last Thursday    He does feel like the retention happened before the constipated.      Usually does not normally have to have constipation.    Use he says that he is voiding without issue.  He does have a weak stream.  Hard to initiate stream.  Intermittency of stream.    Currently with a catheter in place.      No GH/UTI    Bowels are better since surgery.  Urgency is bothersome but still better since surgery     No burning or dysuria. No GH/UTI  No stress urinary incontinence      PVR     11/21  65  3/21  TURP -3+3, 3/130, < 1%  4/21  200  2/20   25      Previous    No longer following adrenal mass.    4/21 CT abdomen/pelvis with and without -9 mm benign lipid poor adrenal adenoma, no change since 8/19.    No erections. Has IPP.    H/o retention  - 1.5 L    oxybutynin  - did stop his incontinence but did give him side effects that he could not tolerate, GI.  Myrebetriq Cause -  diarrhea    8/26/2019 CT abdomen with and withoutadrenal mass protocolsmall right 0.9 cm adrenal nodule.  Favor adrenal adenoma.  8/19 cortisol less than 1.0, plasma metanephrines negative  7/3/2019 CT abdomen/pelvis with9 mm right adrenal nodule.  Which demonstrates enhancement above the background of the adrenal gland.  Penile prosthesis reservoir partially herniates the left inguinal ring.    Father  pancreatic CA,  brother with prostate cancer.      Penile prothesis - functions, he does not use.    No cardiopulmonary history.  Patient does not smoke.  He is on  ibuprofen for back pain. No DM.  Father  at 64, grandfather  at 85        H/o prostate CA - on finasteride    10/21 MRI prostate-17 g, previously 58, negative otherwise    3/21  TURP -3+3, 3/130, < 1%     cystoscopy -4 cm prostate, moderate trabeculations, negative otherwise     prostate biopsy -53 g, right apex 3+3, 1/2, 6%, negative otherwise    3.56    MRI  prostate - 58 g , neg    0.91    2019 MRI [id number] g -negative      1.40     2.32 -on finasteride   MRI siknoagd68 g, no targetable lesion seen    5.09    5.41  10/17 5.5    prostate vaoada61 g - left, 3+3, 50% of core, 5 mm; right - high-grade PIN    5.5  10/16 4.9    10/16 MRI snbvtgys74 g, no targetable lesions seen.     4.4   prostate kpmegu73 g   Pathology  Right basenegative  Right base lateral 3+3, 20%, 4 mm  Right mid, mid lateral, apex, apex lateralnegative  Left basenegative  Left mid3+3, 10%  Left mid lateralnegative  Left apex3+3, 15%   left apex3+3, 10%     5.39     3.68               Past History:  Medical History: has a past medical history of Allergic rhinitis, Arthritis, Balance problem, Broken bones, Cancer (HCC), Cataracts, bilateral, Chronic sinusitis, Claustrophobia, Colon polyps, DDD (degenerative disc disease), cervical, Diabetes mellitus (HCC), Diverticulitis, Forgetfulness, H/O degenerative disc disease, Headache, Hyperlipemia, Hypertension, Limb swelling, Lumbago, Pacemaker, Prostate CA (HCC), Prostate disorder, Shortness of breath, and Sleep apnea.   Surgical History: has a past surgical history that includes Other surgical history (Right, ); Dental surgery; Eye surgery; Joint replacement; Lumbar fusion (); Pilonidal cyst / sinus excision; Amputation (Right, ); Other surgical history (Right, ); Back surgery; Colonoscopy (); Dental surgery; Other surgical history; Replacement total knee (Right); Eye surgery; Foot surgery (Left); Lumbar  fusion (2016); Lumbar puncture; Penile prosthesis implant; Excision; Rotator cuff repair; Uvulopalatopharyngoplasty; and TURP / transurethral incision / drainage prostate.   Family History: family history includes Arthritis in his brother, father, and mother; Cancer in his brother, father, and mother.   Social History: reports that he quit smoking about 43 years ago. He started smoking about 62 years ago. He has a 25.50 pack-year smoking history. He has quit using smokeless tobacco. He reports current alcohol use. He reports that he does not use drugs.  Allergies: Statins, Meperidine, and Sulfate       Current Outpatient Medications:   •  baclofen (LIORESAL) 10 MG tablet, Take 1 tablet by mouth 3 (Three) Times a Day As Needed for Muscle Spasms., Disp: 90 tablet, Rfl: 1  •  clotrimazole-betamethasone (Lotrisone) 1-0.05 % cream, Apply 1 application topically to the appropriate area as directed 2 (Two) Times a Day for 14 days., Disp: 45 g, Rfl: 0  •  empagliflozin (JARDIANCE) 25 MG tablet tablet, Take 1 tablet by mouth Daily., Disp: 90 tablet, Rfl: 3  •  ezetimibe (ZETIA) 10 MG tablet, Take 1 tablet by mouth Daily., Disp: 90 tablet, Rfl: 3  •  FLUoxetine (PROzac) 40 MG capsule, Take 1 capsule by mouth Daily., Disp: 90 capsule, Rfl: 3  •  gabapentin (NEURONTIN) 100 MG capsule, Take 1 capsule by mouth 2 (Two) Times a Day As Needed (pain)., Disp: 60 capsule, Rfl: 0  •  hydroCHLOROthiazide (HYDRODIURIL) 12.5 MG tablet, TAKE 2 TABLETS BY MOUTH DAILY (Patient taking differently: Take 12.5 mg by mouth Daily.), Disp: 90 tablet, Rfl: 2  •  ibuprofen (ADVIL,MOTRIN) 200 MG tablet, Take 800 mg by mouth Every 6 (Six) Hours As Needed for Mild Pain ., Disp: , Rfl:   •  Trulicity 3 MG/0.5ML solution pen-injector, INJECT 3 MG SUBCUTANEOUSLY ONCE WEEKLY, Disp: 6 mL, Rfl: 3     Physical exam       Alert and orient x3  Well appearing, well developed, in no acute distress   Unlabored respirations        Grossly oriented to person, place  and time, judgment is intact, normal mood and affect        Objective     Vital Signs:   There were no vitals taken for this visit.             Assessment and Plan    Diagnoses and all orders for this visit:    1. Prostate cancer (HCC) (Primary)    2. Benign prostatic hyperplasia, unspecified whether lower urinary tract symptoms present        Urinary retention    Voiding trial today, patient understands if he cannot void come back to clinic or go to emergency room    Patient will go ahead and start back on Flomax 0.4 mg daily.  Risk and benefits discussed.  We will see if this makes any difference.    I will go ahead and get him in for office cystoscopy to rule out stricture or residual prostate tissue.        Prostate cancer on active surveillance       Follow-up in 11/22 with MRI prostate and PSA    PVR at  follow-up

## 2022-08-29 ENCOUNTER — OFFICE VISIT (OUTPATIENT)
Dept: UROLOGY | Facility: CLINIC | Age: 77
End: 2022-08-29

## 2022-08-29 VITALS — BODY MASS INDEX: 33.57 KG/M2 | WEIGHT: 239.8 LBS | HEIGHT: 71 IN

## 2022-08-29 DIAGNOSIS — C61 PROSTATE CANCER: Primary | ICD-10-CM

## 2022-08-29 DIAGNOSIS — N40.0 BENIGN PROSTATIC HYPERPLASIA, UNSPECIFIED WHETHER LOWER URINARY TRACT SYMPTOMS PRESENT: ICD-10-CM

## 2022-08-29 PROCEDURE — 99214 OFFICE O/P EST MOD 30 MIN: CPT | Performed by: UROLOGY

## 2022-09-22 PROBLEM — R33.9 URINARY RETENTION: Status: ACTIVE | Noted: 2022-09-22

## 2022-09-22 NOTE — PROGRESS NOTES
Procedures       Urinalysis was checked today and was negative for signs of infection      Cytoscopy Procedure:     Procedure: Flexible cytoscope was passed per urethra into the bladder without difficulty after proper consent. The bladder was inspected in a systematic meridian fashion.       4 cm prostate -open at the bladder neck but the left side still has a fairly large adenoma that does coapt when the scope was brought past the verumontanum.      Large bladder with some minor trabeculation he does have a few small erythematous areas on the posterior wall right above the trigone.  No papillary tissue     Both ureteral orifices were identified and were normal in appearance. The flexible cytoscope was removed. The patient tolerated the procedure well.     PVR today 60         Urinary retention/BPH/bladder lesions       Patient with a few small red lesions in the posterior side of his bladder.  We discussed doing cystoscopy bladder biopsy fulguration and transurethral re section of the prostate.  Risks and benefits were discussed including bleeding, infection and damage to the urinary system.  We also discussed the risk of anesthesia up to and including death.  Patient voiced understanding.      Patient does have some areas in the bladder that are abnormal.  Not very suspicious so I did say if we do not do surgery I would want office cystoscopy  in about 4 months which would be 1/23    As he has had some episodes of retention also discussed we can teach him CIC to cath himself if he gets in trouble in the future.  He would not have to do CIC unless he had trouble     Test if he went into retention.    CIC learned today      Prostate cancer on active surveillance        Needs  MRI prostate and PSA  11/22    PVR at  follow-up           This document has been electronically signed by Lenard Ray MD  September 22, 2022 06:53 EDT

## 2022-09-23 ENCOUNTER — PROCEDURE VISIT (OUTPATIENT)
Dept: UROLOGY | Facility: CLINIC | Age: 77
End: 2022-09-23

## 2022-09-23 VITALS — BODY MASS INDEX: 32.62 KG/M2 | HEIGHT: 71 IN | WEIGHT: 233 LBS

## 2022-09-23 DIAGNOSIS — R33.9 URINARY RETENTION: ICD-10-CM

## 2022-09-23 DIAGNOSIS — C61 PROSTATE CANCER: Primary | ICD-10-CM

## 2022-09-23 PROCEDURE — 52000 CYSTOURETHROSCOPY: CPT | Performed by: UROLOGY

## 2022-10-27 DIAGNOSIS — E11.65 TYPE 2 DIABETES MELLITUS WITH HYPERGLYCEMIA, WITHOUT LONG-TERM CURRENT USE OF INSULIN: ICD-10-CM

## 2022-10-27 RX ORDER — EMPAGLIFLOZIN 25 MG/1
TABLET, FILM COATED ORAL
Qty: 90 TABLET | Refills: 3 | Status: SHIPPED | OUTPATIENT
Start: 2022-10-27

## 2022-11-14 ENCOUNTER — PRIOR AUTHORIZATION (OUTPATIENT)
Dept: INTERNAL MEDICINE | Facility: CLINIC | Age: 77
End: 2022-11-14

## 2022-11-14 DIAGNOSIS — C61 PROSTATE CANCER: ICD-10-CM

## 2022-11-14 DIAGNOSIS — N40.0 BENIGN PROSTATIC HYPERPLASIA WITHOUT LOWER URINARY TRACT SYMPTOMS: ICD-10-CM

## 2022-11-14 DIAGNOSIS — N39.41 URGE INCONTINENCE: ICD-10-CM

## 2022-11-16 ENCOUNTER — APPOINTMENT (OUTPATIENT)
Dept: MRI IMAGING | Facility: HOSPITAL | Age: 77
End: 2022-11-16

## 2022-11-21 NOTE — TELEPHONE ENCOUNTER
This request has received an approval. View the bottom of the request for an electronic copy of the approval letter

## 2022-11-30 RX ORDER — FLUOXETINE HYDROCHLORIDE 40 MG/1
40 CAPSULE ORAL DAILY
Qty: 90 CAPSULE | Refills: 3 | Status: SHIPPED | OUTPATIENT
Start: 2022-11-30

## 2022-12-15 ENCOUNTER — TELEPHONE (OUTPATIENT)
Dept: UROLOGY | Facility: CLINIC | Age: 77
End: 2022-12-15

## 2022-12-15 ENCOUNTER — LAB (OUTPATIENT)
Dept: LAB | Facility: HOSPITAL | Age: 77
End: 2022-12-15

## 2022-12-15 DIAGNOSIS — R97.20 ELEVATED PROSTATE SPECIFIC ANTIGEN (PSA): ICD-10-CM

## 2022-12-15 DIAGNOSIS — R97.20 ELEVATED PROSTATE SPECIFIC ANTIGEN (PSA): Primary | ICD-10-CM

## 2022-12-15 PROBLEM — N40.1 BENIGN PROSTATIC HYPERPLASIA WITH URINARY RETENTION: Status: ACTIVE | Noted: 2022-12-15

## 2022-12-15 PROBLEM — R33.8 BENIGN PROSTATIC HYPERPLASIA WITH URINARY RETENTION: Status: ACTIVE | Noted: 2022-12-15

## 2022-12-15 PROCEDURE — 36415 COLL VENOUS BLD VENIPUNCTURE: CPT

## 2022-12-15 PROCEDURE — 84153 ASSAY OF PSA TOTAL: CPT

## 2022-12-16 LAB — PSA SERPL-MCNC: 3.63 NG/ML (ref 0–4)

## 2022-12-16 NOTE — PROGRESS NOTES
Chief Complaint    Urologic complaint    Subjective          Jamir Porter presents to [facility] UROLOGY  History of Present Illness       77-year-old  gentleman        history of prostate cancer clinical T1c, on active surveillance, s/p TURP patient also with ED, penile prosthesis   Urinary retention - does intermittent CIC      Doing CIC as needed.  Feels like he has more trouble voiding when he is constipated.  He is done this 3 times at his last visit each time was about 250 mL return    Sits to void, does have to strain to void at times.  Minimal.    CIC working okay.    No GH    No  incontinence     2 episodes of 1 retention      No cardiopulmonary history.  Patient does not smoke.  He is on ibuprofen for back pain. No DM.    PVR        024   cystoscopy-4 cm prostate-open at the bladder neck but left side still has some fairly large adenoma that does coapt when the scope was brought past the verumontanum.  Normal cystoscopy     [place]  CIC      60    65  3/21  TURP -3+3, 3/130, < 1%    200     25      Previous    No longer following adrenal mass.     CT abdomen/pelvis with and without -9 mm benign lipid poor adrenal adenoma, no change since .    No erections. Has IPP.    H/o retention  - 1.5 L    oxybutynin  - did stop his incontinence but did give him side effects that he could not tolerate, GI.  Myrebetriq Cause -  diarrhea    2019 CT abdomen with and withoutadrenal mass protocolsmall right 0.9 cm adrenal nodule.  Favor adrenal adenoma.   cortisol less than 1.0, plasma metanephrines negative  7/3/2019 CT abdomen/pelvis with9 mm right adrenal nodule.  Which demonstrates enhancement above the background of the adrenal gland.  Penile prosthesis reservoir partially herniates the left inguinal ring.    Father  pancreatic CA,  brother with prostate cancer.      Penile prothesis - functions, he does not use.    Father  at 64,  grandfather  at 85        H/o prostate CA - on finasteride         3.6      2022 MRI prostate-negative.     10/21 MRI prostate-17 g, previously 58, negative otherwise    3/21  TURP -3+3, 3/130, < 1%     cystoscopy -4 cm prostate, moderate trabeculations, negative otherwise     prostate biopsy -53 g, right apex 3+3, 1/2, 6%, negative otherwise    3.56    MRI  prostate - 58 g , neg    0.91    2019 MRI rskahews36 g -negative      1.40     2.32 -on finasteride   MRI breftkml62 g, no targetable lesion seen    5.09    5.41  10/17 5.5    prostate wtsqqm44 g - left, 3+3, 50% of core, 5 mm; right - high-grade PIN    5.5  10/16 4.9    10/16 MRI azdpzkro19 g, no targetable lesions seen.     4.4   prostate joptaa51 g   Pathology  Right basenegative  Right base lateral 3+3, 20%, 4 mm  Right mid, mid lateral, apex, apex lateralnegative  Left basenegative  Left mid3+3, 10%  Left mid lateralnegative  Left apex3+3, 15%   left apex3+3, 10%     5.39     3.68               Past History:  Medical History: has a past medical history of Allergic rhinitis, Arthritis, Balance problem, Broken bones, Cancer (HCC), Cataracts, bilateral, Chronic sinusitis, Claustrophobia, Colon polyps, DDD (degenerative disc disease), cervical, Diabetes mellitus (HCC), Diverticulitis, Forgetfulness, H/O degenerative disc disease, Headache, Hyperlipemia, Hypertension, Limb swelling, Lumbago, Pacemaker, Prostate CA (HCC), Prostate disorder, Shortness of breath, and Sleep apnea.   Surgical History: has a past surgical history that includes Other surgical history (Right, ); Dental surgery; Eye surgery; Joint replacement; Lumbar fusion (); Pilonidal cyst / sinus excision; Amputation (Right, ); Other surgical history (Right, ); Back surgery; Colonoscopy (); Dental surgery; Other surgical history; Replacement total knee (Right); Eye surgery; Foot surgery (Left);  Lumbar fusion (2016); Lumbar puncture; Penile prosthesis implant; Excision; Rotator cuff repair; Uvulopalatopharyngoplasty; and TURP / transurethral incision / drainage prostate.   Family History: family history includes Arthritis in his brother, father, and mother; Cancer in his brother, father, and mother.   Social History: reports that he quit smoking about 43 years ago. He started smoking about 62 years ago. He has a 25.50 pack-year smoking history. He has quit using smokeless tobacco. He reports current alcohol use. He reports that he does not use drugs.  Allergies: Statins, Meperidine, and Sulfate       Current Outpatient Medications:   •  baclofen (LIORESAL) 10 MG tablet, Take 1 tablet by mouth 3 (Three) Times a Day As Needed for Muscle Spasms., Disp: 90 tablet, Rfl: 1  •  ezetimibe (ZETIA) 10 MG tablet, Take 1 tablet by mouth Daily., Disp: 90 tablet, Rfl: 3  •  FLUoxetine (PROzac) 40 MG capsule, TAKE 1 CAPSULE BY MOUTH DAILY, Disp: 90 capsule, Rfl: 3  •  gabapentin (NEURONTIN) 100 MG capsule, Take 1 capsule by mouth 2 (Two) Times a Day As Needed (pain)., Disp: 60 capsule, Rfl: 0  •  hydroCHLOROthiazide (HYDRODIURIL) 12.5 MG tablet, TAKE 2 TABLETS BY MOUTH DAILY (Patient taking differently: Take 12.5 mg by mouth Daily.), Disp: 90 tablet, Rfl: 2  •  ibuprofen (ADVIL,MOTRIN) 200 MG tablet, Take 800 mg by mouth Every 6 (Six) Hours As Needed for Mild Pain ., Disp: , Rfl:   •  Jardiance 25 MG tablet tablet, TAKE 1 TABLET(25 MG) BY MOUTH EVERY DAY IN THE MORNING, Disp: 90 tablet, Rfl: 3  •  Trulicity 3 MG/0.5ML solution pen-injector, INJECT 3 MG SUBCUTANEOUSLY ONCE WEEKLY, Disp: 6 mL, Rfl: 3       Bladder Scan interpretation 12/19/2022    Estimation of residual urine via BVI 3000 Verathon Bladder Scan  MA/nurse performing: Cristhian GEORGE  Residual Urine: 24 ml  Indication: Prostate cancer (HCC)    Benign prostatic hyperplasia with urinary retention   Position: Supine  Examination: Incremental scanning of the suprapubic  area using 2.0 MHz transducer using copious amounts of acoustic gel.   Findings: An anechoic area was demonstrated which represented the bladder, with measurement of residual urine as noted. I inspected this myself. In that the residual urine was stable or insignificant, refer to plan for treatment and plan necessary at this time.             Objective     Vital Signs:   There were no vitals taken for this visit.             Assessment and Plan    Diagnoses and all orders for this visit:    1. Prostate cancer (HCC) (Primary)    2. Benign prostatic hyperplasia with urinary retention           Urinary retention/BPH/bladder lesions        Patient with a few small red lesions in the posterior side of his bladder.     Did discuss I did not feel like these were very suspicious but I do think we should follow these as just to rule out underlying pathology/cancer.  Patient voiced understanding    Follow-up in 2/23 with office cystoscopy      Prostate cancer on active surveillance        Needs   PSA  5/22         PVR at  follow-up

## 2022-12-19 ENCOUNTER — OFFICE VISIT (OUTPATIENT)
Dept: UROLOGY | Facility: CLINIC | Age: 77
End: 2022-12-19

## 2022-12-19 VITALS — RESPIRATION RATE: 19 BRPM

## 2022-12-19 DIAGNOSIS — C61 PROSTATE CANCER: Primary | ICD-10-CM

## 2022-12-19 DIAGNOSIS — R33.8 BENIGN PROSTATIC HYPERPLASIA WITH URINARY RETENTION: ICD-10-CM

## 2022-12-19 DIAGNOSIS — N40.1 BENIGN PROSTATIC HYPERPLASIA WITH URINARY RETENTION: ICD-10-CM

## 2022-12-19 LAB — SPECIMEN VOL 24H UR: NORMAL L

## 2022-12-19 PROCEDURE — 51798 US URINE CAPACITY MEASURE: CPT | Performed by: UROLOGY

## 2022-12-19 PROCEDURE — 99213 OFFICE O/P EST LOW 20 MIN: CPT | Performed by: UROLOGY

## 2023-01-17 NOTE — PROGRESS NOTES
The ABCs of the Annual Wellness Visit  Subsequent Medicare Wellness Visit    Chief Complaint   Patient presents with   • BUMPS ON HIP   • Medicare Wellness-subsequent      Subjective    History of Present Illness:  Jamir Porter is a 76 y.o. male who presents for a Subsequent Medicare Wellness Visit.    The following portions of the patient's history were reviewed and   updated as appropriate: allergies, current medications, past family history, past medical history, past social history, past surgical history and problem list.    Compared to one year ago, the patient feels his physical   health is better.    Compared to one year ago, the patient feels his mental   health is the same.    Dangelo's neuroma s/p correction. Pt with suture removal in 1 day.   Pt reports having pimples on right buttocks x1 month. Pt reports lesions are tender. Pt has used antibiotic ointment without any affect.   HTN- off lisinopril due to side effects. Pt doing well on HCTZ.   DM2- pt reports feeling well. Labs recently obtained with good control. Pt denies hypoglyvemic events.     Recent Hospitalizations:  This patient has had a [facility] admission record on file within the last 365 days.    Current Medical Providers:  Patient Care Team:  Amaury Mcdonough Jr., MD as PCP - General (Internal Medicine)    Outpatient Medications Prior to Visit   Medication Sig Dispense Refill   • aspirin (aspirin) 81 MG EC tablet aspirin 81 mg oral tablet,delayed release (DR/EC) take 1 tablet (81 mg) by oral route once daily   Suspended     • clindamycin (CLEOCIN) 300 MG capsule Take 300 mg by mouth 3 (Three) Times a Day.     • Dulaglutide (Trulicity) 3 MG/0.5ML solution pen-injector Inject 3 mg under the skin into the appropriate area as directed 1 (One) Time Per Week. 13 pen 3   • ezetimibe (ZETIA) 10 MG tablet      • finasteride (PROSCAR) 5 MG tablet finasteride 5 mg oral tablet take 1 tablet (5 mg) by oral route once daily for 90 days  [phone]   5/26/2021  Active     • FLUoxetine (PROzac) 40 MG capsule      • hydroCHLOROthiazide (HYDRODIURIL) 12.5 MG tablet Take 2 tablets by mouth Daily. 90 tablet 2   • HYDROcodone-acetaminophen (NORCO) 5-325 MG per tablet Take 1 tablet by mouth Every 6 (Six) Hours.     • Jardiance 25 MG tablet      • levothyroxine (SYNTHROID, LEVOTHROID) 75 MCG tablet      • solifenacin (VESICARE) 5 MG tablet        No facility-administered medications prior to visit.       Opioid medication/s are on active medication list.  and I have evaluated his active treatment plan and pain score trends (see table).  There were no vitals filed for this visit.  I have reviewed the chart for potential of high risk medication and harmful drug interactions in the elderly.            Aspirin is on active medication list. Aspirin use is indicated based on review of current medical condition/s. Pros and cons of this therapy have been discussed today. Benefits of this medication outweigh potential harm.  Patient has been encouraged to continue taking this medication.  .    Patient Active Problem List   Diagnosis   • Prostate cancer (CMS/HCC)   • Benign prostatic hyperplasia without lower urinary tract symptoms   • Allergic rhinitis   • Arthritis   • Balance problem   • Broken bones   • Cancer ([facility])   • Cataracts, bilateral   • Chronic sinusitis   • Colon polyps   • Degeneration of intervertebral disc   • Diverticulitis   • Headache   • Hyperlipemia   • Hypertension   • Limb swelling   • Low back pain   • Pacemaker   • Prostate disorder   • Shortness of breath   • Sleep apnea   • Type 2 diabetes mellitus with hyperglycemia, without long-term current use of insulin (CMS/[facility])     Advance Care Planning  Advance Directive is on file.  ACP discussion was held with the patient during this visit. Patient has an advance directive in EMR which is still valid.         Objective    Vitals:    09/24/21 1420   BP: 116/77   BP Location: Left arm   Patient Position: Sitting  "  Cuff Size: Adult   Pulse: 65   Temp: 97.1 °F (36.2 °C)   TempSrc: Temporal   SpO2: 96%   Weight: 104 kg (230 lb)   Height: 177.8 cm (70\")     BMI Readings from Last 1 Encounters:   09/24/21 33.00 kg/m²   BMI is above normal parameters. Recommendations include: nutrition counseling    Does the patient have evidence of cognitive impairment? No    Physical Exam   Appearance: No acute distress, well-nourished  Head: normocephalic, atraumatic  Eyes: extraocular movements intact, no scleral icterus, no conjunctival injection  Ears, Nose, and Throat: external ears normal, nares patent, moist mucous membranes  Cardiovascular: regular rate and rhythm. no murmurs, rales, or rhonchi. no edema  Respiratory: breathing comfortably, symmetric chest rise, clear to auscultation bilaterally. No wheezes, rales, or rhonchi.  Neuro: alert and oriented to time, place, and person. Normal gait  Psych: normal mood and affect   Skin: +nonerythematous lesions on left buttocks without exudate. Some leftover induration.     Lab Results   Component Value Date    TRIG 99 09/22/2021    HDL 36 (L) 09/22/2021     (H) 09/22/2021    VLDL 18 09/22/2021    HGBA1C 6.28 (H) 09/22/2021            HEALTH RISK ASSESSMENT    Smoking Status:  Social History     Tobacco Use   Smoking Status Former Smoker   • Packs/day: 1.50   Smokeless Tobacco Former User   Tobacco Comment    1.5 ppd quit 1979, 17 years total      Alcohol Consumption:  Social History     Substance and Sexual Activity   Alcohol Use Yes    Comment: rarely drinks 02/19/2021     Fall Risk Screen:    STEADI Fall Risk Assessment was completed, and patient is at MODERATE risk for falls. Assessment completed on:9/24/2021    Depression Screening:  PHQ-2/PHQ-9 Depression Screening 9/24/2021   Little interest or pleasure in doing things 0   Feeling down, depressed, or hopeless 1   Total Score 1       Health Habits and Functional and Cognitive Screening:  Functional & Cognitive Status 9/24/2021 "   Do you have difficulty preparing food and eating? No   Do you have difficulty bathing yourself, getting dressed or grooming yourself? No   Do you have difficulty using the toilet? No   Do you have difficulty moving around from place to place? Yes   Do you have trouble with steps or getting out of a bed or a chair? Yes   Current Diet Well Balanced Diet   Dental Exam Up to date   Eye Exam Up to date   Exercise (times per week) 0 times per week   Current Exercises Include No Regular Exercise   Do you need help using the phone?  No   Are you deaf or do you have serious difficulty hearing?  No   Do you need help with transportation? Yes   Do you need help shopping? No   Do you need help preparing meals?  No   Do you need help with housework?  No   Do you need help with laundry? No   Do you need help taking your medications? No   Do you need help managing money? No   Do you ever drive or ride in a car without wearing a seat belt? No   Have you felt unusual stress, anger or loneliness in the last month? No   Who do you live with? Spouse   If you need help, do you have trouble finding someone available to you? No   Have you been bothered in the last four weeks by sexual problems? No   Do you have difficulty concentrating, remembering or making decisions? Yes       Age-appropriate Screening Schedule:  Refer to the list below for future screening recommendations based on patient's age, sex and/or medical conditions. Orders for these recommended tests are listed in the plan section. The patient has been provided with a written plan.    Health Maintenance   Topic Date Due   • TDAP/TD VACCINES (1 - Tdap) Never done   • ZOSTER VACCINE (1 of 2) Never done   • INFLUENZA VACCINE  10/01/2021   • HEMOGLOBIN A1C  03/22/2022   • DIABETIC EYE EXAM  04/13/2022   • URINE MICROALBUMIN  09/21/2022   • LIPID PANEL  09/22/2022              Assessment/Plan   CMS Preventative Services Quick Reference  Risk Factors Identified During  Encounter  Cardiovascular Disease  Dementia/Memory   Depression/Dysphoria  Fall Risk-High or Moderate  Immunizations Discussed/Encouraged (specific Immunizations; COVID19  Inactivity/Sedentary  Obesity/Overweight   Polypharmacy  The above risks/problems have been discussed with the patient.  Follow up actions/plans if indicated are seen below in the Assessment/Plan Section.  Pertinent information has been shared with the patient in the After Visit Summary.    Diagnoses and all orders for this visit:    1. Encounter for annual wellness visit (AWV) in Medicare patient (Primary)    2. Encounter for immunization  -     FluLaval/Fluarix >6 Months ([id number])    3. Cellulitis of buttock  Comments:  appears to be well healing at this time. cont monitoring. keep clean and dry. induration reminaing but will likely be slowest to fully heal.     4. Essential hypertension  Comments:  well controlled on current regimen    5. Hyperlipidemia, unspecified hyperlipidemia type  Comments:  labs reviewed with patient. cont zetia    6. Type 2 diabetes mellitus with hyperglycemia, without long-term current use of insulin (CMS/[facility])  Comments:  labs reviewed with pt. well controlled DM. cont current med regimen        Follow Up:   Return in about 3 months (around 12/24/2021) for Recheck.     An After Visit Summary and PPPS were made available to the patient.                [phone]

## 2023-02-22 DIAGNOSIS — E78.5 HYPERLIPIDEMIA, UNSPECIFIED HYPERLIPIDEMIA TYPE: ICD-10-CM

## 2023-02-22 RX ORDER — EZETIMIBE 10 MG/1
10 TABLET ORAL DAILY
Qty: 90 TABLET | Refills: 3 | Status: SHIPPED | OUTPATIENT
Start: 2023-02-22

## 2023-02-26 PROBLEM — N32.9 LESION OF BLADDER: Status: ACTIVE | Noted: 2023-02-26

## 2023-02-26 NOTE — PROGRESS NOTES
Procedures       Urinalysis was checked today and was negative for signs of infection      Cytoscopy Procedure:     Procedure: Flexible cytoscope was passed per urethra into the bladder without difficulty after proper consent. The bladder was inspected in a systematic meridian fashion.       4 cm prostate -open at the bladder neck but the left side still has a fairly large adenoma that does coapt when the scope was brought past the verumontanum.  Unchanged      Large bladder with some minor trabeculation       No erythema or pathology today       No papillary tissue       Both ureteral orifices were identified and were normal in appearance. The flexible cytoscope was removed. The patient tolerated the procedure well.     PVR      11/22     60         Urinary retention/BPH/bladder lesions     Cystoscopy negative today, patient given reassurance    Patient's had no trouble with retention.  Voiding okay with a slow stream does know how to do CIC    No change in management currently        Prostate cancer on active surveillance          Follow-up in 5/23 with PSA    Will need  MRI prostate and PSA  11/22    PVR at  follow-up           This document has been electronically signed by Lenard Ray MD  February 26, 2023 05:32 EST

## 2023-02-28 ENCOUNTER — PROCEDURE VISIT (OUTPATIENT)
Dept: UROLOGY | Facility: CLINIC | Age: 78
End: 2023-02-28
Payer: MEDICARE

## 2023-02-28 VITALS — BODY MASS INDEX: 32.62 KG/M2 | HEIGHT: 71 IN | WEIGHT: 233 LBS

## 2023-02-28 DIAGNOSIS — C61 PROSTATE CANCER: Primary | ICD-10-CM

## 2023-02-28 DIAGNOSIS — N32.9 LESION OF BLADDER: ICD-10-CM

## 2023-02-28 PROCEDURE — 52000 CYSTOURETHROSCOPY: CPT | Performed by: UROLOGY

## 2023-03-10 ENCOUNTER — OFFICE VISIT (OUTPATIENT)
Dept: INTERNAL MEDICINE | Facility: CLINIC | Age: 78
End: 2023-03-10
Payer: MEDICARE

## 2023-03-10 VITALS
OXYGEN SATURATION: 99 % | HEART RATE: 62 BPM | SYSTOLIC BLOOD PRESSURE: 148 MMHG | DIASTOLIC BLOOD PRESSURE: 76 MMHG | HEIGHT: 71 IN | BODY MASS INDEX: 33.35 KG/M2 | TEMPERATURE: 97.6 F | WEIGHT: 238.2 LBS

## 2023-03-10 DIAGNOSIS — I10 PRIMARY HYPERTENSION: ICD-10-CM

## 2023-03-10 DIAGNOSIS — E11.65 TYPE 2 DIABETES MELLITUS WITH HYPERGLYCEMIA, WITHOUT LONG-TERM CURRENT USE OF INSULIN: Primary | ICD-10-CM

## 2023-03-10 DIAGNOSIS — E78.49 OTHER HYPERLIPIDEMIA: ICD-10-CM

## 2023-03-10 DIAGNOSIS — Z23 NEED FOR INFLUENZA VACCINATION: ICD-10-CM

## 2023-03-10 DIAGNOSIS — Z12.11 COLON CANCER SCREENING: ICD-10-CM

## 2023-03-10 DIAGNOSIS — G62.9 NEUROPATHY: ICD-10-CM

## 2023-03-10 LAB
ALBUMIN SERPL-MCNC: 4.3 G/DL (ref 3.5–5.2)
ALBUMIN UR-MCNC: <1.2 MG/DL
ALBUMIN/GLOB SERPL: 1.5 G/DL
ALP SERPL-CCNC: 94 U/L (ref 39–117)
ALT SERPL W P-5'-P-CCNC: 22 U/L (ref 1–41)
ANION GAP SERPL CALCULATED.3IONS-SCNC: 11 MMOL/L (ref 5–15)
AST SERPL-CCNC: 20 U/L (ref 1–40)
BASOPHILS # BLD AUTO: 0.07 10*3/MM3 (ref 0–0.2)
BASOPHILS NFR BLD AUTO: 0.9 % (ref 0–1.5)
BILIRUB SERPL-MCNC: 0.4 MG/DL (ref 0–1.2)
BUN SERPL-MCNC: 16 MG/DL (ref 8–23)
BUN/CREAT SERPL: 18.6 (ref 7–25)
CALCIUM SPEC-SCNC: 8.8 MG/DL (ref 8.6–10.5)
CHLORIDE SERPL-SCNC: 99 MMOL/L (ref 98–107)
CHOLEST SERPL-MCNC: 208 MG/DL (ref 0–200)
CO2 SERPL-SCNC: 26 MMOL/L (ref 22–29)
CREAT SERPL-MCNC: 0.86 MG/DL (ref 0.76–1.27)
CREAT UR-MCNC: 31.6 MG/DL
DEPRECATED RDW RBC AUTO: 41.9 FL (ref 37–54)
EGFRCR SERPLBLD CKD-EPI 2021: 88.1 ML/MIN/1.73
EOSINOPHIL # BLD AUTO: 0.48 10*3/MM3 (ref 0–0.4)
EOSINOPHIL NFR BLD AUTO: 5.9 % (ref 0.3–6.2)
ERYTHROCYTE [DISTWIDTH] IN BLOOD BY AUTOMATED COUNT: 12.2 % (ref 12.3–15.4)
GLOBULIN UR ELPH-MCNC: 2.9 GM/DL
GLUCOSE SERPL-MCNC: 185 MG/DL (ref 65–99)
HBA1C MFR BLD: 5.9 % (ref 4.8–5.6)
HCT VFR BLD AUTO: 47.4 % (ref 37.5–51)
HDLC SERPL-MCNC: 35 MG/DL (ref 40–60)
HGB BLD-MCNC: 16.5 G/DL (ref 13–17.7)
IMM GRANULOCYTES # BLD AUTO: 0.05 10*3/MM3 (ref 0–0.05)
IMM GRANULOCYTES NFR BLD AUTO: 0.6 % (ref 0–0.5)
LDLC SERPL CALC-MCNC: 135 MG/DL (ref 0–100)
LDLC/HDLC SERPL: 3.73 {RATIO}
LYMPHOCYTES # BLD AUTO: 1.55 10*3/MM3 (ref 0.7–3.1)
LYMPHOCYTES NFR BLD AUTO: 18.9 % (ref 19.6–45.3)
MCH RBC QN AUTO: 32.5 PG (ref 26.6–33)
MCHC RBC AUTO-ENTMCNC: 34.8 G/DL (ref 31.5–35.7)
MCV RBC AUTO: 93.5 FL (ref 79–97)
MICROALBUMIN/CREAT UR: NORMAL MG/G{CREAT}
MONOCYTES # BLD AUTO: 0.67 10*3/MM3 (ref 0.1–0.9)
MONOCYTES NFR BLD AUTO: 8.2 % (ref 5–12)
NEUTROPHILS NFR BLD AUTO: 5.36 10*3/MM3 (ref 1.7–7)
NEUTROPHILS NFR BLD AUTO: 65.5 % (ref 42.7–76)
NRBC BLD AUTO-RTO: 0 /100 WBC (ref 0–0.2)
PLATELET # BLD AUTO: 261 10*3/MM3 (ref 140–450)
PMV BLD AUTO: 10.4 FL (ref 6–12)
POTASSIUM SERPL-SCNC: 3.6 MMOL/L (ref 3.5–5.2)
PROT SERPL-MCNC: 7.2 G/DL (ref 6–8.5)
RBC # BLD AUTO: 5.07 10*6/MM3 (ref 4.14–5.8)
SODIUM SERPL-SCNC: 136 MMOL/L (ref 136–145)
TRIGL SERPL-MCNC: 212 MG/DL (ref 0–150)
VLDLC SERPL-MCNC: 38 MG/DL (ref 5–40)
WBC NRBC COR # BLD: 8.18 10*3/MM3 (ref 3.4–10.8)

## 2023-03-10 PROCEDURE — G0008 ADMIN INFLUENZA VIRUS VAC: HCPCS | Performed by: INTERNAL MEDICINE

## 2023-03-10 PROCEDURE — 85025 COMPLETE CBC W/AUTO DIFF WBC: CPT | Performed by: INTERNAL MEDICINE

## 2023-03-10 PROCEDURE — 82570 ASSAY OF URINE CREATININE: CPT | Performed by: INTERNAL MEDICINE

## 2023-03-10 PROCEDURE — 90662 IIV NO PRSV INCREASED AG IM: CPT | Performed by: INTERNAL MEDICINE

## 2023-03-10 PROCEDURE — 82043 UR ALBUMIN QUANTITATIVE: CPT | Performed by: INTERNAL MEDICINE

## 2023-03-10 PROCEDURE — 80061 LIPID PANEL: CPT | Performed by: INTERNAL MEDICINE

## 2023-03-10 PROCEDURE — 99214 OFFICE O/P EST MOD 30 MIN: CPT | Performed by: INTERNAL MEDICINE

## 2023-03-10 PROCEDURE — 83036 HEMOGLOBIN GLYCOSYLATED A1C: CPT | Performed by: INTERNAL MEDICINE

## 2023-03-10 PROCEDURE — 80053 COMPREHEN METABOLIC PANEL: CPT | Performed by: INTERNAL MEDICINE

## 2023-03-10 RX ORDER — GABAPENTIN 100 MG/1
100 CAPSULE ORAL 2 TIMES DAILY PRN
Qty: 60 CAPSULE | Refills: 0 | Status: SHIPPED | OUTPATIENT
Start: 2023-03-10

## 2023-03-10 NOTE — PROGRESS NOTES
"Chief Complaint  Hypertension (Follow up/) and Diabetes (Follow up)    Subjective          Jamir Porter presents to [facility]  History of Present Illness  Patient needs new prosthetic right foot with [facility].  DM2- due for recheck. Patient reports his last HgbA1c in 11/2022 was high 5s.   hyperlipidemia- patient unable to tolerate statins. Doing well on zetia.   Patient requests a refill on gabapentin for pain control. Patient reports it works well.   hypertension- patient denies Has, dizziness, chest pain. Patient without home Blood Pressure measurements.         Current Outpatient Medications   Medication Instructions   • baclofen (LIORESAL) 10 mg, Oral, 3 Times Daily PRN   • ezetimibe (ZETIA) 10 mg, Oral, Daily   • FLUoxetine (PROZAC) 40 mg, Oral, Daily   • gabapentin (NEURONTIN) 100 mg, Oral, 2 Times Daily PRN   • hydroCHLOROthiazide (HYDRODIURIL) 25 mg, Oral, Daily   • ibuprofen (ADVIL,MOTRIN) 800 mg, Oral, Every 6 Hours PRN   • Jardiance 25 MG tablet tablet TAKE 1 TABLET(25 MG) BY MOUTH EVERY DAY IN THE MORNING   • Trulicity 3 MG/0.5ML solution pen-injector INJECT 3 MG SUBCUTANEOUSLY ONCE WEEKLY       The following portions of the patient's history were reviewed and updated as appropriate: allergies, current medications, past family history, past medical history, past social history, past surgical history, and problem list.    Objective   Vital Signs:   /76 (BP Location: Left arm, Patient Position: Sitting, Cuff Size: Large Adult)   Pulse 62   Temp 97.6 °F (36.4 °C) (Temporal)   Ht 180.3 cm (71\")   Wt 108 kg (238 lb 3.2 oz)   SpO2 99%   BMI 33.22 kg/m²     Wt Readings from Last 3 Encounters:   03/10/23 108 kg (238 lb 3.2 oz)   02/28/23 106 kg (233 lb)   09/23/22 106 kg (233 lb)     BP Readings from Last 3 Encounters:   03/10/23 148/76   08/23/22 137/85   08/18/22 154/81     Physical Exam   Appearance: No acute distress, well-nourished  Head: " normocephalic, atraumatic  Eyes: extraocular movements intact, no scleral icterus, no conjunctival injection  Ears, Nose, and Throat: external ears normal, nares patent, moist mucous membranes  Cardiovascular: regular rate and rhythm. no murmurs, rubs, or gallops. no edema  Respiratory: breathing comfortably, symmetric chest rise, clear to auscultation bilaterally. No wheezes, rales, or rhonchi.  Neuro: alert and oriented to time, place, and person. Normal gait  Psych: normal mood and affect     Result Review :   The following data was reviewed by: Amaury Mcdonough Jr, MD on 03/10/2023:  Common labs    Common Labs 8/1/22 8/1/22 8/1/22 8/1/22 8/23/22 8/23/22 12/15/22    0938 0938 0938 0938 1506 1506    Glucose   109 (A)   93    BUN   16   18    Creatinine   0.93   0.89    Sodium   140   139    Potassium   3.9   3.8    Chloride   104   103    Calcium   8.8   9.4    Albumin   4.20   4.60    Total Bilirubin   0.4   0.5    Alkaline Phosphatase   74   92    AST (SGOT)   24   21    ALT (SGPT)   24   21    WBC 7.50    11.46 (A)     Hemoglobin 14.2    15.2     Hematocrit 42.6    42.6     Platelets 255    282     Total Cholesterol    189      Triglycerides    123      HDL Cholesterol    38 (A)      LDL Cholesterol     129 (A)      Hemoglobin A1C  6.20 (A)        PSA       3.630   (A) Abnormal value              Lab Results   Component Value Date    COVID19 NOT DETECTED 03/19/2021    BILIRUBINUR Negative 08/23/2022       Last Urine Toxicity     LAST URINE TOXICITY RESULTS Latest Ref Rng & Units 10/28/2021    AMPHETAMINES SCREEN, URINE Negative Negative    BARBITURATES SCREEN Negative Negative    BENZODIAZEPINE SCREEN, URINE Negative Negative    BUPRENORPHINEUR Negative Negative    COCAINE SCREEN, URINE Negative Negative    METHADONE SCREEN, URINE Negative Negative    METHAMPHETAMINEUR Negative Negative             Assessment and Plan    Diagnoses and all orders for this visit:    1. Type 2 diabetes mellitus with  hyperglycemia, without long-term current use of insulin (HCC) (Primary)  -     Hemoglobin A1c  -     Microalbumin / Creatinine Urine Ratio - Urine, Clean Catch  -     Ambulatory Referral for Diabetic Eye Exam-Ophthalmology    2. Neuropathy  Comments:  cont gabapentin. MARBELLA and radha reviewed.  Orders:  -     gabapentin (NEURONTIN) 100 MG capsule; Take 1 capsule by mouth 2 (Two) Times a Day As Needed (pain).  Dispense: 60 capsule; Refill: 0    3. Other hyperlipidemia  Comments:  due for recheck. intolerant to statins. cont zetia.   Orders:  -     Lipid Panel    4. Primary hypertension  Comments:  encouraged home monitoring. goal BP <130/80  Orders:  -     CBC & Differential  -     Comprehensive Metabolic Panel    5. Colon cancer screening  -     Ambulatory Referral For Screening Colonoscopy    6. Need for influenza vaccination  -     Fluzone High-Dose 65+yrs ([id number])          Medications Discontinued During This Encounter   Medication Reason   • gabapentin (NEURONTIN) 100 MG capsule Reorder          Follow Up   Return in about 6 months (around 9/10/2023) for Medicare Wellness.  Patient was given instructions and counseling regarding his condition or for health maintenance advice. Please see specific information pulled into the AVS if appropriate.       Amaury Mcdonough Jr, MD  03/10/23  12:37 EST

## 2023-03-14 ENCOUNTER — TELEPHONE (OUTPATIENT)
Dept: INTERNAL MEDICINE | Facility: CLINIC | Age: 78
End: 2023-03-14
Payer: MEDICARE

## 2023-03-14 NOTE — TELEPHONE ENCOUNTER
----- Message from Amaury Mcdonough Jr., MD sent at 3/14/2023  3:06 PM EDT -----  Good control of blood glucose. May stop jardiance.     Elevated triglycerides, which are the storage form of sugar - recommend lower carbohydrate/sugar intake.     HDL low - this is protective cholesterol and generally like the number to be >50. encourage exercise to increase level.     LDL is elevated - this has been associated with increased risk of cardiovascular disease including heart attacks and strokes. Would recommend low cholesterol diet to reduce.

## 2023-04-04 ENCOUNTER — TELEPHONE (OUTPATIENT)
Dept: INTERNAL MEDICINE | Facility: CLINIC | Age: 78
End: 2023-04-04
Payer: MEDICARE

## 2023-04-04 NOTE — TELEPHONE ENCOUNTER
DURABLE MEDICAL EQUIPMENT - SCAN - unined- [facility]3] (03/06/2023)    Placed in providers folder for signing.   Since Dr. Sharonda beniteznt here placed in padilla folder to be signed

## 2023-04-14 ENCOUNTER — TELEPHONE (OUTPATIENT)
Dept: INTERNAL MEDICINE | Facility: CLINIC | Age: 78
End: 2023-04-14
Payer: MEDICARE

## 2023-04-14 NOTE — TELEPHONE ENCOUNTER
CALLED PATIENT REGARDING REFERRAL FOR [facility].  [facility] UNABLE TO REACH PATIENT TO SCHEDULE APPT.  PATIENT WILL CALL [facility] TO SCHEDULE AN APPT.

## 2023-05-18 ENCOUNTER — TELEPHONE (OUTPATIENT)
Dept: UROLOGY | Facility: CLINIC | Age: 78
End: 2023-05-18
Payer: MEDICARE

## 2023-05-18 ENCOUNTER — LAB (OUTPATIENT)
Dept: LAB | Facility: HOSPITAL | Age: 78
End: 2023-05-18
Payer: MEDICARE

## 2023-05-18 DIAGNOSIS — C61 PROSTATE CANCER: ICD-10-CM

## 2023-05-18 DIAGNOSIS — N32.9 LESION OF BLADDER: ICD-10-CM

## 2023-05-18 LAB — PSA SERPL-MCNC: 3.61 NG/ML (ref 0–4)

## 2023-05-18 PROCEDURE — 84153 ASSAY OF PSA TOTAL: CPT

## 2023-05-18 PROCEDURE — 36415 COLL VENOUS BLD VENIPUNCTURE: CPT

## 2023-05-18 NOTE — TELEPHONE ENCOUNTER
Called pt to remind him to do PSA prior to appt 5/22. He said he will go to [facility]. Order is in

## 2023-05-20 PROBLEM — N40.1 BENIGN PROSTATIC HYPERPLASIA WITH LOWER URINARY TRACT SYMPTOMS: Status: ACTIVE | Noted: 2023-05-20

## 2023-05-20 NOTE — PROGRESS NOTES
Chief Complaint    Urologic complaint    Subjective          Jamir Porter presents to [facility] UROLOGY  History of Present Illness       78-year-old  gentleman        history of prostate cancer clinical T1c, on active surveillance, s/p TURP patient also with ED, penile prosthesis   Urinary retention - does intermittent CIC      Voiding okay, does have a slow stream, has to sit to void.    No caffeine for the last several months, his been doing okay with his constipation.  No gross hematuria    No UTI/dysuria    Patient having trouble with stability recently.  Has fallen recently    2/23 cystoscopy-4 cm prostate - open at bladder neck but left side still has some fairly large adenoma that does coapt.   Large bladder minor trabeculation no pathology    No  incontinence        Patient does not know how to do CIC, was having trouble with retention when he was dealing with constipation    No cardiopulmonary history.  Patient does not smoke.  He is on ibuprofen for back pain. No DM.    PVR     5/23       054  12/22   024  9/22 cystoscopy-4 cm prostate-open at the bladder neck but left side still has some fairly large adenoma that does coapt when the scope was brought past the verumontanum.  Normal cystoscopy  9/22   [place]  CIC   9/22   60  11/21  65  3/21  TURP -3+3, 3/130, < 1%  4/21  200  2/20   25      Previous    9/22 2 episodes of 1 retention    No longer following adrenal mass.    4/21 CT abdomen/pelvis with and without -9 mm benign lipid poor adrenal adenoma, no change since 8/19.    No erections. Has IPP.    H/o retention  - 1.5 L    oxybutynin  - did stop his incontinence but did give him side effects that he could not tolerate, GI.  Myrebetriq Cause -  diarrhea    8/26/2019 CT abdomen with and withoutadrenal mass protocolsmall right 0.9 cm adrenal nodule.  Favor adrenal adenoma.  8/19 cortisol less than 1.0, plasma metanephrines negative  7/3/2019 CT abdomen/pelvis with9 mm right  adrenal nodule.  Which demonstrates enhancement above the background of the adrenal gland.  Penile prosthesis reservoir partially herniates the left inguinal ring.    Father  pancreatic CA,  brother with prostate cancer.      Penile prothesis - functions, he does not use.    Father  at 64, grandfather  at 85        H/o prostate CA - on finasteride        3.6       3.6      2022 MRI prostate-negative.     10/21 MRI prostate-17 g, previously 58, negative otherwise    3/21  TURP -3+3, 3/130, < 1%     cystoscopy -4 cm prostate, moderate trabeculations, negative otherwise     prostate biopsy -53 g, right apex 3+3, 1/2, 6%, negative otherwise    3.56    MRI  prostate - 58 g , neg    0.91    2019 MRI [id number] g -negative      1.40     2.32 -on finasteride   MRI iytbgrxn48 g, no targetable lesion seen    5.09    5.41  10/17 5.5    prostate rojeps60 g - left, 3+3, 50% of core, 5 mm; right - high-grade PIN    5.5  10/16 4.9    10/16 MRI uukcyilc36 g, no targetable lesions seen.     4.4   prostate fdqayr51 g   Pathology  Right basenegative  Right base lateral 3+3, 20%, 4 mm  Right mid, mid lateral, apex, apex lateralnegative  Left basenegative  Left mid3+3, 10%  Left mid lateralnegative  Left apex3+3, 15%   left apex3+3, 10%     5.39     3.68               Past History:  Medical History: has a past medical history of Allergic rhinitis, Arthritis, Balance problem, Broken bones, Cancer, Cataracts, bilateral, Chronic sinusitis, Claustrophobia, Colon polyps, DDD (degenerative disc disease), cervical, Diabetes mellitus, Diverticulitis, Forgetfulness, H/O degenerative disc disease, Headache, Hyperlipemia, Hypertension, Limb swelling, Lumbago, Pacemaker, Prostate CA, Prostate disorder, Shortness of breath, and Sleep apnea.   Surgical History: has a past surgical history that includes Other surgical history (Right, ); Dental surgery; Eye  surgery; Joint replacement; Lumbar fusion (2016); Pilonidal cyst / sinus excision; Amputation (Right, 2012); Other surgical history (Right, 2012); Back surgery; Colonoscopy (2019); Dental surgery; Other surgical history; Replacement total knee (Right); Eye surgery; Foot surgery (Left); Lumbar fusion (2016); Lumbar puncture; Penile prosthesis implant; Excision; Rotator cuff repair; Uvulopalatopharyngoplasty; and TURP / transurethral incision / drainage prostate.   Family History: family history includes Arthritis in his brother, father, and mother; Cancer in his brother, father, and mother.   Social History: reports that he quit smoking about 44 years ago. His smoking use included cigarettes. He started smoking about 63 years ago. He has a 25.50 pack-year smoking history. He has quit using smokeless tobacco. He reports current alcohol use. He reports that he does not use drugs.  Allergies: Statins, Meperidine, and Sulfate       Current Outpatient Medications:   •  baclofen (LIORESAL) 10 MG tablet, Take 1 tablet by mouth 3 (Three) Times a Day As Needed for Muscle Spasms., Disp: 90 tablet, Rfl: 1  •  ezetimibe (ZETIA) 10 MG tablet, TAKE 1 TABLET BY MOUTH DAILY, Disp: 90 tablet, Rfl: 3  •  FLUoxetine (PROzac) 40 MG capsule, TAKE 1 CAPSULE BY MOUTH DAILY, Disp: 90 capsule, Rfl: 3  •  gabapentin (NEURONTIN) 100 MG capsule, Take 1 capsule by mouth 2 (Two) Times a Day As Needed (pain)., Disp: 60 capsule, Rfl: 0  •  hydroCHLOROthiazide (HYDRODIURIL) 12.5 MG tablet, TAKE 2 TABLETS BY MOUTH DAILY (Patient taking differently: Take 1 tablet by mouth Daily.), Disp: 90 tablet, Rfl: 2  •  ibuprofen (ADVIL,MOTRIN) 200 MG tablet, Take 4 tablets by mouth Every 6 (Six) Hours As Needed for Mild Pain., Disp: , Rfl:   •  Jardiance 25 MG tablet tablet, TAKE 1 TABLET(25 MG) BY MOUTH EVERY DAY IN THE MORNING, Disp: 90 tablet, Rfl: 3  •  Trulicity 3 MG/0.5ML solution pen-injector, INJECT 3 MG SUBCUTANEOUSLY ONCE WEEKLY, Disp: 6 mL, Rfl: 3          Bladder Scan interpretation 05/22/2023    Estimation of residual urine via BVI 3000 Verathon Bladder Scan  MA/nurse performing: Cristhian GEORGE  Residual Urine: 54 ml  Indication: Prostate cancer    Urinary retention    Benign prostatic hyperplasia with lower urinary tract symptoms, symptom details unspecified   Position: Supine  Examination: Incremental scanning of the suprapubic area using 2.0 MHz transducer using copious amounts of acoustic gel.   Findings: An anechoic area was demonstrated which represented the bladder, with measurement of residual urine as noted. I inspected this myself. In that the residual urine was stable or insignificant, refer to plan for treatment and plan necessary at this time.             Objective     Vital Signs:   There were no vitals taken for this visit.             Assessment and Plan    Diagnoses and all orders for this visit:    1. Prostate cancer (Primary)    2. Urinary retention    3. Benign prostatic hyperplasia with lower urinary tract symptoms, symptom details unspecified           Urinary retention/BPH     Doing well.         Prostate cancer on active surveillance         We discussed that active surveillance is an option for a patient with low risk prostate cancer.  The risks of surveillance include progression of disease, failure of clinical staging to detect advanced disease, need for strict followup, need for repeat prostate biopsies, and patient anxiety related to PSA.  We did discuss that the evidence suggests that patient's who progress to treatment on surveillance have survival similar to those treated at diagnosis.      Patient's PSA stable, negative MRI in 11/22.  We discussed this today.  Because he has been so stable active surveillance we will follow him up in a year with a PSA and MRI prostate.    At that time depending on findings was discussed may need to proceed with repeat biopsy.  Patient voiced understanding.      PVR at  follow-up

## 2023-05-22 ENCOUNTER — OFFICE VISIT (OUTPATIENT)
Dept: UROLOGY | Facility: CLINIC | Age: 78
End: 2023-05-22
Payer: MEDICARE

## 2023-05-22 DIAGNOSIS — N40.1 BENIGN PROSTATIC HYPERPLASIA WITH LOWER URINARY TRACT SYMPTOMS, SYMPTOM DETAILS UNSPECIFIED: ICD-10-CM

## 2023-05-22 DIAGNOSIS — R33.9 URINARY RETENTION: ICD-10-CM

## 2023-05-22 DIAGNOSIS — C61 PROSTATE CANCER: Primary | ICD-10-CM

## 2023-05-22 LAB — SPECIMEN VOL 24H UR: NORMAL L

## 2023-05-29 ENCOUNTER — APPOINTMENT (OUTPATIENT)
Dept: CT IMAGING | Facility: HOSPITAL | Age: 78
End: 2023-05-29

## 2023-05-29 ENCOUNTER — HOSPITAL ENCOUNTER (EMERGENCY)
Facility: HOSPITAL | Age: 78
Discharge: HOME | End: 2023-05-29
Attending: EMERGENCY MEDICINE | Admitting: EMERGENCY MEDICINE

## 2023-05-29 VITALS
OXYGEN SATURATION: 92 % | RESPIRATION RATE: 18 BRPM | DIASTOLIC BLOOD PRESSURE: 85 MMHG | TEMPERATURE: 98.2 F | HEART RATE: 79 BPM | WEIGHT: 228.18 LBS | BODY MASS INDEX: 31.94 KG/M2 | HEIGHT: 71 IN | SYSTOLIC BLOOD PRESSURE: 115 MMHG

## 2023-05-29 DIAGNOSIS — S00.81XA ABRASION OF FACE, INITIAL ENCOUNTER: ICD-10-CM

## 2023-05-29 DIAGNOSIS — S61.411A LACERATION OF SKIN OF RIGHT HAND, INITIAL ENCOUNTER: ICD-10-CM

## 2023-05-29 DIAGNOSIS — S09.90XA INJURY OF HEAD, INITIAL ENCOUNTER: Primary | ICD-10-CM

## 2023-05-29 LAB
HOLD SPECIMEN: NORMAL
HOLD SPECIMEN: NORMAL
WHOLE BLOOD HOLD COAG: NORMAL

## 2023-05-29 PROCEDURE — 99283 EMERGENCY DEPT VISIT LOW MDM: CPT

## 2023-05-29 RX ORDER — IBUPROFEN 400 MG/1
800 TABLET ORAL ONCE
Status: COMPLETED | OUTPATIENT
Start: 2023-05-29 | End: 2023-05-29

## 2023-05-29 RX ORDER — GINSENG 100 MG
1 CAPSULE ORAL ONCE
Status: COMPLETED | OUTPATIENT
Start: 2023-05-29 | End: 2023-05-29

## 2023-05-29 RX ORDER — LIDOCAINE HYDROCHLORIDE AND EPINEPHRINE 10; 10 MG/ML; UG/ML
10 INJECTION, SOLUTION INFILTRATION; PERINEURAL ONCE
Status: COMPLETED | OUTPATIENT
Start: 2023-05-29 | End: 2023-05-29

## 2023-05-29 RX ORDER — CEPHALEXIN 500 MG/1
500 CAPSULE ORAL 4 TIMES DAILY
Qty: 28 CAPSULE | Refills: 0 | Status: SHIPPED | OUTPATIENT
Start: 2023-05-29

## 2023-05-29 RX ADMIN — LIDOCAINE HYDROCHLORIDE,EPINEPHRINE BITARTRATE 10 ML: 10; .01 INJECTION, SOLUTION INFILTRATION; PERINEURAL at 14:00

## 2023-05-29 RX ADMIN — BACITRACIN 0.9 G: 500 OINTMENT TOPICAL at 16:00

## 2023-05-29 RX ADMIN — IBUPROFEN 800 MG: 400 TABLET ORAL at 14:09

## 2023-05-29 NOTE — ED PROVIDER NOTES
Time: 1:05 PM EDT  Date of encounter:  5/29/2023  Independent Historian/Clinical History and Information was obtained by:   Patient  Chief Complaint: Facial injury, right hand laceration    History is limited by: N/A    History of Present Illness:  Patient is a 78 y.o. year old male who presents to the emergency department for evaluation of facial injury and right hand laceration.  Patient states he tripped while walking and fell onto pavers.  Patient is unsure what he cut his right and on.  The laceration is on the palmar aspect of the right hand.  Patient denies any other injuries.  Patient denies any loss of consciousness and states the abrasions from his face were more from a sliding motion than hard impact.  HPI    Patient Care Team  Primary Care Provider: Amaury Mcdonough Jr., MD    Past Medical History:     Allergies   Allergen Reactions   • Statins Myalgia   • Meperidine Unknown - High Severity   • Sulfate Unknown - High Severity     Past Medical History:   Diagnosis Date   • Allergic rhinitis    • Arthritis    • Balance problem    • Broken bones    • Cancer    • Cataracts, bilateral    • Chronic sinusitis    • Claustrophobia    • Colon polyps    • DDD (degenerative disc disease), cervical    • Diabetes mellitus    • Diverticulitis    • Forgetfulness    • H/O degenerative disc disease    • Headache    • Hyperlipemia    • Hypertension    • Limb swelling    • Lumbago    • Pacemaker    • Prostate CA    • Prostate disorder    • Shortness of breath    • Sleep apnea      Past Surgical History:   Procedure Laterality Date   • AMPUTATION Right 2012    knee   • BACK SURGERY     • COLONOSCOPY  2019   • DENTAL PROCEDURE      dental surgery    • DENTAL PROCEDURE      dental surgery   • EXCISION      pilonidal Cyst   • EYE SURGERY      eye implant, yes   • EYE SURGERY      implant yes   • FOOT SURGERY Left    • JOINT REPLACEMENT      joint surger   • LUMBAR FUSION  2016    [facility]    • LUMBAR FUSION  2016     [facility]   • LUMBAR PUNCTURE     • OTHER SURGICAL HISTORY Right 2012    artificial joints/limbs , knee   • OTHER SURGICAL HISTORY Right 2012    rt knee// artificial joints/limbs   • OTHER SURGICAL HISTORY      joint surgery   • PENILE PROSTHESIS IMPLANT     • PILONIDAL CYST / SINUS EXCISION     • REPLACEMENT TOTAL KNEE Right    • ROTATOR CUFF REPAIR     • TURP / TRANSURETHRAL INCISION / DRAINAGE PROSTATE      following prostate cancer   • UVULOPALATOPHARYNGOPLASTY       Family History   Problem Relation Age of Onset   • Arthritis Mother    • Cancer Mother    • Arthritis Father    • Cancer Father    • Arthritis Brother    • Cancer Brother        Home Medications:  Prior to Admission medications    Medication Sig Start Date End Date Taking? Authorizing Provider   baclofen (LIORESAL) 10 MG tablet Take 1 tablet by mouth 3 (Three) Times a Day As Needed for Muscle Spasms. 8/1/22   Amaury Mcdonough Jr., MD   ezetimibe (ZETIA) 10 MG tablet TAKE 1 TABLET BY MOUTH DAILY 2/22/23   Amaury Mcdonough Jr., MD   FLUoxetine (PROzac) 40 MG capsule TAKE 1 CAPSULE BY MOUTH DAILY 11/30/22   Amaury Mcdonough Jr., MD   gabapentin (NEURONTIN) 100 MG capsule Take 1 capsule by mouth 2 (Two) Times a Day As Needed (pain). 3/10/23   Amaury Mcdonough Jr., MD   hydroCHLOROthiazide (HYDRODIURIL) 12.5 MG tablet TAKE 2 TABLETS BY MOUTH DAILY  Patient taking differently: Take 1 tablet by mouth Daily. 12/8/21   Amaury Mcdonough Jr., MD   ibuprofen (ADVIL,MOTRIN) 200 MG tablet Take 4 tablets by mouth Every 6 (Six) Hours As Needed for Mild Pain.    Provider, MD Ria   Jardiance 25 MG tablet tablet TAKE 1 TABLET(25 MG) BY MOUTH EVERY DAY IN THE MORNING 10/27/22   Amaury Mcdonough Jr., MD   Trulicity 3 MG/0.5ML solution pen-injector INJECT 3 MG SUBCUTANEOUSLY ONCE WEEKLY 6/30/22   Amaury Mcdonough Jr., MD        Social History:   Social History     Tobacco Use   • Smoking status: Former     " Packs/day: 1.50     Years: 17.00     Pack years: 25.50     Types: Cigarettes     Start date:      Quit date:      Years since quittin.4   • Smokeless tobacco: Former   • Tobacco comments:     1.5 ppd quit , 17 years total    Vaping Use   • Vaping Use: Never used   Substance Use Topics   • Alcohol use: Yes     Comment: rarely drinks 2021   • Drug use: Never         Review of Systems:  Review of Systems   Skin: Positive for wound.        Physical Exam:  /85   Pulse 79   Temp 98.2 °F (36.8 °C) (Oral)   Resp 18   Ht 180.3 cm (70.98\")   Wt 104 kg (228 lb 2.8 oz)   SpO2 92%   BMI 31.84 kg/m²     Physical Exam             Procedures:  Laceration Repair    Date/Time: 2023 3:20 PM  Performed by: Monica Moralez PA-C  Authorized by: Clive Tovar DO     Consent:     Consent obtained:  Verbal    Consent given by:  Patient    Risks discussed:  Infection, poor cosmetic result and pain  Universal protocol:     Patient identity confirmed:  Verbally with patient  Anesthesia:     Anesthesia method:  Local infiltration    Local anesthetic:  Lidocaine 1% WITH epi  Laceration details:     Location:  Hand    Hand location:  R hand, dorsum    Length (cm):  5  Pre-procedure details:     Preparation:  Patient was prepped and draped in usual sterile fashion  Exploration:     Hemostasis achieved with:  Direct pressure    Wound exploration: wound explored through full range of motion      Wound extent: no foreign bodies/material noted, no muscle damage noted, no nerve damage noted, no tendon damage noted and no vascular damage noted    Treatment:     Area cleansed with:  Povidone-iodine and saline    Amount of cleaning:  Standard    Irrigation solution:  Sterile saline    Irrigation volume:  30  Skin repair:     Repair method:  Sutures    Suture size:  4-0    Suture material:  Nylon    Suture technique:  Simple interrupted    Number of sutures:  10  Approximation:     Approximation:  " Close  Repair type:     Repair type:  Simple  Post-procedure details:     Dressing:  Antibiotic ointment and non-adherent dressing    Procedure completion:  Tolerated well, no immediate complications          Medical Decision Making:      Comorbidities that affect care:    Hypertension, diabetes    External Notes reviewed:    Previous Clinic Note: Patient was seen 5/22/2023 by Dr. Ray, urology, for follow-up for prostate cancer, urinary retention and BPH      The following orders were placed and all results were independently analyzed by me:  Orders Placed This Encounter   Procedures   • Laceration Repair   • [place] Draw   • Wound dressing   • Green Top (Gel)   • Lavender Top   • Gold Top - SST   • Light Blue Top       Medications Given in the Emergency Department:  Medications   lidocaine 1% - EPINEPHrine 1:980891 (XYLOCAINE W/EPI) 1 %-1:337334 injection 10 mL (10 mL Injection Given 5/29/23 1400)   ibuprofen (ADVIL,MOTRIN) tablet 800 mg (800 mg Oral Given 5/29/23 1409)   bacitracin 500 UNIT/GM ointment 0.9 g (0.9 g Topical Given 5/29/23 1600)        ED Course:         Labs:    Lab Results (last 24 hours)     ** No results found for the last 24 hours. **           Imaging:    No Radiology Exams Resulted Within Past 24 Hours      Differential Diagnosis and Discussion:    Trauma:  Differential diagnosis considered but not limited to were subarachnoid hemorrhage, intracranial bleeding, pneumothorax, cardiac contusion, lung contusion, intra-abdominal bleeding, and compartment syndrome of any extremity or other significant traumatic pathology        MDM   CT evaluation of the head, facial bones and C-spine were ordered for this patient based on clinical evaluation.  Patient decided he did not wish to have the diagnostic imaging done.  I advised the patient of potential concern for orbital fractures, C-spine fractures and possible cerebral injury.  Patient is aware and would still like to decline at this time.   Patient's laceration of the right hand was repaired by the physician assistant, please see their portion of the note.  Patient only requested Motrin for pain control.  Patient is stable for discharge.      Patient Care Considerations:    CT HEAD: I considered ordering a noncontrast CT of the head, however Patient declined this exam and CT facial bones and CT C-spine      Consultants/Shared Management Plan:    None    Social Determinants of Health:    Patient is independent, reliable, and has access to care.       Disposition and Care Coordination:    Discharged: The patient is suitable and stable for discharge with no need for consideration of observation or admission.    I have explained discharge medications and the need for follow up with the patient/caretakers. This was also printed in the discharge instructions. Patient was discharged with the following medications and follow up:      Medication List      New Prescriptions    cephalexin 500 MG capsule  Commonly known as: KEFLEX  Take 1 capsule by mouth 4 (Four) Times a Day.        Changed    hydroCHLOROthiazide 12.5 MG tablet  Commonly known as: HYDRODIURIL  TAKE 2 TABLETS BY MOUTH DAILY  What changed: how much to take           Where to Get Your Medications      These medications were sent to [id number] - [place], [place] - [address] AT [facility] - [phone]  - [phone] FX  [address] [place] [address]    Phone: [phone]   · cephalexin 500 MG capsule      No follow-up provider specified.     Final diagnoses:   Injury of head, initial encounter   Abrasion of face, initial encounter   Laceration of skin of right hand, initial encounter        ED Disposition     ED Disposition   Discharge    Condition   Stable    Comment   --             This medical record created using voice recognition software.           Clive Tovar DO  05/29/23 2192

## 2023-05-29 NOTE — DISCHARGE INSTRUCTIONS
Follow-up with your primary care provider in 8 to 10 days for suture removal.    May take Motrin and/or Tylenol as needed for pain.

## 2023-05-29 NOTE — ED NOTES
Patient tripped on the sidewalk and fell - denies taking blood thinners - denies LOC  Right eye abrasion  Right hand abrasion to the palm  Pt has pain in the right shoulder  Right below knee amputation

## 2023-06-06 ENCOUNTER — OFFICE VISIT (OUTPATIENT)
Dept: INTERNAL MEDICINE | Facility: CLINIC | Age: 78
End: 2023-06-06
Payer: MEDICARE

## 2023-06-06 VITALS
HEIGHT: 70 IN | DIASTOLIC BLOOD PRESSURE: 91 MMHG | BODY MASS INDEX: 33.7 KG/M2 | OXYGEN SATURATION: 95 % | SYSTOLIC BLOOD PRESSURE: 135 MMHG | TEMPERATURE: 98.4 F | HEART RATE: 89 BPM | WEIGHT: 235.4 LBS

## 2023-06-06 DIAGNOSIS — J01.00 ACUTE NON-RECURRENT MAXILLARY SINUSITIS: Primary | ICD-10-CM

## 2023-06-06 DIAGNOSIS — I10 PRIMARY HYPERTENSION: ICD-10-CM

## 2023-06-06 DIAGNOSIS — C61 PROSTATE CANCER: ICD-10-CM

## 2023-06-06 DIAGNOSIS — E11.65 TYPE 2 DIABETES MELLITUS WITH HYPERGLYCEMIA, WITHOUT LONG-TERM CURRENT USE OF INSULIN: ICD-10-CM

## 2023-06-06 DIAGNOSIS — E78.49 OTHER HYPERLIPIDEMIA: ICD-10-CM

## 2023-06-06 DIAGNOSIS — Z12.11 COLON CANCER SCREENING: ICD-10-CM

## 2023-06-06 DIAGNOSIS — M79.89 MASS OF SOFT TISSUE OF FACE: ICD-10-CM

## 2023-06-06 LAB
EXPIRATION DATE: NORMAL
EXPIRATION DATE: NORMAL
FLUAV AG UPPER RESP QL IA.RAPID: NOT DETECTED
FLUBV AG UPPER RESP QL IA.RAPID: NOT DETECTED
INTERNAL CONTROL: NORMAL
INTERNAL CONTROL: NORMAL
Lab: NORMAL
Lab: NORMAL
S PYO AG THROAT QL: NEGATIVE
SARS-COV-2 AG UPPER RESP QL IA.RAPID: NOT DETECTED

## 2023-06-06 PROCEDURE — 3075F SYST BP GE 130 - 139MM HG: CPT | Performed by: INTERNAL MEDICINE

## 2023-06-06 PROCEDURE — 3080F DIAST BP >= 90 MM HG: CPT | Performed by: INTERNAL MEDICINE

## 2023-06-06 PROCEDURE — 1160F RVW MEDS BY RX/DR IN RCRD: CPT | Performed by: INTERNAL MEDICINE

## 2023-06-06 PROCEDURE — 99214 OFFICE O/P EST MOD 30 MIN: CPT | Performed by: INTERNAL MEDICINE

## 2023-06-06 PROCEDURE — 87081 CULTURE SCREEN ONLY: CPT | Performed by: INTERNAL MEDICINE

## 2023-06-06 PROCEDURE — 1159F MED LIST DOCD IN RCRD: CPT | Performed by: INTERNAL MEDICINE

## 2023-06-06 RX ORDER — AMOXICILLIN AND CLAVULANATE POTASSIUM 875; 125 MG/1; MG/1
1 TABLET, FILM COATED ORAL 2 TIMES DAILY
Qty: 14 TABLET | Refills: 0 | Status: SHIPPED | OUTPATIENT
Start: 2023-06-06 | End: 2023-06-13

## 2023-06-06 NOTE — PROGRESS NOTES
"Chief Complaint  Cough (Hard coughing ), Nasal Congestion, Abdominal Pain, Headache, and Sore Throat    Subjective          Jamir Porter presents to [facility]  History of Present Illness  Patient with fall about 1 week ago. Patient has completed keflex at this time.   Patient with cough and congestion that is productive of sputum. Patient reports abdominal soreness from coughing. Patient reports HA and sore throat. Symptoms have been present for 5 days. Patient has taken nyquil  without relief. Patient denies shortness of breath beyond baseline, but does have weakness. Patient reports poor appetite.   DM2- patient has stopped taking jardiance due to good control of blood glucose. Patient with weight gain. Patient reports HgbA1c approx 5.7 based on avg blood glucose readings.       Current Outpatient Medications   Medication Instructions    amoxicillin-clavulanate (AUGMENTIN) 875-125 MG per tablet 1 tablet, Oral, 2 Times Daily    ezetimibe (ZETIA) 10 mg, Oral, Daily    FLUoxetine (PROZAC) 40 mg, Oral, Daily    gabapentin (NEURONTIN) 100 mg, Oral, 2 Times Daily PRN    hydroCHLOROthiazide (HYDRODIURIL) 25 mg, Oral, Daily    ibuprofen (ADVIL,MOTRIN) 800 mg, Oral, Every 6 Hours PRN    Trulicity 3 MG/0.5ML solution pen-injector INJECT 3 MG SUBCUTANEOUSLY ONCE WEEKLY       The following portions of the patient's history were reviewed and updated as appropriate: allergies, current medications, past family history, past medical history, past social history, past surgical history, and problem list.    Objective   Vital Signs:   /91 (BP Location: Left arm)   Pulse 89   Temp 98.4 °F (36.9 °C) (Temporal)   Ht 177.8 cm (70\")   Wt 107 kg (235 lb 6.4 oz)   SpO2 95%   BMI 33.78 kg/m²     Wt Readings from Last 3 Encounters:   06/06/23 107 kg (235 lb 6.4 oz)   05/29/23 104 kg (228 lb 2.8 oz)   03/10/23 108 kg (238 lb 3.2 oz)     BP Readings from Last 3 Encounters: "   06/06/23 135/91   05/29/23 115/85   03/10/23 148/76     Physical Exam   Appearance: No acute distress, well-nourished  Head: normocephalic, atraumatic  Eyes: extraocular movements intact, no scleral icterus, no conjunctival injection  Ears, Nose, and Throat: external ears normal, nares patent, moist mucous membranes, tympanic membranes clear bilaterally. Tonsils within normal limits. Oropharynx clear.   Cardiovascular: regular rate and rhythm. no murmurs, rubs, or gallops. no edema  Respiratory: breathing comfortably, symmetric chest rise, clear to auscultation bilaterally. No wheezes, rales, or rhonchi.  Neuro: alert and moves all extremities equally  Lymph: no occipital, cervical, submandibular,or supraclavicular lymphadenopathy.      Result Review :   The following data was reviewed by: Amaury Mcdonough Jr, MD on 06/06/2023:  Common labs          12/15/2022    12:55 3/10/2023    11:15 5/18/2023    12:00   Common Labs   Glucose  185     BUN  16     Creatinine  0.86     Sodium  136     Potassium  3.6     Chloride  99     Calcium  8.8     Albumin  4.3     Total Bilirubin  0.4     Alkaline Phosphatase  94     AST (SGOT)  20     ALT (SGPT)  22     WBC  8.18     Hemoglobin  16.5     Hematocrit  47.4     Platelets  261     Total Cholesterol  208     Triglycerides  212     HDL Cholesterol  35     LDL Cholesterol   135     Hemoglobin A1C  5.90     Microalbumin, Urine  <1.2     PSA 3.630   3.610             Lab Results   Component Value Date    COVID19 NOT DETECTED 03/19/2021    BILIRUBINUR Negative 08/23/2022        Assessment and Plan    Diagnoses and all orders for this visit:    1. Acute non-recurrent maxillary sinusitis (Primary)  Comments:  wiil Rx augmentin. exam reassuring.  Orders:  -     amoxicillin-clavulanate (AUGMENTIN) 875-125 MG per tablet; Take 1 tablet by mouth 2 (Two) Times a Day for 7 days.  Dispense: 14 tablet; Refill: 0    2. Other hyperlipidemia  Comments:  cont zetia.  Orders:  -     Lipid  Panel; Future    3. Type 2 diabetes mellitus with hyperglycemia, without long-term current use of insulin  Comments:  good control. cont trulicity.  Orders:  -     Hemoglobin A1c; Future  -     Microalbumin / Creatinine Urine Ratio - Urine, Clean Catch; Future    4. Prostate cancer  Comments:  s/p TURP. active surveillance yearly.    5. Colon cancer screening  Comments:  pt reports recently completed at outside facility.    6. Primary hypertension  Comments:  well controlled on regimen.  Orders:  -     CBC & Differential; Future  -     Comprehensive Metabolic Panel; Future    7. Mass of soft tissue of face  Comments:  noted on exam. will obtian US to further eval.  Orders:  -     US Head Neck Soft Tissue; Future          Medications Discontinued During This Encounter   Medication Reason    baclofen (LIORESAL) 10 MG tablet *Therapy completed    cephalexin (KEFLEX) 500 MG capsule *Therapy completed    Jardiance 25 MG tablet tablet *Therapy completed          Follow Up   Return in about 3 months (around 9/6/2023).  Patient was given instructions and counseling regarding his condition or for health maintenance advice. Please see specific information pulled into the AVS if appropriate.       Amaury Mcdonough Jr, MD  06/06/23  15:33 EDT

## 2023-06-08 LAB — BACTERIA SPEC AEROBE CULT: NORMAL

## 2023-06-18 DIAGNOSIS — G62.9 NEUROPATHY: ICD-10-CM

## 2023-06-19 RX ORDER — DULAGLUTIDE 3 MG/.5ML
INJECTION, SOLUTION SUBCUTANEOUS
Qty: 6 ML | Refills: 3 | Status: SHIPPED | OUTPATIENT
Start: 2023-06-19

## 2023-06-19 RX ORDER — GABAPENTIN 100 MG/1
CAPSULE ORAL
Qty: 60 CAPSULE | Refills: 0 | Status: SHIPPED | OUTPATIENT
Start: 2023-06-19

## 2023-06-19 NOTE — TELEPHONE ENCOUNTER
Rx Refill Note  Requested Prescriptions     Pending Prescriptions Disp Refills    gabapentin (NEURONTIN) 100 MG capsule [Pharmacy Med Name: GABAPENTIN 100MG CAPSULES] 60 capsule      Sig: TAKE 1 CAPSULE BY MOUTH TWICE DAILY AS NEEDED FOR PAIN    Trulicity 3 MG/0.5ML solution pen-injector [Pharmacy Med Name: TRULICITY 3MG/0.5ML SDP 0.5ML] 6 mL 3     Sig: INJECT 3 MG SUBCUTANEOUSLY ONCE WEEKLY      Last office visit with prescribing clinician: 6/6/2023   Last telemedicine visit with prescribing clinician: Visit date not found   Next office visit with prescribing clinician: 9/11/2023                         Would you like a call back once the refill request has been completed: [] Yes [] No    If the office needs to give you a call back, can they leave a voicemail: [] Yes [] No    Deniz Hart  06/19/23, 07:33 EDT    Refill request for  controlled substance.      Date of request: 6/19/2023  (Please change date if request date is different than today's)  Medication requested: Gabapentin  Last OV:   acute appt was on 6/6/2023 regular follow up was 3/10/2023  Last UDS: 10/28/2021  Contract signed: yes    Date:10/28/2021  Next office visit: 9/11/2023    Deniz Hart

## 2023-09-05 ENCOUNTER — TELEPHONE (OUTPATIENT)
Dept: UROLOGY | Facility: CLINIC | Age: 78
End: 2023-09-05
Payer: MEDICARE

## 2023-09-05 NOTE — TELEPHONE ENCOUNTER
Pt wants to see if he can be worked in to discuss some surgical options. He has had a turp but he says it just isnt working right. He said that if Dr Ray just wants to call him and discuss what is going on that would be fine with him too.

## 2023-09-11 ENCOUNTER — OFFICE VISIT (OUTPATIENT)
Dept: INTERNAL MEDICINE | Facility: CLINIC | Age: 78
End: 2023-09-11
Payer: MEDICARE

## 2023-09-11 VITALS
WEIGHT: 241 LBS | OXYGEN SATURATION: 94 % | HEART RATE: 60 BPM | DIASTOLIC BLOOD PRESSURE: 80 MMHG | HEIGHT: 70 IN | RESPIRATION RATE: 18 BRPM | SYSTOLIC BLOOD PRESSURE: 136 MMHG | BODY MASS INDEX: 34.5 KG/M2 | TEMPERATURE: 97.1 F

## 2023-09-11 DIAGNOSIS — R79.89 ABNORMAL THYROID BLOOD TEST: ICD-10-CM

## 2023-09-11 DIAGNOSIS — G47.30 SLEEP APNEA, UNSPECIFIED TYPE: ICD-10-CM

## 2023-09-11 DIAGNOSIS — R26.89 BALANCE PROBLEM: ICD-10-CM

## 2023-09-11 DIAGNOSIS — I10 PRIMARY HYPERTENSION: ICD-10-CM

## 2023-09-11 DIAGNOSIS — E78.2 MIXED HYPERLIPIDEMIA: ICD-10-CM

## 2023-09-11 DIAGNOSIS — E11.65 TYPE 2 DIABETES MELLITUS WITH HYPERGLYCEMIA, WITHOUT LONG-TERM CURRENT USE OF INSULIN: Primary | ICD-10-CM

## 2023-09-11 DIAGNOSIS — K59.09 OTHER CONSTIPATION: ICD-10-CM

## 2023-09-11 DIAGNOSIS — C61 PROSTATE CANCER: ICD-10-CM

## 2023-09-11 DIAGNOSIS — R05.2 SUBACUTE COUGH: ICD-10-CM

## 2023-09-11 LAB
ALBUMIN SERPL-MCNC: 4.2 G/DL (ref 3.5–5.2)
ALBUMIN/GLOB SERPL: 1.6 G/DL
ALP SERPL-CCNC: 78 U/L (ref 39–117)
ALT SERPL W P-5'-P-CCNC: 21 U/L (ref 1–41)
ANION GAP SERPL CALCULATED.3IONS-SCNC: 8.6 MMOL/L (ref 5–15)
AST SERPL-CCNC: 23 U/L (ref 1–40)
BILIRUB SERPL-MCNC: 0.4 MG/DL (ref 0–1.2)
BUN SERPL-MCNC: 15 MG/DL (ref 8–23)
BUN/CREAT SERPL: 19.7 (ref 7–25)
CALCIUM SPEC-SCNC: 8.8 MG/DL (ref 8.6–10.5)
CHLORIDE SERPL-SCNC: 105 MMOL/L (ref 98–107)
CHOLEST SERPL-MCNC: 183 MG/DL (ref 0–200)
CO2 SERPL-SCNC: 27.4 MMOL/L (ref 22–29)
CREAT SERPL-MCNC: 0.76 MG/DL (ref 0.76–1.27)
EGFRCR SERPLBLD CKD-EPI 2021: 91.4 ML/MIN/1.73
GLOBULIN UR ELPH-MCNC: 2.7 GM/DL
GLUCOSE SERPL-MCNC: 77 MG/DL (ref 65–99)
HBA1C MFR BLD: 6.2 % (ref 4.8–5.6)
HDLC SERPL-MCNC: 38 MG/DL (ref 40–60)
LDLC SERPL CALC-MCNC: 122 MG/DL (ref 0–100)
LDLC/HDLC SERPL: 3.14 {RATIO}
MAGNESIUM SERPL-MCNC: 2.3 MG/DL (ref 1.6–2.4)
POTASSIUM SERPL-SCNC: 4.3 MMOL/L (ref 3.5–5.2)
PROT SERPL-MCNC: 6.9 G/DL (ref 6–8.5)
PSA SERPL-MCNC: 4.59 NG/ML (ref 0–4)
SODIUM SERPL-SCNC: 141 MMOL/L (ref 136–145)
TRIGL SERPL-MCNC: 128 MG/DL (ref 0–150)
TSH SERPL DL<=0.05 MIU/L-ACNC: 4.3 UIU/ML (ref 0.27–4.2)
VLDLC SERPL-MCNC: 23 MG/DL (ref 5–40)

## 2023-09-11 PROCEDURE — 84443 ASSAY THYROID STIM HORMONE: CPT | Performed by: INTERNAL MEDICINE

## 2023-09-11 PROCEDURE — 85025 COMPLETE CBC W/AUTO DIFF WBC: CPT | Performed by: INTERNAL MEDICINE

## 2023-09-11 PROCEDURE — 80053 COMPREHEN METABOLIC PANEL: CPT | Performed by: INTERNAL MEDICINE

## 2023-09-11 PROCEDURE — 82306 VITAMIN D 25 HYDROXY: CPT | Performed by: INTERNAL MEDICINE

## 2023-09-11 PROCEDURE — 82746 ASSAY OF FOLIC ACID SERUM: CPT | Performed by: INTERNAL MEDICINE

## 2023-09-11 PROCEDURE — 83036 HEMOGLOBIN GLYCOSYLATED A1C: CPT | Performed by: INTERNAL MEDICINE

## 2023-09-11 PROCEDURE — 83735 ASSAY OF MAGNESIUM: CPT | Performed by: INTERNAL MEDICINE

## 2023-09-11 PROCEDURE — 82607 VITAMIN B-12: CPT | Performed by: INTERNAL MEDICINE

## 2023-09-11 PROCEDURE — 84153 ASSAY OF PSA TOTAL: CPT | Performed by: INTERNAL MEDICINE

## 2023-09-11 PROCEDURE — 80061 LIPID PANEL: CPT | Performed by: INTERNAL MEDICINE

## 2023-09-11 RX ORDER — AMOXICILLIN 250 MG
1 CAPSULE ORAL 2 TIMES DAILY PRN
Qty: 60 TABLET | Refills: 0 | Status: SHIPPED | OUTPATIENT
Start: 2023-09-11

## 2023-09-11 NOTE — PROGRESS NOTES
The ABCs of the Annual Wellness Visit  Subsequent Medicare Wellness Visit    Subjective    Jamir Porter is a 78 y.o. male who presents for a Subsequent Medicare Wellness Visit.    The following portions of the patient's history were reviewed and   updated as appropriate: allergies, current medications, past family history, past medical history, past social history, past surgical history, and problem list.    Compared to one year ago, the patient feels his physical   health is worse.    Compared to one year ago, the patient feels his mental   health is worse.    Recent Hospitalizations:  He was not admitted to the hospital during the last year.       Current Medical Providers:  Patient Care Team:  Amaury Mcdonough Jr., MD as PCP - General (Internal Medicine)  Lenard Ray MD as Consulting Physician (Urology)    Outpatient Medications Prior to Visit   Medication Sig Dispense Refill    ezetimibe (ZETIA) 10 MG tablet TAKE 1 TABLET BY MOUTH DAILY 90 tablet 3    FLUoxetine (PROzac) 40 MG capsule TAKE 1 CAPSULE BY MOUTH DAILY 90 capsule 3    gabapentin (NEURONTIN) 100 MG capsule TAKE 1 CAPSULE BY MOUTH TWICE DAILY AS NEEDED FOR PAIN 60 capsule 0    hydroCHLOROthiazide (HYDRODIURIL) 12.5 MG tablet TAKE 2 TABLETS BY MOUTH DAILY (Patient taking differently: Take 1 tablet by mouth Daily.) 90 tablet 2    ibuprofen (ADVIL,MOTRIN) 200 MG tablet Take 4 tablets by mouth Every 6 (Six) Hours As Needed for Mild Pain.      Trulicity 3 MG/0.5ML solution pen-injector INJECT 3 MG SUBCUTANEOUSLY ONCE WEEKLY 6 mL 3    amoxicillin (AMOXIL) 875 MG tablet Take 1 tablet by mouth 2 (Two) Times a Day. 20 tablet 0     No facility-administered medications prior to visit.       No opioid medication identified on active medication list. I have reviewed chart for other potential  high risk medication/s and harmful drug interactions in the elderly.        Aspirin is not on active medication list.  Aspirin use is not indicated based on  "review of current medical condition/s. Risk of harm outweighs potential benefits.  .    Patient Active Problem List   Diagnosis    Prostate cancer    Allergic rhinitis    Arthritis    Balance problem    Broken bones    Cancer    Cataracts, bilateral    Chronic sinusitis    Colon polyps    Degeneration of intervertebral disc    Diverticulitis    Headache    Mixed hyperlipidemia    Primary hypertension    Limb swelling    Low back pain    Pacemaker    Prostate disorder    Sleep apnea    Type 2 diabetes mellitus with hyperglycemia, without long-term current use of insulin    Urge incontinence    Anxiety    History of left knee replacement    Left knee pain    Urinary retention    Benign prostatic hyperplasia with urinary retention    Lesion of bladder    Benign prostatic hyperplasia with lower urinary tract symptoms     Advance Care Planning   Advance Care Planning     Advance Directive is on file.      Objective    Vitals:    23 1030   BP: 136/80   BP Location: Right arm   Patient Position: Sitting   Cuff Size: Large Adult   Pulse: 60   Resp: 18   Temp: 97.1 °F (36.2 °C)   TempSrc: Temporal   SpO2: 94%   Weight: 109 kg (241 lb)   Height: 177.8 cm (70\")     Estimated body mass index is 34.58 kg/m² as calculated from the following:    Height as of this encounter: 177.8 cm (70\").    Weight as of this encounter: 109 kg (241 lb).      Does the patient have evidence of cognitive impairment? No          HEALTH RISK ASSESSMENT    Smoking Status:  Social History     Tobacco Use   Smoking Status Former    Packs/day: 1.50    Years: 17.00    Pack years: 25.50    Types: Cigarettes    Start date:     Quit date:     Years since quittin.7   Smokeless Tobacco Former   Tobacco Comments    1.5 ppd quit , 17 years total      Alcohol Consumption:  Social History     Substance and Sexual Activity   Alcohol Use Yes    Comment: rarely drinks 2021     Fall Risk Screen:    XIN Fall Risk Assessment was completed, " and patient is at LOW risk for falls.Assessment completed on:2023    Depression Screenin/11/2023    10:43 AM   PHQ-2/PHQ-9 Depression Screening   Little Interest or Pleasure in Doing Things 2-->more than half the days   Feeling Down, Depressed or Hopeless 1-->several days   Trouble Falling or Staying Asleep, or Sleeping Too Much 1-->several days   Feeling Tired or Having Little Energy 1-->several days   Poor Appetite or Overeating 0-->not at all   Feeling Bad about Yourself - or that You are a Failure or Have Let Yourself or Your Family Down 0-->not at all   Trouble Concentrating on Things, Such as Reading the Newspaper or Watching Television 0-->not at all   Moving or Speaking So Slowly that Other People Could Have Noticed? Or the Opposite - Being So Fidgety 0-->not at all   Thoughts that You Would be Better Off Dead or of Hurting Yourself in Some Way 0-->not at all   PHQ-9: Brief Depression Severity Measure Score 5   If You Checked Off Any Problems, How Difficult Have These Problems Made It For You to Do Your Work, Take Care of Things at Home, or Get Along with Other People? not difficult at all       Health Habits and Functional and Cognitive Screenin/11/2023    10:39 AM   Functional & Cognitive Status   Do you have difficulty preparing food and eating? No   Do you have difficulty bathing yourself, getting dressed or grooming yourself? No   Do you have difficulty using the toilet? No   Do you have difficulty moving around from place to place? No   Do you have trouble with steps or getting out of a bed or a chair? No   Current Diet Well Balanced Diet   Dental Exam Up to date   Eye Exam Up to date   Exercise (times per week) 2 times per week   Current Exercises Include No Regular Exercise   Do you need help using the phone?  No   Are you deaf or do you have serious difficulty hearing?  No   Do you need help to go to places out of walking distance? No   Do you need help shopping? No   Do you  need help preparing meals?  No   Do you need help with housework?  No   Do you need help with laundry? No   Do you need help taking your medications? No   Do you need help managing money? No   Do you ever drive or ride in a car without wearing a seat belt? No   Have you felt unusual stress, anger or loneliness in the last month? No   Who do you live with? Spouse   If you need help, do you have trouble finding someone available to you? No   Do you have difficulty concentrating, remembering or making decisions? Yes       Age-appropriate Screening Schedule:  Refer to the list below for future screening recommendations based on patient's age, sex and/or medical conditions. Orders for these recommended tests are listed in the plan section. The patient has been provided with a written plan.    Health Maintenance   Topic Date Due    TDAP/TD VACCINES (1 - Tdap) Never done    ZOSTER VACCINE (1 of 2) Never done    COVID-19 Vaccine (3 - Moderna series) 12/10/2021    ANNUAL WELLNESS VISIT  09/24/2022    HEMOGLOBIN A1C  09/10/2023    INFLUENZA VACCINE  10/01/2023    LIPID PANEL  03/10/2024    URINE MICROALBUMIN  03/10/2024    DIABETIC EYE EXAM  06/08/2024    BMI FOLLOWUP  09/11/2024    COLORECTAL CANCER SCREENING  06/01/2026    HEPATITIS C SCREENING  Completed    Pneumococcal Vaccine 65+  Completed                  CMS Preventative Services Quick Reference  Risk Factors Identified During Encounter  Immunizations Discussed/Encouraged: Influenza and COVID19  The above risks/problems have been discussed with the patient.  Pertinent information has been shared with the patient in the After Visit Summary.  An After Visit Summary and PPPS were made available to the patient.    Follow Up:   Next Medicare Wellness visit to be scheduled in 1 year.       Additional E&M Note during same encounter follows:  Patient has multiple medical problems which are significant and separately identifiable that require additional work above and beyond  the Medicare Wellness Visit.      Chief Complaint  Medicare Wellness-Initial Visit, Cough (Pt states he has had a cough for over a month ), Leg Pain (Pt states left calf has been bothering him for about 2 months), and Constipation    Subjective          Jamir Porter presents to [facility]  History of Present Illness  Patient with cough x1 month. He has tried several OTC stuff without relief. He went to [facility] on 8/15 and given Prescription amoxil. Patient had augmentin in 6/2023. Patient reports history of aspiration. Patient does report intermittent choking.   Patient also with ongoing constipation over last 3-4 weeks. Patient is spending approx 30 minutes on the commode as well. Patient reports colace is not helping this time.   Patient reports having tightness in his left calf. He has been doing less exercising, but is in the caregiver role.   Patient reports having some difficulty ambulating and some balance difficulty. Patient denies lightheadedness and dizziness. Patient reports waking up and being unaware of where he is. Patient with sleep apnea, but does not use any devices to help with sleep.     Current Outpatient Medications   Medication Instructions    ezetimibe (ZETIA) 10 mg, Oral, Daily    FLUoxetine (PROZAC) 40 mg, Oral, Daily    gabapentin (NEURONTIN) 100 MG capsule TAKE 1 CAPSULE BY MOUTH TWICE DAILY AS NEEDED FOR PAIN    hydroCHLOROthiazide (HYDRODIURIL) 25 mg, Oral, Daily    ibuprofen (ADVIL,MOTRIN) 800 mg, Oral, Every 6 Hours PRN    sennosides-docusate (senna-docusate sodium) 8.6-50 MG per tablet 1 tablet, Oral, 2 Times Daily PRN    Trulicity 3 MG/0.5ML solution pen-injector INJECT 3 MG SUBCUTANEOUSLY ONCE WEEKLY       The following portions of the patient's history were reviewed and updated as appropriate: allergies, current medications, past family history, past medical history, past social history, past surgical history, and problem  "list.    Objective   Vital Signs:   /80 (BP Location: Right arm, Patient Position: Sitting, Cuff Size: Large Adult)   Pulse 60   Temp 97.1 °F (36.2 °C) (Temporal)   Resp 18   Ht 177.8 cm (70\")   Wt 109 kg (241 lb)   SpO2 94%   BMI 34.58 kg/m²     BP Readings from Last 3 Encounters:   09/11/23 136/80   08/15/23 150/65   06/06/23 135/91     Wt Readings from Last 3 Encounters:   09/11/23 109 kg (241 lb)   08/15/23 107 kg (235 lb 14.3 oz)   06/06/23 107 kg (235 lb 6.4 oz)     BMI is >= 30 and <35. (Class 1 Obesity). The following options were offered after discussion;: exercise counseling/recommendations, nutrition counseling/recommendations, and pharmacological intervention options    Physical Exam     Appearance: No acute distress, well-nourished  Head: normocephalic, atraumatic  Eyes: extraocular movements intact, no scleral icterus, no conjunctival injection  Ears, Nose, and Throat: external ears normal, nares patent, moist mucous membranes  Cardiovascular: regular rate and rhythm. no murmurs, rubs, or gallops. no edema  Respiratory: breathing comfortably, symmetric chest rise, clear to auscultation bilaterally. No wheezes, rales, or rhonchi.  Neuro: alert and oriented to time, place, and person. Normal gait  Psych: normal mood and affect     Result Review :   The following data was reviewed by: Amaury Mcdonough Jr, MD on 09/11/2023:  Common labs          12/15/2022    12:55 3/10/2023    11:15 5/18/2023    12:00   Common Labs   Glucose  185     BUN  16     Creatinine  0.86     Sodium  136     Potassium  3.6     Chloride  99     Calcium  8.8     Albumin  4.3     Total Bilirubin  0.4     Alkaline Phosphatase  94     AST (SGOT)  20     ALT (SGPT)  22     WBC  8.18     Hemoglobin  16.5     Hematocrit  47.4     Platelets  261     Total Cholesterol  208     Triglycerides  212     HDL Cholesterol  35     LDL Cholesterol   135     Hemoglobin A1C  5.90     Microalbumin, Urine  <1.2     PSA 3.630   3.610  "            Lab Results   Component Value Date    SARSANTIGEN Not Detected 08/15/2023    COVID19 NOT DETECTED 03/19/2021    RAPFLUA Negative 08/15/2023    RAPFLUB Negative 08/15/2023    FLUAAG Not Detected 06/06/2023    FLUBAG Not Detected 06/06/2023    RAPSCRN Negative 06/06/2023    BILIRUBINUR Negative 08/23/2022          Assessment and Plan    Diagnoses and all orders for this visit:    1. Type 2 diabetes mellitus with hyperglycemia, without long-term current use of insulin (Primary)  Comments:  check labs. cont monitoring.  Orders:  -     Hemoglobin A1c    2. Abnormal thyroid blood test  -     TSH    3. Primary hypertension  Comments:  well controlled on current regimen.  Orders:  -     CBC & Differential  -     Comprehensive Metabolic Panel    4. Prostate cancer  -     PSA DIAGNOSTIC    5. Mixed hyperlipidemia  Comments:  check  lipids. previous intolerance to statins. cont zetia  Orders:  -     Lipid Panel    6. Sleep apnea, unspecified type  -     Ambulatory Referral to Sleep Medicine    7. Other constipation  Comments:  thought to be post-infectious ileus. will Rx senna-S to help.  Orders:  -     sennosides-docusate (senna-docusate sodium) 8.6-50 MG per tablet; Take 1 tablet by mouth 2 (Two) Times a Day As Needed for Constipation.  Dispense: 60 tablet; Refill: 0    8. Subacute cough  Comments:  CXR to further eval. may consider short course of steroids and/or ABx based on result.  Orders:  -     XR Chest PA & Lateral; Future    9. Balance problem  -     Magnesium  -     Vitamin B12 & Folate  -     Vitamin D,25-Hydroxy          Medications Discontinued During This Encounter   Medication Reason    amoxicillin (AMOXIL) 875 MG tablet *Therapy completed          Follow Up   Return in about 3 months (around 12/11/2023) for Recheck, HTN, DM.  Patient was given instructions and counseling regarding his condition or for health maintenance advice. Please see specific information pulled into the AVS if appropriate.        Amaury Mcdonough Jr, MD  09/11/23  12:18 EDT

## 2023-09-12 ENCOUNTER — HOSPITAL ENCOUNTER (OUTPATIENT)
Dept: GENERAL RADIOLOGY | Facility: HOSPITAL | Age: 78
Discharge: HOME | End: 2023-09-12
Admitting: INTERNAL MEDICINE
Payer: MEDICARE

## 2023-09-12 DIAGNOSIS — R05.2 SUBACUTE COUGH: ICD-10-CM

## 2023-09-12 LAB
25(OH)D3 SERPL-MCNC: 18.6 NG/ML (ref 30–100)
BASOPHILS # BLD AUTO: 0.06 10*3/MM3 (ref 0–0.2)
BASOPHILS NFR BLD AUTO: 0.7 % (ref 0–1.5)
DEPRECATED RDW RBC AUTO: 41 FL (ref 37–54)
EOSINOPHIL # BLD AUTO: 0.44 10*3/MM3 (ref 0–0.4)
EOSINOPHIL NFR BLD AUTO: 5.5 % (ref 0.3–6.2)
ERYTHROCYTE [DISTWIDTH] IN BLOOD BY AUTOMATED COUNT: 11.9 % (ref 12.3–15.4)
FOLATE SERPL-MCNC: 8.42 NG/ML (ref 4.78–24.2)
HCT VFR BLD AUTO: 41.1 % (ref 37.5–51)
HGB BLD-MCNC: 14.2 G/DL (ref 13–17.7)
IMM GRANULOCYTES # BLD AUTO: 0.04 10*3/MM3 (ref 0–0.05)
IMM GRANULOCYTES NFR BLD AUTO: 0.5 % (ref 0–0.5)
LYMPHOCYTES # BLD AUTO: 1.33 10*3/MM3 (ref 0.7–3.1)
LYMPHOCYTES NFR BLD AUTO: 16.5 % (ref 19.6–45.3)
MCH RBC QN AUTO: 32.3 PG (ref 26.6–33)
MCHC RBC AUTO-ENTMCNC: 34.5 G/DL (ref 31.5–35.7)
MCV RBC AUTO: 93.4 FL (ref 79–97)
MONOCYTES # BLD AUTO: 0.76 10*3/MM3 (ref 0.1–0.9)
MONOCYTES NFR BLD AUTO: 9.4 % (ref 5–12)
NEUTROPHILS NFR BLD AUTO: 5.43 10*3/MM3 (ref 1.7–7)
NEUTROPHILS NFR BLD AUTO: 67.4 % (ref 42.7–76)
NRBC BLD AUTO-RTO: 0.1 /100 WBC (ref 0–0.2)
PLATELET # BLD AUTO: 262 10*3/MM3 (ref 140–450)
PMV BLD AUTO: 10.5 FL (ref 6–12)
RBC # BLD AUTO: 4.4 10*6/MM3 (ref 4.14–5.8)
VIT B12 BLD-MCNC: 667 PG/ML (ref 211–946)
WBC NRBC COR # BLD: 8.06 10*3/MM3 (ref 3.4–10.8)

## 2023-09-12 PROCEDURE — 71046 X-RAY EXAM CHEST 2 VIEWS: CPT

## 2023-09-12 RX ORDER — LEVOTHYROXINE SODIUM 0.07 MG/1
75 TABLET ORAL DAILY
Qty: 90 TABLET | Refills: 1 | Status: SHIPPED | OUTPATIENT
Start: 2023-09-12

## 2023-09-12 RX ORDER — ERGOCALCIFEROL 1.25 MG/1
50000 CAPSULE ORAL WEEKLY
Qty: 13 CAPSULE | Refills: 1 | Status: SHIPPED | OUTPATIENT
Start: 2023-09-12

## 2023-09-15 ENCOUNTER — PREP FOR SURGERY (OUTPATIENT)
Dept: OTHER | Facility: HOSPITAL | Age: 78
End: 2023-09-15
Payer: MEDICARE

## 2023-09-15 DIAGNOSIS — G57.62 LESION OF LEFT PLANTAR NERVE: Primary | ICD-10-CM

## 2023-09-18 ENCOUNTER — TELEPHONE (OUTPATIENT)
Dept: INTERNAL MEDICINE | Facility: CLINIC | Age: 78
End: 2023-09-18

## 2023-09-18 PROBLEM — G57.62 LESION OF LEFT PLANTAR NERVE: Status: ACTIVE | Noted: 2023-09-18

## 2023-09-18 NOTE — TELEPHONE ENCOUNTER
Patient is scheduled for surgery with [facility] on December 28th. Represenative from their office called to get fax number to send over form for clearance for surgery. States it will need to be 60 days before.

## 2023-10-30 ENCOUNTER — TELEPHONE (OUTPATIENT)
Dept: UROLOGY | Facility: CLINIC | Age: 78
End: 2023-10-30
Payer: MEDICARE

## 2023-10-30 DIAGNOSIS — N39.41 URGE INCONTINENCE: Primary | ICD-10-CM

## 2023-10-30 NOTE — TELEPHONE ENCOUNTER
Patient called stating the Vibegron 75mg samples worked really well for him so he would like a prescription called in for it.

## 2023-11-03 RX ORDER — HYDROCHLOROTHIAZIDE 12.5 MG/1
25 TABLET ORAL DAILY
Qty: 90 TABLET | Refills: 2 | Status: SHIPPED | OUTPATIENT
Start: 2023-11-03

## 2023-11-06 ENCOUNTER — TELEPHONE (OUTPATIENT)
Dept: UROLOGY | Facility: CLINIC | Age: 78
End: 2023-11-06
Payer: MEDICARE

## 2023-11-06 NOTE — TELEPHONE ENCOUNTER
Patient called, he states that he was given samples of Gemtessa, he said that the medication is working great. He stated that it was sent to his pharmacy but according to them it is not covered by insurance and he said that it is too expensive for him to pay out of pocket. He wanted to see if there is a way for Dr Ray to do a peer to peer or maybe give him some more samples

## 2023-11-09 NOTE — TELEPHONE ENCOUNTER
Patient has tried and failed oxybutynin and myrbetriq and could not tolerate.       Sent patient [employer] message, with website to see if he could apply for savings card.

## 2023-11-10 NOTE — TELEPHONE ENCOUNTER
PATIENT CALLED AND STATED THAT THE PRESCRIPTION FOR GEMTESA IS $1600.00, AND HE CANNOT AFFORD THIS.  HE SAID IT REQUIRES A PA BY PROVIDER, AND HE WANTS TO KNOW IF WE CAN GET THIS AUTHORIZED FOR HIM    I ASKED HIM IF HE TRIED MYRBETRIQ OR OXYBUTYNIN.  HE SAID HE TRIED MYRBETRIQ, BUT IT DIDN'T WORK.  UNSURE OF THE OXYBUTYNIN.    I DID READ THE MESSAGE TO HIM THAT [employer] SENT THROUGH MY CHART, AND TOLD HIM I WILL LEAVE A MESSAGE.      CAN HE GET MORE GEMTESA SAMPLES.

## 2023-11-13 NOTE — TELEPHONE ENCOUNTER
Called and spoke with patient regarding gemtesa. He did not try and apply for savings card, because he does not believe he will qualify. I explained to patient it would at least be worth trying. I did get patient 2 month worth of samples, to see if we can get something figured out in the mean time. Patient voiced understanding and will apply for savings card. He will come  samples at his convenience.

## 2023-11-22 ENCOUNTER — TELEPHONE (OUTPATIENT)
Dept: UROLOGY | Facility: CLINIC | Age: 78
End: 2023-11-22
Payer: MEDICARE

## 2023-11-22 NOTE — TELEPHONE ENCOUNTER
----- Message from Rosey Brandon sent at 11/22/2023  7:26 AM EST -----  Per , can you move patients appt out 4-5 weeks with MRI prostate prior. Please let patient know we will call him with the MRI appt information.   Appt does not have to be a cysto as he's had one earlier this year  ----- Message -----  From: Lenard Ray MD  Sent: 11/22/2023   6:59 AM EST  To: Rosey Srivastavassinger      Coming in on Monday, does not have to be cystoscopy, just did well earlier in the year.  Needs MRI prostate before he sees me if possible

## 2023-11-22 NOTE — TELEPHONE ENCOUNTER
CALLED AND SPOKE W/ PT TO MOVE APPT OUT 4-5 WEEKS W/ MRI PRIOR. INFORMED PT [facility] WILL REACH OUT W/ APPOINTMENT DATE FOR MRI.    Future Appointments         Provider Department Center    12/12/2023 10:15 AM Amaury Mcdonough Jr., MD [facility]    12/18/2023 9:30 AM Lenard Ray MD [facility]    5/28/2024 10:00 AM Lenard Ray MD [facility] UROLOG[facility]

## 2023-11-29 ENCOUNTER — TELEPHONE (OUTPATIENT)
Dept: UROLOGY | Facility: CLINIC | Age: 78
End: 2023-11-29
Payer: MEDICARE

## 2023-11-29 NOTE — TELEPHONE ENCOUNTER
Notified pt of scheduled MRI Prostate at [facility] ([address]) on 12/15/23 at 2:30pm, arrive at 2:00pm. No prep. Pt will follow up with us as previously scheduled on 12/18/23. He verbalized understanding of instruction via teach back

## 2023-12-12 ENCOUNTER — OFFICE VISIT (OUTPATIENT)
Dept: INTERNAL MEDICINE | Facility: CLINIC | Age: 78
End: 2023-12-12
Payer: MEDICARE

## 2023-12-12 VITALS
TEMPERATURE: 97.5 F | OXYGEN SATURATION: 93 % | SYSTOLIC BLOOD PRESSURE: 148 MMHG | DIASTOLIC BLOOD PRESSURE: 80 MMHG | BODY MASS INDEX: 34.73 KG/M2 | HEART RATE: 62 BPM | HEIGHT: 70 IN | WEIGHT: 242.6 LBS

## 2023-12-12 DIAGNOSIS — Z23 NEED FOR COVID-19 VACCINE: ICD-10-CM

## 2023-12-12 DIAGNOSIS — M25.551 RIGHT HIP PAIN: ICD-10-CM

## 2023-12-12 DIAGNOSIS — E78.2 MIXED HYPERLIPIDEMIA: ICD-10-CM

## 2023-12-12 DIAGNOSIS — E11.65 TYPE 2 DIABETES MELLITUS WITH HYPERGLYCEMIA, WITHOUT LONG-TERM CURRENT USE OF INSULIN: Primary | ICD-10-CM

## 2023-12-12 DIAGNOSIS — E03.9 ACQUIRED HYPOTHYROIDISM: ICD-10-CM

## 2023-12-12 DIAGNOSIS — Z23 NEED FOR INFLUENZA VACCINATION: ICD-10-CM

## 2023-12-12 DIAGNOSIS — I10 PRIMARY HYPERTENSION: ICD-10-CM

## 2023-12-12 DIAGNOSIS — Z29.11 NEED FOR RSV VACCINATION: ICD-10-CM

## 2023-12-12 DIAGNOSIS — M51.36 DDD (DEGENERATIVE DISC DISEASE), LUMBAR: ICD-10-CM

## 2023-12-12 PROBLEM — M51.369 DDD (DEGENERATIVE DISC DISEASE), LUMBAR: Status: ACTIVE | Noted: 2021-09-24

## 2023-12-12 LAB
ALBUMIN SERPL-MCNC: 4.6 G/DL (ref 3.5–5.2)
ALBUMIN/GLOB SERPL: 1.8 G/DL
ALP SERPL-CCNC: 82 U/L (ref 39–117)
ALT SERPL W P-5'-P-CCNC: 22 U/L (ref 1–41)
AMPHET+METHAMPHET UR QL: NEGATIVE
AMPHETAMINE INTERNAL CONTROL: NORMAL
AMPHETAMINES UR QL: NEGATIVE
ANION GAP SERPL CALCULATED.3IONS-SCNC: 10 MMOL/L (ref 5–15)
AST SERPL-CCNC: 25 U/L (ref 1–40)
BARBITURATE INTERNAL CONTROL: NORMAL
BARBITURATES UR QL SCN: NEGATIVE
BASOPHILS # BLD AUTO: 0.09 10*3/MM3 (ref 0–0.2)
BASOPHILS NFR BLD AUTO: 1.1 % (ref 0–1.5)
BENZODIAZ UR QL SCN: NEGATIVE
BENZODIAZEPINE INTERNAL CONTROL: NORMAL
BILIRUB SERPL-MCNC: 0.4 MG/DL (ref 0–1.2)
BUN SERPL-MCNC: 18 MG/DL (ref 8–23)
BUN/CREAT SERPL: 12 (ref 7–25)
BUPRENORPHINE INTERNAL CONTROL: NORMAL
BUPRENORPHINE SERPL-MCNC: NEGATIVE NG/ML
CALCIUM SPEC-SCNC: 9.5 MG/DL (ref 8.6–10.5)
CANNABINOIDS SERPL QL: NEGATIVE
CHLORIDE SERPL-SCNC: 99 MMOL/L (ref 98–107)
CHOLEST SERPL-MCNC: 223 MG/DL (ref 0–200)
CO2 SERPL-SCNC: 28 MMOL/L (ref 22–29)
COCAINE INTERNAL CONTROL: NORMAL
COCAINE UR QL: NEGATIVE
CREAT SERPL-MCNC: 1.5 MG/DL (ref 0.76–1.27)
DEPRECATED RDW RBC AUTO: 41.5 FL (ref 37–54)
EGFRCR SERPLBLD CKD-EPI 2021: 47.1 ML/MIN/1.73
EOSINOPHIL # BLD AUTO: 0.53 10*3/MM3 (ref 0–0.4)
EOSINOPHIL NFR BLD AUTO: 6.3 % (ref 0.3–6.2)
ERYTHROCYTE [DISTWIDTH] IN BLOOD BY AUTOMATED COUNT: 12.2 % (ref 12.3–15.4)
EXPIRATION DATE: NORMAL
GLOBULIN UR ELPH-MCNC: 2.6 GM/DL
GLUCOSE SERPL-MCNC: 110 MG/DL (ref 65–99)
HBA1C MFR BLD: 5.8 % (ref 4.8–5.6)
HCT VFR BLD AUTO: 43.8 % (ref 37.5–51)
HDLC SERPL-MCNC: 40 MG/DL (ref 40–60)
HGB BLD-MCNC: 15.3 G/DL (ref 13–17.7)
IMM GRANULOCYTES # BLD AUTO: 0.05 10*3/MM3 (ref 0–0.05)
IMM GRANULOCYTES NFR BLD AUTO: 0.6 % (ref 0–0.5)
LDLC SERPL CALC-MCNC: 156 MG/DL (ref 0–100)
LDLC/HDLC SERPL: 3.83 {RATIO}
LYMPHOCYTES # BLD AUTO: 1.47 10*3/MM3 (ref 0.7–3.1)
LYMPHOCYTES NFR BLD AUTO: 17.5 % (ref 19.6–45.3)
Lab: NORMAL
MCH RBC QN AUTO: 32.6 PG (ref 26.6–33)
MCHC RBC AUTO-ENTMCNC: 34.9 G/DL (ref 31.5–35.7)
MCV RBC AUTO: 93.2 FL (ref 79–97)
MDMA (ECSTASY) INTERNAL CONTROL: NORMAL
MDMA UR QL SCN: NEGATIVE
METHADONE INTERNAL CONTROL: NORMAL
METHADONE UR QL SCN: NEGATIVE
METHAMPHETAMINE INTERNAL CONTROL: NORMAL
MONOCYTES # BLD AUTO: 0.82 10*3/MM3 (ref 0.1–0.9)
MONOCYTES NFR BLD AUTO: 9.7 % (ref 5–12)
NEUTROPHILS NFR BLD AUTO: 5.46 10*3/MM3 (ref 1.7–7)
NEUTROPHILS NFR BLD AUTO: 64.8 % (ref 42.7–76)
NRBC BLD AUTO-RTO: 0 /100 WBC (ref 0–0.2)
OPIATES INTERNAL CONTROL: NORMAL
OPIATES UR QL: NEGATIVE
OXYCODONE INTERNAL CONTROL: NORMAL
OXYCODONE UR QL SCN: NEGATIVE
PCP UR QL SCN: NEGATIVE
PHENCYCLIDINE INTERNAL CONTROL: NORMAL
PLATELET # BLD AUTO: 285 10*3/MM3 (ref 140–450)
PMV BLD AUTO: 10.6 FL (ref 6–12)
POTASSIUM SERPL-SCNC: 4.2 MMOL/L (ref 3.5–5.2)
PROT SERPL-MCNC: 7.2 G/DL (ref 6–8.5)
RBC # BLD AUTO: 4.7 10*6/MM3 (ref 4.14–5.8)
SODIUM SERPL-SCNC: 137 MMOL/L (ref 136–145)
THC INTERNAL CONTROL: NORMAL
TRIGL SERPL-MCNC: 150 MG/DL (ref 0–150)
TSH SERPL DL<=0.05 MIU/L-ACNC: 5.7 UIU/ML (ref 0.27–4.2)
VLDLC SERPL-MCNC: 27 MG/DL (ref 5–40)
WBC NRBC COR # BLD AUTO: 8.42 10*3/MM3 (ref 3.4–10.8)

## 2023-12-12 PROCEDURE — 3079F DIAST BP 80-89 MM HG: CPT | Performed by: INTERNAL MEDICINE

## 2023-12-12 PROCEDURE — 84443 ASSAY THYROID STIM HORMONE: CPT | Performed by: INTERNAL MEDICINE

## 2023-12-12 PROCEDURE — 90662 IIV NO PRSV INCREASED AG IM: CPT | Performed by: INTERNAL MEDICINE

## 2023-12-12 PROCEDURE — 99214 OFFICE O/P EST MOD 30 MIN: CPT | Performed by: INTERNAL MEDICINE

## 2023-12-12 PROCEDURE — 85025 COMPLETE CBC W/AUTO DIFF WBC: CPT | Performed by: INTERNAL MEDICINE

## 2023-12-12 PROCEDURE — 80061 LIPID PANEL: CPT | Performed by: INTERNAL MEDICINE

## 2023-12-12 PROCEDURE — 83036 HEMOGLOBIN GLYCOSYLATED A1C: CPT | Performed by: INTERNAL MEDICINE

## 2023-12-12 PROCEDURE — 80053 COMPREHEN METABOLIC PANEL: CPT | Performed by: INTERNAL MEDICINE

## 2023-12-12 PROCEDURE — 80305 DRUG TEST PRSMV DIR OPT OBS: CPT | Performed by: INTERNAL MEDICINE

## 2023-12-12 PROCEDURE — 3077F SYST BP >= 140 MM HG: CPT | Performed by: INTERNAL MEDICINE

## 2023-12-12 PROCEDURE — G0008 ADMIN INFLUENZA VIRUS VAC: HCPCS | Performed by: INTERNAL MEDICINE

## 2023-12-12 RX ORDER — DULAGLUTIDE 4.5 MG/.5ML
4.5 INJECTION, SOLUTION SUBCUTANEOUS WEEKLY
Qty: 9 ML | Refills: 3 | Status: SHIPPED | OUTPATIENT
Start: 2023-12-12

## 2023-12-12 NOTE — PROGRESS NOTES
"Chief Complaint  Diabetes (Type 2 ) and referral request  (Ortho requesting Dr. Miranda /Hip replacement (right) )    Subjective          Jamir Porter presents to [facility]  History of Present Illness  Patient reports ongoing right hip and low back pain. It is affecting his sleep. He is using a motrin and antihistamine to help with sleep. Patient reports previously having side effects with other pain medication. He will intermittently taking gabapentin. Patient has tried physical therapy for his back preivously but reports it worsened his back pain.   Hypertension- patient denies headaches, dizziness, chest pain.   Hyperlipidemia- doing well on zetia. Previously intolerant to statins.   Hypothyroid- due for recheck.   DM2- patient reports trulicity does not seem to be helpgin with weigh tloss as much nay more. He is amenable to trying higher dosage. Tolerating 3mg dosage well.     Current Outpatient Medications   Medication Instructions    ezetimibe (ZETIA) 10 mg, Oral, Daily    FLUoxetine (PROZAC) 40 mg, Oral, Daily    gabapentin (NEURONTIN) 100 MG capsule TAKE 1 CAPSULE BY MOUTH TWICE DAILY AS NEEDED FOR PAIN    hydroCHLOROthiazide (HYDRODIURIL) 25 mg, Oral, Daily    ibuprofen (ADVIL,MOTRIN) 800 mg, Oral, Every 6 Hours PRN    levothyroxine (SYNTHROID) 75 mcg, Oral, Daily    Trulicity 4.5 mg, Subcutaneous, Weekly    Vibegron 75 mg, Oral, Daily    vitamin D (ERGOCALCIFEROL) 50,000 Units, Oral, Weekly       The following portions of the patient's history were reviewed and updated as appropriate: allergies, current medications, past family history, past medical history, past social history, past surgical history, and problem list.      Objective   Vital Signs:   /80 (BP Location: Left arm)   Pulse 62   Temp 97.5 °F (36.4 °C) (Temporal)   Ht 177.8 cm (70\")   Wt 110 kg (242 lb 9.6 oz)   SpO2 93%   BMI 34.81 kg/m²     BP Readings from Last 3 Encounters: "   12/12/23 148/80   09/11/23 136/80   08/15/23 150/65     Wt Readings from Last 3 Encounters:   12/12/23 110 kg (242 lb 9.6 oz)   09/11/23 109 kg (241 lb)   08/15/23 107 kg (235 lb 14.3 oz)       Physical Exam   Appearance: No acute distress, well-nourished  Head: normocephalic, atraumatic  Eyes: extraocular movements intact, no scleral icterus, no conjunctival injection  Ears, Nose, and Throat: external ears normal, nares patent, moist mucous membranes  Cardiovascular: regular rate and rhythm. no murmurs, rubs, or gallops. no edema  Respiratory: breathing comfortably, symmetric chest rise, clear to auscultation bilaterally. No wheezes, rales, or rhonchi.  Neuro: alert and oriented to time, place, and person. Normal gait  Psych: normal mood and affect     Result Review :   The following data was reviewed by: Amaury Mcdonough Jr, MD on 12/12/2023:  Common labs          3/10/2023    11:15 5/18/2023    12:00 9/11/2023    11:27   Common Labs   Glucose 185   77    BUN 16   15    Creatinine 0.86   0.76    Sodium 136   141    Potassium 3.6   4.3    Chloride 99   105    Calcium 8.8   8.8    Albumin 4.3   4.2    Total Bilirubin 0.4   0.4    Alkaline Phosphatase 94   78    AST (SGOT) 20   23    ALT (SGPT) 22   21    WBC 8.18   8.06    Hemoglobin 16.5   14.2    Hematocrit 47.4   41.1    Platelets 261   262    Total Cholesterol 208   183    Triglycerides 212   128    HDL Cholesterol 35   38    LDL Cholesterol  135   122    Hemoglobin A1C 5.90   6.20    Microalbumin, Urine <1.2      PSA  3.610  4.590        Lab Results   Component Value Date    SARSANTIGEN Not Detected 08/15/2023    COVID19 NOT DETECTED 03/19/2021    RAPFLUA Negative 08/15/2023    RAPFLUB Negative 08/15/2023    FLUAAG Not Detected 06/06/2023    FLUBAG Not Detected 06/06/2023    RAPSCRN Negative 06/06/2023    BILIRUBINUR Negative 08/23/2022       Last Urine Toxicity  More data exists         Latest Ref Rng & Units 12/12/2023 3/10/2023   LAST URINE TOXICITY  RESULTS   Creatinine, Urine mg/dL - 31.6    Amphetamine, Urine Qual Negative Negative  -   Barbiturates Screen, Urine Negative Negative  -   Benzodiazepine Screen, Urine Negative Negative  -   Buprenorphine, Screen, Urine Negative Negative  -   Cocaine Screen, Urine Negative Negative  -   Methadone Screen , Urine Negative Negative  -   Methamphetamine, Ur Negative Negative  -         Assessment and Plan    Diagnoses and all orders for this visit:    1. Type 2 diabetes mellitus with hyperglycemia, without long-term current use of insulin (Primary)  Comments:  inc trulicity to 4.5mg to help augment weight loss efforts.  Orders:  -     Dulaglutide (Trulicity) 4.5 MG/0.5ML solution pen-injector; Inject 0.5 mL under the skin into the appropriate area as directed 1 (One) Time Per Week.  Dispense: 9 mL; Refill: 3  -     Hemoglobin A1c    2. Right hip pain  Comments:  refer to ortho for further eval. pt considering hip replacement.  Orders:  -     Ambulatory Referral to Orthopedic Surgery    3. Need for influenza vaccination  -     Fluzone High-Dose 65+yrs    4. Primary hypertension  Comments:  slightly elevated today, will cont monitoring on current regimen. montior at home. goal BP <130/80  Orders:  -     CBC & Differential  -     Comprehensive Metabolic Panel    5. DDD (degenerative disc disease), lumbar  Comments:  will further eval with imaging and likely BASILIO f/u.  Orders:  -     POC Urine Drug Screen Premier Bio-Cup  -     XR Spine Lumbar 4+ View; Future  -     MRI Lumbar Spine Without Contrast; Future  -     Ambulatory Referral to Physical Therapy    6. Need for RSV vaccination    7. Need for COVID-19 vaccine  Comments:  pt defers today    8. Mixed hyperlipidemia  Comments:  intolerant to statins. cont zetia.  Orders:  -     Lipid Panel    9. Acquired hypothyroidism  Comments:  check TSH and adjust synthroid accordingly  Orders:  -     TSH        Medications Discontinued During This Encounter   Medication Reason     sennosides-docusate (senna-docusate sodium) 8.6-50 MG per tablet *Therapy completed    Trulicity 3 MG/0.5ML solution pen-injector Alternate therapy          Follow Up   Return in about 4 months (around 4/12/2024) for Recheck, DM, HTN.  Patient was given instructions and counseling regarding his condition or for health maintenance advice. Please see specific information pulled into the AVS if appropriate.       Amaury Mcdonough Jr, MD  12/12/23  11:21 EST

## 2023-12-14 ENCOUNTER — TELEPHONE (OUTPATIENT)
Dept: INTERNAL MEDICINE | Facility: CLINIC | Age: 78
End: 2023-12-14
Payer: MEDICARE

## 2023-12-14 DIAGNOSIS — N17.9 AKI (ACUTE KIDNEY INJURY): Primary | ICD-10-CM

## 2023-12-14 RX ORDER — LEVOTHYROXINE SODIUM 0.1 MG/1
100 TABLET ORAL DAILY
Qty: 90 TABLET | Refills: 1 | Status: SHIPPED | OUTPATIENT
Start: 2023-12-14

## 2023-12-14 NOTE — TELEPHONE ENCOUNTER
----- Message from Amaury Mcdonough Jr., MD sent at 12/14/2023 11:48 AM EST -----  Improved HgbA1c.     TSH remains elevated and will send updated dosage of synthroid medication.     LDL is elevated - this has been associated with increased risk of cardiovascular disease including heart attacks and strokes. Would recommend low cholesterol diet to reduce.     Reduced kidney function noted. Hydrate and check again in 1 week. Will order.

## 2023-12-15 NOTE — PROGRESS NOTES
Chief Complaint    Urologic complaint    Subjective          Jamir Porter presents to [facility]  History of Present Illness       78-year-old  gentleman       History of prostate cancer clinical T1c, on active surveillance, s/p TURP patient also with ED, penile prosthesis   Urinary retention - does intermittent CIC        11/23 urge incontinence - Gemtesa working great but not covered by insurance too expensive.      Myrbetriq causes diarrhea    Currently voiding okay.   Urgency much improved on Gemtesa.    No GH    No UTI/dysuria      No  incontinence      No cardiopulmonary history.  Patient does not smoke.  He is on ibuprofen for back pain. No DM.      PVR     12/23   138  5/23       054  12/22   024  9/22 cystoscopy-4 cm prostate-open at the bladder neck but left side still has some fairly large adenoma that does coapt when the scope was brought past the verumontanum.  Normal cystoscopy  9/22   [facility]  CIC   9/22   60  11/21  65  3/21  TURP -3+3, 3/130, < 1%  4/21  200  2/20   25      Previous        Patient does not know how to do CIC, was having trouble with retention when he was dealing with constipation      2/23 cystoscopy-4 cm prostate - open at bladder neck but left side still has some fairly large adenoma that does coapt.   Large bladder minor trabeculation no pathology    9/22 2 episodes of 1 retention    No longer following adrenal mass.    4/21 CT abdomen/pelvis with and without -9 mm benign lipid poor adrenal adenoma, no change since 8/19.    No erections. Has IPP.    H/o retention  - 1.5 L    oxybutynin  - did stop his incontinence but did give him side effects that he could not tolerate, GI.  Myrebetriq Cause -  diarrhea    8/26/2019 CT abdomen with and withoutadrenal mass protocolsmall right 0.9 cm adrenal nodule.  Favor adrenal adenoma.  8/19 cortisol less than 1.0, plasma metanephrines negative  7/3/2019 CT abdomen/pelvis with9 mm right adrenal nodule.   Which demonstrates enhancement above the background of the adrenal gland.  Penile prosthesis reservoir partially herniates the left inguinal ring.    Father  pancreatic CA,  brother with prostate cancer.      Penile prothesis - functions, he does not use.    Father  at 64, grandfather  at 85        H/o prostate CA     12/15/2023 MRI prostate - 12 g -PI-RADS 4 lesion 6 mm right posterior lateral apex peripheral zone.        4.5         3.6       3.6      2022 MRI prostate-negative.     10/21 MRI prostate-17 g, previously 58, negative otherwise    3/21  TURP -3+3, 3/130, < 1% - stopped finasteride     cystoscopy -4 cm prostate, moderate trabeculations, negative otherwise     prostate biopsy -53 g, right apex 3+3, 1/2, 6%, negative otherwise    3.56    MRI  prostate - 58 g , neg    0.91    2019 MRI dxzidveq06 g -negative      1.40     2.32 -on finasteride   MRI rtrxdqdz94 g, no targetable lesion seen    5.09    5.41  10/17 5.5    prostate sdeqiq19 g - left, 3+3, 50% of core, 5 mm; right - high-grade PIN    5.5  10/16 4.9    10/16 MRI xhcmmxfr50 g, no targetable lesions seen.     4.4   prostate zwmdeo24 g   Pathology  Right basenegative  Right base lateral 3+3, 20%, 4 mm  Right mid, mid lateral, apex, apex lateralnegative  Left basenegative  Left mid3+3, 10%  Left mid lateralnegative  Left apex3+3, 15%   left apex3+3, 10%     5.39     3.68               Past History:  Medical History: has a past medical history of Allergic rhinitis, Arthritis, Balance problem, Broken bones, Cancer, Cataracts, bilateral, Chronic sinusitis, Claustrophobia, Colon polyps, DDD (degenerative disc disease), cervical, Diabetes mellitus, Diverticulitis, Forgetfulness, H/O degenerative disc disease, Headache, Hyperlipemia, Hypertension, Limb swelling, Lumbago, Pacemaker, Prostate CA, Prostate disorder, Shortness of breath, and Sleep apnea.   Surgical  History: has a past surgical history that includes Other surgical history (Right, 2012); Dental surgery; Eye surgery; Joint replacement; Lumbar fusion (2016); Pilonidal cyst / sinus excision; Amputation (Right, 2012); Other surgical history (Right, 2012); Back surgery; Colonoscopy (2019); Dental surgery; Other surgical history; Replacement total knee (Right); Eye surgery; Foot surgery (Left); Lumbar fusion (2016); Lumbar puncture; Penile prosthesis implant; Excision; Rotator cuff repair; Uvulopalatopharyngoplasty; and TURP / transurethral incision / drainage prostate.   Family History: family history includes Arthritis in his brother, father, and mother; Cancer in his brother, father, and mother.   Social History: reports that he quit smoking about 44 years ago. His smoking use included cigarettes. He started smoking about 63 years ago. He has a 25.50 pack-year smoking history. He has quit using smokeless tobacco. He reports current alcohol use. He reports that he does not use drugs.  Allergies: Statins, Meperidine, and Sulfate       Current Outpatient Medications:     Dulaglutide (Trulicity) 4.5 MG/0.5ML solution pen-injector, Inject 0.5 mL under the skin into the appropriate area as directed 1 (One) Time Per Week., Disp: 9 mL, Rfl: 3    ezetimibe (ZETIA) 10 MG tablet, TAKE 1 TABLET BY MOUTH DAILY, Disp: 90 tablet, Rfl: 3    FLUoxetine (PROzac) 40 MG capsule, TAKE 1 CAPSULE BY MOUTH DAILY, Disp: 90 capsule, Rfl: 3    gabapentin (NEURONTIN) 100 MG capsule, TAKE 1 CAPSULE BY MOUTH TWICE DAILY AS NEEDED FOR PAIN, Disp: 60 capsule, Rfl: 0    hydroCHLOROthiazide (HYDRODIURIL) 12.5 MG tablet, TAKE 2 TABLETS BY MOUTH DAILY, Disp: 90 tablet, Rfl: 2    ibuprofen (ADVIL,MOTRIN) 200 MG tablet, Take 4 tablets by mouth Every 6 (Six) Hours As Needed for Mild Pain., Disp: , Rfl:     levothyroxine (Synthroid) 100 MCG tablet, Take 1 tablet by mouth Daily., Disp: 90 tablet, Rfl: 1    Vibegron 75 MG tablet, Take 1 tablet by mouth  Daily., Disp: 90 tablet, Rfl: 4    vitamin D (ERGOCALCIFEROL) 1.25 MG (74228 UT) capsule capsule, Take 1 capsule by mouth 1 (One) Time Per Week., Disp: 13 capsule, Rfl: 1         Bladder Scan interpretation 12/18/2023    Estimation of residual urine via BVI 3000 Verathon Bladder Scan  MA/nurse performing: LEANDRO Rivera  Residual Urine: 138 ml  Indication: Benign prostatic hyperplasia with lower urinary tract symptoms, symptom details unspecified    Prostate cancer    Urge incontinence   Position: Supine  Examination: Incremental scanning of the suprapubic area using 2.0 MHz transducer using copious amounts of acoustic gel.   Findings: An anechoic area was demonstrated which represented the bladder, with measurement of residual urine as noted. I inspected this myself. In that the residual urine was stable or insignificant, refer to plan for treatment and plan necessary at this time.                Objective     Vital Signs:   There were no vitals taken for this visit.             Assessment and Plan    Diagnoses and all orders for this visit:    1. Benign prostatic hyperplasia with lower urinary tract symptoms, symptom details unspecified (Primary)    2. Prostate cancer           Urinary retention/BPH    Voiding okay.         Urgency      Patient doing much better on Gemtesa.  Could not tolerate Myrbetriq secondary to diarrhea.        Prostate cancer on active surveillance         We discussed that active surveillance is an option for a patient with low risk prostate cancer.  The risks of surveillance include progression of disease, failure of clinical staging to detect advanced disease, need for strict followup, need for repeat prostate biopsies, and patient anxiety related to PSA.  We did discuss that the evidence suggests that patient's who progress to treatment on surveillance have survival similar to those treated at diagnosis.      PSA a little higher, MRI did show a small PI - RADS 4 lesion.  We discussed risk  and benefits at this time we will continue to follow conservatively follow-up in 6 months with PSA      We will plan on a biopsy in 1 year      PVR at  follow-up

## 2023-12-18 ENCOUNTER — OFFICE VISIT (OUTPATIENT)
Dept: UROLOGY | Facility: CLINIC | Age: 78
End: 2023-12-18
Payer: MEDICARE

## 2023-12-18 VITALS — RESPIRATION RATE: 16 BRPM | HEIGHT: 70 IN | BODY MASS INDEX: 34.65 KG/M2 | WEIGHT: 242 LBS

## 2023-12-18 DIAGNOSIS — C61 PROSTATE CANCER: ICD-10-CM

## 2023-12-18 DIAGNOSIS — R33.9 URINARY RETENTION: ICD-10-CM

## 2023-12-18 DIAGNOSIS — N39.41 URGE INCONTINENCE: ICD-10-CM

## 2023-12-18 DIAGNOSIS — N40.1 BENIGN PROSTATIC HYPERPLASIA WITH LOWER URINARY TRACT SYMPTOMS, SYMPTOM DETAILS UNSPECIFIED: ICD-10-CM

## 2023-12-18 DIAGNOSIS — N40.1 BENIGN PROSTATIC HYPERPLASIA WITH LOWER URINARY TRACT SYMPTOMS, SYMPTOM DETAILS UNSPECIFIED: Primary | ICD-10-CM

## 2023-12-18 LAB
BILIRUB BLD-MCNC: NEGATIVE MG/DL
CLARITY, POC: CLEAR
COLOR UR: YELLOW
EXPIRATION DATE: ABNORMAL
GLUCOSE UR STRIP-MCNC: NEGATIVE MG/DL
KETONES UR QL: NEGATIVE
LEUKOCYTE EST, POC: ABNORMAL
Lab: ABNORMAL
NITRITE UR-MCNC: NEGATIVE MG/ML
PH UR: 6 [PH] (ref 5–8)
PROT UR STRIP-MCNC: NEGATIVE MG/DL
RBC # UR STRIP: NEGATIVE /UL
SP GR UR: 1.02 (ref 1–1.03)
SPECIMEN VOL 24H UR: 138 L
UROBILINOGEN UR QL: ABNORMAL

## 2023-12-21 ENCOUNTER — OFFICE VISIT (OUTPATIENT)
Dept: ORTHOPEDIC SURGERY | Facility: CLINIC | Age: 78
End: 2023-12-21
Payer: MEDICARE

## 2023-12-21 VITALS
HEART RATE: 65 BPM | OXYGEN SATURATION: 97 % | HEIGHT: 70 IN | BODY MASS INDEX: 34.65 KG/M2 | WEIGHT: 242 LBS | SYSTOLIC BLOOD PRESSURE: 146 MMHG | DIASTOLIC BLOOD PRESSURE: 81 MMHG

## 2023-12-21 DIAGNOSIS — M16.11 PRIMARY OSTEOARTHRITIS OF RIGHT HIP: ICD-10-CM

## 2023-12-21 DIAGNOSIS — M25.551 RIGHT HIP PAIN: Primary | ICD-10-CM

## 2023-12-21 DIAGNOSIS — M51.36 DDD (DEGENERATIVE DISC DISEASE), LUMBAR: ICD-10-CM

## 2023-12-21 RX ORDER — DEXAMETHASONE SODIUM PHOSPHATE 4 MG/ML
8 INJECTION, SOLUTION INTRA-ARTICULAR; INTRALESIONAL; INTRAMUSCULAR; INTRAVENOUS; SOFT TISSUE ONCE
Status: COMPLETED | OUTPATIENT
Start: 2023-12-21 | End: 2023-12-21

## 2023-12-21 RX ADMIN — DEXAMETHASONE SODIUM PHOSPHATE 8 MG: 4 INJECTION, SOLUTION INTRA-ARTICULAR; INTRALESIONAL; INTRAMUSCULAR; INTRAVENOUS; SOFT TISSUE at 10:52

## 2023-12-21 NOTE — PROGRESS NOTES
"Chief Complaint  Initial Evaluation and Pain of the Right Hip     Subjective      Jamir Porter presents to [facility] for evaluation of the right hip. He previously seen  Been in 2019 for his right hip. He reports back pain as well. He reports pain with walking. He ambulates with a cane. He had a spinal laminectomy in 2015 without relief of his back pain. He reports left leg weakness. He denies lateral hip pain. He locates pain to the posterior hip and low back.     Allergies   Allergen Reactions    Statins Myalgia    Meperidine Unknown - High Severity    Sulfate Unknown - High Severity        Social History     Socioeconomic History    Marital status:    Tobacco Use    Smoking status: Former     Packs/day: 1.50     Years: 17.00     Additional pack years: 0.00     Total pack years: 25.50     Types: Cigarettes     Start date:      Quit date:      Years since quittin.0    Smokeless tobacco: Former    Tobacco comments:     1.5 ppd quit , 17 years total    Vaping Use    Vaping Use: Never used   Substance and Sexual Activity    Alcohol use: Yes     Comment: rarely drinks 2021    Drug use: Never    Sexual activity: Defer        I reviewed the patient's chief complaint, history of present illness, review of systems, past medical history, surgical history, family history, social history, medications, and allergy list.     Review of Systems     Constitutional: Denies fevers, chills, weight loss  Cardiovascular: Denies chest pain, shortness of breath  Skin: Denies rashes, acute skin changes  Neurologic: Denies headache, loss of consciousness  MSK: Right hip pain      Vital Signs:   /81   Pulse 65   Ht 177.8 cm (70\")   Wt 110 kg (242 lb)   SpO2 97%   BMI 34.72 kg/m²          Physical Exam  General: Alert. No acute distress    Ortho Exam      Right hip- pain to the posterior hip and sacroiliac region. Flexion 90. External Rotation 25. Internal rotation " 30. 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 Neurovascularly intact. Positive EHL, FHL, GS and TA. Sensation intact to all 5 nerves of the foot. Positive pulses.     Procedures    X-Ray Report:  Right hip X-Ray  Indication: Evaluation of right hip pain  AP/Lateral view(s)  Findings: mild to moderate degenerative changes. No significant joint space narrowing. Enthesophytes to the greater trochanter. Degenerative changes to the lumbar spine.   Prior studies available for comparison: no         Imaging Results (Most Recent)       Procedure Component Value Units Date/Time    XR Hip With or Without Pelvis 2 - 3 View Right [[id number]] Resulted: 23 1021     Updated: 23 1023             Result Review :       MRI Prostate W & WO Contrast    Result Date: 2023  Narrative: REVIEWING YOUR TEST RESULTS IN [facility] IS NOT A SUBSTITUTE FOR DISCUSSING THOSE RESULTS WITH YOUR HEALTH CARE PROVIDER. PLEASE CONTACT YOUR PROVIDER VIA [employer] TO DISCUSS ANY QUESTIONS OR CONCERNS YOU MAY HAVE REGARDING THESE TEST RESULTS.  RADIOLOGY REPORT FACILITY:  [facility] UNIT/AGE/GENDER: N.MRI  OP      AGE:78 Y          SEX:M PATIENT NAME/:  BUD CALLEJAS A    1945 UNIT NUMBER:  [id number] ACCOUNT NUMBER:  [id number] ACCESSION NUMBER:  [id number] MRI of the prostate with and without contrast dated December 15, 2023. INDICATION: Prostate cancer. Positive biopsy 5 years and one year ago. Restaging study. History of TURP. PSA 4.590 on 2023. TECHNIQUE: Multi parametric prostate protocol with high-resolution T1 and T2 weighted sequences, precontrast, diffusion weighted imaging and multiphase dynamic gadolinium enhanced gradient echo imaging during contrast administration. Post processing includes construction of 3-D prostate volume and 3-D regions of interest for potential biopsy when applicable, performed on a separate workstation by the radiologist. COMPARISON: 2022 FINDINGS: Prostate  measures approximately 3.7 x 2.6 x 2.5 cm for total volume of 11.56 mL. Central TURP defect. Persistent mild residual changes of BPH involving the transition zone. There is hazy and streaky decreased T2 signal intensity in the bilateral peripheral zones without significant restricted diffusion and favored to be related to chronic prostatitis. In the right posterolateral apex peripheral zone at the 7 to 8 o'clock position there is a 6 mm T2 hypointense lesion with at least moderate restricted diffusion and with hyperenhancement (ASHISH #1). Posterolaterally, this does contact the prostate capsule and capsular involvement not excluded. Regions of the neurovascular bundles appear intact. Mild diffuse bladder wall thickening. Reservoir for a penile prosthesis in the left anterior pelvis. Seminal vesicles are similar in appearance and small in caliber with cystic change on the right. No ascites or significant lymphadenopathy. Diverticulosis. Degenerative and postoperative change but no suspicious osseous lesion. IMPRESSION: 1. PI-RADS Category 4: T2 hypointense 6 mm lesion in the right posterolateral apex peripheral zone from the 7 to 8 o'clock position with at least moderate restricted diffusion and hyperenhancement and consistent with prostate malignancy until proven otherwise. 2. Status post TURP with mild residual changes of BPH. 3. Chronic prostatitis. Dictated by: Jessica Hayden M.D. Images and Report reviewed and interpreted by: Jessica Hayden M.D. <PS><Electronically signed by: Jessica Hayden M.D.> 12/17/2023 1549   D: 12/17/2023 1539 T: 12/17/2023 1539            Assessment and Plan     Diagnoses and all orders for this visit:    1. Right hip pain (Primary)  -     XR Hip With or Without Pelvis 2 - 3 View Right    2. DDD (degenerative disc disease), lumbar    3. Primary osteoarthritis of right hip        Discussed the treatment plan with the patient.  I reviewed the x-rays that were obtained today with the  patient. I discussed with the patient that his symptoms seem more related to his back. Plan to proceed with MRI of the lumbar spine that was ordered by his PCP. Discussed the risks and benefits of IM injection. The patient expressed understanding and wished to proceed. He tolerated the injection well.     Call or return if worsening symptoms.    Follow Up     MRI results      Patient was given instructions and counseling regarding his condition or for health maintenance advice. Please see specific information pulled into the AVS if appropriate.     Scribed for Ken Miranda MD by Keiry Aaron.  12/21/23   10:21 EST    I have personally performed the services described in this document as scribed by the above individual and it is both accurate and complete. Ken Miranda MD 12/21/23

## 2023-12-26 RX ORDER — MULTIPLE VITAMINS W/ MINERALS TAB 9MG-400MCG
1 TAB ORAL DAILY
COMMUNITY

## 2023-12-26 NOTE — PRE-PROCEDURE INSTRUCTIONS
PRE-OP INSTRUCTIONS REVIEWED WITH PATIENT: FASTING/BATHING/ARRIVAL PROCEDURES.  INSTRUCTED TO TAKE A.M. DAY OF SURGERY: GEMTESSA, ZETIA, PROZAC, SYNTHROID GABAPENTIN PRN  UNDERSTANDING VERBALIZED.

## 2023-12-27 RX ORDER — CEFAZOLIN SODIUM 2 G/100ML
2000 INJECTION, SOLUTION INTRAVENOUS ONCE
Status: COMPLETED | OUTPATIENT
Start: 2023-12-28 | End: 2023-12-28

## 2023-12-28 ENCOUNTER — ANESTHESIA EVENT (OUTPATIENT)
Dept: PERIOP | Facility: HOSPITAL | Age: 78
End: 2023-12-28
Payer: MEDICARE

## 2023-12-28 ENCOUNTER — HOSPITAL ENCOUNTER (OUTPATIENT)
Facility: HOSPITAL | Age: 78
Setting detail: HOSPITAL OUTPATIENT SURGERY
Discharge: HOME | End: 2023-12-28
Attending: PODIATRIST | Admitting: PODIATRIST
Payer: MEDICARE

## 2023-12-28 ENCOUNTER — ANESTHESIA (OUTPATIENT)
Dept: PERIOP | Facility: HOSPITAL | Age: 78
End: 2023-12-28
Payer: MEDICARE

## 2023-12-28 VITALS
OXYGEN SATURATION: 97 % | HEART RATE: 57 BPM | HEIGHT: 69 IN | TEMPERATURE: 97.7 F | SYSTOLIC BLOOD PRESSURE: 134 MMHG | DIASTOLIC BLOOD PRESSURE: 78 MMHG | RESPIRATION RATE: 16 BRPM | BODY MASS INDEX: 35.56 KG/M2 | WEIGHT: 240.08 LBS

## 2023-12-28 DIAGNOSIS — G57.62 LESION OF LEFT PLANTAR NERVE: ICD-10-CM

## 2023-12-28 LAB
GLUCOSE BLDC GLUCOMTR-MCNC: 110 MG/DL (ref 70–99)
GLUCOSE BLDC GLUCOMTR-MCNC: 134 MG/DL (ref 70–99)

## 2023-12-28 PROCEDURE — 25010000002 ONDANSETRON PER 1 MG

## 2023-12-28 PROCEDURE — 25810000003 LACTATED RINGERS PER 1000 ML: Performed by: ANESTHESIOLOGY

## 2023-12-28 PROCEDURE — 25010000002 CEFAZOLIN IN DEXTROSE 2000 MG/ 100 ML SOLUTION: Performed by: STUDENT IN AN ORGANIZED HEALTH CARE EDUCATION/TRAINING PROGRAM

## 2023-12-28 PROCEDURE — 25010000002 LIDOCAINE 1 % SOLUTION: Performed by: PODIATRIST

## 2023-12-28 PROCEDURE — 88304 TISSUE EXAM BY PATHOLOGIST: CPT | Performed by: PODIATRIST

## 2023-12-28 PROCEDURE — 25010000002 DEXAMETHASONE PER 1 MG

## 2023-12-28 PROCEDURE — 82948 REAGENT STRIP/BLOOD GLUCOSE: CPT

## 2023-12-28 PROCEDURE — 25010000002 PROPOFOL 10 MG/ML EMULSION

## 2023-12-28 PROCEDURE — 25010000002 SUGAMMADEX 200 MG/2ML SOLUTION

## 2023-12-28 RX ORDER — PROMETHAZINE HYDROCHLORIDE 12.5 MG/1
25 TABLET ORAL ONCE AS NEEDED
Status: DISCONTINUED | OUTPATIENT
Start: 2023-12-28 | End: 2023-12-28 | Stop reason: HOSPADM

## 2023-12-28 RX ORDER — LIDOCAINE HYDROCHLORIDE 10 MG/ML
INJECTION, SOLUTION INFILTRATION; PERINEURAL AS NEEDED
Status: DISCONTINUED | OUTPATIENT
Start: 2023-12-28 | End: 2023-12-28 | Stop reason: HOSPADM

## 2023-12-28 RX ORDER — OXYCODONE HYDROCHLORIDE 5 MG/1
5 TABLET ORAL
Status: DISCONTINUED | OUTPATIENT
Start: 2023-12-28 | End: 2023-12-28 | Stop reason: HOSPADM

## 2023-12-28 RX ORDER — LIDOCAINE HYDROCHLORIDE 20 MG/ML
INJECTION, SOLUTION EPIDURAL; INFILTRATION; INTRACAUDAL; PERINEURAL AS NEEDED
Status: DISCONTINUED | OUTPATIENT
Start: 2023-12-28 | End: 2023-12-28 | Stop reason: SURG

## 2023-12-28 RX ORDER — DEXMEDETOMIDINE HYDROCHLORIDE 100 UG/ML
INJECTION, SOLUTION INTRAVENOUS AS NEEDED
Status: DISCONTINUED | OUTPATIENT
Start: 2023-12-28 | End: 2023-12-28 | Stop reason: SURG

## 2023-12-28 RX ORDER — ROCURONIUM BROMIDE 10 MG/ML
INJECTION, SOLUTION INTRAVENOUS AS NEEDED
Status: DISCONTINUED | OUTPATIENT
Start: 2023-12-28 | End: 2023-12-28 | Stop reason: SURG

## 2023-12-28 RX ORDER — ONDANSETRON 2 MG/ML
INJECTION INTRAMUSCULAR; INTRAVENOUS AS NEEDED
Status: DISCONTINUED | OUTPATIENT
Start: 2023-12-28 | End: 2023-12-28 | Stop reason: SURG

## 2023-12-28 RX ORDER — ACETAMINOPHEN 500 MG
1000 TABLET ORAL ONCE
Status: COMPLETED | OUTPATIENT
Start: 2023-12-28 | End: 2023-12-28

## 2023-12-28 RX ORDER — PHENYLEPHRINE HCL IN 0.9% NACL 1 MG/10 ML
SYRINGE (ML) INTRAVENOUS AS NEEDED
Status: DISCONTINUED | OUTPATIENT
Start: 2023-12-28 | End: 2023-12-28 | Stop reason: SURG

## 2023-12-28 RX ORDER — PROPOFOL 10 MG/ML
VIAL (ML) INTRAVENOUS AS NEEDED
Status: DISCONTINUED | OUTPATIENT
Start: 2023-12-28 | End: 2023-12-28 | Stop reason: SURG

## 2023-12-28 RX ORDER — ONDANSETRON 2 MG/ML
4 INJECTION INTRAMUSCULAR; INTRAVENOUS ONCE AS NEEDED
Status: DISCONTINUED | OUTPATIENT
Start: 2023-12-28 | End: 2023-12-28 | Stop reason: HOSPADM

## 2023-12-28 RX ORDER — PROMETHAZINE HYDROCHLORIDE 25 MG/1
25 SUPPOSITORY RECTAL ONCE AS NEEDED
Status: DISCONTINUED | OUTPATIENT
Start: 2023-12-28 | End: 2023-12-28 | Stop reason: HOSPADM

## 2023-12-28 RX ORDER — DEXAMETHASONE SODIUM PHOSPHATE 4 MG/ML
INJECTION, SOLUTION INTRA-ARTICULAR; INTRALESIONAL; INTRAMUSCULAR; INTRAVENOUS; SOFT TISSUE AS NEEDED
Status: DISCONTINUED | OUTPATIENT
Start: 2023-12-28 | End: 2023-12-28 | Stop reason: SURG

## 2023-12-28 RX ORDER — SODIUM CHLORIDE, SODIUM LACTATE, POTASSIUM CHLORIDE, CALCIUM CHLORIDE 600; 310; 30; 20 MG/100ML; MG/100ML; MG/100ML; MG/100ML
9 INJECTION, SOLUTION INTRAVENOUS CONTINUOUS PRN
Status: DISCONTINUED | OUTPATIENT
Start: 2023-12-28 | End: 2023-12-28 | Stop reason: HOSPADM

## 2023-12-28 RX ADMIN — DEXMEDETOMIDINE 10 MCG: 100 INJECTION, SOLUTION, CONCENTRATE INTRAVENOUS at 08:11

## 2023-12-28 RX ADMIN — SUGAMMADEX 200 MG: 100 INJECTION, SOLUTION INTRAVENOUS at 08:08

## 2023-12-28 RX ADMIN — Medication 200 MCG: at 07:54

## 2023-12-28 RX ADMIN — PROPOFOL 50 MG: 10 INJECTION, EMULSION INTRAVENOUS at 08:08

## 2023-12-28 RX ADMIN — ACETAMINOPHEN 1000 MG: 500 TABLET ORAL at 06:59

## 2023-12-28 RX ADMIN — ONDANSETRON 4 MG: 2 INJECTION INTRAMUSCULAR; INTRAVENOUS at 08:14

## 2023-12-28 RX ADMIN — DEXMEDETOMIDINE 20 MCG: 100 INJECTION, SOLUTION, CONCENTRATE INTRAVENOUS at 07:30

## 2023-12-28 RX ADMIN — Medication 100 MCG: at 07:56

## 2023-12-28 RX ADMIN — CEFAZOLIN SODIUM 2000 MG: 2 INJECTION, SOLUTION INTRAVENOUS at 07:44

## 2023-12-28 RX ADMIN — LIDOCAINE HYDROCHLORIDE 80 MG: 20 INJECTION, SOLUTION EPIDURAL; INFILTRATION; INTRACAUDAL; PERINEURAL at 07:32

## 2023-12-28 RX ADMIN — PROPOFOL 150 MG: 10 INJECTION, EMULSION INTRAVENOUS at 07:32

## 2023-12-28 RX ADMIN — DEXMEDETOMIDINE 10 MCG: 100 INJECTION, SOLUTION, CONCENTRATE INTRAVENOUS at 07:52

## 2023-12-28 RX ADMIN — ROCURONIUM BROMIDE 40 MG: 50 INJECTION INTRAVENOUS at 07:32

## 2023-12-28 RX ADMIN — DEXAMETHASONE SODIUM PHOSPHATE 4 MG: 4 INJECTION, SOLUTION INTRAMUSCULAR; INTRAVENOUS at 07:44

## 2023-12-28 RX ADMIN — SODIUM CHLORIDE, POTASSIUM CHLORIDE, SODIUM LACTATE AND CALCIUM CHLORIDE 9 ML/HR: 600; 310; 30; 20 INJECTION, SOLUTION INTRAVENOUS at 06:59

## 2023-12-28 NOTE — DISCHARGE INSTRUCTIONS
DISCHARGE INSTRUCTIONS  ORTHOPEDICS      For your surgery you had:  General anesthesia (you may have a sore throat for the first 24 hours)  IV sedation.  Local anesthesia  Monitored anesthesia care    You may experience dizziness, drowsiness, or light-headedness for several hours following surgery  Do not stay alone today or tonight.  Limit your activity for 24 hours.  Resume your diet slowly.  Follow whatever special dietary instructions you may have been given by the doctor.  You should not drive or operate machinery or drink alcohol for 24 hours or while you are taking pain medication.  You should not sign any legally binding documents.  If you had an axillary or regional block, you will not have control of the involved limb for up to 12 hours.   Do NOT remove dressing.  You may shower or bathe: tomorrow, do not get dressing wet.  Sleep with the injured part elevated on a pillow.  Medications per physician's instructions as indicated on Discharge Medication Information Sheet.  Follow verbal instructions of your doctor.  Sit with the lower leg propped up on a footstool or chair with pillows.  Exercise toes for 10 minutes every hour while awake. Ice bag to injured area for 72 hours.  Apply 20 minutes on - 20 minutes off.  Never place ice directly on skin or cast.  Avoid getting cast or dressing wet.      SPECIAL INSTRUCTIONS:  Please take surgical shoe to follow up appointment with you.  Wear surgical show at all times   Heel touch weight bearing as tolerated       Last dose of pain medication was given at:   Tylenol 1 gram at 0700am preop  May start pain meds or next dose of tylenol after 1100am    NOTIFY THE PHYSICIAN IF YOU EXPERIENCE:  Numbness of toes.  Inability to move toes.  Extreme coldness, paleness or blue dis-coloration of toes.  Excessive swelling of affected surgical site or swelling that causes the cast to rub or cut into skin.  Pain unrelieved by pain medication  Nausea/vomiting not relieved by  prescribed medication  Unable to urinate in 6 hours after surgery  Temperature greater than 101 degree Fahrenheit or chills  If unable to reach your doctor, please go to the closest emergency room       [facility] Post-Surgical Instructions    Go directly home and try to keep your feet elevated by sitting in the back seat of the car and placing your foot on the seat. Have your prescriptions filled immediately.    Elevate feet above the level of the heart by supporting your feet and legs with pillows. Lying on a couch with 3-4 pillows works well. Elevation helps reduce swelling and should be used whenever you are resting.    Place an ice bag covered with a towel on your ankle area and/or behind your knee for no longer than 20 minutes, then keep ice off of foot for at least 20 minutes. The best way to remember this is 20 minutes on, then 20 minutes off. Continue this for the first week while awake. Ice helps to reduce swelling and inflammation. Do NOT sleep with ice on your foot or leg.    To promote circulation and healing, bend your knee and rotate your foot and ankle for 5 minutes each hour. Dangle your feet down for 1-2 minutes at least once per hour, then continue to elevate.    Keep the bandages or cast clean and dry. Do NOT remove your bandages under any circumstances without consulting Dr. Trejo. You may bathe by hanging your foot outside of the tub, but DO NOT attempt to shower.    Take your pain medication only as directed. Avoid alcoholwhile taking medication. If you experience nausea or lightheadedness, remain lying down and contact the office. You may prefer to use aspirin Tylenol, Motrin or other over the counter pain reliever.    A small amount of bleeding through the bandage may occur and should not cause concern unless it becomes heavy or persists. If this happens, contact the office. Do not become alarmed if you notice a bruised appearance to your feet or toes.    Eat a  well-balanced diet high in protein and vitamin C. Take supplemental vitamins and calcium. Drink plenty of fluids and get plenty of rest with your feet elevated.    Blankets may be kept from irritating the surgical site by using a large cardboard box to cradle the covers over the feet.    Use of crutches, a walker, or a cane can be useful in public areas to protect your feet. Do not attempt to operate a motor vehicle until directed by Dr. Trejo.    Call the office if any of the following occur: bleeding that won't stop, injury to foot, fever, wet bandages, redness with throbbing, and pain unrelieved by medication.

## 2023-12-28 NOTE — ANESTHESIA PREPROCEDURE EVALUATION
Anesthesia Evaluation     NPO Solid Status: > 8 hours  NPO Liquid Status: > 8 hours           Airway   Mallampati: II  TM distance: >3 FB  Neck ROM: full  No difficulty expected  Dental - normal exam     Pulmonary - normal exam   (+) ,sleep apnea  Cardiovascular - normal exam    (+) hypertension well controlled, hyperlipidemia      Neuro/Psych  (+) numbness, psychiatric history Anxiety and Depression  GI/Hepatic/Renal/Endo    (+) diabetes mellitus type 2, thyroid problem hypothyroidism    Musculoskeletal     Abdominal  - normal exam    Bowel sounds: normal.   Substance History      OB/GYN          Other   arthritis,                 Anesthesia Plan    ASA 3     general     intravenous induction     Anesthetic plan, risks, benefits, and alternatives have been provided, discussed and informed consent has been obtained with: patient.  Pre-procedure education provided  Plan discussed with CRNA.    CODE STATUS:

## 2023-12-28 NOTE — BRIEF OP NOTE
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    A Foot, Left Tissue TISSUE PATHOLOGY EXAM   Phillip Munguia DPM 12/28/23 0757 No    Description: left foot neuroma                  Drains:   [REMOVED] Urethral Catheter Straight-tip 16 Fr. (Removed)       Findings: Consistent with preoperative dx        Complications: none          Phu Hill DPM     Date: 12/28/2023  Time: 08:21 EST

## 2023-12-28 NOTE — ANESTHESIA POSTPROCEDURE EVALUATION
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        Post Anesthesia Care and Evaluation    Patient location during evaluation: PACU  Patient participation: complete - patient participated  Level of consciousness: awake  Pain scale: See nurse's notes for pain score.  Pain management: adequate    Airway patency: patent  Anesthetic complications: No anesthetic complications  PONV Status: none  Cardiovascular status: acceptable  Respiratory status: acceptable and spontaneous ventilation  Hydration status: acceptable    Comments: Patient seen and examined postoperatively; vital signs stable; SpO2 greater than or equal to 90%; cardiopulmonary status stable; nausea/vomiting adequately controlled; pain adequately controlled; no apparent anesthesia complications; patient discharged from anesthesia care when discharge criteria were met

## 2023-12-28 NOTE — H&P
.    Referring Provider: Phillip Munguia DPM        Patient Care Team:  Amaury Mcdonough Jr., MD as PCP - General (Internal Medicine)  Lenard Ray MD as Consulting Physician (Urology)    Chief complaint Left foot neuroma pain    Subjective .     History of present illness:  Jamir Porter is a 78 y.o. male who presents with left foot neuroma in the second interspace. Has had surgery in the past for the same condition, issue has recurred         Review of Systems  A 10 point review of systems was conducted and was negative other than that mentioned in the HPI    History  Past Medical History:   Diagnosis Date    Allergic rhinitis     Anxiety and depression     Arthritis     Balance problem     DDD BACK NEUROMA , CANE HELPS    Chronic sinusitis     NO CURRENT ISSUES    Claustrophobia     Colon polyps     NO CANCER    DDD (degenerative disc disease), cervical     C6/7 FULL ROM    Diabetes mellitus     Disease of thyroid gland     Diverticulitis     NO CURRENT ISSUES    Forgetfulness     H/O degenerative disc disease     Hx of BKA, right     Hyperlipemia     Hypertension     Limb swelling     OCCASSIONAL    Lumbago     Plantar nerve lesion, left     RIGHT LEG BKA    Prostate CA     Sleep apnea     REPAIRED WITH SURGERY    Urinary incontinence    ,   Past Surgical History:   Procedure Laterality Date    AMPUTATION Right 2012    R BKA    BACK SURGERY      LUMBAR MULTIPLE ABLATIONS PRIOR TO FUSION    COLONOSCOPY  2019    DENTAL PROCEDURE      dental surgery     EYE SURGERY Bilateral     eye implant, yes CATARACTS REMOVED    FOOT SURGERY Left     LUMBAR FUSION  2016    [facility]    LUMBAR PUNCTURE      ORIF FEMUR FRACTURE Right     PROXIMAL    PENILE PROSTHESIS IMPLANT      PILONIDAL CYST / SINUS EXCISION      ROTATOR CUFF REPAIR Bilateral     TOTAL KNEE ARTHROPLASTY Left     TURP / TRANSURETHRAL INCISION / DRAINAGE PROSTATE      following prostate cancer    UVULOPALATOPHARYNGOPLASTY     ,   Family  History   Problem Relation Age of Onset    Arthritis Mother     Cancer Mother     Arthritis Father     Cancer Father     Arthritis Brother     Cancer Brother     Malig Hyperthermia Neg Hx    ,   Social History     Tobacco Use    Smoking status: Former     Packs/day: 1.50     Years: 17.00     Additional pack years: 0.00     Total pack years: 25.50     Types: Cigarettes     Start date:      Quit date:      Years since quittin.0    Smokeless tobacco: Former    Tobacco comments:     1.5 ppd quit , 17 years total    Vaping Use    Vaping Use: Never used   Substance Use Topics    Alcohol use: Yes     Comment: rarely drinks 2021    Drug use: Never   ,   Medications Prior to Admission   Medication Sig Dispense Refill Last Dose    ezetimibe (ZETIA) 10 MG tablet TAKE 1 TABLET BY MOUTH DAILY 90 tablet 3 2023    FLUoxetine (PROzac) 40 MG capsule TAKE 1 CAPSULE BY MOUTH DAILY 90 capsule 3 2023    gabapentin (NEURONTIN) 100 MG capsule TAKE 1 CAPSULE BY MOUTH TWICE DAILY AS NEEDED FOR PAIN 60 capsule 0 2023    levothyroxine (Synthroid) 100 MCG tablet Take 1 tablet by mouth Daily. 90 tablet 1 2023    multivitamin with minerals tablet tablet Take 1 tablet by mouth Daily. LD 23    NON FORMULARY Take 1 tablet by mouth Daily. FOCUS FACTOR, MEMORY SUPPLEMENT  LAST DOSE 23    Vibegron 75 MG tablet Take 1 tablet by mouth Daily. (Patient taking differently: Take 1 tablet by mouth Daily. GEMTESA) 90 tablet 4 2023    Dulaglutide (Trulicity) 4.5 MG/0.5ML solution pen-injector Inject 0.5 mL under the skin into the appropriate area as directed 1 (One) Time Per Week. (Patient taking differently: Inject 0.5 mL under the skin into the appropriate area as directed 1 (One) Time Per Week. LAST DOSE 23) 9 mL 3 2023    hydroCHLOROthiazide (HYDRODIURIL) 12.5 MG tablet TAKE 2 TABLETS BY MOUTH DAILY (Patient taking differently: Take 1 tablet by  mouth Daily. LAST DOSE 12/27/23) 90 tablet 2 Unknown    ibuprofen (ADVIL,MOTRIN) 200 MG tablet Take 4 tablets by mouth Every 6 (Six) Hours As Needed for Mild Pain. LAST DOSE 12/22/23 12/22/2023    vitamin D (ERGOCALCIFEROL) 1.25 MG (04965 UT) capsule capsule Take 1 capsule by mouth 1 (One) Time Per Week. (Patient taking differently: Take 1 capsule by mouth Every 7 (Seven) Days. LAST DOSE 12/12/25/23) 13 capsule 1 12/12/2023   , Scheduled Meds:  acetaminophen, 1,000 mg, Oral, Once  ceFAZolin, 2,000 mg, Intravenous, Once    , PRN Meds:    lactated ringers and Allergies:  Statins, Sulfate, and Meperidine    Objective     Vital Signs   Temp:  [97 °F (36.1 °C)] 97 °F (36.1 °C)  Heart Rate:  [60] 60  Resp:  [18] 18  BP: (143)/(96) 143/96    Physical Exam:     General Appearance:    Alert, cooperative, in no acute distress   Head:    Normocephalic, without obvious abnormality, atraumatic   Eyes:            Lids and lashes normal, conjunctivae and sclerae normal, no   icterus, no pallor, corneas clear   Neck:   trachea midline   Lungs:     Clear to auscultation,respirations regular, even and                  unlabored    Heart:    Regular rhythm and normal rate, normal S1 and S2, no            murmur, no gallop, no rub, no click   Chest Wall:    No abnormalities observed   Abdomen:     Normal bowel sounds, no masses, no organomegaly, soft        non-tender, non-distended, no guarding, no rebound                tenderness   Rectal:     Deferred   Derm:   warm, dry and supple with hair present at level of digits bilateral.   Skin is noted to have normal texture, texture and elasticity bilateral.     Neuro:   The deep tendon reflexes of the lower extremities are symmetrical; they are graded at 2/4. Sensation is grossly intact bilateral   Left foot painful with palpation at 2nd innerspace, no pain with palpation and compression    Vascular:   Dp pt pulses palpable, CFT brisk   Lymph nodes:   No palpable adenopathy   MSK:    TTP to the left 2nd interspace       Results Review:   I reviewed the patient's new clinical results.      Assessment & Plan       Lesion of left plantar nerve      Plan:   Left second interspace neuroma excision  .Surgical management is desired and recommended.  Patient understands risks and benefits of surgery and signed a consent form.  Risks include persistent or reoccurring deformity, pain, infection.  Benefits include deformity correction, pain reduction.  Opportunity for the patient to ask questions was given and all questions were answered to patient's satisfaction.  Patient demonstrate understanding of postoperative protocol including weightbearing and activity limitations.        I discussed the patients findings and my recommendations with patient    Phu Hill DPM  12/28/23  06:36 EST    Time: More than 50% of time spent in counseling and coordination of care:  Total face-to-face/floor time 30 min.  Time spent in counseling 30 min. Counseling included the following topics: coordination of care, risks, benefits, complications, alternatives to treatment including both non surgical and surgical options.

## 2023-12-28 NOTE — OP NOTE
Procedure Report    Patient Name:  Jamir Porter  YOB: 1945    Date of Surgery:  12/28/2023     Indications:    The patient with diagnosis as outlined above. The patient has attempted and failed conservative treatment as outlined in preoperative clinic notes. Possible postoperative complications were reviewed with the patient in the pre-op holding area. The patient desires to proceed with surgery and signed the consent form. No guarantees were given or implied, but it is expected that the patient will have a favorable outcome. It is with this understanding that we proceed.    Pre-op Diagnosis:   Lesion of left plantar nerve [G57.62]       Post-Op Diagnosis Codes:     * Lesion of left plantar nerve [G57.62]    Procedure/CPT® Codes:      Procedure(s):  FUENTES'S NEUROMA EXCISION    Staff:  Surgeon(s):  Phillpi Munguia DPM Ahmed, Ali Syed, DPM Humrick, Hans C, DPM         Anesthesia: General    Estimated Blood Loss: 0 mL    Implants:    Nothing was implanted during the procedure    Specimen:          Specimens       ID Source Type Tests Collected By Collected At Frozen?    A Foot, Left Tissue TISSUE PATHOLOGY EXAM   Phillip Munguia DPM 12/28/23 0757 No    Description: left foot neuroma                Findings: Consistent with preoperative diagnosis    Complications: None    Description of Procedure:     In the pre-op holding area, the extremity to be operated on was clearly marked and the patient verified correct laterality of the marking. The patient was transferred to the OR table and placed in a supine position. A timeout was performed in which identification of the correct patient, procedure, location, and materials was done. The left foot and leg were prepped and draped in normal sterile fashion. The foot and leg were exsanguinated and the thigh tourniquet was inflated to 250 mmHg.    Attention was directed to the left foot where a linear longitudinal incision was made at the plantar  second intermetatarsal space. Care was taken throughout dissection to avoid damage to the neurovascular and tendinous structures. Hemostasis was achieved via electrocautery. This incision was deepened through blunt dissection  which allowed for visualization of     All surgical wounds were irrigated copiously with saline with antibiotic in the solution and closed in layers with a 3-0 vicryl and 3-0 prolene suture. A dry sterile dressing was placed on the surgical extremity. The patient was placed in a soft dry, sterile dressing.    POSTOPERATIVE INFORMATION: The patient tolerated the above noted procedure and anesthesia well and was transferred to the PACU with vital signs stable, and vascular status intact with capillary refill intact to all digits. Postoperative instructions reviewed in detail with the patient with written instructions provided. Patient will return to clinic in approximately 10 days for first postoperative visit. Patient has the number of the clinic and was instructed to call prior to that time should any problems, questions, or concerns arise.                Phu Hill DPM     Date: 12/28/2023  Time: 09:08 EST

## 2024-01-02 LAB
CYTO UR: NORMAL
LAB AP CASE REPORT: NORMAL
LAB AP CLINICAL INFORMATION: NORMAL
PATH REPORT.FINAL DX SPEC: NORMAL
PATH REPORT.GROSS SPEC: NORMAL

## 2024-01-04 ENCOUNTER — HOSPITAL ENCOUNTER (OUTPATIENT)
Dept: MRI IMAGING | Facility: HOSPITAL | Age: 79
Discharge: HOME | End: 2024-01-04
Admitting: INTERNAL MEDICINE
Payer: MEDICARE

## 2024-01-04 DIAGNOSIS — M51.36 DDD (DEGENERATIVE DISC DISEASE), LUMBAR: ICD-10-CM

## 2024-01-04 PROCEDURE — 72148 MRI LUMBAR SPINE W/O DYE: CPT

## 2024-01-05 ENCOUNTER — TREATMENT (OUTPATIENT)
Dept: PHYSICAL THERAPY | Facility: CLINIC | Age: 79
End: 2024-01-05
Payer: MEDICARE

## 2024-01-05 DIAGNOSIS — M54.41 CHRONIC BILATERAL LOW BACK PAIN WITH BILATERAL SCIATICA: Primary | ICD-10-CM

## 2024-01-05 DIAGNOSIS — Z98.1 HISTORY OF LUMBAR FUSION: ICD-10-CM

## 2024-01-05 DIAGNOSIS — M54.42 CHRONIC BILATERAL LOW BACK PAIN WITH BILATERAL SCIATICA: Primary | ICD-10-CM

## 2024-01-05 DIAGNOSIS — M54.50 CHRONIC BILATERAL LOW BACK PAIN WITHOUT SCIATICA: Primary | ICD-10-CM

## 2024-01-05 DIAGNOSIS — G89.29 CHRONIC BILATERAL LOW BACK PAIN WITHOUT SCIATICA: Primary | ICD-10-CM

## 2024-01-05 DIAGNOSIS — R26.9 GAIT DISTURBANCE: ICD-10-CM

## 2024-01-05 DIAGNOSIS — G89.29 CHRONIC BILATERAL LOW BACK PAIN WITH BILATERAL SCIATICA: Primary | ICD-10-CM

## 2024-01-05 PROCEDURE — 97110 THERAPEUTIC EXERCISES: CPT | Performed by: PHYSICAL THERAPIST

## 2024-01-05 PROCEDURE — 97162 PT EVAL MOD COMPLEX 30 MIN: CPT | Performed by: PHYSICAL THERAPIST

## 2024-01-05 NOTE — PROGRESS NOTES
Physical Therapy Initial Evaluation and Plan of Care     [address] [place] [id number]    Patient: Jamir Porter   : 1945  Diagnosis/ICD-10 Code:  Chronic bilateral low back pain without sciatica [M54.50, G89.29]  Referring practitioner: Amaury Mcdonough *  Date of Initial Visit: 2024  Today's Date: 2024  Patient seen for 1 sessions           Subjective Questionnaire: Oswestry: 23/45 = 51% limitation      Subjective  : The patient presents to physical therapy with complaints of low back pain that has been present for greater than 20 years. States he's had a laminectomy performed in 2013 from L1- S1 as well as a fusion from L4-S1 which didn't change his pain much. He has tried injections and radiofrequency ablations which haven't helped much. He reports his pain is increased with standing and walking primarily on the right side of his low back, but no symptoms (including pain, numbness, tingling) goes into the hips or legs. He tried going to the gym for a while after PT last time, but says it was more trouble than it was worth. He just underwent an MRI yesterday, but not aware of results.     Pt occupation: retired, but enjoys writing     Current Pain 7/10  Best Pain: 4/10  Worst Pain: 9/10  Quality of pain: sharp, ache     Aggravating Factors: standing, walking  Easing Factors: sitting     Past Medical Hx: Right BKA, prostate cancer, Lumbar fusion L4-S1, left Dangelo's neuroma removal    Patient Goals: Reduce his low back pain.      Objective          Postural Observations    Additional Postural Observation Details  Flattened lumbar spine, incision present from previous surgeries.    Palpation   Left   Muscle spasm in the erector spinae.     Right   Muscle spasm in the erector spinae. Tenderness of the erector spinae.   Trigger point to erector spinae.     Neurological Testing     Sensation     Lumbar   Left   Intact: light touch    Right   Intact: light touch    Comments   Right  light touch: R BKA    Active Range of Motion     Additional Active Range of Motion Details  Flexion - all hip flexion, no reversal of lumbar spine (fusion)    Strength/Myotome Testing     Left Hip   Planes of Motion   Flexion: 4+  Extension: 4+  Abduction: 4+  Adduction: 4+    Right Hip   Planes of Motion   Flexion: 4  Extension: 4  Abduction: 4-  Adduction: 4    Left Knee   Flexion: 4+  Extension: 5    Right Knee   Flexion: 4  Extension: 4    Additional Strength Details  Ankle not assessed due to Right BKA and recent neuroma removal on Left    Tests     Additional Tests Details  Flexion reduces lower back pain, no distal symptoms    Ambulation     Comments   Right BKA with prosthetic. Ambulates with a boot on the left foot from recent neuroma removal in the left foot. Uses a single point cane.      See Exercise, Manual, and Modality Logs for complete treatment.     Assessment & Plan       Assessment  Impairments: abnormal muscle firing, abnormal or restricted ROM, activity intolerance, impaired physical strength and pain with function   Functional limitations: carrying objects, lifting, walking, uncomfortable because of pain, sitting and standing   Assessment details: The patient presents to physical therapy with complaints of low back pain with a history of fusion L4-S1 and laminectomy L1-S1. He seems to benefit from flexion based exercises today and will need ongoing strengthening of the lower extremities. The patient presents with associated lower extremity weakness, lumbar stiffness, and functional deficits (OSWESTRY). The patient would benefit from skilled PT intervention to address the above mentioned functional limitations.     Prognosis: good    Goals  Plan Goals: LOW BACK PROBLEMS:    1. The patient complains of low back pain.  LTG 1: 12 weeks:  The patient will report a pain rating of 3/10 or better at worst in order to improve  tolerance to activities of daily living and improve sleep quality.  STATUS:   New  STG 1a: 6 weeks:  The patient will report a pain rating of 5/10 or better at its worst.  STATUS:  New  TREATMENT:  Therapeutic exercises, manual therapy, aquatic therapy, home exercise   instruction, and modalities as needed for pain to include:  electrical stimulation, moist heat, ice,   ultrasound, and diathermy.      2. The patient demonstrates weakness of the B hip.  LTG 2: 12 weeks:  The patient will demonstrate 5 /5 strength for B hip flexion, abduction,  and extension in order to improve hip stability.  STATUS:  New  STG 2a: 6 weeks:  The patient will demonstrate 4+ /5 strength for B hip flexion, abduction,  and extension.  STATUS:  New  TREATMENT: Therapeutic exercises, manual therapy, aquatic therapy, home exercise instruction,  and modalities as needed for pain to include:  electrical stimulation, moist heat, ice, ultrasound, and   diathermy.      3. Mobility: Walking/Moving Around Functional Limitation    LTG 3: 12 weeks:  The patient will demonstrate 1-19 % limitation by achieving a score of 1-9 on the SHAWN.  STATUS:  New  STG 3 a: 6 weeks:  The patient will demonstrate 20-39 % limitation by achieving a score of 10-19 on the SHAWN.    STATUS:  New  STG 3a: The patient will be independent with HEP.    STATUS:  New  TREATMENT:  Manual therapy, therapeutic exercise, home exercise instruction, and modalities as needed to include: moist heat, electrical stimulation, and ultrasound.         Plan  Therapy options: will be seen for skilled therapy services  Planned modality interventions: TENS, cryotherapy, thermotherapy (hydrocollator packs), traction and dry needling  Other planned modality interventions: aquatic therapy  Planned therapy interventions: manual therapy, stretching, strengthening, therapeutic activities, neuromuscular re-education, home exercise program, joint mobilization, functional ROM exercises, soft tissue mobilization, spinal/joint mobilization, flexibility and gait training  Other  planned therapy interventions: aquatic therapy  Frequency: 3x week  Duration in weeks: 12  Treatment plan discussed with: patient        Visit Diagnoses:    ICD-10-CM ICD-9-CM   1. Chronic bilateral low back pain without sciatica  M54.50 724.2    G89.29 338.29   2. History of lumbar fusion  Z98.1 V45.4   3. Gait disturbance  R26.9 781.2       History # of Personal Factors and/or Comorbidities: HIGH (3+)  Examination of Body System(s): # of elements: HIGH (4+)  Clinical Presentation: STABLE   Clinical Decision Making: MODERATE      Timed:         Manual Therapy:    0     mins  22500;     Therapeutic Exercise:    14     mins  34198;     Neuromuscular Atiya:    0    mins  34659;    Therapeutic Activity:     0     mins  20119;     Gait Trainin     mins  42886;     Ultrasound:     0     mins  74050;    Ionto                               0    mins   14962  Self Care                       0     mins   93589        Un-Timed:  Electrical Stimulation:    0     mins  17069 (MC );  Dry Needling     0     mins self-pay  Canalith Repos    0     mins 38893  Traction     0     mins 13497  Low Eval     0     Mins  03188  Mod Eval     40     Mins  45569  High Eval                       0     Mins  14884  Re-Eval                           0    mins  56160    Timed Treatment:   14   mins   Total Treatment:     54   mins    PT SIGNATURE: Oracio Palomino PT     Electronically signed 2024    [place] License: PT - [id number]     Initial Certification  Certification Period: 2024 thru 4/3/2024  I certify that the therapy services are furnished while this patient is under my care.  The services outlined above are required by this patient, and will be reviewed every 90 days.     PHYSICIAN: Amaury Mcdonough Jr., MD   NPI: [id number]                                        DATE:     Please sign and return via fax to [phone]. Thank you, [facility].

## 2024-01-08 ENCOUNTER — TELEPHONE (OUTPATIENT)
Dept: INTERNAL MEDICINE | Facility: CLINIC | Age: 79
End: 2024-01-08
Payer: MEDICARE

## 2024-01-08 NOTE — TELEPHONE ENCOUNTER
----- Message from Amaury Mcdonough Jr., MD sent at 1/5/2024  8:11 AM EST -----  Lumbar degenerative disc disease and multiple bulging discs, would recommend follow-up with Neurosurgery to discuss treatment options.

## 2024-01-10 ENCOUNTER — TREATMENT (OUTPATIENT)
Dept: PHYSICAL THERAPY | Facility: CLINIC | Age: 79
End: 2024-01-10
Payer: MEDICARE

## 2024-01-10 DIAGNOSIS — R26.9 GAIT DISTURBANCE: ICD-10-CM

## 2024-01-10 DIAGNOSIS — Z98.1 HISTORY OF LUMBAR FUSION: ICD-10-CM

## 2024-01-10 DIAGNOSIS — G89.29 CHRONIC BILATERAL LOW BACK PAIN WITHOUT SCIATICA: Primary | ICD-10-CM

## 2024-01-10 DIAGNOSIS — M54.50 CHRONIC BILATERAL LOW BACK PAIN WITHOUT SCIATICA: Primary | ICD-10-CM

## 2024-01-10 PROCEDURE — 97530 THERAPEUTIC ACTIVITIES: CPT | Performed by: PHYSICAL THERAPIST

## 2024-01-10 PROCEDURE — 97110 THERAPEUTIC EXERCISES: CPT | Performed by: PHYSICAL THERAPIST

## 2024-01-10 NOTE — PROGRESS NOTES
Physical Therapy Daily Treatment Note  [address] [place] [address]    Patient: Jamir Porter   : 1945  Diagnosis/ICD-10 Code:  Chronic bilateral low back pain without sciatica [M54.50, G89.29]  Referring practitioner: Amaury Mcdonough *  Date of Initial Visit: Type: THERAPY  Noted: 2024  Today's Date: 1/10/2024  Patient seen for 2 sessions           Subjective : Patient reports he feels like the exercises have been helpful so far. Minimal pain currently. He has an appointment with Neuro at the end of January.    Objective   See Exercise, Manual, and Modality Logs for complete treatment.       Assessment/Plan : Pt tolerated today's session well. Progressed functional mobility and strengthening activities today which reproduced mild lower back pain, but this seemed to resolve with seated trunk flexion at the end of the session. Pt would benefit from continued skilled therapy to address deficits. Progress per plan of care.             Timed:  Manual Therapy:    0     mins  65905;  Therapeutic Exercise:    16     mins  93074;     Neuromuscular Atiya:    0    mins  56768;    Therapeutic Activity:     10     mins  00364;     Gait Trainin     mins  34808;     Ultrasound:     0     mins  02736;    Aquatic Therapy    0     mins  55801;        Timed Treatment:   26   mins   Total Treatment:     26   mins    Oracio Palomino PT  Physical Therapist    Electronically signed 1/10/2024    [place] License: PT - [id number]

## 2024-01-12 ENCOUNTER — TREATMENT (OUTPATIENT)
Dept: PHYSICAL THERAPY | Facility: CLINIC | Age: 79
End: 2024-01-12
Payer: MEDICARE

## 2024-01-12 DIAGNOSIS — R26.9 GAIT DISTURBANCE: ICD-10-CM

## 2024-01-12 DIAGNOSIS — G89.29 CHRONIC BILATERAL LOW BACK PAIN WITHOUT SCIATICA: Primary | ICD-10-CM

## 2024-01-12 DIAGNOSIS — M54.50 CHRONIC BILATERAL LOW BACK PAIN WITHOUT SCIATICA: Primary | ICD-10-CM

## 2024-01-12 DIAGNOSIS — Z98.1 HISTORY OF LUMBAR FUSION: ICD-10-CM

## 2024-01-12 PROCEDURE — 97110 THERAPEUTIC EXERCISES: CPT | Performed by: PHYSICAL THERAPIST

## 2024-01-12 PROCEDURE — 97530 THERAPEUTIC ACTIVITIES: CPT | Performed by: PHYSICAL THERAPIST

## 2024-01-12 NOTE — PROGRESS NOTES
Physical Therapy Daily Treatment Note  [address] [place] [address]    Patient: Jamir Porter   : 1945  Diagnosis/ICD-10 Code:  Chronic bilateral low back pain without sciatica [M54.50, G89.29]  Referring practitioner: Amaury Mcdonough *  Date of Initial Visit: Type: THERAPY  Noted: 2024  Today's Date: 2024  Patient seen for 3 sessions           Subjective : Patient reports he feels like the exercises are helping his back feel better overall.    Objective   See Exercise, Manual, and Modality Logs for complete treatment.       Assessment/Plan : Pt tolerated today's session well. Progressed standing tolerance today along with strengthening and functional mobility. Pt would benefit from continued skilled therapy to address deficits. Progress per plan of care.             Timed:  Manual Therapy:    0     mins  82773;  Therapeutic Exercise:    12     mins  68699;     Neuromuscular Atiya:    0    mins  48505;    Therapeutic Activity:     16     mins  73266;     Gait Trainin     mins  96184;     Ultrasound:     0     mins  95878;    Aquatic Therapy    0     mins  16234;        Timed Treatment:   28   mins   Total Treatment:     28   mins    Oracio Palomino PT  Physical Therapist    Electronically signed 2024    [place] License: PT - [id number]

## 2024-01-15 ENCOUNTER — TELEPHONE (OUTPATIENT)
Dept: PHYSICAL THERAPY | Facility: OTHER | Age: 79
End: 2024-01-15
Payer: MEDICARE

## 2024-01-15 NOTE — TELEPHONE ENCOUNTER
Caller: Jamir Porter    Relationship: Self    What was the call regarding: PATIENT CANCELLED TODAYS APPT BECAUSE OF THE WEATHER

## 2024-01-17 ENCOUNTER — TREATMENT (OUTPATIENT)
Dept: PHYSICAL THERAPY | Facility: CLINIC | Age: 79
End: 2024-01-17
Payer: MEDICARE

## 2024-01-17 ENCOUNTER — HOSPITAL ENCOUNTER (EMERGENCY)
Facility: HOSPITAL | Age: 79
Discharge: HOME | End: 2024-01-17
Attending: EMERGENCY MEDICINE
Payer: MEDICARE

## 2024-01-17 VITALS
DIASTOLIC BLOOD PRESSURE: 76 MMHG | TEMPERATURE: 97.9 F | SYSTOLIC BLOOD PRESSURE: 148 MMHG | OXYGEN SATURATION: 94 % | RESPIRATION RATE: 21 BRPM | HEART RATE: 77 BPM

## 2024-01-17 DIAGNOSIS — R26.9 GAIT DISTURBANCE: ICD-10-CM

## 2024-01-17 DIAGNOSIS — L03.116 CELLULITIS OF LEFT FOOT: Primary | ICD-10-CM

## 2024-01-17 DIAGNOSIS — Z98.1 HISTORY OF LUMBAR FUSION: ICD-10-CM

## 2024-01-17 DIAGNOSIS — G89.29 CHRONIC BILATERAL LOW BACK PAIN WITHOUT SCIATICA: Primary | ICD-10-CM

## 2024-01-17 DIAGNOSIS — M54.50 CHRONIC BILATERAL LOW BACK PAIN WITHOUT SCIATICA: Primary | ICD-10-CM

## 2024-01-17 LAB
ALBUMIN SERPL-MCNC: 4.4 G/DL (ref 3.5–5.2)
ALBUMIN/GLOB SERPL: 1.2 G/DL
ALP SERPL-CCNC: 101 U/L (ref 39–117)
ALT SERPL W P-5'-P-CCNC: 22 U/L (ref 1–41)
ANION GAP SERPL CALCULATED.3IONS-SCNC: 12.9 MMOL/L (ref 5–15)
AST SERPL-CCNC: 19 U/L (ref 1–40)
BASOPHILS # BLD AUTO: 0.11 10*3/MM3 (ref 0–0.2)
BASOPHILS NFR BLD AUTO: 0.8 % (ref 0–1.5)
BILIRUB SERPL-MCNC: 0.5 MG/DL (ref 0–1.2)
BUN SERPL-MCNC: 20 MG/DL (ref 8–23)
BUN/CREAT SERPL: 19 (ref 7–25)
CALCIUM SPEC-SCNC: 9.7 MG/DL (ref 8.6–10.5)
CHLORIDE SERPL-SCNC: 98 MMOL/L (ref 98–107)
CO2 SERPL-SCNC: 28.1 MMOL/L (ref 22–29)
CREAT SERPL-MCNC: 1.05 MG/DL (ref 0.76–1.27)
DEPRECATED RDW RBC AUTO: 41.8 FL (ref 37–54)
EGFRCR SERPLBLD CKD-EPI 2021: 71.8 ML/MIN/1.73
EOSINOPHIL # BLD AUTO: 0.29 10*3/MM3 (ref 0–0.4)
EOSINOPHIL NFR BLD AUTO: 2 % (ref 0.3–6.2)
ERYTHROCYTE [DISTWIDTH] IN BLOOD BY AUTOMATED COUNT: 12.4 % (ref 12.3–15.4)
GLOBULIN UR ELPH-MCNC: 3.6 GM/DL
GLUCOSE SERPL-MCNC: 143 MG/DL (ref 65–99)
HCT VFR BLD AUTO: 46.9 % (ref 37.5–51)
HGB BLD-MCNC: 16.3 G/DL (ref 13–17.7)
HOLD SPECIMEN: NORMAL
HOLD SPECIMEN: NORMAL
IMM GRANULOCYTES # BLD AUTO: 0.08 10*3/MM3 (ref 0–0.05)
IMM GRANULOCYTES NFR BLD AUTO: 0.6 % (ref 0–0.5)
LYMPHOCYTES # BLD AUTO: 1.46 10*3/MM3 (ref 0.7–3.1)
LYMPHOCYTES NFR BLD AUTO: 10.1 % (ref 19.6–45.3)
MCH RBC QN AUTO: 31.9 PG (ref 26.6–33)
MCHC RBC AUTO-ENTMCNC: 34.8 G/DL (ref 31.5–35.7)
MCV RBC AUTO: 91.8 FL (ref 79–97)
MONOCYTES # BLD AUTO: 1.23 10*3/MM3 (ref 0.1–0.9)
MONOCYTES NFR BLD AUTO: 8.5 % (ref 5–12)
NEUTROPHILS NFR BLD AUTO: 11.28 10*3/MM3 (ref 1.7–7)
NEUTROPHILS NFR BLD AUTO: 78 % (ref 42.7–76)
NRBC BLD AUTO-RTO: 0 /100 WBC (ref 0–0.2)
PLATELET # BLD AUTO: 323 10*3/MM3 (ref 140–450)
PMV BLD AUTO: 9.6 FL (ref 6–12)
POTASSIUM SERPL-SCNC: 3.9 MMOL/L (ref 3.5–5.2)
PROT SERPL-MCNC: 8 G/DL (ref 6–8.5)
RBC # BLD AUTO: 5.11 10*6/MM3 (ref 4.14–5.8)
SODIUM SERPL-SCNC: 139 MMOL/L (ref 136–145)
WBC NRBC COR # BLD AUTO: 14.45 10*3/MM3 (ref 3.4–10.8)
WHOLE BLOOD HOLD COAG: NORMAL
WHOLE BLOOD HOLD SPECIMEN: NORMAL

## 2024-01-17 PROCEDURE — 80053 COMPREHEN METABOLIC PANEL: CPT | Performed by: EMERGENCY MEDICINE

## 2024-01-17 PROCEDURE — 97110 THERAPEUTIC EXERCISES: CPT | Performed by: PHYSICAL THERAPIST

## 2024-01-17 PROCEDURE — 99283 EMERGENCY DEPT VISIT LOW MDM: CPT

## 2024-01-17 PROCEDURE — 85025 COMPLETE CBC W/AUTO DIFF WBC: CPT | Performed by: EMERGENCY MEDICINE

## 2024-01-17 PROCEDURE — 87040 BLOOD CULTURE FOR BACTERIA: CPT | Performed by: EMERGENCY MEDICINE

## 2024-01-17 RX ORDER — SODIUM CHLORIDE 0.9 % (FLUSH) 0.9 %
10 SYRINGE (ML) INJECTION AS NEEDED
Status: DISCONTINUED | OUTPATIENT
Start: 2024-01-17 | End: 2024-01-17 | Stop reason: HOSPADM

## 2024-01-17 RX ORDER — DOXYCYCLINE 100 MG/1
100 CAPSULE ORAL 2 TIMES DAILY
Qty: 20 CAPSULE | Refills: 0 | Status: SHIPPED | OUTPATIENT
Start: 2024-01-17

## 2024-01-17 RX ORDER — AMOXICILLIN AND CLAVULANATE POTASSIUM 875; 125 MG/1; MG/1
1 TABLET, FILM COATED ORAL EVERY 12 HOURS
Qty: 20 TABLET | Refills: 0 | Status: SHIPPED | OUTPATIENT
Start: 2024-01-17 | End: 2024-01-25

## 2024-01-17 NOTE — DISCHARGE INSTRUCTIONS
Continue to follow postop instructions.  Take the antibiotics as prescribed.  Wash the foot with antibacterial soap and water.  Follow-up with your foot specialist on Monday or Tuesday for recheck to ensure appropriate healing.  If you cannot be seen by them then follow-up with your primary care provider for wound check.  Return to the ER for increasing pain, increasing swelling, abnormal drainage from any skin lesions or any other concerns issues that may arise.

## 2024-01-17 NOTE — PROGRESS NOTES
Physical Therapy Daily Treatment Note  [address] [place] [address]    Patient: Jamir Porter   : 1945  Diagnosis/ICD-10 Code:  Chronic bilateral low back pain without sciatica [M54.50, G89.29]  Referring practitioner: Amaury Mcdonough *  Date of Initial Visit: Type: THERAPY  Noted: 2024  Today's Date: 2024  Patient seen for 4 sessions           Subjective : Patient reports he has a spot on the top of his foot that is sore and red that came up 3-4 days ago and has stayed about the same. He is wanting to have it checked out.     Objective   See Exercise, Manual, and Modality Logs for complete treatment.       Assessment/Plan : Pt tolerated today's session well. With assessment of his foot, I have concerns of a potential infection after his surgery and elected to not perform standing activiteis today. He confirms he will be going over to his physician's office to be assessed. Reviewed his HEP as he is not coming in next week with therapist being out of office. Pt would benefit from continued skilled therapy to address deficits. Progress per plan of care.             Timed:  Manual Therapy:    0     mins  08685;  Therapeutic Exercise:    15     mins  90215;     Neuromuscular Atiya:    0    mins  61719;    Therapeutic Activity:     0     mins  63707;     Gait Trainin     mins  04071;     Ultrasound:     0     mins  74571;    Aquatic Therapy    0     mins  96770;        Timed Treatment:   15   mins   Total Treatment:     15   mins    Oracio Palomino PT  Physical Therapist    Electronically signed 2024    [place] License: PT - [id number]

## 2024-01-17 NOTE — ED PROVIDER NOTES
"Time: 2:14 PM EST  Date of encounter:  1/17/2024  Independent Historian/Clinical History and Information was obtained by:   Patient and Family  Chief Complaint: Swollen left foot    History is limited by: N/A    History of Present Illness:  Patient is a 79 y.o. year old male who presents to the emergency department for evaluation of redness and swelling of his left foot.  Patient is his concern for the development of a infection.  Patient reports that he had a Dangelo's neuroma surgery done on December 28 by Dr. Maldonado with [facility].  Patient states that he had his stitches removed last Thursday.  Patient reports everything was going well postop.  Patient states since having the stitches removed he has developed some redness and swelling of his foot in the area of the surgery.  He denies any drainage.  Patient was at physical therapy today.  They were concerned as well about an infection and wanted him to be checked out for possible infection.    HPI    Patient Care Team  Primary Care Provider: Amaury Mcdonough Jr., MD    Past Medical History:     Allergies   Allergen Reactions    Statins Myalgia    Sulfate Hives    Meperidine Other (See Comments)     \"DOESN'T WORK\"     Past Medical History:   Diagnosis Date    Allergic rhinitis     Anxiety and depression     Arthritis     Balance problem     DDD BACK NEUROMA , CANE HELPS    Chronic sinusitis     NO CURRENT ISSUES    Claustrophobia     Colon polyps     NO CANCER    DDD (degenerative disc disease), cervical     C6/7 FULL ROM    Diabetes mellitus     Disease of thyroid gland     Diverticulitis     NO CURRENT ISSUES    Forgetfulness     H/O degenerative disc disease     Hx of BKA, right     Hyperlipemia     Hypertension     Limb swelling     OCCASSIONAL    Lumbago     Plantar nerve lesion, left     RIGHT LEG BKA    Prostate CA     Sleep apnea     REPAIRED WITH SURGERY    Urinary incontinence      Past Surgical History:   Procedure " Laterality Date    AMPUTATION Right 2012    R BKA    BACK SURGERY      LUMBAR MULTIPLE ABLATIONS PRIOR TO FUSION    BUNIONECTOMY N/A 12/28/2023    Procedure: FUENTES'S NEUROMA EXCISION;  Surgeon: Phillip Munguia DPM;  Location: [facility];  Service: Podiatry;  Laterality: N/A;    COLONOSCOPY  2019    DENTAL PROCEDURE      dental surgery     EYE SURGERY Bilateral     eye implant, yes CATARACTS REMOVED    FOOT SURGERY Left     LUMBAR FUSION  2016    [facility]    LUMBAR PUNCTURE      ORIF FEMUR FRACTURE Right     PROXIMAL    PENILE PROSTHESIS IMPLANT      PILONIDAL CYST / SINUS EXCISION      ROTATOR CUFF REPAIR Bilateral     TOTAL KNEE ARTHROPLASTY Left     TURP / TRANSURETHRAL INCISION / DRAINAGE PROSTATE      following prostate cancer    UVULOPALATOPHARYNGOPLASTY       Family History   Problem Relation Age of Onset    Arthritis Mother     Cancer Mother     Arthritis Father     Cancer Father     Arthritis Brother     Cancer Brother     Malig Hyperthermia Neg Hx        Home Medications:  Prior to Admission medications    Medication Sig Start Date End Date Taking? Authorizing Provider   Dulaglutide (Trulicity) 4.5 MG/0.5ML solution pen-injector Inject 0.5 mL under the skin into the appropriate area as directed 1 (One) Time Per Week.  Patient taking differently: Inject 0.5 mL under the skin into the appropriate area as directed 1 (One) Time Per Week. LAST DOSE FRIDAY 12/22/23 12/12/23   Amaury Mcdonough Jr., MD   ezetimibe (ZETIA) 10 MG tablet TAKE 1 TABLET BY MOUTH DAILY 2/22/23   Amaury Mcdonough Jr., MD   FLUoxetine (PROzac) 40 MG capsule TAKE 1 CAPSULE BY MOUTH DAILY 11/30/22   Amaury Mcdonough Jr., MD   gabapentin (NEURONTIN) 100 MG capsule TAKE 1 CAPSULE BY MOUTH TWICE DAILY AS NEEDED FOR PAIN 6/19/23   Amaury Mcdonough Jr., MD   hydroCHLOROthiazide (HYDRODIURIL) 12.5 MG tablet TAKE 2 TABLETS BY MOUTH DAILY  Patient taking differently: Take 1 tablet by mouth Daily. LAST DOSE  12/27/23 11/3/23   Amaury Mcdonough Jr., MD   ibuprofen (ADVIL,MOTRIN) 200 MG tablet Take 4 tablets by mouth Every 6 (Six) Hours As Needed for Mild Pain. LAST DOSE 23    Ria Key MD   levothyroxine (Synthroid) 100 MCG tablet Take 1 tablet by mouth Daily. 23   Amaury Mcdonough Jr., MD   multivitamin with minerals tablet tablet Take 1 tablet by mouth Daily. LD 23    Ria Key MD   NON FORMULARY Take 1 tablet by mouth Daily. FOCUS FACTOR, MEMORY SUPPLEMENT  LAST DOSE 23    Ria Key MD   Vibegron 75 MG tablet Take 1 tablet by mouth Daily.  Patient taking differently: Take 1 tablet by mouth Daily. GEMTESA 10/30/23   Lenard Ray MD   vitamin D (ERGOCALCIFEROL) 1.25 MG (45507 UT) capsule capsule Take 1 capsule by mouth 1 (One) Time Per Week.  Patient taking differently: Take 1 capsule by mouth Every 7 (Seven) Days. LAST DOSE 23   Amaury Mcdonough Jr., MD        Social History:   Social History     Tobacco Use    Smoking status: Former     Packs/day: 1.50     Years: 17.00     Additional pack years: 0.00     Total pack years: 25.50     Types: Cigarettes     Start date:      Quit date:      Years since quittin.0    Smokeless tobacco: Former    Tobacco comments:     1.5 ppd quit , 17 years total    Vaping Use    Vaping Use: Never used   Substance Use Topics    Alcohol use: Yes     Comment: rarely drinks 2021    Drug use: Never         Review of Systems:  Review of Systems   Constitutional:  Negative for chills and fever.   HENT:  Negative for congestion, ear pain and sore throat.    Eyes:  Negative for pain.   Respiratory:  Negative for cough, chest tightness and shortness of breath.    Cardiovascular:  Negative for chest pain.   Gastrointestinal:  Negative for abdominal pain, diarrhea, nausea and vomiting.   Genitourinary:  Negative for flank pain and hematuria.   Musculoskeletal:  Negative for  joint swelling.   Skin:  Negative for pallor.        Redness and swelling involving left foot   Neurological:  Negative for seizures and headaches.   All other systems reviewed and are negative.       Physical Exam:  /76   Pulse 77   Temp 97.9 °F (36.6 °C) (Oral)   Resp 21   SpO2 94%     Physical Exam    Vital signs were reviewed under triage note.  General appearance - Patient appears well-developed and well-nourished.  Patient is in no acute distress.  Head - Normocephalic, atraumatic.  Pupils - Equal, round, reactive to light.  Extraocular muscles are intact.  Conjunctiva is clear.  Nasal - Normal inspection.  No evidence of trauma or epistaxis.  Tympanic membranes - Gray, intact without erythema or retractions.  Oral mucosa - Pink and moist without lesions or erythema.  Uvula is midline.  Chest wall - Atraumatic.  Chest wall is nontender.  There are no vesicular rashes noted.  Neck - Supple.  Trachea was midline.  There is no palpable lymphadenopathy or thyromegaly.  There are no meningeal signs  Lungs - Clear to auscultation and percussion bilaterally.  Heart - Regular rate and rhythm without any murmurs, clicks, or gallops.  Abdomen - Soft.  Bowel sounds are present.  There is no palpable tenderness.  There is no rebound, guarding, or rigidity.  There are no palpable masses.  There are no pulsatile masses.  Back - Spine is straight and midline.  There is no CVA tenderness.  Extremities - Intact x4 with full range of motion.  There is no palpable edema.  Pulses are intact x4 and equal.  Left foot reveals surgical wound to be well-healed without any wound dehiscence or drainage.  There is some soft tissue swelling involving the distal midfoot area along with some mild erythema.  There is some tenderness to palpation as well.  Neurologic - Patient is awake, alert, and oriented x3.  Cranial nerves II through XII are grossly intact.  Motor and sensory functions grossly intact.  Cerebellar function was  normal.  Integument - There are no rashes.  There are no petechia or purpura lesions noted.  There are no vesicular lesions noted.          Procedures:  Procedures      Medical Decision Making:      Comorbidities that affect care:    Hyperlipidemia, hypertension, diabetes, status post Dangelo's neuroma surgery, hypothyroidism    External Notes reviewed:    Previous Operation Note: Op note of Dr. Phu Hill, podiatrist, date 12/28/2023 was reviewed by me.      The following orders were placed and all results were independently analyzed by me:  Orders Placed This Encounter   Procedures    Blood Culture - Blood,    Blood Culture - Blood,    Comprehensive Metabolic Panel    [place] Draw    CBC Auto Differential    Lactic Acid, Plasma    Insert Peripheral IV    CBC & Differential    Green Top (Gel)    Lavender Top    Gold Top - SST    Light Blue Top       Medications Given in the Emergency Department:  Medications   sodium chloride 0.9 % flush 10 mL (has no administration in time range)        ED Course:     The patient was seen and evaluated in the ED by me.  The above history and physical examination was performed as documented.  Diagnostic data was obtained.  Results reviewed.  Discussed with the patient.  Patient does have enough changes to the foot area to be concerned for the possible development of cellulitis.  It is also possible this may just be all inflammatory response.  With infection being the worst case scenario, I will treat for possible postop infection.  I spoke with my clinical pharmacist and decision was made to put the patient on Augmentin and doxycycline to cover for both strep and staph.  Patient was advised to follow-up with his foot specialist first of this week for wound check to ensure adequate healing and to make sure that there are no indicators for any concerns that he would need a washout.  Patient was invited to return to the ER over the weekend for any significant change or worsening of his  condition.    Labs:    Lab Results (last 24 hours)       Procedure Component Value Units Date/Time    CBC & Differential [[id number]]  (Abnormal) Collected: 01/17/24 1339    Specimen: Blood from Arm, Left Updated: 01/17/24 1354    Narrative:      The following orders were created for panel order CBC & Differential.  Procedure                               Abnormality         Status                     ---------                               -----------         ------                     CBC Auto Differential[[id number]]        Abnormal            Final result                 Please view results for these tests on the individual orders.    Comprehensive Metabolic Panel [[id number]]  (Abnormal) Collected: 01/17/24 1339    Specimen: Blood from Arm, Left Updated: 01/17/24 1417     Glucose 143 mg/dL      BUN 20 mg/dL      Creatinine 1.05 mg/dL      Sodium 139 mmol/L      Potassium 3.9 mmol/L      Comment: Slight hemolysis detected by analyzer. Result may be falsely elevated.        Chloride 98 mmol/L      CO2 28.1 mmol/L      Calcium 9.7 mg/dL      Total Protein 8.0 g/dL      Albumin 4.4 g/dL      ALT (SGPT) 22 U/L      AST (SGOT) 19 U/L      Alkaline Phosphatase 101 U/L      Total Bilirubin 0.5 mg/dL      Globulin 3.6 gm/dL      A/G Ratio 1.2 g/dL      BUN/Creatinine Ratio 19.0     Anion Gap 12.9 mmol/L      eGFR 71.8 mL/min/1.73     Narrative:      GFR Normal >60  Chronic Kidney Disease <60  Kidney Failure <15    The GFR formula is only valid for adults with stable renal function between ages 18 and 70.    Blood Culture - Blood, Arm, Left [[id number]] Collected: 01/17/24 1339    Specimen: Blood from Arm, Left Updated: 01/17/24 1348    CBC Auto Differential [[id number]]  (Abnormal) Collected: 01/17/24 1339    Specimen: Blood from Arm, Left Updated: 01/17/24 1354     WBC 14.45 10*3/mm3      RBC 5.11 10*6/mm3      Hemoglobin 16.3 g/dL      Hematocrit 46.9 %      MCV 91.8 fL      MCH 31.9 pg      MCHC 34.8 g/dL      RDW 12.4 %       RDW-SD 41.8 fl      MPV 9.6 fL      Platelets 323 10*3/mm3      Neutrophil % 78.0 %      Lymphocyte % 10.1 %      Monocyte % 8.5 %      Eosinophil % 2.0 %      Basophil % 0.8 %      Immature Grans % 0.6 %      Neutrophils, Absolute 11.28 10*3/mm3      Lymphocytes, Absolute 1.46 10*3/mm3      Monocytes, Absolute 1.23 10*3/mm3      Eosinophils, Absolute 0.29 10*3/mm3      Basophils, Absolute 0.11 10*3/mm3      Immature Grans, Absolute 0.08 10*3/mm3      nRBC 0.0 /100 WBC              Imaging:    No Radiology Exams Resulted Within Past 24 Hours      Differential Diagnosis and Discussion:    Rash: Differential diagnosis includes but is not limited to sepsis, cellulitis, [place] Spotted Fever, meningitis, meningococcemia, Varicella, Strep infection, dermatitis, allergic reaction, Lyme disease, and toxic shock syndrome.    All labs were reviewed and interpreted by me.    MDM     Amount and/or Complexity of Data Reviewed  Clinical lab tests: reviewed             Patient Care Considerations:    SEPSIS was considered but is NOT present in the emergency department as SIRS criteria is not present.      Consultants/Shared Management Plan:    None    Social Determinants of Health:    Patient is independent, reliable, and has access to care.       Disposition and Care Coordination:    Discharged: I considered escalation of care by admitting this patient to the hospital, however the patient has improved and is suitable and  stable for discharge.    I have explained the patient´s condition, diagnoses and treatment plan based on the information available to me at this time. I have answered questions and addressed any concerns. The patient has a good  understanding of the patient´s diagnosis, condition, and treatment plan as can be expected at this point. The vital signs have been stable. The patient´s condition is stable and appropriate for discharge from the emergency department.      The patient will pursue further  outpatient evaluation with the primary care physician or other designated or consulting physician as outlined in the discharge instructions. They are agreeable to this plan of care and follow-up instructions have been explained in detail. The patient has received these instructions in written format and have expressed an understanding of the discharge instructions. The patient is aware that any significant change in condition or worsening of symptoms should prompt an immediate return to this or the closest emergency department or call to 911.  I have explained discharge medications and the need for follow up with the patient/caretakers. This was also printed in the discharge instructions. Patient was discharged with the following medications and follow up:      Medication List        New Prescriptions      amoxicillin-clavulanate 875-125 MG per tablet  Commonly known as: AUGMENTIN  Take 1 tablet by mouth Every 12 (Twelve) Hours.     doxycycline 100 MG capsule  Commonly known as: MONODOX  Take 1 capsule by mouth 2 (Two) Times a Day.            Changed      hydroCHLOROthiazide 12.5 MG tablet  Commonly known as: HYDRODIURIL  TAKE 2 TABLETS BY MOUTH DAILY  What changed:   how much to take  additional instructions     Trulicity 4.5 MG/0.5ML solution pen-injector  Generic drug: Dulaglutide  Inject 0.5 mL under the skin into the appropriate area as directed 1 (One) Time Per Week.  What changed: additional instructions     Vibegron 75 MG tablet  Take 1 tablet by mouth Daily.  What changed: additional instructions     vitamin D 1.25 MG (00602 UT) capsule capsule  Commonly known as: ERGOCALCIFEROL  Take 1 capsule by mouth 1 (One) Time Per Week.  What changed:   when to take this  additional instructions               Where to Get Your Medications        [id number] - [place], [place] - [address] AT [facility] [phone] [facility] [phone]   [address] [address]      Phone: [phone]   amoxicillin-clavulanate 875-125 MG per tablet  doxycycline 100 MG capsule      Amaury Mcdonough Jr., MD  [address]  [address]  [place] [id number] [phone]    In 1 week  For wound re-check      Call your foot specialist/surgeon.  Arrange for outpatient follow-up for wound check for the first par of this t is coming week.          Final diagnoses:   Cellulitis of left foot        ED Disposition       ED Disposition   Discharge    Condition   Stable    Comment   --               This medical record created using voice recognition software.             Abhishek Corbett DO  01/21/24 105

## 2024-01-22 LAB — BACTERIA SPEC AEROBE CULT: NORMAL

## 2024-01-25 ENCOUNTER — OFFICE VISIT (OUTPATIENT)
Dept: INTERNAL MEDICINE | Facility: CLINIC | Age: 79
End: 2024-01-25
Payer: MEDICARE

## 2024-01-25 VITALS
BODY MASS INDEX: 34.96 KG/M2 | SYSTOLIC BLOOD PRESSURE: 124 MMHG | TEMPERATURE: 97.8 F | WEIGHT: 236 LBS | HEART RATE: 92 BPM | HEIGHT: 69 IN | DIASTOLIC BLOOD PRESSURE: 75 MMHG | OXYGEN SATURATION: 92 %

## 2024-01-25 DIAGNOSIS — L03.116 CELLULITIS OF LEFT LOWER EXTREMITY: Primary | ICD-10-CM

## 2024-01-25 PROCEDURE — 3074F SYST BP LT 130 MM HG: CPT | Performed by: NURSE PRACTITIONER

## 2024-01-25 PROCEDURE — 1160F RVW MEDS BY RX/DR IN RCRD: CPT | Performed by: NURSE PRACTITIONER

## 2024-01-25 PROCEDURE — 1159F MED LIST DOCD IN RCRD: CPT | Performed by: NURSE PRACTITIONER

## 2024-01-25 PROCEDURE — 3078F DIAST BP <80 MM HG: CPT | Performed by: NURSE PRACTITIONER

## 2024-01-25 PROCEDURE — 99213 OFFICE O/P EST LOW 20 MIN: CPT | Performed by: NURSE PRACTITIONER

## 2024-01-25 RX ORDER — CEPHALEXIN 500 MG/1
500 CAPSULE ORAL 4 TIMES DAILY
Qty: 40 CAPSULE | Refills: 0 | Status: SHIPPED | OUTPATIENT
Start: 2024-01-25 | End: 2024-02-04

## 2024-01-25 NOTE — PROGRESS NOTES
Chief Complaint  left foot (Went to ED for infection. He also wants to know if it is okay to get a pedicure ) and Vitamin D Deficiency (Pt states he went off his vit D med prior to surgery. Wants to know if its okay to resume taking it)    Subjective          Jamir Porter is an 79 year old male that presents to [facility] with c/o redness and swelling to the top of his left foot. He was recently treated in the ER for cellulitis after a smith neuroma surgery. He states that the stitches were removed and then he developed redness and swelling. He was treated with bactrim and doxycycline. However, he felt like the medications weren't prescribed often enough so he took them every 6-8 hours and has already completed the courses before 10 days.    He denies any new fever or chills.     History of Present Illness      Current Outpatient Medications   Medication Instructions    cephalexin (KEFLEX) 500 mg, Oral, 4 Times Daily    doxycycline (MONODOX) 100 mg, Oral, 2 Times Daily    ezetimibe (ZETIA) 10 mg, Oral, Daily    FLUoxetine (PROZAC) 40 mg, Oral, Daily    gabapentin (NEURONTIN) 100 MG capsule TAKE 1 CAPSULE BY MOUTH TWICE DAILY AS NEEDED FOR PAIN    hydroCHLOROthiazide (HYDRODIURIL) 25 mg, Oral, Daily    ibuprofen (ADVIL,MOTRIN) 800 mg, Oral, Every 6 Hours PRN, LAST DOSE 12/22/23    levothyroxine (SYNTHROID) 100 mcg, Oral, Daily    multivitamin with minerals tablet tablet 1 tablet, Oral, Daily, LD 12/25/23    NON FORMULARY 1 tablet, Oral, Daily, FOCUS FACTOR, MEMORY SUPPLEMENT<BR>LAST DOSE 12/25/23    Trulicity 4.5 mg, Subcutaneous, Weekly    Vibegron 75 mg, Oral, Daily    vitamin D (ERGOCALCIFEROL) 50,000 Units, Oral, Weekly       The following portions of the patient's history were reviewed and updated as appropriate: allergies, current medications, past family history, past medical history, past social history, past surgical history, and problem list.    Objective  "  Vital Signs:   /75   Pulse 92   Temp 97.8 °F (36.6 °C)   Ht 175.3 cm (69\")   Wt 107 kg (236 lb)   SpO2 92%   BMI 34.85 kg/m²     BP Readings from Last 3 Encounters:   01/25/24 124/75   01/17/24 148/76   12/28/23 134/78     Wt Readings from Last 3 Encounters:   01/25/24 107 kg (236 lb)   12/28/23 109 kg (240 lb 1.3 oz)   12/21/23 110 kg (242 lb)           Physical Exam  Vitals and nursing note reviewed.   Constitutional:       Appearance: Normal appearance. He is not ill-appearing.   HENT:      Head: Normocephalic and atraumatic.   Pulmonary:      Effort: Pulmonary effort is normal.   Feet:      Left foot:      Skin integrity: Erythema present.      Comments: Swelling to 2nd toe of left foot. Surgical incision is well approximated, no signs of infection.  See attached photos.  Neurological:      Mental Status: He is alert.   Psychiatric:         Mood and Affect: Mood normal.         Behavior: Behavior normal.                  Result Review :   The following data was reviewed by: LISA Morel on 01/25/2024:  Common labs          9/11/2023    11:27 12/12/2023    10:59 1/17/2024    13:39   Common Labs   Glucose 77  110  143    BUN 15  18  20    Creatinine 0.76  1.50  1.05    Sodium 141  137  139    Potassium 4.3  4.2  3.9    Chloride 105  99  98    Calcium 8.8  9.5  9.7    Albumin 4.2  4.6  4.4    Total Bilirubin 0.4  0.4  0.5    Alkaline Phosphatase 78  82  101    AST (SGOT) 23  25  19    ALT (SGPT) 21  22  22    WBC 8.06  8.42  14.45    Hemoglobin 14.2  15.3  16.3    Hematocrit 41.1  43.8  46.9    Platelets 262  285  323    Total Cholesterol 183  223     Triglycerides 128  150     HDL Cholesterol 38  40     LDL Cholesterol  122  156     Hemoglobin A1C 6.20  5.80     PSA 4.590               Lab Results   Component Value Date    SARSANTIGEN Not Detected 08/15/2023    COVID19 NOT DETECTED 03/19/2021    RAPFLUA Negative 08/15/2023    RAPFLUB Negative 08/15/2023    FLUAAG Not Detected 06/06/2023 "    FLUBAG Not Detected 06/06/2023    RAPSCRN Negative 06/06/2023    BILIRUBINUR Negative 12/18/2023     ED with Abhishek Corbett DO (01/17/2024)    Procedures        Assessment and Plan    Diagnoses and all orders for this visit:    1. Cellulitis of left lower extremity (Primary)  -     cephalexin (Keflex) 500 MG capsule; Take 1 capsule by mouth 4 (Four) Times a Day for 10 days.  Dispense: 40 capsule; Refill: 0          Medications Discontinued During This Encounter   Medication Reason    amoxicillin-clavulanate (AUGMENTIN) 875-125 MG per tablet *Therapy completed          Follow Up   No follow-ups on file.  Patient was given instructions and counseling regarding his condition or for health maintenance advice. Please see specific information pulled into the AVS if appropriate.     Please be sure to take medication as prescribed with food to avoid stomach upset.       Ai Padilla, APRN  01/25/24  15:46 EST

## 2024-01-26 ENCOUNTER — OFFICE VISIT (OUTPATIENT)
Dept: NEUROSURGERY | Facility: CLINIC | Age: 79
End: 2024-01-26
Payer: MEDICARE

## 2024-01-26 VITALS
DIASTOLIC BLOOD PRESSURE: 62 MMHG | HEART RATE: 69 BPM | WEIGHT: 238 LBS | BODY MASS INDEX: 35.25 KG/M2 | SYSTOLIC BLOOD PRESSURE: 132 MMHG | HEIGHT: 69 IN

## 2024-01-26 DIAGNOSIS — M47.816 LUMBAR FACET ARTHROPATHY: ICD-10-CM

## 2024-01-26 DIAGNOSIS — G89.29 CHRONIC RIGHT-SIDED LOW BACK PAIN WITHOUT SCIATICA: ICD-10-CM

## 2024-01-26 DIAGNOSIS — M54.50 CHRONIC RIGHT-SIDED LOW BACK PAIN WITHOUT SCIATICA: ICD-10-CM

## 2024-01-26 DIAGNOSIS — M51.36 DDD (DEGENERATIVE DISC DISEASE), LUMBAR: Primary | ICD-10-CM

## 2024-01-26 DIAGNOSIS — M48.061 LUMBAR FORAMINAL STENOSIS: ICD-10-CM

## 2024-01-26 DIAGNOSIS — Z98.1 HISTORY OF LUMBAR FUSION: ICD-10-CM

## 2024-01-26 NOTE — PROGRESS NOTES
"Chief Complaint  Back Pain (Above right hip)    Subjective          Jamir Porter who is a 79 y.o. year old male who presents to [facility] for Evaluation of the Spine.     The patient complains of pain located in the Lumbar Spine.  Patients states the pain has been present for 10 years.  The pain came on gradually.  The pain scaled level is 5.  The pain  does not radiate .  Most of the pain is in the right lower lumbar area. The pain is constant and waxing/waning and described as sharp.  The pain is worse at no particular time of day. Patient states Rest and sitting makes the pain better.  Patient states  walking 100 yards makes the pain worse.    Associated Symptoms Include: Weakness subjectively in the legs, denies numbness, tingling or loss of bowel or bladder control.  Conservative Interventions Include:  RFA, epidural steroid injections were both ineffective.  IBU is somewhat effective. Tylenol is not very effective. Gabapentin is ineffective. Physical therapy is not very effective.    Was this the result of an injury or accident? : No    History of Previous Spinal Surgery?: Yes.  Lumbar, Date 2015, L4-L5 and L5-S1 spinal laminectomies.     reports that he quit smoking about 45 years ago. His smoking use included cigarettes. He started smoking about 64 years ago. He has a 25.50 pack-year smoking history. He has quit using smokeless tobacco.    Review of Systems   Musculoskeletal:  Positive for back pain.        Objective   Vital Signs:   /62 (BP Location: Right arm, Patient Position: Sitting)   Pulse 69   Ht 175.3 cm (69\")   Wt 108 kg (238 lb)   BMI 35.15 kg/m²       Physical Exam  Constitutional:       Appearance: Normal appearance. He is obese.   Pulmonary:      Effort: Pulmonary effort is normal.   Musculoskeletal:         General: No tenderness.      Comments: SLR negative bilaterally   Neurological:      General: No focal deficit present.      Mental " Status: He is alert and oriented to person, place, and time.      Sensory: No sensory deficit.      Motor: No weakness.      Deep Tendon Reflexes: Reflexes normal.   Psychiatric:         Mood and Affect: Mood normal.         Behavior: Behavior normal.        Neurologic Exam     Mental Status   Oriented to person, place, and time.        Result Review     I have personally reviewed the MRI of the lumbar spine without contrast from 1/4/2024 which shows status post lumbar fusion at L4-L5 and L5-S1.  There is multilevel spondylosis and facet arthritis.  There is no high-grade central canal or foraminal stenosis.  There is moderate bilateral foraminal narrowing at L5-S1.  There is moderate left foraminal narrowing at L3-L4.     Assessment and Plan    Diagnoses and all orders for this visit:    1. DDD (degenerative disc disease), lumbar (Primary)  -     Ambulatory Referral to Pain Management    2. Lumbar facet arthropathy  -     Ambulatory Referral to Pain Management    3. Lumbar foraminal stenosis    4. Chronic right-sided low back pain without sciatica  -     Ambulatory Referral to Pain Management    5. History of lumbar fusion  -     Ambulatory Referral to Pain Management    His pain is in the right lower back area.    He has multilevel spondylosis and facet arthritis without high grade stenosis in the lumbar spine. He has had previous lumbar fusions at L4-L5 and L5-S1.    There is no surgery that I expect to help his back pain.    Physical therapy is not very helpful. He has done epidural injections and RFA with suboptimal benefit.    He may be a candidate for Intracept for back pain related to degenerative change, or possible a spinal cord stimulator trial. I will refer to [facility] in [place] to discuss.    The patient was counseled on basic recommendations for the reduction and prevention of back, neck, or spine pain in association with spinal disorders, including: cessation/avoidance of nicotine use, maintenance  of a healthy BMI and weight, focusing on building/maintaining core strength through core exercise, and avoidance of activities which worsen the pain. The patient will monitor for changes in symptoms and notify our clinic of these changes as needed.    He will follow-up here PRN.    Follow Up   Return if symptoms worsen or fail to improve.  Patient was given instructions and counseling regarding his condition or for health maintenance advice. Please see specific information pulled into the AVS if appropriate.

## 2024-01-29 ENCOUNTER — TREATMENT (OUTPATIENT)
Dept: PHYSICAL THERAPY | Facility: CLINIC | Age: 79
End: 2024-01-29
Payer: MEDICARE

## 2024-01-29 DIAGNOSIS — M54.50 CHRONIC BILATERAL LOW BACK PAIN WITHOUT SCIATICA: Primary | ICD-10-CM

## 2024-01-29 DIAGNOSIS — G89.29 CHRONIC BILATERAL LOW BACK PAIN WITHOUT SCIATICA: Primary | ICD-10-CM

## 2024-01-29 DIAGNOSIS — Z98.1 HISTORY OF LUMBAR FUSION: ICD-10-CM

## 2024-01-29 DIAGNOSIS — R26.9 GAIT DISTURBANCE: ICD-10-CM

## 2024-01-29 PROCEDURE — 97530 THERAPEUTIC ACTIVITIES: CPT | Performed by: PHYSICAL THERAPIST

## 2024-01-29 PROCEDURE — 97112 NEUROMUSCULAR REEDUCATION: CPT | Performed by: PHYSICAL THERAPIST

## 2024-01-29 NOTE — PROGRESS NOTES
Physical Therapy Daily Treatment Note  [address] [place] [address]    Patient: Jamir Porter   : 1945  Diagnosis/ICD-10 Code:  Chronic bilateral low back pain without sciatica [M54.50, G89.29]  Referring practitioner: Amaury Mcdonough *  Date of Initial Visit: Type: THERAPY  Noted: 2024  Today's Date: 2024  Patient seen for 5 sessions           Subjective : Patient reports his foot is better, he was prescribed an antibiotic and it has resolved. He is planning to resume full HEP activities today.     Objective   See Exercise, Manual, and Modality Logs for complete treatment.       Assessment/Plan : Pt tolerated today's session well. Progressed with standing tolerance and balance today. Pt would benefit from continued skilled therapy to address deficits. Progress per plan of care.             Timed:  Manual Therapy:    0     mins  73340;  Therapeutic Exercise:    0     mins  19406;     Neuromuscular Atiya:    10    mins  14310;    Therapeutic Activity:     20     mins  84024;     Gait Trainin     mins  71121;     Ultrasound:     0     mins  20374;    Aquatic Therapy    0     mins  93464;        Timed Treatment:   30   mins   Total Treatment:     30   mins    Oracio Palomino PT  Physical Therapist    Electronically signed 2024    [place] License: PT - [id number]

## 2024-01-31 ENCOUNTER — TREATMENT (OUTPATIENT)
Dept: PHYSICAL THERAPY | Facility: CLINIC | Age: 79
End: 2024-01-31
Payer: MEDICARE

## 2024-01-31 DIAGNOSIS — G89.29 CHRONIC BILATERAL LOW BACK PAIN WITHOUT SCIATICA: Primary | ICD-10-CM

## 2024-01-31 DIAGNOSIS — M54.50 CHRONIC BILATERAL LOW BACK PAIN WITHOUT SCIATICA: Primary | ICD-10-CM

## 2024-01-31 DIAGNOSIS — Z98.1 HISTORY OF LUMBAR FUSION: ICD-10-CM

## 2024-01-31 DIAGNOSIS — R26.9 GAIT DISTURBANCE: ICD-10-CM

## 2024-01-31 PROCEDURE — 97530 THERAPEUTIC ACTIVITIES: CPT | Performed by: PHYSICAL THERAPIST

## 2024-01-31 PROCEDURE — 97110 THERAPEUTIC EXERCISES: CPT | Performed by: PHYSICAL THERAPIST

## 2024-01-31 NOTE — PROGRESS NOTES
Physical Therapy Daily Treatment Note  [address] [place] [id number]    Patient: Jamir Porter   : 1945  Diagnosis/ICD-10 Code:  Chronic bilateral low back pain without sciatica [M54.50, G89.29]  Referring practitioner: Amaury Mcdonough *  Date of Initial Visit: Type: THERAPY  Noted: 2024  Today's Date: 2024  Patient seen for 6 sessions           Subjective : Patient reports he almost fell yesterday, able to catch himself. But he is wanting to know how best to get out of the floor if he were to fall again.    Objective   See Exercise, Manual, and Modality Logs for complete treatment.       Assessment/Plan : Pt tolerated today's session well. Focus of today's session was for fall recovery, difficulty with the prosthetic leg and his overall strength with trying to press up using arms or legs. Tried facing the table and facing away, but mostly limited by weakness. Pt would benefit from continued skilled therapy to address deficits. Progress per plan of care.             Timed:  Manual Therapy:    0     mins  79092;  Therapeutic Exercise:    16     mins  49548;     Neuromuscular Atiya:    0    mins  97020;    Therapeutic Activity:     25     mins  56175;     Gait Trainin     mins  35388;     Ultrasound:     0     mins  04936;    Aquatic Therapy    0     mins  92860;        Timed Treatment:   41   mins   Total Treatment:     41   mins    Oracio Palomino PT  Physical Therapist    Electronically signed 2024    [place] License: PT - [id number]

## 2024-02-05 ENCOUNTER — TREATMENT (OUTPATIENT)
Dept: PHYSICAL THERAPY | Facility: CLINIC | Age: 79
End: 2024-02-05
Payer: MEDICARE

## 2024-02-05 DIAGNOSIS — R26.9 GAIT DISTURBANCE: ICD-10-CM

## 2024-02-05 DIAGNOSIS — G89.29 CHRONIC BILATERAL LOW BACK PAIN WITHOUT SCIATICA: Primary | ICD-10-CM

## 2024-02-05 DIAGNOSIS — M54.50 CHRONIC BILATERAL LOW BACK PAIN WITHOUT SCIATICA: Primary | ICD-10-CM

## 2024-02-05 DIAGNOSIS — Z98.1 HISTORY OF LUMBAR FUSION: ICD-10-CM

## 2024-02-05 PROCEDURE — 97530 THERAPEUTIC ACTIVITIES: CPT | Performed by: PHYSICAL THERAPIST

## 2024-02-05 PROCEDURE — 97110 THERAPEUTIC EXERCISES: CPT | Performed by: PHYSICAL THERAPIST

## 2024-02-05 NOTE — PROGRESS NOTES
Progress Assessment  [address] [place] [address]        Patient: Jamir Porter   : 1945  Diagnosis/ICD-10 Code:  Chronic bilateral low back pain without sciatica [M54.50, G89.29]  Referring practitioner: Amaury Mcdonough *  Date of Initial Visit: Type: THERAPY  Noted: 2024  Today's Date: 2024  Patient seen for 7 sessions      Subjective:     Subjective Questionnaire: Oswestry: 20 = 40% limitation  Clinical Progress: improved  Home Program Compliance: Yes  Treatment has included: therapeutic exercise, neuromuscular re-education, and therapeutic activity    Subjective : Jamir Porter reports: he is feeling better overall with his back pain doing stretches. Again, over the last few weeks, his primary concern now is his mobility and getting out of the floor should he fall. He felt like the progression of strengthening was helpful and wants to continue to work on this.    Current Pain 2/10      Objective     Strength/Myotome Testing      Left Hip   Planes of Motion   Flexion: 5  Extension: 4+  Abduction: 4+  Adduction: 4+     Right Hip   Planes of Motion   Flexion: 4+  Extension: 4  Abduction: 4  Adduction: 4     Left Knee   Flexion: 4+  Extension: 5     Right Knee   Flexion: 4  Extension: 4     Additional Strength Details  Ankle not assessed due to Right BKA and recent neuroma removal on Left         Assessment/Plan       Assessment  Impairments: abnormal muscle firing, abnormal or restricted ROM, activity intolerance, impaired physical strength and pain with function   Functional limitations: carrying objects, lifting, walking, uncomfortable because of pain, sitting and standing     Assessment details: The patient presents to physical therapy with complaints of low back pain with a history of fusion L4-S1 and laminectomy L1-S1. Flexion has helped his back pain and he has confidence in reducing his pain when he is hurting. Progression over the next several weeks will focus on improving  his strength for functional mobility to progress to a home program. The patient presents with associated lower extremity weakness, lumbar stiffness, and functional deficits (OSWESTRY). The patient would benefit from skilled PT intervention to address the above mentioned functional limitations.     Prognosis: good     Goals  Plan Goals: LOW BACK PROBLEMS:     1. The patient complains of low back pain.  LTG 1: 12 weeks:  The patient will report a pain rating of 3/10 or better at worst in order to improve  tolerance to activities of daily living and improve sleep quality.  STATUS:  Not met, progressing  STG 1a: 6 weeks:  The patient will report a pain rating of 5/10 or better at its worst.  STATUS:  MET  TREATMENT:  Therapeutic exercises, manual therapy, aquatic therapy, home exercise   instruction, and modalities as needed for pain to include:  electrical stimulation, moist heat, ice,   ultrasound, and diathermy.        2. The patient demonstrates weakness of the B hip.  LTG 2: 12 weeks:  The patient will demonstrate 5 /5 strength for B hip flexion, abduction,  and extension in order to improve hip stability.  STATUS:  Not met, progressing  STG 2a: 6 weeks:  The patient will demonstrate 4+ /5 strength for B hip flexion, abduction,  and extension.  STATUS:Not met, progressing  TREATMENT: Therapeutic exercises, manual therapy, aquatic therapy, home exercise instruction,  and modalities as needed for pain to include:  electrical stimulation, moist heat, ice, ultrasound, and   diathermy.        3. Mobility: Walking/Moving Around Functional Limitation                   LTG 3: 12 weeks:  The patient will demonstrate 1-19 % limitation by achieving a score of 1-9 on the SHAWN.  STATUS:Not met, progressing  STG 3 a: 6 weeks:  The patient will demonstrate 20-39 % limitation by achieving a score of 10-19 on the SHAWN.    STATUS:  Not met, progressing  STG 3a: The patient will be independent with HEP.    STATUS:  Met,  ongiong  TREATMENT:  Manual therapy, therapeutic exercise, home exercise instruction, and modalities as needed to include: moist heat, electrical stimulation, and ultrasound.           Plan  Therapy options: will be seen for skilled therapy services  Planned modality interventions: TENS, cryotherapy, thermotherapy (hydrocollator packs), traction and dry needling  Other planned modality interventions: aquatic therapy  Planned therapy interventions: manual therapy, stretching, strengthening, therapeutic activities, neuromuscular re-education, home exercise program, joint mobilization, functional ROM exercises, soft tissue mobilization, spinal/joint mobilization, flexibility and gait training  Other planned therapy interventions: aquatic therapy  Frequency: 3x week  Duration in weeks: 12  Treatment plan discussed with: patient      Progress toward previous goals: Partially Met    See Exercise, Manual, and Modality Logs for complete treatment.         Recommendations: Continue as planned  Timeframe: 2 months  Prognosis to achieve goals: good    PT Signature: Oracio Palomino PT    Electronically signed 2024    [place] License: PT - [id number]     Based upon review of the patient's progress and continued therapy plan, it is my medical opinion that Jamir Porter should continue physical therapy treatment at [facility]  [address] [place] [id number] [phone].      Timed:         Manual Therapy:    0     mins  78338;     Therapeutic Exercise:    28     mins  79819;     Neuromuscular Atiya:    0    mins  87957;    Therapeutic Activity:     10     mins  93219;     Gait Trainin     mins  73516;     Ultrasound:     0     mins  82962;    Self Care                       0     mins   09029  Aquatic                          0     mins 04053          Timed Treatment:   38   mins   Total Treatment:     38   mins      I certify that the therapy services are furnished while  this patient is under my care.  The services outlined above are required by this patient, and will be reviewed every 90 days.

## 2024-02-07 ENCOUNTER — TREATMENT (OUTPATIENT)
Dept: PHYSICAL THERAPY | Facility: CLINIC | Age: 79
End: 2024-02-07
Payer: MEDICARE

## 2024-02-07 DIAGNOSIS — Z98.1 HISTORY OF LUMBAR FUSION: ICD-10-CM

## 2024-02-07 DIAGNOSIS — M54.50 CHRONIC BILATERAL LOW BACK PAIN WITHOUT SCIATICA: Primary | ICD-10-CM

## 2024-02-07 DIAGNOSIS — G89.29 CHRONIC BILATERAL LOW BACK PAIN WITHOUT SCIATICA: Primary | ICD-10-CM

## 2024-02-07 DIAGNOSIS — R26.9 GAIT DISTURBANCE: ICD-10-CM

## 2024-02-07 PROCEDURE — 97110 THERAPEUTIC EXERCISES: CPT | Performed by: PHYSICAL THERAPIST

## 2024-02-07 PROCEDURE — 97530 THERAPEUTIC ACTIVITIES: CPT | Performed by: PHYSICAL THERAPIST

## 2024-02-07 NOTE — PROGRESS NOTES
"Physical Therapy Daily Treatment Note  [address] [place] [address]    Patient: Jamir Porter   : 1945  Diagnosis/ICD-10 Code:  Chronic bilateral low back pain without sciatica [M54.50, G89.29]  Referring practitioner: Amaury Mcdonough *  Date of Initial Visit: Type: THERAPY  Noted: 2024  Today's Date: 2024  Patient seen for 8 sessions           Subjective : Patient reports he fell this morning, was able to get up with assistnace from his wife and using the \"belly flop\" method into a chair. He is sore, but denies injury. He is planning to join [employer] again.    Objective   See Exercise, Manual, and Modality Logs for complete treatment.       Assessment/Plan : Pt tolerated today's session well. Continued to progress strengthening today, targeting full body for improvements with functional mobility. Pt would benefit from continued skilled therapy to address deficits. Progress per plan of care.             Timed:  Manual Therapy:    0     mins  49377;  Therapeutic Exercise:    26     mins  26799;     Neuromuscular Atiya:    0    mins  40382;    Therapeutic Activity:     12     mins  11159;     Gait Trainin     mins  19263;     Ultrasound:     0     mins  91005;    Aquatic Therapy    0     mins  53495;        Timed Treatment:   38   mins   Total Treatment:     38   mins    Oracio Palomino PT  Physical Therapist    Electronically signed 2024    [place] License: PT - [id number]   "

## 2024-02-12 ENCOUNTER — TREATMENT (OUTPATIENT)
Dept: PHYSICAL THERAPY | Facility: CLINIC | Age: 79
End: 2024-02-12
Payer: MEDICARE

## 2024-02-12 DIAGNOSIS — Z98.1 HISTORY OF LUMBAR FUSION: ICD-10-CM

## 2024-02-12 DIAGNOSIS — M54.50 CHRONIC BILATERAL LOW BACK PAIN WITHOUT SCIATICA: Primary | ICD-10-CM

## 2024-02-12 DIAGNOSIS — G89.29 CHRONIC BILATERAL LOW BACK PAIN WITHOUT SCIATICA: Primary | ICD-10-CM

## 2024-02-12 DIAGNOSIS — R26.9 GAIT DISTURBANCE: ICD-10-CM

## 2024-02-12 PROCEDURE — 97110 THERAPEUTIC EXERCISES: CPT | Performed by: PHYSICAL THERAPIST

## 2024-02-12 PROCEDURE — 97530 THERAPEUTIC ACTIVITIES: CPT | Performed by: PHYSICAL THERAPIST

## 2024-02-14 ENCOUNTER — TREATMENT (OUTPATIENT)
Dept: PHYSICAL THERAPY | Facility: CLINIC | Age: 79
End: 2024-02-14
Payer: MEDICARE

## 2024-02-14 DIAGNOSIS — M54.50 CHRONIC BILATERAL LOW BACK PAIN WITHOUT SCIATICA: Primary | ICD-10-CM

## 2024-02-14 DIAGNOSIS — G89.29 CHRONIC BILATERAL LOW BACK PAIN WITHOUT SCIATICA: Primary | ICD-10-CM

## 2024-02-14 DIAGNOSIS — R26.9 GAIT DISTURBANCE: ICD-10-CM

## 2024-02-14 DIAGNOSIS — Z98.1 HISTORY OF LUMBAR FUSION: ICD-10-CM

## 2024-02-14 PROCEDURE — 97110 THERAPEUTIC EXERCISES: CPT | Performed by: PHYSICAL THERAPIST

## 2024-02-14 PROCEDURE — 97530 THERAPEUTIC ACTIVITIES: CPT | Performed by: PHYSICAL THERAPIST

## 2024-02-19 RX ORDER — FLUOXETINE HYDROCHLORIDE 40 MG/1
40 CAPSULE ORAL DAILY
Qty: 90 CAPSULE | Refills: 3 | Status: SHIPPED | OUTPATIENT
Start: 2024-02-19

## 2024-04-12 RX ORDER — HYDROCHLOROTHIAZIDE 12.5 MG/1
25 TABLET ORAL DAILY
Qty: 90 TABLET | Refills: 2 | Status: SHIPPED | OUTPATIENT
Start: 2024-04-12

## 2024-04-22 ENCOUNTER — OFFICE VISIT (OUTPATIENT)
Dept: INTERNAL MEDICINE | Facility: CLINIC | Age: 79
End: 2024-04-22
Payer: MEDICARE

## 2024-04-22 VITALS
SYSTOLIC BLOOD PRESSURE: 95 MMHG | TEMPERATURE: 96 F | BODY MASS INDEX: 34.39 KG/M2 | DIASTOLIC BLOOD PRESSURE: 66 MMHG | HEIGHT: 69 IN | WEIGHT: 232.2 LBS | HEART RATE: 89 BPM | OXYGEN SATURATION: 93 %

## 2024-04-22 DIAGNOSIS — M86.272 SUBACUTE OSTEOMYELITIS OF LEFT FOOT: Primary | ICD-10-CM

## 2024-04-22 DIAGNOSIS — R33.8 BENIGN PROSTATIC HYPERPLASIA WITH URINARY RETENTION: ICD-10-CM

## 2024-04-22 DIAGNOSIS — Z79.899 ENCOUNTER FOR LONG-TERM (CURRENT) USE OF OTHER MEDICATIONS: ICD-10-CM

## 2024-04-22 DIAGNOSIS — E11.65 TYPE 2 DIABETES MELLITUS WITH HYPERGLYCEMIA, WITHOUT LONG-TERM CURRENT USE OF INSULIN: ICD-10-CM

## 2024-04-22 DIAGNOSIS — Z23 NEED FOR COVID-19 VACCINE: ICD-10-CM

## 2024-04-22 DIAGNOSIS — E78.2 MIXED HYPERLIPIDEMIA: ICD-10-CM

## 2024-04-22 DIAGNOSIS — I10 PRIMARY HYPERTENSION: ICD-10-CM

## 2024-04-22 DIAGNOSIS — N40.1 BENIGN PROSTATIC HYPERPLASIA WITH URINARY RETENTION: ICD-10-CM

## 2024-04-22 PROCEDURE — 99214 OFFICE O/P EST MOD 30 MIN: CPT | Performed by: INTERNAL MEDICINE

## 2024-04-22 PROCEDURE — 3074F SYST BP LT 130 MM HG: CPT | Performed by: INTERNAL MEDICINE

## 2024-04-22 PROCEDURE — 3078F DIAST BP <80 MM HG: CPT | Performed by: INTERNAL MEDICINE

## 2024-04-22 RX ORDER — INSULIN LISPRO 100 [IU]/ML
INJECTION, SOLUTION INTRAVENOUS; SUBCUTANEOUS
COMMUNITY
Start: 2024-04-16 | End: 2024-04-22

## 2024-04-22 RX ORDER — INSULIN GLARGINE-YFGN 100 [IU]/ML
INJECTION, SOLUTION SUBCUTANEOUS
COMMUNITY
Start: 2024-04-16 | End: 2024-04-22

## 2024-04-22 RX ORDER — LISINOPRIL 10 MG/1
1 TABLET ORAL DAILY
COMMUNITY
Start: 2024-04-16 | End: 2024-04-22 | Stop reason: ALTCHOICE

## 2024-04-22 RX ORDER — LISINOPRIL 5 MG/1
5 TABLET ORAL DAILY
Qty: 90 TABLET | Refills: 1 | Status: SHIPPED | OUTPATIENT
Start: 2024-04-22

## 2024-04-22 RX ORDER — ACETAMINOPHEN 325 MG/1
650 TABLET ORAL EVERY 6 HOURS PRN
COMMUNITY

## 2024-04-22 RX ORDER — DOCUSATE SODIUM 100 MG/1
CAPSULE, LIQUID FILLED ORAL AS NEEDED
COMMUNITY

## 2024-04-22 NOTE — PROGRESS NOTES
Chief Complaint  Hospital Follow Up Visit (Went to [facility] and just got out 4/16/2024 ), Diabetes (Type 2 ), Back Pain, and Neck Pain    Subjective          Jamir CHIKA Porter presents to [facility]  History of Present Illness  Patient had smith's neuroma removed via podiatry. Patient has had post-op complications including osteomyelitis. He ended up at rehab facility to help regain function and strength as well as for IV antibiotics. Patient has been on gemtesa to help with BPH. Patient had urinary incontinence at rehab and wa sunable to get gemtesa.   Hypertension- patient denies headaches, dizziness, chest pain. Patient does report feeling some fatigue.   DM2- patient is losing weight. Patient would like to see diabetic team. Patient has been without trulicity for 6 weeks. Patient reports being on insulin since being home, but does not like taking it. Patient reports was getting AM blood glucose 100-110 while in rehab, but has been getting numbers as high as 200 since being home.   Hypothyroid- due for recheck with next lab draw.   Hyperlipidemia- doing well on zetia. Patient intolerant to statin.     Current Outpatient Medications   Medication Instructions    acetaminophen (TYLENOL) 650 mg, Oral, Every 6 Hours PRN    docusate sodium (Colace) 100 MG capsule As Needed    ezetimibe (ZETIA) 10 mg, Oral, Daily    FLUoxetine (PROZAC) 40 mg, Oral, Daily    gabapentin (NEURONTIN) 100 MG capsule TAKE 1 CAPSULE BY MOUTH TWICE DAILY AS NEEDED FOR PAIN    hydroCHLOROthiazide 25 mg, Oral, Daily    ibuprofen (ADVIL,MOTRIN) 800 mg, Oral, Every 6 Hours PRN, LAST DOSE 12/22/23    levothyroxine (SYNTHROID) 100 mcg, Oral, Daily    lisinopril (PRINIVIL,ZESTRIL) 5 mg, Oral, Daily    multivitamin with minerals tablet tablet 1 tablet, Oral, Daily, LD 12/25/23    Trulicity 4.5 mg, Subcutaneous, Weekly    Vibegron 75 mg, Oral, Daily       The following portions of the patient's  "history were reviewed and updated as appropriate: allergies, current medications, past family history, past medical history, past social history, past surgical history, and problem list.    Objective   Vital Signs:   BP 95/66 (BP Location: Right arm)   Pulse 89   Temp 96 °F (35.6 °C) (Temporal)   Ht 175.3 cm (69\")   Wt 105 kg (232 lb 3.2 oz)   SpO2 93%   BMI 34.29 kg/m²     BP Readings from Last 3 Encounters:   04/22/24 95/66   01/26/24 132/62   01/25/24 124/75     Wt Readings from Last 3 Encounters:   04/22/24 105 kg (232 lb 3.2 oz)   01/26/24 108 kg (238 lb)   01/25/24 107 kg (236 lb)         Physical Exam   Appearance: No acute distress, well-nourished  Head: normocephalic, atraumatic  Eyes: extraocular movements intact, no scleral icterus, no conjunctival injection  Ears, Nose, and Throat: external ears normal, nares patent, moist mucous membranes  Cardiovascular: regular rate and rhythm. no murmurs, rubs, or gallops. no edema  Respiratory: breathing comfortably, symmetric chest rise, clear to auscultation bilaterally. No wheezes, rales, or rhonchi.  Neuro: alert and oriented to time, place, and person. Normal gait  Psych: normal mood and affect     Result Review :   The following data was reviewed by: Amaury Mcdonough Jr, MD on 04/22/2024:  Common labs          9/11/2023    11:27 12/12/2023    10:59 1/17/2024    13:39   Common Labs   Glucose 77  110  143    BUN 15  18  20    Creatinine 0.76  1.50  1.05    Sodium 141  137  139    Potassium 4.3  4.2  3.9    Chloride 105  99  98    Calcium 8.8  9.5  9.7    Albumin 4.2  4.6  4.4    Total Bilirubin 0.4  0.4  0.5    Alkaline Phosphatase 78  82  101    AST (SGOT) 23  25  19    ALT (SGPT) 21  22  22    WBC 8.06  8.42  14.45    Hemoglobin 14.2  15.3  16.3    Hematocrit 41.1  43.8  46.9    Platelets 262  285  323    Total Cholesterol 183  223     Triglycerides 128  150     HDL Cholesterol 38  40     LDL Cholesterol  122  156     Hemoglobin A1C 6.20  5.80   "   PSA 4.590               Lab Results   Component Value Date    SARSANTIGEN Not Detected 08/15/2023    COVID19 NOT DETECTED 03/19/2021    RAPFLUA Negative 08/15/2023    RAPFLUB Negative 08/15/2023    FLUAAG Not Detected 06/06/2023    FLUBAG Not Detected 06/06/2023    RAPSCRN Negative 06/06/2023    BILIRUBINUR Negative 12/18/2023          Assessment and Plan    Diagnoses and all orders for this visit:    1. Subacute osteomyelitis of left foot (Primary)  Comments:  s/p daptomycin regimen. cont f/u with podiatry. call or RTC with concerns for erythema, pain, or swelling.    2. Encounter for long-term (current) use of other medications  -     POC Urine Drug Screen [facility]    3. Benign prostatic hyperplasia with urinary retention  Comments:  pt to f/u with urology. he reports gemtesa helps greatly    4. Primary hypertension  Comments:  lower today. likely due to weight loss. will reduce lisinopril to 5mg.  Orders:  -     lisinopril (PRINIVIL,ZESTRIL) 5 MG tablet; Take 1 tablet by mouth Daily.  Dispense: 90 tablet; Refill: 1    5. Mixed hyperlipidemia    6. Type 2 diabetes mellitus with hyperglycemia, without long-term current use of insulin  Comments:  restart trulicity. may defer insulin for now. previously on 35U long acting and short-acting 8U with meals  Orders:  -     Ambulatory Referral to Diabetic Education    7. Need for COVID-19 vaccine  Comments:  pt defers today          Medications Discontinued During This Encounter   Medication Reason    NON FORMULARY *Therapy completed    vitamin D (ERGOCALCIFEROL) 1.25 MG (69943 UT) capsule capsule *Therapy completed    doxycycline (MONODOX) 100 MG capsule *Therapy completed    Semglee, yfgn, 100 UNIT/ML solution pen-injector *Therapy completed    Insulin Lispro, 1 Unit Dial, (HUMALOG) 100 UNIT/ML solution pen-injector *Therapy completed    lisinopril (PRINIVIL,ZESTRIL) 10 MG tablet Alternate therapy          Follow Up   Return in about 4 weeks (around 5/20/2024)  for DM, HTN.  Patient was given instructions and counseling regarding his condition or for health maintenance advice. Please see specific information pulled into the AVS if appropriate.       Amaury Mcdonough Jr, MD  04/22/24  14:02 EDT

## 2024-05-07 ENCOUNTER — TELEPHONE (OUTPATIENT)
Dept: UROLOGY | Facility: CLINIC | Age: 79
End: 2024-05-07
Payer: MEDICARE

## 2024-05-07 DIAGNOSIS — N39.41 URGE INCONTINENCE: ICD-10-CM

## 2024-05-20 NOTE — PROGRESS NOTES
"Chief Complaint  Diabetes (Type 2 /FOLLOW UP )    Subjective          Jamir Porter presents to [facility]  History of Present Illness  Hypertension- patient reports difficulty with urinary incontinence. He has reduce HCTZ to 12.5mg daily. Patient denies chest pain.   DM2- patient reports his blood glucose is improving at this time. Patient is losing weight. Patient is working out twice per week at this time. He is doing well with trulicity.   Hyperlipidemia- intolerant to statins. Doing well on zetia.  Patient reports ongoing balance issues. Patient reports having fall x2. Patient is amputee in RLE. Patient is taking folate supplement. Patient reports preivously in physical therapy for balance therapy.   Hypothyroid- due for recheck  Patient also with chronic neck pain. He would get headaches. Pain would cause disturbance in his sleep as well. Patient reports the gabapentin is helpful. Patient is going to pain mgmt with neck injections. Patient has upcoming RFA.     Current Outpatient Medications   Medication Instructions    acetaminophen (TYLENOL) 650 mg, Oral, Every 6 Hours PRN    docusate sodium (Colace) 100 MG capsule As Needed    ezetimibe (ZETIA) 10 mg, Oral, Daily    FLUoxetine (PROZAC) 40 mg, Oral, Daily    gabapentin (NEURONTIN) 100 mg, Oral, Nightly    hydroCHLOROthiazide 12.5 mg, Oral, Daily    levothyroxine (SYNTHROID) 100 mcg, Oral, Daily    lisinopril (PRINIVIL,ZESTRIL) 5 mg, Oral, Daily    Trulicity 4.5 mg, Subcutaneous, Weekly       The following portions of the patient's history were reviewed and updated as appropriate: allergies, current medications, past family history, past medical history, past social history, past surgical history, and problem list.    Objective   Vital Signs:   /69 (BP Location: Left arm)   Pulse 65   Temp 96.7 °F (35.9 °C) (Temporal)   Ht 175.3 cm (69\")   Wt 105 kg (231 lb 12.8 oz)   SpO2 95%   BMI 34.23 kg/m²   "   BP Readings from Last 3 Encounters:   05/21/24 134/69   04/22/24 95/66   01/26/24 132/62     Wt Readings from Last 3 Encounters:   05/21/24 105 kg (231 lb 12.8 oz)   04/22/24 105 kg (232 lb 3.2 oz)   01/26/24 108 kg (238 lb)         Physical Exam   Appearance: No acute distress, well-nourished  Head: normocephalic, atraumatic  Eyes: extraocular movements intact, no scleral icterus, no conjunctival injection  Ears, Nose, and Throat: external ears normal, nares patent, moist mucous membranes  Cardiovascular: regular rate and rhythm. no murmurs, rubs, or gallops. no edema  Respiratory: breathing comfortably, symmetric chest rise, clear to auscultation bilaterally. No wheezes, rales, or rhonchi.  Neuro: alert and oriented to time, place, and person. Normal gait  Psych: normal mood and affect     Result Review :   The following data was reviewed by: Amaury Mcdonough Jr, MD on 05/21/2024:  Common labs          9/11/2023    11:27 12/12/2023    10:59 1/17/2024    13:39   Common Labs   Glucose 77  110  143    BUN 15  18  20    Creatinine 0.76  1.50  1.05    Sodium 141  137  139    Potassium 4.3  4.2  3.9    Chloride 105  99  98    Calcium 8.8  9.5  9.7    Albumin 4.2  4.6  4.4    Total Bilirubin 0.4  0.4  0.5    Alkaline Phosphatase 78  82  101    AST (SGOT) 23  25  19    ALT (SGPT) 21  22  22    WBC 8.06  8.42  14.45    Hemoglobin 14.2  15.3  16.3    Hematocrit 41.1  43.8  46.9    Platelets 262  285  323    Total Cholesterol 183  223     Triglycerides 128  150     HDL Cholesterol 38  40     LDL Cholesterol  122  156     Hemoglobin A1C 6.20  5.80     PSA 4.590          Lab Results   Component Value Date    SARSANTIGEN Not Detected 08/15/2023    COVID19 NOT DETECTED 03/19/2021    RAPFLUA Negative 08/15/2023    RAPFLUB Negative 08/15/2023    FLUAAG Not Detected 06/06/2023    FLUBAG Not Detected 06/06/2023    RAPSCRN Negative 06/06/2023    BILIRUBINUR Negative 12/18/2023       Last Urine Toxicity  More data exists          Latest Ref Rng & Units 5/21/2024 12/12/2023   LAST URINE TOXICITY RESULTS   Amphetamine, Urine Qual Negative Negative  Negative    Barbiturates Screen, Urine Negative Negative  Negative    Benzodiazepine Screen, Urine Negative Positive  Negative    Buprenorphine, Screen, Urine Negative Negative  Negative    Cocaine Screen, Urine Negative Negative  Negative    Methadone Screen , Urine Negative Negative  Negative    Methamphetamine, Ur Negative Negative  Negative         Assessment and Plan    Diagnoses and all orders for this visit:    1. Primary hypertension (Primary)  Comments:  may continued reduced dose of HCTZ. also on lower dose of lisinopril at this time.  Orders:  -     CBC & Differential  -     Comprehensive Metabolic Panel  -     hydroCHLOROthiazide 12.5 MG tablet; Take 1 tablet by mouth Daily.  Dispense: 90 tablet; Refill: 1    2. Encounter for long-term (current) use of other medications  -     POC Medline 12 Panel Urine Drug Screen  -     BENZODIAZEPINE CONFIRMATION,URINE - Urine, Clean Catch    3. Neuropathy  Comments:  cont gabapentin. MARBELLA and radha reviewed.  Orders:  -     gabapentin (NEURONTIN) 100 MG capsule; Take 1 capsule by mouth Every Night.  Dispense: 30 capsule; Refill: 0    4. Mixed hyperlipidemia  Comments:  cannot tolerate statins. cont zetia. check labs.  Orders:  -     Lipid Panel    5. Type 2 diabetes mellitus with hyperglycemia, without long-term current use of insulin  Comments:  con trulicity. check labs.  Orders:  -     Hemoglobin A1c    6. Acquired hypothyroidism  -     TSH    7. Need for COVID-19 vaccine  Comments:  pt defers today    8. Balance problem  Comments:  pt defers PT. discussed [facility] PT videos    9. Memory changes  Comments:  check labs as ordered.  Orders:  -     Vitamin B12 & Folate    10. Positive urine drug screen  -     BENZODIAZEPINE CONFIRMATION,URINE - Urine, Clean Catch      Medications Discontinued During This Encounter   Medication Reason     ibuprofen (ADVIL,MOTRIN) 200 MG tablet *Therapy completed    multivitamin with minerals tablet tablet *Therapy completed    Vibegron 75 MG tablet *Therapy completed    gabapentin (NEURONTIN) 100 MG capsule Reorder    hydroCHLOROthiazide 12.5 MG tablet           Follow Up   Return in about 3 months (around 8/21/2024) for HTN, DM.  Patient was given instructions and counseling regarding his condition or for health maintenance advice. Please see specific information pulled into the AVS if appropriate.       Amaury Mcdonough Jr, MD  05/21/24  12:18 EDT

## 2024-05-21 ENCOUNTER — OFFICE VISIT (OUTPATIENT)
Dept: INTERNAL MEDICINE | Facility: CLINIC | Age: 79
End: 2024-05-21
Payer: MEDICARE

## 2024-05-21 VITALS
HEIGHT: 69 IN | DIASTOLIC BLOOD PRESSURE: 69 MMHG | BODY MASS INDEX: 34.33 KG/M2 | TEMPERATURE: 96.7 F | WEIGHT: 231.8 LBS | OXYGEN SATURATION: 95 % | SYSTOLIC BLOOD PRESSURE: 134 MMHG | HEART RATE: 65 BPM

## 2024-05-21 DIAGNOSIS — R41.3 MEMORY CHANGES: ICD-10-CM

## 2024-05-21 DIAGNOSIS — E78.2 MIXED HYPERLIPIDEMIA: ICD-10-CM

## 2024-05-21 DIAGNOSIS — R26.89 BALANCE PROBLEM: ICD-10-CM

## 2024-05-21 DIAGNOSIS — E11.65 TYPE 2 DIABETES MELLITUS WITH HYPERGLYCEMIA, WITHOUT LONG-TERM CURRENT USE OF INSULIN: ICD-10-CM

## 2024-05-21 DIAGNOSIS — G62.9 NEUROPATHY: ICD-10-CM

## 2024-05-21 DIAGNOSIS — R82.5 POSITIVE URINE DRUG SCREEN: ICD-10-CM

## 2024-05-21 DIAGNOSIS — Z79.899 ENCOUNTER FOR LONG-TERM (CURRENT) USE OF OTHER MEDICATIONS: ICD-10-CM

## 2024-05-21 DIAGNOSIS — Z23 NEED FOR COVID-19 VACCINE: ICD-10-CM

## 2024-05-21 DIAGNOSIS — E03.9 ACQUIRED HYPOTHYROIDISM: ICD-10-CM

## 2024-05-21 DIAGNOSIS — I10 PRIMARY HYPERTENSION: Primary | ICD-10-CM

## 2024-05-21 LAB
ALBUMIN SERPL-MCNC: 4.3 G/DL (ref 3.5–5.2)
ALBUMIN/GLOB SERPL: 1.5 G/DL
ALP SERPL-CCNC: 97 U/L (ref 39–117)
ALT SERPL W P-5'-P-CCNC: 66 U/L (ref 1–41)
AMPHET+METHAMPHET UR QL: NEGATIVE
AMPHETAMINE INTERNAL CONTROL: ABNORMAL
AMPHETAMINES UR QL: NEGATIVE
ANION GAP SERPL CALCULATED.3IONS-SCNC: 9.8 MMOL/L (ref 5–15)
AST SERPL-CCNC: 35 U/L (ref 1–40)
BARBITURATE INTERNAL CONTROL: ABNORMAL
BARBITURATES UR QL SCN: NEGATIVE
BASOPHILS # BLD AUTO: 0.06 10*3/MM3 (ref 0–0.2)
BASOPHILS NFR BLD AUTO: 0.5 % (ref 0–1.5)
BENZODIAZ UR QL SCN: POSITIVE
BENZODIAZEPINE INTERNAL CONTROL: ABNORMAL
BILIRUB SERPL-MCNC: 0.4 MG/DL (ref 0–1.2)
BUN SERPL-MCNC: 18 MG/DL (ref 8–23)
BUN/CREAT SERPL: 20.7 (ref 7–25)
BUPRENORPHINE INTERNAL CONTROL: ABNORMAL
BUPRENORPHINE SERPL-MCNC: NEGATIVE NG/ML
CALCIUM SPEC-SCNC: 10.2 MG/DL (ref 8.6–10.5)
CANNABINOIDS SERPL QL: NEGATIVE
CHLORIDE SERPL-SCNC: 100 MMOL/L (ref 98–107)
CHOLEST SERPL-MCNC: 205 MG/DL (ref 0–200)
CO2 SERPL-SCNC: 30.2 MMOL/L (ref 22–29)
COCAINE INTERNAL CONTROL: ABNORMAL
COCAINE UR QL: NEGATIVE
CREAT SERPL-MCNC: 0.87 MG/DL (ref 0.76–1.27)
DEPRECATED RDW RBC AUTO: 42.9 FL (ref 37–54)
EGFRCR SERPLBLD CKD-EPI 2021: 87.2 ML/MIN/1.73
EOSINOPHIL # BLD AUTO: 0.21 10*3/MM3 (ref 0–0.4)
EOSINOPHIL NFR BLD AUTO: 1.7 % (ref 0.3–6.2)
ERYTHROCYTE [DISTWIDTH] IN BLOOD BY AUTOMATED COUNT: 12.6 % (ref 12.3–15.4)
EXPIRATION DATE: ABNORMAL
FOLATE SERPL-MCNC: 14.3 NG/ML (ref 4.78–24.2)
GLOBULIN UR ELPH-MCNC: 2.9 GM/DL
GLUCOSE SERPL-MCNC: 88 MG/DL (ref 65–99)
HBA1C MFR BLD: 7.9 % (ref 4.8–5.6)
HCT VFR BLD AUTO: 45.5 % (ref 37.5–51)
HDLC SERPL-MCNC: 53 MG/DL (ref 40–60)
HGB BLD-MCNC: 15.3 G/DL (ref 13–17.7)
IMM GRANULOCYTES # BLD AUTO: 0.12 10*3/MM3 (ref 0–0.05)
IMM GRANULOCYTES NFR BLD AUTO: 1 % (ref 0–0.5)
LDLC SERPL CALC-MCNC: 137 MG/DL (ref 0–100)
LDLC/HDLC SERPL: 2.55 {RATIO}
LYMPHOCYTES # BLD AUTO: 1.34 10*3/MM3 (ref 0.7–3.1)
LYMPHOCYTES NFR BLD AUTO: 10.9 % (ref 19.6–45.3)
Lab: ABNORMAL
MCH RBC QN AUTO: 31.5 PG (ref 26.6–33)
MCHC RBC AUTO-ENTMCNC: 33.6 G/DL (ref 31.5–35.7)
MCV RBC AUTO: 93.8 FL (ref 79–97)
MDMA (ECSTASY) INTERNAL CONTROL: ABNORMAL
MDMA UR QL SCN: NEGATIVE
METHADONE INTERNAL CONTROL: ABNORMAL
METHADONE UR QL SCN: NEGATIVE
METHAMPHETAMINE INTERNAL CONTROL: ABNORMAL
MONOCYTES # BLD AUTO: 1.18 10*3/MM3 (ref 0.1–0.9)
MONOCYTES NFR BLD AUTO: 9.6 % (ref 5–12)
MORPHINE INTERNAL CONTROL: ABNORMAL
MORPHINE/OPIATES SCREEN, URINE: NEGATIVE
NEUTROPHILS NFR BLD AUTO: 76.3 % (ref 42.7–76)
NEUTROPHILS NFR BLD AUTO: 9.39 10*3/MM3 (ref 1.7–7)
NRBC BLD AUTO-RTO: 0 /100 WBC (ref 0–0.2)
OXYCODONE INTERNAL CONTROL: ABNORMAL
OXYCODONE UR QL SCN: NEGATIVE
PCP UR QL SCN: NEGATIVE
PHENCYCLIDINE INTERNAL CONTROL: ABNORMAL
PLATELET # BLD AUTO: 327 10*3/MM3 (ref 140–450)
PMV BLD AUTO: 10.3 FL (ref 6–12)
POTASSIUM SERPL-SCNC: 4.3 MMOL/L (ref 3.5–5.2)
PROT SERPL-MCNC: 7.2 G/DL (ref 6–8.5)
RBC # BLD AUTO: 4.85 10*6/MM3 (ref 4.14–5.8)
SODIUM SERPL-SCNC: 140 MMOL/L (ref 136–145)
THC INTERNAL CONTROL: ABNORMAL
TRIGL SERPL-MCNC: 84 MG/DL (ref 0–150)
TSH SERPL DL<=0.05 MIU/L-ACNC: 3.89 UIU/ML (ref 0.27–4.2)
VIT B12 BLD-MCNC: 625 PG/ML (ref 211–946)
VLDLC SERPL-MCNC: 15 MG/DL (ref 5–40)
WBC NRBC COR # BLD AUTO: 12.3 10*3/MM3 (ref 3.4–10.8)

## 2024-05-21 PROCEDURE — 99214 OFFICE O/P EST MOD 30 MIN: CPT | Performed by: INTERNAL MEDICINE

## 2024-05-21 PROCEDURE — 80305 DRUG TEST PRSMV DIR OPT OBS: CPT | Performed by: INTERNAL MEDICINE

## 2024-05-21 PROCEDURE — 84443 ASSAY THYROID STIM HORMONE: CPT | Performed by: INTERNAL MEDICINE

## 2024-05-21 PROCEDURE — 80061 LIPID PANEL: CPT | Performed by: INTERNAL MEDICINE

## 2024-05-21 PROCEDURE — 82746 ASSAY OF FOLIC ACID SERUM: CPT | Performed by: INTERNAL MEDICINE

## 2024-05-21 PROCEDURE — 83036 HEMOGLOBIN GLYCOSYLATED A1C: CPT | Performed by: INTERNAL MEDICINE

## 2024-05-21 PROCEDURE — 82607 VITAMIN B-12: CPT | Performed by: INTERNAL MEDICINE

## 2024-05-21 PROCEDURE — 80053 COMPREHEN METABOLIC PANEL: CPT | Performed by: INTERNAL MEDICINE

## 2024-05-21 PROCEDURE — 3075F SYST BP GE 130 - 139MM HG: CPT | Performed by: INTERNAL MEDICINE

## 2024-05-21 PROCEDURE — G0480 DRUG TEST DEF 1-7 CLASSES: HCPCS | Performed by: INTERNAL MEDICINE

## 2024-05-21 PROCEDURE — 3078F DIAST BP <80 MM HG: CPT | Performed by: INTERNAL MEDICINE

## 2024-05-21 PROCEDURE — 85025 COMPLETE CBC W/AUTO DIFF WBC: CPT | Performed by: INTERNAL MEDICINE

## 2024-05-21 PROCEDURE — 1125F AMNT PAIN NOTED PAIN PRSNT: CPT | Performed by: INTERNAL MEDICINE

## 2024-05-21 RX ORDER — GABAPENTIN 100 MG/1
100 CAPSULE ORAL NIGHTLY
Qty: 30 CAPSULE | Refills: 0 | Status: SHIPPED | OUTPATIENT
Start: 2024-05-21

## 2024-05-21 RX ORDER — HYDROCHLOROTHIAZIDE 12.5 MG/1
12.5 TABLET ORAL DAILY
Qty: 90 TABLET | Refills: 1 | Status: SHIPPED | OUTPATIENT
Start: 2024-05-21

## 2024-05-23 ENCOUNTER — TELEPHONE (OUTPATIENT)
Dept: INTERNAL MEDICINE | Facility: CLINIC | Age: 79
End: 2024-05-23
Payer: MEDICARE

## 2024-05-23 NOTE — TELEPHONE ENCOUNTER
Attempted to contact patient regarding lab results. Left Voicemail to call our office back.     HUB OK TO READ/ ADVISE:     HgbA1c significantly above previous check. Recommend transition from trulicity to mounjaro with intention of more efficacy. Similar class of medication that is weekly injection.     LDL is elevated - this has been associated with increased risk of cardiovascular disease including heart attacks and strokes. Would recommend low cholesterol diet to reduce. Continue zetia.     Complete blood count with abnormalities recheck in 1month. Will order.

## 2024-05-28 LAB — BENZODIAZ CTO UR CFM-MCNC: NEGATIVE NG/ML

## 2024-05-29 ENCOUNTER — TELEPHONE (OUTPATIENT)
Dept: INTERNAL MEDICINE | Facility: CLINIC | Age: 79
End: 2024-05-29
Payer: MEDICARE

## 2024-05-29 DIAGNOSIS — R26.89 BALANCE PROBLEM: Primary | ICD-10-CM

## 2024-05-29 NOTE — TELEPHONE ENCOUNTER
Caller: Jamir Porter    Relationship: Self    Best call back number: [phone]     What is the medical concern/diagnosis: IMPROVING BALANCE AND UPPER BODY STRENGTH.    What specialty or service is being requested: PHYSICAL THERAPY    What is the provider, practice or medical service name: [facility]    What is the office location: [address] [place] [address], [place] [address]    What is the office phone number: [phone]  FAX: [phone]    Any additional details: PATIENT STATES THAT HE FELL AGAIN LAST NIGHT (05.28.2024). PATIENT STATE THAT HE IS NEEDING HELP WITH HIS BALANCE. [facility] PROVIDES PHYSICAL THERAPY FOR AMPUTES AND HE BELIEVES IT WOULD HELP. PLEASE ADVISE.     PATIENT WOULD ALSO LIKE TO ADVISE THAT INFORMATION FOR REFERRAL NEEDS TO BE FAXED TO [facility]. PATIENT REQUESTING FOR HIS INSURANCE INFORMATION TO BE FAXED TO THEM AS WELL.

## 2024-06-04 ENCOUNTER — TELEPHONE (OUTPATIENT)
Dept: INTERNAL MEDICINE | Facility: CLINIC | Age: 79
End: 2024-06-04
Payer: MEDICARE

## 2024-06-04 NOTE — TELEPHONE ENCOUNTER
Regarding: Monjaro vs. Trulicity  Contact: [phone]  ----- Message from Amaury Mcdonough Jr., MD sent at 6/4/2024  1:11 PM EDT -----  Please call patient with instructions in reply as he requested     ----- Message sent from Amaury Mcdonough Jr., MD to Jamir Porter at 6/4/2024  1:11 PM -----   That's awesome you were able to get both. Yes, I would complete the trulicity that you have before transitioning to mounjaro.       ----- Message -----       From:Jamir Porter       Sent:6/3/2024  9:44 AM EDT         To:Amaury Mcdonough    Subject:Monjaro vs. Trulicity    [facility] Trulicity and Monjaro were charged and dispensed.   I now have about three months of Trulicity on hand as well as the recent prescription for Monjaro.  Your instructions were for me to transition from Trulicity to Monjaro.  I assume that I'm supposed to finish the Trulicity BEFORE starting the Monjaro.  Please call me to verify this situation.   Jamir Porter [phone]

## 2024-06-04 NOTE — TELEPHONE ENCOUNTER
Called PT, PT is aware and confirmed.   No further question or concerns       He agreed that he will finish up the Trulicity before starting the mounjaro

## 2024-06-17 ENCOUNTER — LAB (OUTPATIENT)
Dept: LAB | Facility: HOSPITAL | Age: 79
End: 2024-06-17
Payer: MEDICARE

## 2024-06-17 DIAGNOSIS — C61 PROSTATE CANCER: ICD-10-CM

## 2024-06-17 DIAGNOSIS — N40.1 BENIGN PROSTATIC HYPERPLASIA WITH LOWER URINARY TRACT SYMPTOMS, SYMPTOM DETAILS UNSPECIFIED: ICD-10-CM

## 2024-06-17 LAB — PSA SERPL-MCNC: 5.27 NG/ML (ref 0–4)

## 2024-06-17 PROCEDURE — 85652 RBC SED RATE AUTOMATED: CPT | Performed by: INTERNAL MEDICINE

## 2024-06-17 PROCEDURE — 82043 UR ALBUMIN QUANTITATIVE: CPT | Performed by: INTERNAL MEDICINE

## 2024-06-17 PROCEDURE — 84153 ASSAY OF PSA TOTAL: CPT

## 2024-06-17 PROCEDURE — 86140 C-REACTIVE PROTEIN: CPT | Performed by: INTERNAL MEDICINE

## 2024-06-17 PROCEDURE — 82570 ASSAY OF URINE CREATININE: CPT | Performed by: INTERNAL MEDICINE

## 2024-06-21 PROBLEM — N40.1 BENIGN PROSTATIC HYPERPLASIA WITH LOWER URINARY TRACT SYMPTOMS: Status: ACTIVE | Noted: 2024-06-21

## 2024-06-21 NOTE — PROGRESS NOTES
Chief Complaint    Urologic complaint    Subjective          Jamir Porter presents to [facility]  History of Present Illness       79-year-old  gentleman       History of prostate cancer clinical T1c, on active surveillance, s/p TURP patient also with ED, penile prosthesis   Urinary retention - does intermittent CIC        Patient having some trouble with balance and strength he fell x 2 last week.      2023   on Gemtesa daily.  Helping a lot with urge incontinence    Voids okay, no straining.  Some sensation of incomplete emptying at times.      No GH/ UTI/dysuria    No incontinence/no pads      No cardiopulmonary history.  Patient does not smoke.  He is on ibuprofen for back pain. No DM.        PVR     6/24     117  12/23   138  5/23       054  12/22   024  9/22 cystoscopy-4 cm prostate-open at the bladder neck but left side still has some fairly large adenoma that does coapt when the scope was brought past the verumontanum.  Normal cystoscopy  9/22   Elias-Fela Solis  CIC   9/22   60  11/21  65  3/21  TURP -3+3, 3/130, < 1%  4/21  200  2/20   25          Previous    Myrbetriq causes diarrhea    Patient does not know how to do CIC, was having trouble with retention when he was dealing with constipation      2/23 cystoscopy-4 cm prostate - open at bladder neck but left side still has some fairly large adenoma that does coapt.   Large bladder minor trabeculation no pathology    9/22 2 episodes of 1 retention    No longer following adrenal mass.    4/21 CT abdomen/pelvis with and without -9 mm benign lipid poor adrenal adenoma, no change since 8/19.    No erections. Has IPP.    H/o retention  - 1.5 L    oxybutynin  - did stop his incontinence but did give him side effects that he could not tolerate, GI.  Myrebetriq Cause -  diarrhea    8/26/2019 CT abdomen with and withoutadrenal mass protocolsmall right 0.9 cm adrenal nodule.  Favor adrenal adenoma.  8/19 cortisol less than 1.0, plasma  metanephrines negative  7/3/2019 CT abdomen/pelvis with9 mm right adrenal nodule.  Which demonstrates enhancement above the background of the adrenal gland.  Penile prosthesis reservoir partially herniates the left inguinal ring.    Father  pancreatic CA,  brother with prostate cancer.      Penile prothesis - functions, he does not use.    Father  at 64, grandfather  at 85          H/o prostate CA              5.2        12/15/2023 MRI prostate - 12 g -PI-RADS 4 lesion 6 mm right posterior lateral apex peripheral zone.         4.5          3.6       3.6      2022 MRI prostate-negative.     10/21 MRI prostate-17 g, previously 58, negative otherwise    3/21  TURP -3+3, 3/130, < 1% - stopped finasteride     cystoscopy -4 cm prostate, moderate trabeculations, negative otherwise     prostate biopsy -53 g, right apex 3+3, 1/2, 6%, negative otherwise    3.56    MRI  prostate - 58 g , neg    0.91    2019 MRI [id number] g -negative      1.40     2.32 -on finasteride   MRI [id number] g, no targetable lesion seen    5.09    5.41  10/17 5.5    prostate [id number] g - left, 3+3, 50% of core, 5 mm; right - high-grade PIN    5.5  10/16 4.9    10/16 MRI [id number] g, no targetable lesions seen.     4.4   prostate [id number] g   Pathology  Right basenegative  Right base lateral 3+3, 20%, 4 mm  Right mid, mid lateral, apex, apex lateralnegative  Left basenegative  Left mid3+3, 10%  Left mid lateralnegative  Left apex3+3, 15%   left apex3+3, 10%     5.39     3.68               Past History:  Medical History: has a past medical history of Allergic rhinitis, Anxiety and depression, Arthritis, Balance problem, Chronic sinusitis, Claustrophobia, Colon polyps, DDD (degenerative disc disease), cervical, Diabetes mellitus, Disease of thyroid gland, Diverticulitis, Forgetfulness, H/O degenerative disc disease, BKA, right, Hyperlipemia,  Hypertension, Limb swelling, Lumbago, Plantar nerve lesion, left, Prostate CA, Sleep apnea, and Urinary incontinence.   Surgical History: has a past surgical history that includes Dental surgery; Eye surgery (Bilateral); Pilonidal cyst / sinus excision; Amputation (Right, 2012); Back surgery; Colonoscopy (2019); Foot surgery (Left); Lumbar fusion (2016); Lumbar puncture; Penile prosthesis implant; Rotator cuff repair (Bilateral); Uvulopalatopharyngoplasty; TURP / transurethral incision / drainage prostate; ORIF femur fracture (Right); Total knee arthroplasty (Left); and Bunionectomy (N/A, 12/28/2023).   Family History: family history includes Arthritis in his brother, father, and mother; Cancer in his brother, father, and mother.   Social History: reports that he quit smoking about 45 years ago. His smoking use included cigarettes. He started smoking about 64 years ago. He has a 28.5 pack-year smoking history. He has been exposed to tobacco smoke. He has quit using smokeless tobacco. He reports current alcohol use. He reports that he does not use drugs.  Allergies: Statins, Sulfate, and Meperidine             Objective     Vital Signs:   There were no vitals taken for this visit.        Bladder Scan interpretation 06/24/2024    Estimation of residual urine via [employer] [place] Bladder Scan  MA/nurse performing: yony REEVES  Residual Urine: 117 ml  Indication: Benign prostatic hyperplasia with lower urinary tract symptoms, symptom details unspecified    Prostate cancer   Position: Supine  Examination: Incremental scanning of the suprapubic area using 2.0 MHz transducer using copious amounts of acoustic gel.   Findings: An anechoic area was demonstrated which represented the bladder, with measurement of residual urine as noted. I inspected this myself. In that the residual urine was stable or insignificant, refer to plan for treatment and plan necessary at this time.           Assessment and Plan    Diagnoses and all  orders for this visit:    1. Benign prostatic hyperplasia with lower urinary tract symptoms, symptom details unspecified (Primary)    2. Prostate cancer           Urinary retention/BPH    Voiding okay.         Urgency      Cont Gemtesa.  Refilled today.        Prostate cancer on active surveillance         We discussed that active surveillance is an option for a patient with low risk prostate cancer.  The risks of surveillance include progression of disease, failure of clinical staging to detect advanced disease, need for strict followup, need for repeat prostate biopsies, and patient anxiety related to PSA.  We did discuss that the evidence suggests that patient's who progress to treatment on surveillance have survival similar to those treated at diagnosis.      PSA is a little higher,   at this time we will continue active surveillance we will have him follow-up in 6 months with PSA and MRI of his prostate.    At that time we will consider rebiopsy        PVR at  follow-up

## 2024-06-24 ENCOUNTER — OFFICE VISIT (OUTPATIENT)
Dept: UROLOGY | Facility: CLINIC | Age: 79
End: 2024-06-24
Payer: MEDICARE

## 2024-06-24 VITALS — BODY MASS INDEX: 34.8 KG/M2 | RESPIRATION RATE: 16 BRPM | WEIGHT: 235 LBS | HEIGHT: 69 IN

## 2024-06-24 DIAGNOSIS — C61 PROSTATE CANCER: ICD-10-CM

## 2024-06-24 DIAGNOSIS — N40.1 BENIGN PROSTATIC HYPERPLASIA WITH LOWER URINARY TRACT SYMPTOMS, SYMPTOM DETAILS UNSPECIFIED: Primary | ICD-10-CM

## 2024-06-24 LAB
BILIRUB BLD-MCNC: NEGATIVE MG/DL
CLARITY, POC: CLEAR
COLOR UR: YELLOW
EXPIRATION DATE: NORMAL
GLUCOSE UR STRIP-MCNC: NEGATIVE MG/DL
KETONES UR QL: NEGATIVE
LEUKOCYTE EST, POC: NEGATIVE
Lab: NORMAL
NITRITE UR-MCNC: NEGATIVE MG/ML
PH UR: 5.5 [PH] (ref 5–8)
PROT UR STRIP-MCNC: NEGATIVE MG/DL
RBC # UR STRIP: NEGATIVE /UL
SP GR UR: 1.02 (ref 1–1.03)
SPECIMEN VOL 24H UR: 117 L
UROBILINOGEN UR QL: NORMAL

## 2024-07-30 ENCOUNTER — OFFICE VISIT (OUTPATIENT)
Dept: DIABETES SERVICES | Facility: HOSPITAL | Age: 79
End: 2024-07-30
Payer: MEDICARE

## 2024-07-30 VITALS
TEMPERATURE: 98.5 F | OXYGEN SATURATION: 97 % | WEIGHT: 236.2 LBS | HEIGHT: 69 IN | SYSTOLIC BLOOD PRESSURE: 141 MMHG | HEART RATE: 77 BPM | BODY MASS INDEX: 34.98 KG/M2 | DIASTOLIC BLOOD PRESSURE: 73 MMHG

## 2024-07-30 DIAGNOSIS — N18.2 CONTROLLED TYPE 2 DIABETES MELLITUS WITH STAGE 2 CHRONIC KIDNEY DISEASE, WITHOUT LONG-TERM CURRENT USE OF INSULIN: Primary | ICD-10-CM

## 2024-07-30 DIAGNOSIS — E11.22 CONTROLLED TYPE 2 DIABETES MELLITUS WITH STAGE 2 CHRONIC KIDNEY DISEASE, WITHOUT LONG-TERM CURRENT USE OF INSULIN: Primary | ICD-10-CM

## 2024-07-30 DIAGNOSIS — E66.9 OBESITY (BMI 30-39.9): ICD-10-CM

## 2024-07-30 LAB
EXPIRATION DATE: ABNORMAL
GLUCOSE BLDC GLUCOMTR-MCNC: 135 MG/DL (ref 70–99)
HBA1C MFR BLD: 6.7 % (ref 4.5–5.7)
Lab: ABNORMAL

## 2024-07-30 PROCEDURE — 1160F RVW MEDS BY RX/DR IN RCRD: CPT | Performed by: NURSE PRACTITIONER

## 2024-07-30 PROCEDURE — 3077F SYST BP >= 140 MM HG: CPT | Performed by: NURSE PRACTITIONER

## 2024-07-30 PROCEDURE — 83036 HEMOGLOBIN GLYCOSYLATED A1C: CPT | Performed by: NURSE PRACTITIONER

## 2024-07-30 PROCEDURE — 99214 OFFICE O/P EST MOD 30 MIN: CPT | Performed by: NURSE PRACTITIONER

## 2024-07-30 PROCEDURE — 1159F MED LIST DOCD IN RCRD: CPT | Performed by: NURSE PRACTITIONER

## 2024-07-30 PROCEDURE — 82948 REAGENT STRIP/BLOOD GLUCOSE: CPT | Performed by: NURSE PRACTITIONER

## 2024-07-30 PROCEDURE — G0463 HOSPITAL OUTPT CLINIC VISIT: HCPCS | Performed by: NURSE PRACTITIONER

## 2024-07-30 PROCEDURE — 3078F DIAST BP <80 MM HG: CPT | Performed by: NURSE PRACTITIONER

## 2024-07-30 NOTE — PROGRESS NOTES
"Chief Complaint  Diabetes (New patient)    Referred By: Amaury Mcdonough *    Subjective          Jamir FARR Porter presents to [facility] for diabetes medication management    History of Present Illness    Visit type:  to establish care  Diabetes type:  Type 2  Age at time of dx/Year of dx/Number of years:  2018  Family History of Diabetes: None  Current diabetes status/concerns/issues:  He has been referred to our office by his PCP.  His wife is a patient in this clinic.  He is currently taking Trulicity but will be switching to Mounjaro when he runs out.  He had a neuroma removed from his left foot and developed an infection.  This lead to hospitalization with surgery and transfer to rehab facility.  He was there for 6 weeks.  He was d/c on April 17th.  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  No  Hypoglycemia:  None reported at this time  Hypoglycemia Symptoms:  No hypoglycemia at this time  Current Diabetes treatment:  Trulicity 3 mg once weekly  Prior diabetes treatments:  none that he can recall  Using ACEI or ARB: No  Using Statin: Contraindication/Allergy  Blood glucose device:  Meter  Blood glucose monitoring frequency:  1  Blood glucose " range/average:   14 day glucose average is 139; range 120 -160  Glucose Source: Device Reviewed  Dietary behavior:  Limits high carb/sweet foods, Avoids sugary drinks, Number of meals each day - 3; Number of snacks each day - 1; he has had a decrease in his appetite  Activity/Exercise:  None  Last Foot Exam: None  Diabetes Education Hx: None  Social Determinants of Health: None    Past Medical History:   Diagnosis Date    Allergic rhinitis     Anxiety and depression     Arthritis     Balance problem     DDD BACK NEUROMA , CANE HELPS    Chronic sinusitis     NO CURRENT ISSUES    Claustrophobia     Colon polyps     NO CANCER    DDD (degenerative disc disease), cervical     C6/7 FULL ROM    Diabetes mellitus     Disease of thyroid gland     Diverticulitis     NO CURRENT ISSUES    Forgetfulness     H/O degenerative disc disease     Hx of BKA, right     Hyperlipemia     Hypertension     Limb swelling     OCCASSIONAL    Lumbago     Plantar nerve lesion, left     RIGHT LEG BKA    Prostate CA     Sleep apnea     REPAIRED WITH SURGERY    Urinary incontinence      Past Surgical History:   Procedure Laterality Date    AMPUTATION Right 2012    R BKA    BACK SURGERY      LUMBAR MULTIPLE ABLATIONS PRIOR TO FUSION    BUNIONECTOMY N/A 12/28/2023    Procedure: FUENTES'S NEUROMA EXCISION;  Surgeon: Phillip Munguia DPM;  Location: [facility];  Service: Podiatry;  Laterality: N/A;    COLONOSCOPY  2019    DENTAL PROCEDURE      dental surgery     EYE SURGERY Bilateral     eye implant, yes CATARACTS REMOVED    FOOT SURGERY Left     LUMBAR FUSION  2016    [facility]    LUMBAR PUNCTURE      ORIF FEMUR FRACTURE Right     PROXIMAL    PENILE PROSTHESIS IMPLANT      PILONIDAL CYST / SINUS EXCISION      ROTATOR CUFF REPAIR Bilateral     TOOTH EXTRACTION Right 07/30/2024    TOTAL KNEE ARTHROPLASTY Left     TURP / TRANSURETHRAL INCISION / DRAINAGE PROSTATE      following prostate cancer    UVULOPALATOPHARYNGOPLASTY       Family History  "  Problem Relation Age of Onset    Arthritis Mother     Cancer Mother     Arthritis Father     Cancer Father     Arthritis Brother     Cancer Brother     Malig Hyperthermia Neg Hx      Social History     Socioeconomic History    Marital status:    Tobacco Use    Smoking status: Former     Current packs/day: 0.00     Average packs/day: 1.5 packs/day for 19.0 years (28.5 ttl pk-yrs)     Types: Cigarettes     Start date:      Quit date:      Years since quittin.6     Passive exposure: Past    Smokeless tobacco: Former    Tobacco comments:     1.5 ppd quit , 17 years total    Vaping Use    Vaping status: Never Used   Substance and Sexual Activity    Alcohol use: Yes     Comment: rarely drinks 2021    Drug use: Never    Sexual activity: Defer     Allergies   Allergen Reactions    Statins Myalgia    Sulfate Hives    Meperidine Other (See Comments)     \"DOESN'T WORK\"       Current Outpatient Medications:     acetaminophen (TYLENOL) 325 MG tablet, Take 2 tablets by mouth Every 6 (Six) Hours As Needed for Mild Pain., Disp: , Rfl:     docusate sodium (Colace) 100 MG capsule, As Needed., Disp: , Rfl:     ezetimibe (ZETIA) 10 MG tablet, TAKE 1 TABLET BY MOUTH DAILY, Disp: 90 tablet, Rfl: 3    FLUoxetine (PROzac) 40 MG capsule, TAKE 1 CAPSULE BY MOUTH DAILY, Disp: 90 capsule, Rfl: 3    gabapentin (NEURONTIN) 100 MG capsule, Take 1 capsule by mouth Every Night., Disp: 30 capsule, Rfl: 0    hydroCHLOROthiazide 12.5 MG tablet, Take 1 tablet by mouth Daily., Disp: 90 tablet, Rfl: 1    HYDROcodone-acetaminophen (NORCO) 5-325 MG per tablet, As Needed., Disp: , Rfl:     Trulicity 3 MG/0.5ML solution pen-injector, ADMINISTER 3 MG UNDER THE SKIN 1 TIME WEEKLY, Disp: , Rfl:     Vibegron 75 MG tablet, Take 1 tablet by mouth Daily., Disp: 30 tablet, Rfl: 11    Objective     Vitals:    24 1432   BP: 141/73   BP Location: Right arm   Patient Position: Sitting   Cuff Size: Adult   Pulse: 77   Temp: 98.5 °F " "(36.9 °C)   TempSrc: Temporal   SpO2: 97%   Weight: 107 kg (236 lb 3.2 oz)   Height: 175.3 cm (69\")     Body mass index is 34.88 kg/m².    Physical Exam  Constitutional:       Appearance: Normal appearance. He is obese.      Comments: Obesity (BMI 30 - 39.9) Pt Current BMI = 34.88    HENT:      Head: Normocephalic and atraumatic.      Right Ear: External ear normal.      Left Ear: External ear normal.      Nose: Nose normal.   Eyes:      Extraocular Movements: Extraocular movements intact.      Conjunctiva/sclera: Conjunctivae normal.   Pulmonary:      Effort: Pulmonary effort is normal.   Musculoskeletal:         General: Normal range of motion.      Cervical back: Normal range of motion.   Skin:     General: Skin is warm and dry.   Neurological:      General: No focal deficit present.      Mental Status: He is alert and oriented to person, place, and time. Mental status is at baseline.   Psychiatric:         Mood and Affect: Mood normal.         Behavior: Behavior normal.         Thought Content: Thought content normal.         Judgment: Judgment normal.             Result Review :   The following data was reviewed by: LISA Anand on 07/30/2024:    Most Recent A1C          7/30/2024    14:42   HGBA1C Most Recent   Hemoglobin A1C 6.7        A1C Last 3 Results          12/12/2023    10:59 5/21/2024    11:33 7/30/2024    14:42   HGBA1C Last 3 Results   Hemoglobin A1C 5.80  7.90  6.7      A1c collected in the office today is 7.9%, indicating Controlled Type II diabetes.  This result is down from the prior result of 7.9% collected on 5/21/24     Glucose   Date Value Ref Range Status   07/30/2024 135 (H) 70 - 99 mg/dL Final     Comment:     Serial Number: [id number]:  [id number]     Point of care glucose in the office today is within normal limits for nonfasting glucose    Creatinine   Date Value Ref Range Status   05/21/2024 0.87 0.76 - 1.27 mg/dL Final   01/17/2024 1.05 0.76 - 1.27 mg/dL Final "     eGFR   Date Value Ref Range Status   05/21/2024 87.2 >60.0 mL/min/1.73 Final   01/17/2024 71.8 >60.0 mL/min/1.73 Final     Labs collected on 5/21/2024 show Stage II mild (GFR = 60-89mL/min)    Microalbumin, Urine   Date Value Ref Range Status   06/17/2024 <1.2 mg/dL Final   03/10/2023 <1.2 mg/dL Final     Creatinine, Urine   Date Value Ref Range Status   06/17/2024 94.3 mg/dL Final   03/10/2023 31.6 mg/dL Final     Microalbumin/Creatinine Ratio   Date Value Ref Range Status   06/17/2024   Final     Comment:     Unable to calculate   03/10/2023   Final     Comment:     Unable to calculate     Urine microalbuminuria collected on 6/17/2024 is negative for microalbuminuria    Total Cholesterol   Date Value Ref Range Status   05/21/2024 205 (H) 0 - 200 mg/dL Final   12/12/2023 223 (H) 0 - 200 mg/dL Final     Triglycerides   Date Value Ref Range Status   05/21/2024 84 0 - 150 mg/dL Final   12/12/2023 150 0 - 150 mg/dL Final     HDL Cholesterol   Date Value Ref Range Status   05/21/2024 53 40 - 60 mg/dL Final   12/12/2023 40 40 - 60 mg/dL Final     LDL Cholesterol    Date Value Ref Range Status   05/21/2024 137 (H) 0 - 100 mg/dL Final   12/12/2023 156 (H) 0 - 100 mg/dL Final     Lipid panel collected on 5/21/2024 shows Hypercholesterolemia            Assessment: The patient has had improvement and is a since the prior evaluation.  He states that his A1c went up after the patient had an extended stay in a rehab facility due to infection in his left foot.  Since being discharged home he has had better control of his dietary intake to help further control glucose levels.  The patient has been prescribed Mounjaro 10 mg once weekly.  He is currently using out his current supply of the Trulicity that he has on hand and then will make the transition.  Patient does report having some constipation issues.  He was advised to use a daily or every other day stool softener to help to tolerate the medications without worsening the  constipation.      Diagnoses and all orders for this visit:    1. Controlled type 2 diabetes mellitus with stage 2 chronic kidney disease, without long-term current use of insulin (Primary)  -     POC Glycosylated Hemoglobin (Hb A1C)  -     POC Glucose    2. Obesity (BMI 30-39.9)        Plan: We made no changes to his treatment plan.  The patient will transition to the Mounjaro as previously prescribed by his PCP once he is used up his supply of the Trulicity.  If he has any difficulty tolerating the medication he will contact this office for advice.  He is to focus on dietary behavior to help further control glucose levels as well as to promote weight loss.    The patient will monitor his blood glucose levels once a day.  If he develops problematic hyperglycemia or hypoglycemia or adverse drug reactions, he will contact the office for further instructions.        Follow Up     Return in about 3 months (around 10/30/2024) for Medication Management.    Patient was given instructions and counseling regarding his condition or for health maintenance advice. Please see specific information pulled into the AVS if appropriate.     Indu Carter, LISA  07/30/2024      Dictated Utilizing Dragon Dictation.  Please note that portions of this note were completed with a voice recognition program.  Part of this note may be an electronic transcription/translation of spoken language to printed text using the Dragon Dictation System.

## 2024-08-10 DIAGNOSIS — E78.5 HYPERLIPIDEMIA, UNSPECIFIED HYPERLIPIDEMIA TYPE: ICD-10-CM

## 2024-08-12 RX ORDER — EZETIMIBE 10 MG/1
10 TABLET ORAL DAILY
Qty: 90 TABLET | Refills: 3 | Status: SHIPPED | OUTPATIENT
Start: 2024-08-12

## 2024-08-23 ENCOUNTER — TELEPHONE (OUTPATIENT)
Dept: INTERNAL MEDICINE | Facility: CLINIC | Age: 79
End: 2024-08-23

## 2024-08-27 ENCOUNTER — OFFICE VISIT (OUTPATIENT)
Dept: INTERNAL MEDICINE | Facility: CLINIC | Age: 79
End: 2024-08-27
Payer: MEDICARE

## 2024-08-27 VITALS
BODY MASS INDEX: 34.48 KG/M2 | TEMPERATURE: 97.6 F | WEIGHT: 232.8 LBS | HEART RATE: 64 BPM | SYSTOLIC BLOOD PRESSURE: 123 MMHG | HEIGHT: 69 IN | DIASTOLIC BLOOD PRESSURE: 74 MMHG | OXYGEN SATURATION: 93 %

## 2024-08-27 DIAGNOSIS — E11.65 TYPE 2 DIABETES MELLITUS WITH HYPERGLYCEMIA, WITHOUT LONG-TERM CURRENT USE OF INSULIN: ICD-10-CM

## 2024-08-27 DIAGNOSIS — Z23 NEED FOR INFLUENZA VACCINATION: ICD-10-CM

## 2024-08-27 DIAGNOSIS — R53.1 WEAKNESS: ICD-10-CM

## 2024-08-27 DIAGNOSIS — E03.9 ACQUIRED HYPOTHYROIDISM: ICD-10-CM

## 2024-08-27 DIAGNOSIS — R26.89 BALANCE PROBLEM: ICD-10-CM

## 2024-08-27 DIAGNOSIS — F33.2 SEVERE EPISODE OF RECURRENT MAJOR DEPRESSIVE DISORDER, WITHOUT PSYCHOTIC FEATURES: ICD-10-CM

## 2024-08-27 DIAGNOSIS — E78.2 MIXED HYPERLIPIDEMIA: ICD-10-CM

## 2024-08-27 DIAGNOSIS — I10 PRIMARY HYPERTENSION: Primary | ICD-10-CM

## 2024-08-27 DIAGNOSIS — Z29.11 NEED FOR RSV VACCINATION: ICD-10-CM

## 2024-08-27 PROCEDURE — 84443 ASSAY THYROID STIM HORMONE: CPT | Performed by: INTERNAL MEDICINE

## 2024-08-27 PROCEDURE — G2211 COMPLEX E/M VISIT ADD ON: HCPCS | Performed by: INTERNAL MEDICINE

## 2024-08-27 PROCEDURE — 3074F SYST BP LT 130 MM HG: CPT | Performed by: INTERNAL MEDICINE

## 2024-08-27 PROCEDURE — 3078F DIAST BP <80 MM HG: CPT | Performed by: INTERNAL MEDICINE

## 2024-08-27 PROCEDURE — 82746 ASSAY OF FOLIC ACID SERUM: CPT | Performed by: INTERNAL MEDICINE

## 2024-08-27 PROCEDURE — 82085 ASSAY OF ALDOLASE: CPT | Performed by: INTERNAL MEDICINE

## 2024-08-27 PROCEDURE — 80053 COMPREHEN METABOLIC PANEL: CPT | Performed by: INTERNAL MEDICINE

## 2024-08-27 PROCEDURE — 1125F AMNT PAIN NOTED PAIN PRSNT: CPT | Performed by: INTERNAL MEDICINE

## 2024-08-27 PROCEDURE — 80061 LIPID PANEL: CPT | Performed by: INTERNAL MEDICINE

## 2024-08-27 PROCEDURE — 82607 VITAMIN B-12: CPT | Performed by: INTERNAL MEDICINE

## 2024-08-27 PROCEDURE — 99214 OFFICE O/P EST MOD 30 MIN: CPT | Performed by: INTERNAL MEDICINE

## 2024-08-27 PROCEDURE — 83036 HEMOGLOBIN GLYCOSYLATED A1C: CPT | Performed by: INTERNAL MEDICINE

## 2024-08-27 PROCEDURE — 82550 ASSAY OF CK (CPK): CPT | Performed by: INTERNAL MEDICINE

## 2024-08-27 PROCEDURE — 85025 COMPLETE CBC W/AUTO DIFF WBC: CPT | Performed by: INTERNAL MEDICINE

## 2024-08-27 RX ORDER — BUSPIRONE HYDROCHLORIDE 10 MG/1
10 TABLET ORAL EVERY MORNING
Qty: 30 TABLET | Refills: 0 | Status: SHIPPED | OUTPATIENT
Start: 2024-08-27

## 2024-08-27 NOTE — PROGRESS NOTES
"Chief Complaint  Bladder Problem (Been seen by Urology he is going back in November ), Constipation (Patient stated that his bowels don't empty out all the way he doesn't feel like ), Weakness - Generalized (He stated that he feels weak /He goes to PT twice  week- after that he just feels tired he used to have energy afterwards ), Depression (He stated that he thinks depression might be the cause of this ), Balance Issues (He stated that he has had some  falls but not recent /He feels very unsteady, like he can fall at the next step  ), and Back Pain (Patient is requesting medical marijuana )    Subjective          Jamir FARR Porter presents to [facility]  History of Present Illness  Constipation- patient reports having Bms, but never feels like he fully evacuates his bowels.   Patient is follow-up with urology for bladder issues. Patient is currently on gemtesa. Patient feels like the medication is wearing off.   Patient reports feeling generally weak. He is doing physical therapy to help with strengthening. Patient reports he does not get the \"runner's high\" after exercise. Patient reports difficulty balancing. He is working with physical therapy on this as well. He continue to have epidural injections regularly. Patient has a \"pinched nerve\" pain in his right side of lower back.    MDD- patient reports feeling down recently. Patient reports his physical functioning is causing him distress. He feels bad that his wife is doing more of the chores around his house. Patient reports compliance with prozac.  Patient reports previously tried several medicine, but Prozac seemingly worked best for him.      PHQ-9 Depression Screening  Little interest or pleasure in doing things? 3-->nearly every day   Feeling down, depressed, or hopeless? 2-->more than half the days   Trouble falling or staying asleep, or sleeping too much? 3-->nearly every day   Feeling tired or having little " energy? 3-->nearly every day   Poor appetite or overeating? 3-->nearly every day   Feeling bad about yourself - or that you are a failure or have let yourself or your family down? 3-->nearly every day   Trouble concentrating on things, such as reading the newspaper or watching television? 2-->more than half the days   Moving or speaking so slowly that other people could have noticed? Or the opposite - being so fidgety or restless that you have been moving around a lot more than usual? 2-->more than half the days   Thoughts that you would be better off dead, or of hurting yourself in some way? 3-->nearly every day (but he doesnt want to harm himself, he stated that he doesnt think that he is doing anything for anyone to be here. he stated that if someone said come on lets go and make it easy then he would take it up)   PHQ-9 Total Score 24   If you checked off any problems, how difficult have these problems made it for you to do your work, take care of things at home, or get along with other people? very difficult         Current Outpatient Medications   Medication Instructions    acetaminophen (TYLENOL) 650 mg, Oral, Every 6 Hours PRN    busPIRone (BUSPAR) 10 mg, Oral, Every Morning    docusate sodium (Colace) 100 MG capsule As Needed    ezetimibe (ZETIA) 10 mg, Oral, Daily    FLUoxetine (PROZAC) 40 mg, Oral, Daily    gabapentin (NEURONTIN) 100 mg, Oral, Nightly    hydroCHLOROthiazide 12.5 mg, Oral, Daily    HYDROcodone-acetaminophen (NORCO) 5-325 MG per tablet As Needed    Trulicity 3 MG/0.5ML solution pen-injector ADMINISTER 3 MG UNDER THE SKIN 1 TIME WEEKLY    Vibegron 75 mg, Oral, Daily       The following portions of the patient's history were reviewed and updated as appropriate: allergies, current medications, past family history, past medical history, past social history, past surgical history, and problem list.    Objective   Vital Signs:   /74 (BP Location: Left arm)   Pulse 64   Temp 97.6 °F (36.4  "°C) (Temporal)   Ht 175.3 cm (69\")   Wt 106 kg (232 lb 12.8 oz)   SpO2 93%   BMI 34.38 kg/m²     BP Readings from Last 3 Encounters:   08/27/24 123/74   07/30/24 141/73   07/02/24 146/77     Wt Readings from Last 3 Encounters:   08/27/24 106 kg (232 lb 12.8 oz)   07/30/24 107 kg (236 lb 3.2 oz)   07/02/24 107 kg (235 lb)         Physical Exam   Appearance: No acute distress, well-nourished  Head: normocephalic, atraumatic  Eyes: extraocular movements intact, no scleral icterus, no conjunctival injection  Ears, Nose, and Throat: external ears normal, nares patent, moist mucous membranes  Cardiovascular: regular rate and rhythm. no murmurs, rubs, or gallops. no edema  Respiratory: breathing comfortably, symmetric chest rise, clear to auscultation bilaterally. No wheezes, rales, or rhonchi.  Neuro: alert and oriented to time, place, and person. Normal gait  Psych: normal mood and affect     Result Review :   The following data was reviewed by: Amaury Mcdonough Jr, MD on 08/27/2024:  Common labs          5/21/2024    11:33 6/17/2024    13:11 6/17/2024    13:18 7/30/2024    14:42   Common Labs   Glucose 88       BUN 18       Creatinine 0.87       Sodium 140       Potassium 4.3       Chloride 100       Calcium 10.2       Albumin 4.3       Total Bilirubin 0.4       Alkaline Phosphatase 97       AST (SGOT) 35       ALT (SGPT) 66       WBC 12.30       Hemoglobin 15.3       Hematocrit 45.5       Platelets 327       Total Cholesterol 205       Triglycerides 84       HDL Cholesterol 53       LDL Cholesterol  137       Hemoglobin A1C 7.90    6.7    Microalbumin, Urine   <1.2     PSA  5.270          Lab Results   Component Value Date    SARSANTIGEN Not Detected 06/01/2024    COVID19 NOT DETECTED 03/19/2021    RAPFLUA Negative 08/15/2023    RAPFLUB Negative 08/15/2023    FLUAAG Not Detected 06/01/2024    FLUBAG Not Detected 06/01/2024    RAPSCRN Negative 06/01/2024    BILIRUBINUR Negative 06/24/2024       Last Urine " Toxicity  More data exists         Latest Ref Rng & Units 6/17/2024 5/21/2024   LAST URINE TOXICITY RESULTS   Creatinine, Urine mg/dL 94.3  -   Amphetamine, Urine Qual Negative - Negative    Barbiturates Screen, Urine Negative - Negative    Benzodiazepine Screen, Urine Negative - Positive    Buprenorphine, Screen, Urine Negative - Negative    Cocaine Screen, Urine Negative - Negative    Methadone Screen , Urine Negative - Negative    Methamphetamine, Ur Negative - Negative       Details                     Assessment and Plan    Diagnoses and all orders for this visit:    1. Primary hypertension (Primary)  Comments:  Well-controlled on current regimen.  BP goal less than 130/80.  Orders:  -     CBC & Differential  -     Comprehensive Metabolic Panel    2. Mixed hyperlipidemia  Comments:  Check labs.  Patient intolerant to statins.  Orders:  -     Lipid Panel    3. Type 2 diabetes mellitus with hyperglycemia, without long-term current use of insulin  Comments:  Improved on previous check.  Will check labs today.  Orders:  -     Hemoglobin A1c    4. Acquired hypothyroidism  -     TSH    5. Weakness  Comments:  Further evaluate with labs as ordered.  Will also obtain EMG to further evaluate.  Continue follow-up with PT.  Orders:  -     CK  -     Aldolase  -     EMG & Nerve Conduction Test; Future  -     Vitamin B12 & Folate    6. Balance problem  -     EMG & Nerve Conduction Test; Future  -     Vitamin B12 & Folate    7. Need for influenza vaccination    8. Need for RSV vaccination    9. Severe episode of recurrent major depressive disorder, without psychotic features  Comments:  Will add BuSpar to her regimen with Prozac.  Follow-up in 4 weeks.  Orders:  -     busPIRone (BUSPAR) 10 MG tablet; Take 1 tablet by mouth Every Morning.  Dispense: 30 tablet; Refill: 0        There are no discontinued medications.       Follow Up   Return in about 4 weeks (around 9/24/2024) for mental health.  Patient was given instructions  and counseling regarding his condition or for health maintenance advice. Please see specific information pulled into the AVS if appropriate.       Amaury Mcdonough Jr, MD  08/27/24  11:15 EDT

## 2024-08-28 ENCOUNTER — TELEPHONE (OUTPATIENT)
Dept: INTERNAL MEDICINE | Facility: CLINIC | Age: 79
End: 2024-08-28
Payer: MEDICARE

## 2024-08-28 LAB
ALBUMIN SERPL-MCNC: 4.2 G/DL (ref 3.5–5.2)
ALBUMIN/GLOB SERPL: 1.4 G/DL
ALP SERPL-CCNC: 96 U/L (ref 39–117)
ALT SERPL W P-5'-P-CCNC: 23 U/L (ref 1–41)
ANION GAP SERPL CALCULATED.3IONS-SCNC: 13.6 MMOL/L (ref 5–15)
AST SERPL-CCNC: 27 U/L (ref 1–40)
BASOPHILS # BLD AUTO: 0.07 10*3/MM3 (ref 0–0.2)
BASOPHILS NFR BLD AUTO: 0.8 % (ref 0–1.5)
BILIRUB SERPL-MCNC: 0.4 MG/DL (ref 0–1.2)
BUN SERPL-MCNC: 16 MG/DL (ref 8–23)
BUN/CREAT SERPL: 16.8 (ref 7–25)
CALCIUM SPEC-SCNC: 9.5 MG/DL (ref 8.6–10.5)
CHLORIDE SERPL-SCNC: 99 MMOL/L (ref 98–107)
CHOLEST SERPL-MCNC: 188 MG/DL (ref 0–200)
CK SERPL-CCNC: 47 U/L (ref 20–200)
CO2 SERPL-SCNC: 25.4 MMOL/L (ref 22–29)
CREAT SERPL-MCNC: 0.95 MG/DL (ref 0.76–1.27)
DEPRECATED RDW RBC AUTO: 42.5 FL (ref 37–54)
EGFRCR SERPLBLD CKD-EPI 2021: 80.9 ML/MIN/1.73
EOSINOPHIL # BLD AUTO: 0.52 10*3/MM3 (ref 0–0.4)
EOSINOPHIL NFR BLD AUTO: 6.2 % (ref 0.3–6.2)
ERYTHROCYTE [DISTWIDTH] IN BLOOD BY AUTOMATED COUNT: 11.9 % (ref 12.3–15.4)
FOLATE SERPL-MCNC: 10.6 NG/ML (ref 4.78–24.2)
GLOBULIN UR ELPH-MCNC: 3.1 GM/DL
GLUCOSE SERPL-MCNC: 195 MG/DL (ref 65–99)
HBA1C MFR BLD: 6.2 % (ref 4.8–5.6)
HCT VFR BLD AUTO: 43.7 % (ref 37.5–51)
HDLC SERPL-MCNC: 33 MG/DL (ref 40–60)
HGB BLD-MCNC: 14.6 G/DL (ref 13–17.7)
IMM GRANULOCYTES # BLD AUTO: 0.05 10*3/MM3 (ref 0–0.05)
IMM GRANULOCYTES NFR BLD AUTO: 0.6 % (ref 0–0.5)
LDLC SERPL CALC-MCNC: 132 MG/DL (ref 0–100)
LDLC/HDLC SERPL: 3.93 {RATIO}
LYMPHOCYTES # BLD AUTO: 1.05 10*3/MM3 (ref 0.7–3.1)
LYMPHOCYTES NFR BLD AUTO: 12.5 % (ref 19.6–45.3)
MCH RBC QN AUTO: 32.4 PG (ref 26.6–33)
MCHC RBC AUTO-ENTMCNC: 33.4 G/DL (ref 31.5–35.7)
MCV RBC AUTO: 96.9 FL (ref 79–97)
MONOCYTES # BLD AUTO: 0.64 10*3/MM3 (ref 0.1–0.9)
MONOCYTES NFR BLD AUTO: 7.6 % (ref 5–12)
NEUTROPHILS NFR BLD AUTO: 6.06 10*3/MM3 (ref 1.7–7)
NEUTROPHILS NFR BLD AUTO: 72.3 % (ref 42.7–76)
NRBC BLD AUTO-RTO: 0 /100 WBC (ref 0–0.2)
PLATELET # BLD AUTO: 303 10*3/MM3 (ref 140–450)
PMV BLD AUTO: 9.5 FL (ref 6–12)
POTASSIUM SERPL-SCNC: 3.4 MMOL/L (ref 3.5–5.2)
PROT SERPL-MCNC: 7.3 G/DL (ref 6–8.5)
RBC # BLD AUTO: 4.51 10*6/MM3 (ref 4.14–5.8)
SODIUM SERPL-SCNC: 138 MMOL/L (ref 136–145)
TRIGL SERPL-MCNC: 126 MG/DL (ref 0–150)
TSH SERPL DL<=0.05 MIU/L-ACNC: 3.99 UIU/ML (ref 0.27–4.2)
VIT B12 BLD-MCNC: 644 PG/ML (ref 211–946)
VLDLC SERPL-MCNC: 23 MG/DL (ref 5–40)
WBC NRBC COR # BLD AUTO: 8.39 10*3/MM3 (ref 3.4–10.8)

## 2024-08-28 NOTE — TELEPHONE ENCOUNTER
----- Message from Amaury Mcdonough sent at 8/28/2024  9:21 AM EDT -----  HDL low - this is protective cholesterol and generally like the number to be >50. encourage exercise to increase level.     LDL is elevated - this has been associated with increased risk of cardiovascular disease including heart attacks and strokes. Would recommend low cholesterol diet to reduce.     Good control of blood glucose.    Potassium slightly low and would recommend dietary supplemntation    Other labs ok.

## 2024-08-28 NOTE — TELEPHONE ENCOUNTER
Called  patient no answer left      OKAY FOR HUB TO READ/ADVISE     HDL low - this is protective cholesterol and generally like the number to be >50. encourage exercise to increase level.     LDL is elevated - this has been associated with increased risk of cardiovascular disease including heart attacks and strokes. Would recommend low cholesterol diet to reduce.     Good control of blood glucose.     Potassium slightly low and would recommend dietary supplemntation     Other labs ok.

## 2024-08-29 LAB — ALDOLASE SERPL-CCNC: 5.6 U/L (ref 3.3–10.3)

## 2024-09-05 NOTE — ANESTHESIA PROCEDURE NOTES
Airway  Urgency: elective    Date/Time: 12/28/2023 7:37 AM  Airway not difficult    General Information and Staff    Patient location during procedure: OR  Anesthesiologist: Iggy Schroeder MD  CRNA/CAA: Meena Leiva CRNA    Indications and Patient Condition  Indications for airway management: airway protection    Preoxygenated: yes  Mask difficulty assessment: 3 - difficult mask (inadequate, unstable or two providers) +/- NMBA    Final Airway Details  Final airway type: endotracheal airway      Successful airway: ETT  Cuffed: yes   Successful intubation technique: direct laryngoscopy and video laryngoscopy  Facilitating devices/methods: intubating stylet  Endotracheal tube insertion site: oral  Blade: Oden  Blade size: 3  ETT size (mm): 7.5  Cormack-Lehane Classification: grade I - full view of glottis  Placement verified by: chest auscultation and capnometry   Measured from: lips  ETT/EBT  to lips (cm): 22  Number of attempts at approach: 2  Assessment: lips, teeth, and gum same as pre-op and atraumatic intubation    Additional Comments  First intubation atraumatic - mac 3 blade, grade 2b view. ETT visualized passing through VC. Cuff inflated to minimal occlusive pressure. Unable to obtain eTCO2 reading and minimal chest rise. Oden blade inserted with confirmation of ETT between VC with cuff fully past VC. Confirmed circuit return tubing had come partially undone from ventilator. Tightened connections and was able to provide recruitment breaths without difficulty. BBS equal and clear. Normal +eTCO2 waveform noted.            accepted

## 2024-09-16 RX ORDER — TIRZEPATIDE 7.5 MG/.5ML
INJECTION, SOLUTION SUBCUTANEOUS
Qty: 2 ML | Refills: 0 | OUTPATIENT
Start: 2024-09-16

## 2024-09-24 ENCOUNTER — OFFICE VISIT (OUTPATIENT)
Dept: INTERNAL MEDICINE | Facility: CLINIC | Age: 79
End: 2024-09-24
Payer: MEDICARE

## 2024-09-24 VITALS
TEMPERATURE: 97.2 F | BODY MASS INDEX: 34.36 KG/M2 | SYSTOLIC BLOOD PRESSURE: 149 MMHG | HEART RATE: 63 BPM | WEIGHT: 232 LBS | HEIGHT: 69 IN | DIASTOLIC BLOOD PRESSURE: 79 MMHG | OXYGEN SATURATION: 95 %

## 2024-09-24 DIAGNOSIS — I10 PRIMARY HYPERTENSION: ICD-10-CM

## 2024-09-24 DIAGNOSIS — Z29.11 NEED FOR RSV VACCINATION: ICD-10-CM

## 2024-09-24 DIAGNOSIS — Z23 NEED FOR INFLUENZA VACCINATION: ICD-10-CM

## 2024-09-24 DIAGNOSIS — G62.9 NEUROPATHY: ICD-10-CM

## 2024-09-24 DIAGNOSIS — E11.65 TYPE 2 DIABETES MELLITUS WITH HYPERGLYCEMIA, WITHOUT LONG-TERM CURRENT USE OF INSULIN: Primary | ICD-10-CM

## 2024-09-24 DIAGNOSIS — F33.2 SEVERE EPISODE OF RECURRENT MAJOR DEPRESSIVE DISORDER, WITHOUT PSYCHOTIC FEATURES: ICD-10-CM

## 2024-09-24 PROCEDURE — G0008 ADMIN INFLUENZA VIRUS VAC: HCPCS | Performed by: INTERNAL MEDICINE

## 2024-09-24 PROCEDURE — 90662 IIV NO PRSV INCREASED AG IM: CPT | Performed by: INTERNAL MEDICINE

## 2024-09-24 PROCEDURE — 1125F AMNT PAIN NOTED PAIN PRSNT: CPT | Performed by: INTERNAL MEDICINE

## 2024-09-24 PROCEDURE — 3078F DIAST BP <80 MM HG: CPT | Performed by: INTERNAL MEDICINE

## 2024-09-24 PROCEDURE — 99214 OFFICE O/P EST MOD 30 MIN: CPT | Performed by: INTERNAL MEDICINE

## 2024-09-24 PROCEDURE — 3077F SYST BP >= 140 MM HG: CPT | Performed by: INTERNAL MEDICINE

## 2024-09-24 RX ORDER — GABAPENTIN 100 MG/1
100 CAPSULE ORAL NIGHTLY
Qty: 30 CAPSULE | Refills: 0 | Status: SHIPPED | OUTPATIENT
Start: 2024-09-24

## 2024-09-24 RX ORDER — BUSPIRONE HYDROCHLORIDE 15 MG/1
15 TABLET ORAL EVERY MORNING
Qty: 30 TABLET | Refills: 0 | Status: SHIPPED | OUTPATIENT
Start: 2024-09-24

## 2024-10-09 ENCOUNTER — DOCUMENTATION (OUTPATIENT)
Dept: PHYSICAL THERAPY | Facility: CLINIC | Age: 79
End: 2024-10-09
Payer: MEDICARE

## 2024-10-09 NOTE — PROGRESS NOTES
Outpatient Physical Therapy  [address] [facility] [place], [place] [address]      Discharge Summary  Discharge Summary from Physical Therapy Report      Dates  PT visit: 1/5/24-2/14/24  Number of Visits: 10       Goals  Plan Goals: LOW BACK PROBLEMS:     1. The patient complains of low back pain.  LTG 1: 12 weeks:  The patient will report a pain rating of 3/10 or better at worst in order to improve  tolerance to activities of daily living and improve sleep quality.  STATUS:  Not met, progressing  STG 1a: 6 weeks:  The patient will report a pain rating of 5/10 or better at its worst.  STATUS:  MET  TREATMENT:  Therapeutic exercises, manual therapy, aquatic therapy, home exercise   instruction, and modalities as needed for pain to include:  electrical stimulation, moist heat, ice,   ultrasound, and diathermy.        2. The patient demonstrates weakness of the B hip.  LTG 2: 12 weeks:  The patient will demonstrate 5 /5 strength for B hip flexion, abduction,  and extension in order to improve hip stability.  STATUS:  Not met, progressing  STG 2a: 6 weeks:  The patient will demonstrate 4+ /5 strength for B hip flexion, abduction,  and extension.  STATUS:Not met, progressing  TREATMENT: Therapeutic exercises, manual therapy, aquatic therapy, home exercise instruction,  and modalities as needed for pain to include:  electrical stimulation, moist heat, ice, ultrasound, and   diathermy.        3. Mobility: Walking/Moving Around Functional Limitation                   LTG 3: 12 weeks:  The patient will demonstrate 1-19 % limitation by achieving a score of 1-9 on the SHAWN.  STATUS:Not met, progressing  STG 3 a: 6 weeks:  The patient will demonstrate 20-39 % limitation by achieving a score of 10-19 on the SHAWN.    STATUS:  Not met, progressing  STG 3a: The patient will be independent with HEP.    STATUS:  Met, ongiong  TREATMENT:  Manual therapy, therapeutic exercise, home exercise instruction, and modalities as needed to  include: moist heat, electrical stimulation, and ultrasound.    Discharge Plan: Continue with current home exercise program as instructed    Date of Discharge 10/9/2024      Electronically signed:   Camille Palomino PTA  Physical Therapist Assistant  [facility] License #: [id number]

## 2024-10-14 ENCOUNTER — TELEPHONE (OUTPATIENT)
Dept: INTERNAL MEDICINE | Facility: CLINIC | Age: 79
End: 2024-10-14
Payer: MEDICARE

## 2024-10-14 NOTE — TELEPHONE ENCOUNTER
Caller: Jamir Porter A    Relationship to patient: Self    Best call back number: [phone]     Patient is needing: PATIENT STATES HE GOT A CALL FROM THE OFFICE EITHER FRIDAY OR OVER THE WEEKEND. PATIENT STATES HE THINKS THE PERSON THAT CALLED WAS LUPE. PATIENT IS ASKING FOR A CALL BACK AFTER 2PM TODAY OR TOMORROW 10.15.24 FOR A CALL BACK.

## 2024-10-14 NOTE — TELEPHONE ENCOUNTER
Tried to contacted patient no answer left Vm    OKAY FOR HUB TO READ/ADVISE     Patient needs annual wellness visit schedule for December

## 2024-10-14 NOTE — TELEPHONE ENCOUNTER
Name: PorterJamir merida CHIKA    Relationship: Self    Best Callback Number: [phone]    HUB PROVIDED THE RELAY MESSAGE FROM THE OFFICE   PATIENT VOICED UNDERSTANDING AND HAS NO FURTHER QUESTIONS AT THIS TIME    ADDITIONAL INFORMATION: SCHEDULED FOR 12.31.2024

## 2024-10-20 DIAGNOSIS — E11.65 TYPE 2 DIABETES MELLITUS WITH HYPERGLYCEMIA, WITHOUT LONG-TERM CURRENT USE OF INSULIN: ICD-10-CM

## 2024-10-21 RX ORDER — TIRZEPATIDE 10 MG/.5ML
INJECTION, SOLUTION SUBCUTANEOUS
Qty: 2 ML | Refills: 0 | Status: SHIPPED | OUTPATIENT
Start: 2024-10-21

## 2024-10-24 ENCOUNTER — TELEPHONE (OUTPATIENT)
Dept: INTERNAL MEDICINE | Facility: CLINIC | Age: 79
End: 2024-10-24
Payer: MEDICARE

## 2024-10-24 ENCOUNTER — HOSPITAL ENCOUNTER (OUTPATIENT)
Facility: HOSPITAL | Age: 79
Discharge: HOME | End: 2024-10-24
Admitting: INTERNAL MEDICINE
Payer: MEDICARE

## 2024-10-24 DIAGNOSIS — R53.1 WEAKNESS: ICD-10-CM

## 2024-10-24 DIAGNOSIS — R26.89 BALANCE PROBLEM: ICD-10-CM

## 2024-10-24 PROCEDURE — 95913 NRV CNDJ TEST 13/> STUDIES: CPT

## 2024-10-24 PROCEDURE — 95886 MUSC TEST DONE W/N TEST COMP: CPT

## 2024-10-24 NOTE — TELEPHONE ENCOUNTER
Hub staff attempted to follow warm transfer process and was unsuccessful     Caller: Jamir Porter    Relationship to patient: Self    Best call back number: [phone]     Patient is needing: PATIENT RECEIVED A CALL FROM 'S OFFICE. HE STATED HE WAS CALLING THEM BACK. PLEASE CALL AND ADVISE.

## 2024-10-25 DIAGNOSIS — R53.1 WEAKNESS: ICD-10-CM

## 2024-10-25 DIAGNOSIS — G62.9 NEUROPATHY: Primary | ICD-10-CM

## 2024-10-25 DIAGNOSIS — R26.89 BALANCE PROBLEM: ICD-10-CM

## 2024-10-31 ENCOUNTER — OFFICE VISIT (OUTPATIENT)
Dept: DIABETES SERVICES | Facility: HOSPITAL | Age: 79
End: 2024-10-31
Payer: MEDICARE

## 2024-10-31 VITALS
DIASTOLIC BLOOD PRESSURE: 69 MMHG | SYSTOLIC BLOOD PRESSURE: 132 MMHG | BODY MASS INDEX: 34.16 KG/M2 | OXYGEN SATURATION: 94 % | WEIGHT: 230.6 LBS | HEART RATE: 64 BPM | HEIGHT: 69 IN

## 2024-10-31 DIAGNOSIS — N18.2 CONTROLLED TYPE 2 DIABETES MELLITUS WITH STAGE 2 CHRONIC KIDNEY DISEASE, WITHOUT LONG-TERM CURRENT USE OF INSULIN: Primary | ICD-10-CM

## 2024-10-31 DIAGNOSIS — E66.9 OBESITY (BMI 30-39.9): ICD-10-CM

## 2024-10-31 DIAGNOSIS — E11.22 CONTROLLED TYPE 2 DIABETES MELLITUS WITH STAGE 2 CHRONIC KIDNEY DISEASE, WITHOUT LONG-TERM CURRENT USE OF INSULIN: Primary | ICD-10-CM

## 2024-10-31 LAB
EXPIRATION DATE: ABNORMAL
GLUCOSE BLDC GLUCOMTR-MCNC: 136 MG/DL (ref 70–99)
HBA1C MFR BLD: 6.2 % (ref 4.5–5.7)
Lab: ABNORMAL

## 2024-10-31 PROCEDURE — 82948 REAGENT STRIP/BLOOD GLUCOSE: CPT | Performed by: NURSE PRACTITIONER

## 2024-10-31 PROCEDURE — 3078F DIAST BP <80 MM HG: CPT | Performed by: NURSE PRACTITIONER

## 2024-10-31 PROCEDURE — 99214 OFFICE O/P EST MOD 30 MIN: CPT | Performed by: NURSE PRACTITIONER

## 2024-10-31 PROCEDURE — 3075F SYST BP GE 130 - 139MM HG: CPT | Performed by: NURSE PRACTITIONER

## 2024-10-31 PROCEDURE — 83036 HEMOGLOBIN GLYCOSYLATED A1C: CPT | Performed by: NURSE PRACTITIONER

## 2024-10-31 RX ORDER — TIRZEPATIDE 12.5 MG/.5ML
12.5 INJECTION, SOLUTION SUBCUTANEOUS
Qty: 2 ML | Refills: 5 | Status: SHIPPED | OUTPATIENT
Start: 2024-10-31

## 2024-10-31 RX ORDER — VIBEGRON 75 MG/1
1 TABLET, FILM COATED ORAL DAILY
COMMUNITY
Start: 2024-09-17

## 2024-10-31 NOTE — PROGRESS NOTES
Chief Complaint  Diabetes (Med mgt, A1c eval )    Referred By: Amaury Mcdonough *    Subjective          Jamir FARR Porter presents to [facility] for diabetes medication management    History of Present Illness    Visit type:  follow-up  Diabetes type:  Type 2  Current diabetes status/concerns/issues:  He was transitioned off of Ozempic and onto Mounjaro.  He does have some constipation issues.  He has had a 6 pounds with loss  Other health concerns: He is going to PT to help him with strength training.  Current Diabetes symptoms:    Polyuria: No   Polydipsia: No   Polyphagia: No   Blurred vision: No   Excessive fatigue: No  Known Diabetes complications:  Neuropathy: Tingling; Location: Other: left foot  Renal: Stage II mild (GFR = 60-89 mL/min) and Microalbuminuria - NEGATIVE  Eyes: No Retinopathy reported on current eye exam; Location: N/A  Amputation/Wounds:  prior amputation to RLL due to traumatic injury  GI: Constipation  Cardiovascular: Hypertension and Hyperlipidemia  ED: Patient Reported has had penile implant  Other: None  Hypoglycemia:  None reported at this time  Hypoglycemia Symptoms:  No hypoglycemia at this time  Current diabetes treatment:  Mounjaro 10 mg once weekly   Blood glucose device:  Meter  Blood glucose monitoring frequency:  1  Blood glucose range/average:   100-120  Glucose Source: Patient Reported  Diet:  Limits high carb/sweet foods, Avoids sugary drinks  Activity/Exercise:   He is doing PT for strength training    Past Medical History:   Diagnosis Date    Allergic 1980    Scotch broom    Allergic rhinitis     Anxiety and depression     Arthritis     Balance problem     DDD BACK NEUROMA , CANE HELPS    Benign prostatic hyperplasia Monitored    Cataract     corrected by surgery    Chronic sinusitis     NO CURRENT ISSUES    Claustrophobia     Colon polyps     NO CANCER    DDD (degenerative disc disease), cervical     C6/7 FULL ROM    Diabetes insipidus      Diabetes mellitus     Disease of thyroid gland     Diverticulitis     NO CURRENT ISSUES    Erectile dysfunction Sexually inactive    Forgetfulness     H/O degenerative disc disease     Hx of BKA, right     Hyperlipemia     Hypertension     Limb swelling     OCCASSIONAL    Lumbago     Multiple endocrine neoplasia     Plantar nerve lesion, left     RIGHT LEG BKA    Prostate CA     Sleep apnea     REPAIRED WITH SURGERY    Testosterone deficiency     Type 2 diabetes mellitus     Urinary incontinence      Past Surgical History:   Procedure Laterality Date    ADENOIDECTOMY  1950    AMPUTATION Right 2012    R BKA    BACK SURGERY      LUMBAR MULTIPLE ABLATIONS PRIOR TO FUSION    BUNIONECTOMY N/A 2023    Procedure: FUENTES'S NEUROMA EXCISION;  Surgeon: Phillip Munguia DPM;  [facility]: [facility];  Service: Podiatry;  Laterality: N/A;    COLONOSCOPY      DENTAL PROCEDURE      dental surgery     EYE SURGERY Bilateral     eye implant, yes CATARACTS REMOVED    FOOT SURGERY Left     FRACTURE SURGERY  2008    JOINT REPLACEMENT  2017    LUMBAR FUSION      [facility]    LUMBAR PUNCTURE      ORIF FEMUR FRACTURE Right     PROXIMAL    PENILE PROSTHESIS IMPLANT      PILONIDAL CYST / SINUS EXCISION      PROSTATE SURGERY  TURP date on file    ROTATOR CUFF REPAIR Bilateral     SINUS SURGERY  turbinectomy     SPINE SURGERY  Spinal Laminectomy 2012    TONSILLECTOMY  1950    TOOTH EXTRACTION Right 2024    TOTAL KNEE ARTHROPLASTY Left     TURP / TRANSURETHRAL INCISION / DRAINAGE PROSTATE      following prostate cancer    UVULOPALATOPHARYNGOPLASTY      VASECTOMY  1979     Family History   Problem Relation Age of Onset    Arthritis Mother             Cancer Mother             Arthritis Father             Cancer Father             Arthritis Brother         unknown    Cancer Brother         Unknown    Arthritis Brother     Cancer Brother         Prostate    Malig  "Hyperthermia Neg Hx      Social History     Socioeconomic History    Marital status:    Tobacco Use    Smoking status: Former     Current packs/day: 0.00     Average packs/day: 1.5 packs/day for 19.0 years (28.5 ttl pk-yrs)     Types: Cigarettes     Start date: 1960     Quit date:      Years since quittin.8     Passive exposure: Past    Smokeless tobacco: Former    Tobacco comments:     Tobacco free all types   Vaping Use    Vaping status: Never Used   Substance and Sexual Activity    Alcohol use: Not Currently     Alcohol/week: 1.0 standard drink of alcohol     Types: 1 Drinks containing 0.5 oz of alcohol per week     Comment: Occasionally    Drug use: Never    Sexual activity: Not Currently     Partners: Female     Birth control/protection: Vasectomy     Comment: 78 YO male.  Not active.     Allergies   Allergen Reactions    Statins Myalgia    Sulfate Hives    Meperidine Other (See Comments)     \"DOESN'T WORK\"       Current Outpatient Medications:     acetaminophen (TYLENOL) 325 MG tablet, Take 2 tablets by mouth Every 6 (Six) Hours As Needed for Mild Pain., Disp: , Rfl:     busPIRone (BUSPAR) 15 MG tablet, Take 1 tablet by mouth Every Morning., Disp: 30 tablet, Rfl: 0    docusate sodium (Colace) 100 MG capsule, As Needed., Disp: , Rfl:     ezetimibe (ZETIA) 10 MG tablet, TAKE 1 TABLET BY MOUTH DAILY, Disp: 90 tablet, Rfl: 3    FLUoxetine (PROzac) 40 MG capsule, TAKE 1 CAPSULE BY MOUTH DAILY, Disp: 90 capsule, Rfl: 3    gabapentin (NEURONTIN) 100 MG capsule, Take 1 capsule by mouth Every Night., Disp: 30 capsule, Rfl: 0    Gemtesa 75 MG tablet, Take 1 tablet by mouth Daily., Disp: , Rfl:     hydroCHLOROthiazide 12.5 MG tablet, Take 1 tablet by mouth Daily., Disp: 90 tablet, Rfl: 1    HYDROcodone-acetaminophen (NORCO) 5-325 MG per tablet, As Needed., Disp: , Rfl:     Tirzepatide (Mounjaro) 12.5 MG/0.5ML solution auto-injector, Inject 12.5 mg under the skin into the appropriate area as directed " "Every 7 (Seven) Days., Disp: 2 mL, Rfl: 5    Objective     Vitals:    10/31/24 1115   BP: 132/69   Pulse: 64   SpO2: 94%   Weight: 105 kg (230 lb 9.6 oz)   Height: 175.3 cm (69\")     Body mass index is 34.05 kg/m².    Physical Exam  Constitutional:       Appearance: Normal appearance. He is obese.      Comments: Obesity (BMI 30 - 39.9) Pt Current BMI = 34.05    HENT:      Head: Normocephalic and atraumatic.      Right Ear: External ear normal.      Left Ear: External ear normal.      Nose: Nose normal.   Eyes:      Extraocular Movements: Extraocular movements intact.      Conjunctiva/sclera: Conjunctivae normal.   Pulmonary:      Effort: Pulmonary effort is normal.   Musculoskeletal:         General: Normal range of motion.      Cervical back: Normal range of motion.   Skin:     General: Skin is warm and dry.   Neurological:      General: No focal deficit present.      Mental Status: He is alert and oriented to person, place, and time. Mental status is at baseline.   Psychiatric:         Mood and Affect: Mood normal.         Behavior: Behavior normal.         Thought Content: Thought content normal.         Judgment: Judgment normal.             Result Review :   The following data was reviewed by: LISA Anand on 10/31/2024:    Most Recent A1C          10/31/2024    11:30   HGBA1C Most Recent   Hemoglobin A1C 6.2        A1C Last 3 Results          7/30/2024    14:42 8/27/2024    10:53 10/31/2024    11:30   HGBA1C Last 3 Results   Hemoglobin A1C 6.7  6.20  6.2      A1c collected in the office today is 6.2%, indicating Controlled Type II diabetes.  This result is unchanged from the prior result of 6.2% collected on 8/27/24     Glucose   Date Value Ref Range Status   10/31/2024 136 (H) 70 - 99 mg/dL Final     Comment:     Serial Number: [id number]:  [id number]     Point of care glucose in the office today is within normal limits for nonfasting glucose    Creatinine   Date Value Ref Range Status "   08/27/2024 0.95 0.76 - 1.27 mg/dL Final   05/21/2024 0.87 0.76 - 1.27 mg/dL Final     eGFR   Date Value Ref Range Status   08/27/2024 80.9 >60.0 mL/min/1.73 Final   05/21/2024 87.2 >60.0 mL/min/1.73 Final     Labs collected on 8/27/24 show Stage II mild (GFR = 60-89mL/min)      Total Cholesterol   Date Value Ref Range Status   08/27/2024 188 0 - 200 mg/dL Final   05/21/2024 205 (H) 0 - 200 mg/dL Final     Triglycerides   Date Value Ref Range Status   08/27/2024 126 0 - 150 mg/dL Final   05/21/2024 84 0 - 150 mg/dL Final     HDL Cholesterol   Date Value Ref Range Status   08/27/2024 33 (L) 40 - 60 mg/dL Final   05/21/2024 53 40 - 60 mg/dL Final     LDL Cholesterol    Date Value Ref Range Status   08/27/2024 132 (H) 0 - 100 mg/dL Final   05/21/2024 137 (H) 0 - 100 mg/dL Final     Lipid panel collected on 8/27/24 shows Hypercholesterolemia and low HDL            Assessment: His A1c remains well-controlled.  He has had some weight loss with the transition off of the Ozempic and onto Mounjaro.  He is having some constipation with the Mounjaro.      Diagnoses and all orders for this visit:    1. Controlled type 2 diabetes mellitus with stage 2 chronic kidney disease, without long-term current use of insulin (Primary)  -     POC Glucose  -     POC Glycosylated Hemoglobin (Hb A1C)  -     Tirzepatide (Mounjaro) 12.5 MG/0.5ML solution auto-injector; Inject 12.5 mg under the skin into the appropriate area as directed Every 7 (Seven) Days.  Dispense: 2 mL; Refill: 5    2. Obesity (BMI 30-39.9)        Plan: We will increase the Mounjaro to 12.5 mg once weekly to see if we can have further weight loss along with glucose control.  The patient was recommended to use a daily stool softener and increase his water intake to overcome concerns about constipation.  Patient will be scheduled for routine follow-up appointment.    The patient will monitor his blood glucose levels once a day.  If he develops problematic hyperglycemia or  hypoglycemia or adverse drug reactions, he will contact the office for further instructions.        Follow Up     No follow-ups on file.    Patient was given instructions and counseling regarding his condition or for health maintenance advice. Please see specific information pulled into the AVS if appropriate.     Indu Carter, APRN  10/31/2024      Dictated Utilizing Dragon Dictation.  Please note that portions of this note were completed with a voice recognition program.  Part of this note may be an electronic transcription/translation of spoken language to printed text using the Dragon Dictation System.

## 2024-11-07 ENCOUNTER — TELEPHONE (OUTPATIENT)
Dept: INTERNAL MEDICINE | Facility: CLINIC | Age: 79
End: 2024-11-07
Payer: MEDICARE

## 2024-11-07 NOTE — TELEPHONE ENCOUNTER
Patient called in stating that he needed a new socket liner script for his appointment at [facility].     Script written out and signed by Dr. Mcdonough.    Faxed back to [facility] at [phone]

## 2024-11-09 ENCOUNTER — ANESTHESIA (OUTPATIENT)
Dept: PERIOP | Facility: HOSPITAL | Age: 79
End: 2024-11-09
Payer: MEDICARE

## 2024-11-09 ENCOUNTER — HOSPITAL ENCOUNTER (OUTPATIENT)
Facility: HOSPITAL | Age: 79
Discharge: HOME | End: 2024-11-11
Attending: EMERGENCY MEDICINE | Admitting: SURGERY
Payer: MEDICARE

## 2024-11-09 ENCOUNTER — APPOINTMENT (OUTPATIENT)
Dept: GENERAL RADIOLOGY | Facility: HOSPITAL | Age: 79
End: 2024-11-09
Payer: MEDICARE

## 2024-11-09 ENCOUNTER — HOSPITAL ENCOUNTER (EMERGENCY)
Facility: HOSPITAL | Age: 79
Discharge: HOME | End: 2024-11-09
Attending: EMERGENCY MEDICINE
Payer: MEDICARE

## 2024-11-09 ENCOUNTER — APPOINTMENT (OUTPATIENT)
Dept: CT IMAGING | Facility: HOSPITAL | Age: 79
End: 2024-11-09
Payer: MEDICARE

## 2024-11-09 ENCOUNTER — ANESTHESIA EVENT (OUTPATIENT)
Dept: PERIOP | Facility: HOSPITAL | Age: 79
End: 2024-11-09
Payer: MEDICARE

## 2024-11-09 ENCOUNTER — ANESTHESIA EVENT (OUTPATIENT)
Dept: EMERGENCY DEPT | Facility: HOSPITAL | Age: 79
End: 2024-11-09

## 2024-11-09 ENCOUNTER — ANESTHESIA (OUTPATIENT)
Dept: EMERGENCY DEPT | Facility: HOSPITAL | Age: 79
End: 2024-11-09

## 2024-11-09 VITALS
WEIGHT: 233.25 LBS | SYSTOLIC BLOOD PRESSURE: 156 MMHG | TEMPERATURE: 98.8 F | OXYGEN SATURATION: 91 % | RESPIRATION RATE: 16 BRPM | HEIGHT: 69 IN | DIASTOLIC BLOOD PRESSURE: 87 MMHG | HEART RATE: 74 BPM | BODY MASS INDEX: 34.55 KG/M2

## 2024-11-09 DIAGNOSIS — Z98.890 S/P ROBOT-ASSISTED SURGICAL PROCEDURE: ICD-10-CM

## 2024-11-09 DIAGNOSIS — K81.0 ACUTE CHOLECYSTITIS: Primary | ICD-10-CM

## 2024-11-09 DIAGNOSIS — R07.9 CHEST PAIN, UNSPECIFIED TYPE: Primary | ICD-10-CM

## 2024-11-09 DIAGNOSIS — R26.2 DIFFICULTY WALKING: ICD-10-CM

## 2024-11-09 PROBLEM — K81.9 CHOLECYSTITIS: Status: ACTIVE | Noted: 2024-11-09

## 2024-11-09 LAB
ALBUMIN SERPL-MCNC: 3.9 G/DL (ref 3.5–5.2)
ALBUMIN/GLOB SERPL: 1.2 G/DL
ALP SERPL-CCNC: 103 U/L (ref 39–117)
ALT SERPL W P-5'-P-CCNC: 22 U/L (ref 1–41)
ANION GAP SERPL CALCULATED.3IONS-SCNC: 10.8 MMOL/L (ref 5–15)
AST SERPL-CCNC: 20 U/L (ref 1–40)
BASOPHILS # BLD AUTO: 0.07 10*3/MM3 (ref 0–0.2)
BASOPHILS NFR BLD AUTO: 0.5 % (ref 0–1.5)
BILIRUB SERPL-MCNC: 0.3 MG/DL (ref 0–1.2)
BUN SERPL-MCNC: 19 MG/DL (ref 8–23)
BUN/CREAT SERPL: 23.2 (ref 7–25)
CALCIUM SPEC-SCNC: 9.3 MG/DL (ref 8.6–10.5)
CHLORIDE SERPL-SCNC: 99 MMOL/L (ref 98–107)
CO2 SERPL-SCNC: 26.2 MMOL/L (ref 22–29)
CREAT SERPL-MCNC: 0.82 MG/DL (ref 0.76–1.27)
DEPRECATED RDW RBC AUTO: 41.6 FL (ref 37–54)
EGFRCR SERPLBLD CKD-EPI 2021: 88.8 ML/MIN/1.73
EOSINOPHIL # BLD AUTO: 0.42 10*3/MM3 (ref 0–0.4)
EOSINOPHIL NFR BLD AUTO: 2.8 % (ref 0.3–6.2)
ERYTHROCYTE [DISTWIDTH] IN BLOOD BY AUTOMATED COUNT: 12.6 % (ref 12.3–15.4)
GEN 5 2HR TROPONIN T REFLEX: 7 NG/L
GLOBULIN UR ELPH-MCNC: 3.2 GM/DL
GLUCOSE BLDC GLUCOMTR-MCNC: 174 MG/DL (ref 70–99)
GLUCOSE BLDC GLUCOMTR-MCNC: 247 MG/DL (ref 70–99)
GLUCOSE SERPL-MCNC: 198 MG/DL (ref 65–99)
HCT VFR BLD AUTO: 38.9 % (ref 37.5–51)
HGB BLD-MCNC: 13.9 G/DL (ref 13–17.7)
HOLD SPECIMEN: NORMAL
HOLD SPECIMEN: NORMAL
IMM GRANULOCYTES # BLD AUTO: 0.14 10*3/MM3 (ref 0–0.05)
IMM GRANULOCYTES NFR BLD AUTO: 0.9 % (ref 0–0.5)
LIPASE SERPL-CCNC: 54 U/L (ref 13–60)
LYMPHOCYTES # BLD AUTO: 1.3 10*3/MM3 (ref 0.7–3.1)
LYMPHOCYTES NFR BLD AUTO: 8.7 % (ref 19.6–45.3)
MAGNESIUM SERPL-MCNC: 1.9 MG/DL (ref 1.6–2.4)
MAGNESIUM SERPL-MCNC: 1.9 MG/DL (ref 1.6–2.4)
MCH RBC QN AUTO: 32.2 PG (ref 26.6–33)
MCHC RBC AUTO-ENTMCNC: 35.7 G/DL (ref 31.5–35.7)
MCV RBC AUTO: 90 FL (ref 79–97)
MONOCYTES # BLD AUTO: 1.53 10*3/MM3 (ref 0.1–0.9)
MONOCYTES NFR BLD AUTO: 10.2 % (ref 5–12)
NEUTROPHILS NFR BLD AUTO: 11.53 10*3/MM3 (ref 1.7–7)
NEUTROPHILS NFR BLD AUTO: 76.9 % (ref 42.7–76)
NRBC BLD AUTO-RTO: 0 /100 WBC (ref 0–0.2)
NT-PROBNP SERPL-MCNC: 66.1 PG/ML (ref 0–1800)
PLATELET # BLD AUTO: 294 10*3/MM3 (ref 140–450)
PMV BLD AUTO: 9.3 FL (ref 6–12)
POTASSIUM SERPL-SCNC: 3.4 MMOL/L (ref 3.5–5.2)
PROT SERPL-MCNC: 7.1 G/DL (ref 6–8.5)
RBC # BLD AUTO: 4.32 10*6/MM3 (ref 4.14–5.8)
SODIUM SERPL-SCNC: 136 MMOL/L (ref 136–145)
TROPONIN T DELTA: 0 NG/L
TROPONIN T SERPL HS-MCNC: 7 NG/L
WBC NRBC COR # BLD AUTO: 14.99 10*3/MM3 (ref 3.4–10.8)
WHOLE BLOOD HOLD COAG: NORMAL
WHOLE BLOOD HOLD SPECIMEN: NORMAL

## 2024-11-09 PROCEDURE — 71045 X-RAY EXAM CHEST 1 VIEW: CPT

## 2024-11-09 PROCEDURE — 25010000002 BUPIVACAINE (PF) 0.25 % SOLUTION: Performed by: SURGERY

## 2024-11-09 PROCEDURE — 25010000002 HYDROMORPHONE 1 MG/ML SOLUTION: Performed by: EMERGENCY MEDICINE

## 2024-11-09 PROCEDURE — A9270 NON-COVERED ITEM OR SERVICE: HCPCS | Performed by: STUDENT IN AN ORGANIZED HEALTH CARE EDUCATION/TRAINING PROGRAM

## 2024-11-09 PROCEDURE — 25010000002 ONDANSETRON PER 1 MG: Performed by: EMERGENCY MEDICINE

## 2024-11-09 PROCEDURE — 47563 LAPARO CHOLECYSTECTOMY/GRAPH: CPT | Performed by: SURGERY

## 2024-11-09 PROCEDURE — 99214 OFFICE O/P EST MOD 30 MIN: CPT | Performed by: STUDENT IN AN ORGANIZED HEALTH CARE EDUCATION/TRAINING PROGRAM

## 2024-11-09 PROCEDURE — 83735 ASSAY OF MAGNESIUM: CPT | Performed by: EMERGENCY MEDICINE

## 2024-11-09 PROCEDURE — 96375 TX/PRO/DX INJ NEW DRUG ADDON: CPT

## 2024-11-09 PROCEDURE — 83690 ASSAY OF LIPASE: CPT | Performed by: EMERGENCY MEDICINE

## 2024-11-09 PROCEDURE — 93005 ELECTROCARDIOGRAM TRACING: CPT | Performed by: EMERGENCY MEDICINE

## 2024-11-09 PROCEDURE — 25810000003 LACTATED RINGERS PER 1000 ML: Performed by: STUDENT IN AN ORGANIZED HEALTH CARE EDUCATION/TRAINING PROGRAM

## 2024-11-09 PROCEDURE — 84484 ASSAY OF TROPONIN QUANT: CPT | Performed by: EMERGENCY MEDICINE

## 2024-11-09 PROCEDURE — 99204 OFFICE O/P NEW MOD 45 MIN: CPT | Performed by: SURGERY

## 2024-11-09 PROCEDURE — 82948 REAGENT STRIP/BLOOD GLUCOSE: CPT | Performed by: ANESTHESIOLOGY

## 2024-11-09 PROCEDURE — 25010000002 FENTANYL CITRATE (PF) 50 MCG/ML SOLUTION: Performed by: ANESTHESIOLOGY

## 2024-11-09 PROCEDURE — 25010000002 ONDANSETRON PER 1 MG: Performed by: ANESTHESIOLOGY

## 2024-11-09 PROCEDURE — 25010000002 INDOCYANINE GREEN 25 MG RECONSTITUTED SOLUTION: Performed by: SURGERY

## 2024-11-09 PROCEDURE — 88304 TISSUE EXAM BY PATHOLOGIST: CPT | Performed by: SURGERY

## 2024-11-09 PROCEDURE — 94761 N-INVAS EAR/PLS OXIMETRY MLT: CPT

## 2024-11-09 PROCEDURE — 99284 EMERGENCY DEPT VISIT MOD MDM: CPT

## 2024-11-09 PROCEDURE — 25010000002 SUGAMMADEX 200 MG/2ML SOLUTION: Performed by: ANESTHESIOLOGY

## 2024-11-09 PROCEDURE — 83735 ASSAY OF MAGNESIUM: CPT | Performed by: STUDENT IN AN ORGANIZED HEALTH CARE EDUCATION/TRAINING PROGRAM

## 2024-11-09 PROCEDURE — 99285 EMERGENCY DEPT VISIT HI MDM: CPT

## 2024-11-09 PROCEDURE — 25010000002 LIDOCAINE PF 2% 2 % SOLUTION: Performed by: ANESTHESIOLOGY

## 2024-11-09 PROCEDURE — 63710000001 INSULIN LISPRO (HUMAN) PER 5 UNITS: Performed by: STUDENT IN AN ORGANIZED HEALTH CARE EDUCATION/TRAINING PROGRAM

## 2024-11-09 PROCEDURE — 96374 THER/PROPH/DIAG INJ IV PUSH: CPT

## 2024-11-09 PROCEDURE — 25010000002 PIPERACILLIN SOD-TAZOBACTAM PER 1 G: Performed by: SURGERY

## 2024-11-09 PROCEDURE — 63710000001 GABAPENTIN 100 MG CAPSULE: Performed by: STUDENT IN AN ORGANIZED HEALTH CARE EDUCATION/TRAINING PROGRAM

## 2024-11-09 PROCEDURE — 85025 COMPLETE CBC W/AUTO DIFF WBC: CPT | Performed by: EMERGENCY MEDICINE

## 2024-11-09 PROCEDURE — 74177 CT ABD & PELVIS W/CONTRAST: CPT

## 2024-11-09 PROCEDURE — S2900 ROBOTIC SURGICAL SYSTEM: HCPCS | Performed by: SURGERY

## 2024-11-09 PROCEDURE — 82948 REAGENT STRIP/BLOOD GLUCOSE: CPT

## 2024-11-09 PROCEDURE — 25010000002 CEFAZOLIN PER 500 MG: Performed by: ANESTHESIOLOGY

## 2024-11-09 PROCEDURE — 83880 ASSAY OF NATRIURETIC PEPTIDE: CPT | Performed by: EMERGENCY MEDICINE

## 2024-11-09 PROCEDURE — 94799 UNLISTED PULMONARY SVC/PX: CPT

## 2024-11-09 PROCEDURE — 25010000002 PROPOFOL 10 MG/ML EMULSION: Performed by: ANESTHESIOLOGY

## 2024-11-09 PROCEDURE — 80053 COMPREHEN METABOLIC PANEL: CPT | Performed by: EMERGENCY MEDICINE

## 2024-11-09 PROCEDURE — 25810000003 LACTATED RINGERS PER 1000 ML: Performed by: ANESTHESIOLOGY

## 2024-11-09 PROCEDURE — 25510000001 IOPAMIDOL PER 1 ML: Performed by: EMERGENCY MEDICINE

## 2024-11-09 PROCEDURE — 25010000002 DEXAMETHASONE PER 1 MG: Performed by: ANESTHESIOLOGY

## 2024-11-09 PROCEDURE — 63710000001 INSULIN GLARGINE PER 5 UNITS: Performed by: STUDENT IN AN ORGANIZED HEALTH CARE EDUCATION/TRAINING PROGRAM

## 2024-11-09 PROCEDURE — 36415 COLL VENOUS BLD VENIPUNCTURE: CPT

## 2024-11-09 PROCEDURE — 0 LABETALOL 5 MG/ML SOLUTION: Performed by: ANESTHESIOLOGY

## 2024-11-09 PROCEDURE — 25010000002 ACETAMINOPHEN 10 MG/ML SOLUTION: Performed by: ANESTHESIOLOGY

## 2024-11-09 PROCEDURE — 63710000001 OXYBUTYNIN XL 5 MG TABLET SUSTAINED-RELEASE 24 HOUR: Performed by: STUDENT IN AN ORGANIZED HEALTH CARE EDUCATION/TRAINING PROGRAM

## 2024-11-09 PROCEDURE — 63710000001 FAMOTIDINE 20 MG TABLET: Performed by: STUDENT IN AN ORGANIZED HEALTH CARE EDUCATION/TRAINING PROGRAM

## 2024-11-09 DEVICE — CLIP LIG HEMOLOK PA 6CT MD/LG GRN: Type: IMPLANTABLE DEVICE | Site: ABDOMEN | Status: FUNCTIONAL

## 2024-11-09 RX ORDER — PROMETHAZINE HYDROCHLORIDE 25 MG/1
25 SUPPOSITORY RECTAL ONCE AS NEEDED
Status: DISCONTINUED | OUTPATIENT
Start: 2024-11-09 | End: 2024-11-09 | Stop reason: HOSPADM

## 2024-11-09 RX ORDER — FAMOTIDINE 20 MG/1
20 TABLET, FILM COATED ORAL NIGHTLY
Qty: 20 TABLET | Refills: 0 | Status: SHIPPED | OUTPATIENT
Start: 2024-11-09

## 2024-11-09 RX ORDER — LABETALOL HYDROCHLORIDE 5 MG/ML
INJECTION, SOLUTION INTRAVENOUS AS NEEDED
Status: DISCONTINUED | OUTPATIENT
Start: 2024-11-09 | End: 2024-11-09 | Stop reason: SURG

## 2024-11-09 RX ORDER — SODIUM CHLORIDE, SODIUM LACTATE, POTASSIUM CHLORIDE, CALCIUM CHLORIDE 600; 310; 30; 20 MG/100ML; MG/100ML; MG/100ML; MG/100ML
INJECTION, SOLUTION INTRAVENOUS CONTINUOUS PRN
Status: DISCONTINUED | OUTPATIENT
Start: 2024-11-09 | End: 2024-11-09 | Stop reason: SURG

## 2024-11-09 RX ORDER — FENTANYL CITRATE 50 UG/ML
INJECTION, SOLUTION INTRAMUSCULAR; INTRAVENOUS AS NEEDED
Status: DISCONTINUED | OUTPATIENT
Start: 2024-11-09 | End: 2024-11-09 | Stop reason: SURG

## 2024-11-09 RX ORDER — ONDANSETRON 2 MG/ML
4 INJECTION INTRAMUSCULAR; INTRAVENOUS ONCE AS NEEDED
Status: DISCONTINUED | OUTPATIENT
Start: 2024-11-09 | End: 2024-11-09 | Stop reason: HOSPADM

## 2024-11-09 RX ORDER — SODIUM CHLORIDE 0.9 % (FLUSH) 0.9 %
10 SYRINGE (ML) INJECTION AS NEEDED
Status: DISCONTINUED | OUTPATIENT
Start: 2024-11-09 | End: 2024-11-09 | Stop reason: HOSPADM

## 2024-11-09 RX ORDER — ONDANSETRON 2 MG/ML
INJECTION INTRAMUSCULAR; INTRAVENOUS AS NEEDED
Status: DISCONTINUED | OUTPATIENT
Start: 2024-11-09 | End: 2024-11-09 | Stop reason: SURG

## 2024-11-09 RX ORDER — SUCCINYLCHOLINE/SOD CL,ISO/PF 100 MG/5ML
SYRINGE (ML) INTRAVENOUS AS NEEDED
Status: DISCONTINUED | OUTPATIENT
Start: 2024-11-09 | End: 2024-11-09 | Stop reason: SURG

## 2024-11-09 RX ORDER — SODIUM CHLORIDE 0.9 % (FLUSH) 0.9 %
10 SYRINGE (ML) INJECTION AS NEEDED
Status: DISCONTINUED | OUTPATIENT
Start: 2024-11-09 | End: 2024-11-11 | Stop reason: HOSPADM

## 2024-11-09 RX ORDER — FAMOTIDINE 20 MG/1
20 TABLET, FILM COATED ORAL NIGHTLY
Status: DISCONTINUED | OUTPATIENT
Start: 2024-11-09 | End: 2024-11-11 | Stop reason: HOSPADM

## 2024-11-09 RX ORDER — ONDANSETRON 2 MG/ML
4 INJECTION INTRAMUSCULAR; INTRAVENOUS ONCE
Status: COMPLETED | OUTPATIENT
Start: 2024-11-09 | End: 2024-11-09

## 2024-11-09 RX ORDER — INSULIN LISPRO 100 [IU]/ML
2-7 INJECTION, SOLUTION INTRAVENOUS; SUBCUTANEOUS
Status: DISCONTINUED | OUTPATIENT
Start: 2024-11-09 | End: 2024-11-11 | Stop reason: HOSPADM

## 2024-11-09 RX ORDER — BUPIVACAINE HYDROCHLORIDE 2.5 MG/ML
INJECTION, SOLUTION EPIDURAL; INFILTRATION; INTRACAUDAL AS NEEDED
Status: DISCONTINUED | OUTPATIENT
Start: 2024-11-09 | End: 2024-11-09 | Stop reason: HOSPADM

## 2024-11-09 RX ORDER — SODIUM CHLORIDE 9 MG/ML
40 INJECTION, SOLUTION INTRAVENOUS AS NEEDED
Status: DISCONTINUED | OUTPATIENT
Start: 2024-11-09 | End: 2024-11-09 | Stop reason: HOSPADM

## 2024-11-09 RX ORDER — ASPIRIN 81 MG/1
324 TABLET, CHEWABLE ORAL ONCE
Status: DISCONTINUED | OUTPATIENT
Start: 2024-11-09 | End: 2024-11-09 | Stop reason: HOSPADM

## 2024-11-09 RX ORDER — SODIUM CHLORIDE, SODIUM LACTATE, POTASSIUM CHLORIDE, CALCIUM CHLORIDE 600; 310; 30; 20 MG/100ML; MG/100ML; MG/100ML; MG/100ML
9 INJECTION, SOLUTION INTRAVENOUS CONTINUOUS PRN
Status: DISCONTINUED | OUTPATIENT
Start: 2024-11-09 | End: 2024-11-09

## 2024-11-09 RX ORDER — IBUPROFEN 600 MG/1
1 TABLET ORAL
Status: DISCONTINUED | OUTPATIENT
Start: 2024-11-09 | End: 2024-11-10

## 2024-11-09 RX ORDER — DEXTROSE MONOHYDRATE 25 G/50ML
25 INJECTION, SOLUTION INTRAVENOUS
Status: DISCONTINUED | OUTPATIENT
Start: 2024-11-09 | End: 2024-11-11 | Stop reason: HOSPADM

## 2024-11-09 RX ORDER — SODIUM CHLORIDE, SODIUM LACTATE, POTASSIUM CHLORIDE, CALCIUM CHLORIDE 600; 310; 30; 20 MG/100ML; MG/100ML; MG/100ML; MG/100ML
75 INJECTION, SOLUTION INTRAVENOUS CONTINUOUS
Status: ACTIVE | OUTPATIENT
Start: 2024-11-09 | End: 2024-11-10

## 2024-11-09 RX ORDER — OXYBUTYNIN CHLORIDE 5 MG/1
10 TABLET, EXTENDED RELEASE ORAL DAILY
Status: DISCONTINUED | OUTPATIENT
Start: 2024-11-09 | End: 2024-11-11 | Stop reason: HOSPADM

## 2024-11-09 RX ORDER — PHENYLEPHRINE HCL IN 0.9% NACL 1 MG/10 ML
SYRINGE (ML) INTRAVENOUS AS NEEDED
Status: DISCONTINUED | OUTPATIENT
Start: 2024-11-09 | End: 2024-11-09 | Stop reason: SURG

## 2024-11-09 RX ORDER — PROMETHAZINE HYDROCHLORIDE 12.5 MG/1
25 TABLET ORAL ONCE AS NEEDED
Status: DISCONTINUED | OUTPATIENT
Start: 2024-11-09 | End: 2024-11-09 | Stop reason: HOSPADM

## 2024-11-09 RX ORDER — SODIUM CHLORIDE 0.9 % (FLUSH) 0.9 %
10 SYRINGE (ML) INJECTION EVERY 12 HOURS SCHEDULED
Status: DISCONTINUED | OUTPATIENT
Start: 2024-11-09 | End: 2024-11-09 | Stop reason: HOSPADM

## 2024-11-09 RX ORDER — ONDANSETRON 2 MG/ML
4 INJECTION INTRAMUSCULAR; INTRAVENOUS EVERY 6 HOURS PRN
Status: DISCONTINUED | OUTPATIENT
Start: 2024-11-09 | End: 2024-11-11 | Stop reason: HOSPADM

## 2024-11-09 RX ORDER — BUSPIRONE HYDROCHLORIDE 15 MG/1
15 TABLET ORAL EVERY MORNING
Status: DISCONTINUED | OUTPATIENT
Start: 2024-11-10 | End: 2024-11-11 | Stop reason: HOSPADM

## 2024-11-09 RX ORDER — GABAPENTIN 100 MG/1
100 CAPSULE ORAL NIGHTLY
Status: DISCONTINUED | OUTPATIENT
Start: 2024-11-09 | End: 2024-11-11 | Stop reason: HOSPADM

## 2024-11-09 RX ORDER — OXYCODONE HYDROCHLORIDE 5 MG/1
5 TABLET ORAL
Status: DISCONTINUED | OUTPATIENT
Start: 2024-11-09 | End: 2024-11-09 | Stop reason: HOSPADM

## 2024-11-09 RX ORDER — FAMOTIDINE 10 MG/ML
20 INJECTION, SOLUTION INTRAVENOUS ONCE
Status: COMPLETED | OUTPATIENT
Start: 2024-11-09 | End: 2024-11-09

## 2024-11-09 RX ORDER — ACETAMINOPHEN 10 MG/ML
INJECTION, SOLUTION INTRAVENOUS AS NEEDED
Status: DISCONTINUED | OUTPATIENT
Start: 2024-11-09 | End: 2024-11-09 | Stop reason: SURG

## 2024-11-09 RX ORDER — HYDROCODONE BITARTRATE AND ACETAMINOPHEN 5; 325 MG/1; MG/1
1 TABLET ORAL 4 TIMES DAILY PRN
Qty: 12 TABLET | Refills: 0 | Status: SHIPPED | OUTPATIENT
Start: 2024-11-09 | End: 2024-11-09

## 2024-11-09 RX ORDER — CEFAZOLIN SODIUM 1 G/3ML
INJECTION, POWDER, FOR SOLUTION INTRAMUSCULAR; INTRAVENOUS AS NEEDED
Status: DISCONTINUED | OUTPATIENT
Start: 2024-11-09 | End: 2024-11-09 | Stop reason: SURG

## 2024-11-09 RX ORDER — NICOTINE POLACRILEX 4 MG
15 LOZENGE BUCCAL
Status: DISCONTINUED | OUTPATIENT
Start: 2024-11-09 | End: 2024-11-10

## 2024-11-09 RX ORDER — ROCURONIUM BROMIDE 10 MG/ML
INJECTION, SOLUTION INTRAVENOUS AS NEEDED
Status: DISCONTINUED | OUTPATIENT
Start: 2024-11-09 | End: 2024-11-09 | Stop reason: SURG

## 2024-11-09 RX ORDER — LIDOCAINE HYDROCHLORIDE 20 MG/ML
INJECTION, SOLUTION EPIDURAL; INFILTRATION; INTRACAUDAL; PERINEURAL AS NEEDED
Status: DISCONTINUED | OUTPATIENT
Start: 2024-11-09 | End: 2024-11-09 | Stop reason: SURG

## 2024-11-09 RX ORDER — MAGNESIUM HYDROXIDE 1200 MG/15ML
LIQUID ORAL AS NEEDED
Status: DISCONTINUED | OUTPATIENT
Start: 2024-11-09 | End: 2024-11-09 | Stop reason: HOSPADM

## 2024-11-09 RX ORDER — IOPAMIDOL 755 MG/ML
100 INJECTION, SOLUTION INTRAVASCULAR
Status: COMPLETED | OUTPATIENT
Start: 2024-11-09 | End: 2024-11-09

## 2024-11-09 RX ORDER — PROPOFOL 10 MG/ML
VIAL (ML) INTRAVENOUS AS NEEDED
Status: DISCONTINUED | OUTPATIENT
Start: 2024-11-09 | End: 2024-11-09 | Stop reason: SURG

## 2024-11-09 RX ORDER — INDOCYANINE GREEN AND WATER 25 MG
2.5 KIT INJECTION ONCE
Status: COMPLETED | OUTPATIENT
Start: 2024-11-09 | End: 2024-11-09

## 2024-11-09 RX ORDER — DEXAMETHASONE SODIUM PHOSPHATE 4 MG/ML
INJECTION, SOLUTION INTRA-ARTICULAR; INTRALESIONAL; INTRAMUSCULAR; INTRAVENOUS; SOFT TISSUE AS NEEDED
Status: DISCONTINUED | OUTPATIENT
Start: 2024-11-09 | End: 2024-11-09 | Stop reason: SURG

## 2024-11-09 RX ADMIN — SODIUM CHLORIDE, POTASSIUM CHLORIDE, SODIUM LACTATE AND CALCIUM CHLORIDE: 600; 310; 30; 20 INJECTION, SOLUTION INTRAVENOUS at 16:56

## 2024-11-09 RX ADMIN — IOPAMIDOL 100 ML: 755 INJECTION, SOLUTION INTRAVENOUS at 11:51

## 2024-11-09 RX ADMIN — FAMOTIDINE 20 MG: 10 INJECTION INTRAVENOUS at 03:49

## 2024-11-09 RX ADMIN — ROCURONIUM BROMIDE 20 MG: 10 INJECTION, SOLUTION INTRAVENOUS at 16:12

## 2024-11-09 RX ADMIN — SODIUM CHLORIDE, POTASSIUM CHLORIDE, SODIUM LACTATE AND CALCIUM CHLORIDE: 600; 310; 30; 20 INJECTION, SOLUTION INTRAVENOUS at 15:54

## 2024-11-09 RX ADMIN — SODIUM CHLORIDE, POTASSIUM CHLORIDE, SODIUM LACTATE AND CALCIUM CHLORIDE: 600; 310; 30; 20 INJECTION, SOLUTION INTRAVENOUS at 16:07

## 2024-11-09 RX ADMIN — INSULIN GLARGINE 5 UNITS: 100 INJECTION, SOLUTION SUBCUTANEOUS at 21:06

## 2024-11-09 RX ADMIN — SODIUM CHLORIDE, POTASSIUM CHLORIDE, SODIUM LACTATE AND CALCIUM CHLORIDE 75 ML/HR: 600; 310; 30; 20 INJECTION, SOLUTION INTRAVENOUS at 21:06

## 2024-11-09 RX ADMIN — Medication 100 MG: at 15:34

## 2024-11-09 RX ADMIN — FENTANYL CITRATE 50 MCG: 50 INJECTION, SOLUTION INTRAMUSCULAR; INTRAVENOUS at 16:02

## 2024-11-09 RX ADMIN — GABAPENTIN 100 MG: 100 CAPSULE ORAL at 21:06

## 2024-11-09 RX ADMIN — ONDANSETRON 4 MG: 2 INJECTION INTRAMUSCULAR; INTRAVENOUS at 11:09

## 2024-11-09 RX ADMIN — INSULIN LISPRO 3 UNITS: 100 INJECTION, SOLUTION INTRAVENOUS; SUBCUTANEOUS at 21:06

## 2024-11-09 RX ADMIN — Medication 100 MCG: at 16:48

## 2024-11-09 RX ADMIN — ONDANSETRON 4 MG: 2 INJECTION INTRAMUSCULAR; INTRAVENOUS at 04:08

## 2024-11-09 RX ADMIN — SODIUM CHLORIDE, POTASSIUM CHLORIDE, SODIUM LACTATE AND CALCIUM CHLORIDE: 600; 310; 30; 20 INJECTION, SOLUTION INTRAVENOUS at 15:18

## 2024-11-09 RX ADMIN — OXYBUTYNIN CHLORIDE 10 MG: 5 TABLET, EXTENDED RELEASE ORAL at 19:28

## 2024-11-09 RX ADMIN — CEFAZOLIN 2 G: 1 INJECTION, POWDER, FOR SOLUTION INTRAMUSCULAR; INTRAVENOUS at 15:48

## 2024-11-09 RX ADMIN — DEXAMETHASONE SODIUM PHOSPHATE 4 MG: 4 INJECTION, SOLUTION INTRAMUSCULAR; INTRAVENOUS at 16:24

## 2024-11-09 RX ADMIN — FENTANYL CITRATE 100 MCG: 50 INJECTION, SOLUTION INTRAMUSCULAR; INTRAVENOUS at 15:34

## 2024-11-09 RX ADMIN — Medication 100 MCG: at 16:56

## 2024-11-09 RX ADMIN — LIDOCAINE HYDROCHLORIDE 60 MG: 20 INJECTION, SOLUTION INTRAVENOUS at 15:34

## 2024-11-09 RX ADMIN — Medication 100 MCG: at 16:36

## 2024-11-09 RX ADMIN — HYDROMORPHONE HYDROCHLORIDE 1 MG: 1 INJECTION, SOLUTION INTRAMUSCULAR; INTRAVENOUS; SUBCUTANEOUS at 11:10

## 2024-11-09 RX ADMIN — ONDANSETRON HYDROCHLORIDE 4 MG: 2 SOLUTION INTRAMUSCULAR; INTRAVENOUS at 17:17

## 2024-11-09 RX ADMIN — PIPERACILLIN AND TAZOBACTAM 3.38 G: 3; .375 INJECTION, POWDER, FOR SOLUTION INTRAVENOUS at 19:28

## 2024-11-09 RX ADMIN — HYDROMORPHONE HYDROCHLORIDE 0.5 MG: 1 INJECTION, SOLUTION INTRAMUSCULAR; INTRAVENOUS; SUBCUTANEOUS at 03:49

## 2024-11-09 RX ADMIN — FENTANYL CITRATE 50 MCG: 50 INJECTION, SOLUTION INTRAMUSCULAR; INTRAVENOUS at 16:16

## 2024-11-09 RX ADMIN — ACETAMINOPHEN 1000 MG: 10 INJECTION INTRAVENOUS at 17:31

## 2024-11-09 RX ADMIN — LABETALOL HYDROCHLORIDE 5 MG: 5 INJECTION, SOLUTION INTRAVENOUS at 16:24

## 2024-11-09 RX ADMIN — FAMOTIDINE 20 MG: 20 TABLET ORAL at 21:06

## 2024-11-09 RX ADMIN — PROPOFOL 150 MG: 10 INJECTION, EMULSION INTRAVENOUS at 15:34

## 2024-11-09 RX ADMIN — INDOCYANINE GREEN AND WATER 2.5 MG: KIT at 15:10

## 2024-11-09 RX ADMIN — SUGAMMADEX 200 MG: 100 INJECTION, SOLUTION INTRAVENOUS at 17:21

## 2024-11-09 RX ADMIN — ROCURONIUM BROMIDE 50 MG: 10 INJECTION, SOLUTION INTRAVENOUS at 15:38

## 2024-11-09 NOTE — CONSULTS
Chief Complaint: Abdominal Pain    Subjective         Abdominal Pain      Jamir Porter is a 79 y.o. male presents to [facility] EM[facility] to be seen for RUQ ttp.  He was found to have acute cholecystitis.  He had a leukocytosis and normal LFTs.  His imaging is shown below:      Study Result    Narrative & Impression   CT ABDOMEN PELVIS W CONTRAST     Date of Exam: 11/9/2024 11:35 AM EST     Indication: Right upper quadrant pain  abdominal pain.     Comparison: 4/6/2021 and prior     Technique: Axial CT images were obtained of the abdomen and pelvis after the uneventful intravenous administration of iodinated contrast. Reconstructed coronal and sagittal images were also obtained. Automated exposure control and iterative construction   methods were used.           FINDINGS:     Abdomen/Pelvis:     Lower Chest: Dependent opacities are noted at the lung bases left greater than right.     Atelectasis. Pneumonia cannot be excluded.     No free air noted below the diaphragm.     Organs: Significant distention of the gallbladder is noted with mild wall thickening and     Pericholecystic fluid noted. There are stones within the gallbladder. There is no significant dilation appreciated of the common duct. No obvious stones are noted within the duct.     Liver demonstrates no evidence of intrapedicle or ductal dilation. Liver demonstrates no acute abnormality. The pancreas, spleen and adrenal glands are grossly unremarkable in appearance. Small low-attenuation lesions within the kidneys are either too   small to characterize are compatible with cysts. No evidence of obstructive uropathy is noted     GI/Bowel: Mild reactive changes are noted of the descending portion of the duodenum. The stomach and the small bowel are otherwise grossly unremarkable appearance. Small lymph nodes are noted within the lewis hepatic region likely reactive in nature.   Mesenteric vessels appear to opacify unremarkably. Ileocecal  valve is unremarkable. The appendix appears within normal limits. The colon demonstrates no acute abnormality. There are some scattered diverticuli without evidence of acute diverticulitis     Pelvis: Urinary bladder is grossly unremarkable in appearance. 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 No suspicious pelvic adenopathy or fluid collection noted     Peritoneum/Retroperitoneum: Aorta is normal in caliber. There is underlying atherosclerotic change. No suspicious retroperitoneal adenopathy     Bones/Soft Tissues: Postsurgical changes are noted of the lumbar sacral spine. Multilevel degenerative changes are noted in the spine.     IMPRESSION:     1. Abnormal appearance of the gallbladder which is distended with surrounding pericholecystic fluid. The pended small stones are noted. Findings are compatible with acute cholecystitis.     2. Dependent opacities at lung bases, left greater than right which may resent atelectasis or pneumonia     3. Other findings as above          Objective     Past Medical History:   Diagnosis Date    Allergic 1980    Scotch broom    Allergic rhinitis     Anxiety and depression     Arthritis     Balance problem     DDD BACK NEUROMA , CANE HELPS    Benign prostatic hyperplasia Monitored    Cataract     corrected by surgery    Chronic sinusitis     NO CURRENT ISSUES    Claustrophobia     Colon polyps     NO CANCER    DDD (degenerative disc disease), cervical     C6/7 FULL ROM    Diabetes insipidus     Diabetes mellitus     Disease of thyroid gland     Diverticulitis     NO CURRENT ISSUES    Erectile dysfunction Sexually inactive    Forgetfulness     H/O degenerative disc disease     Hx of BKA, right     Hyperlipemia     Hypertension     Limb swelling     OCCASSIONAL    Lumbago     Multiple endocrine neoplasia     Plantar nerve lesion, left     RIGHT LEG BKA    Prostate CA     Sleep apnea     REPAIRED WITH SURGERY    Testosterone deficiency      Type 2 diabetes mellitus     Urinary incontinence        Past Surgical History:   Procedure Laterality Date    ADENOIDECTOMY  1950    AMPUTATION Right 2012    R BKA    BACK SURGERY      LUMBAR MULTIPLE ABLATIONS PRIOR TO FUSION    BUNIONECTOMY N/A 12/28/2023    Procedure: FUENTES'S NEUROMA EXCISION;  Surgeon: Phillip Munguia DPM;  [facility]: [facility];  Service: Podiatry;  Laterality: N/A;    COLONOSCOPY  2019    DENTAL PROCEDURE      dental surgery     EYE SURGERY Bilateral     eye implant, yes CATARACTS REMOVED    FOOT SURGERY Left     FRACTURE SURGERY  2008    JOINT REPLACEMENT  2017    LUMBAR FUSION  2016    [facility]    LUMBAR PUNCTURE      ORIF FEMUR FRACTURE Right     PROXIMAL    PENILE PROSTHESIS IMPLANT      PILONIDAL CYST / SINUS EXCISION      PROSTATE SURGERY  TURP date on file    ROTATOR CUFF REPAIR Bilateral     SINUS SURGERY  turbinectomy 1994    SPINE SURGERY  Spinal Laminectomy 2012    TONSILLECTOMY  1950    TOOTH EXTRACTION Right 07/30/2024    TOTAL KNEE ARTHROPLASTY Left     TURP / TRANSURETHRAL INCISION / DRAINAGE PROSTATE      following prostate cancer    UVULOPALATOPHARYNGOPLASTY      VASECTOMY  November 1979         Current Facility-Administered Medications:     [COMPLETED] Insert Peripheral IV, , , Once **AND** sodium chloride 0.9 % flush 10 mL, 10 mL, Intravenous, PRN, Lepora, Clive, DO    Current Outpatient Medications:     acetaminophen (TYLENOL) 325 MG tablet, Take 2 tablets by mouth Every 6 (Six) Hours As Needed for Mild Pain., Disp: , Rfl:     busPIRone (BUSPAR) 15 MG tablet, Take 1 tablet by mouth Every Morning., Disp: 30 tablet, Rfl: 0    docusate sodium (Colace) 100 MG capsule, As Needed., Disp: , Rfl:     ezetimibe (ZETIA) 10 MG tablet, TAKE 1 TABLET BY MOUTH DAILY, Disp: 90 tablet, Rfl: 3    famotidine (PEPCID) 20 MG tablet, Take 1 tablet by mouth Every Night., Disp: 20 tablet, Rfl: 0    FLUoxetine (PROzac) 40 MG capsule, TAKE 1 CAPSULE BY MOUTH DAILY, Disp: 90 capsule,  "Rfl: 3    gabapentin (NEURONTIN) 100 MG capsule, Take 1 capsule by mouth Every Night., Disp: 30 capsule, Rfl: 0    Gemtesa 75 MG tablet, Take 1 tablet by mouth Daily., Disp: , Rfl:     hydroCHLOROthiazide 12.5 MG tablet, Take 1 tablet by mouth Daily., Disp: 90 tablet, Rfl: 1    HYDROcodone-acetaminophen (NORCO) 5-325 MG per tablet, As Needed., Disp: , Rfl:     HYDROcodone-acetaminophen (NORCO) 5-325 MG per tablet, Take 1 tablet by mouth 4 (Four) Times a Day As Needed for Moderate Pain., Disp: 12 tablet, Rfl: 0    Tirzepatide (Mounjaro) 12.5 MG/0.5ML solution auto-injector, Inject 12.5 mg under the skin into the appropriate area as directed Every 7 (Seven) Days., Disp: 2 mL, Rfl: 5    Allergies   Allergen Reactions    Statins Myalgia    Sulfate Hives    Meperidine Other (See Comments)     \"DOESN'T WORK\"        Family History   Problem Relation Age of Onset    Arthritis Mother             Cancer Mother             Arthritis Father             Cancer Father             Arthritis Brother         unknown    Cancer Brother         Unknown    Arthritis Brother     Cancer Brother         Prostate    Malig Hyperthermia Neg Hx        Social History     Socioeconomic History    Marital status:    Tobacco Use    Smoking status: Former     Current packs/day: 0.00     Average packs/day: 1.5 packs/day for 19.0 years (28.5 ttl pk-yrs)     Types: Cigarettes     Start date: 1960     Quit date:      Years since quittin.8     Passive exposure: Past    Smokeless tobacco: Former    Tobacco comments:     Tobacco free all types   Vaping Use    Vaping status: Never Used   Substance and Sexual Activity    Alcohol use: Not Currently     Alcohol/week: 1.0 standard drink of alcohol     Types: 1 Drinks containing 0.5 oz of alcohol per week     Comment: Occasionally    Drug use: Never    Sexual activity: Not Currently     Partners: Female     Birth control/protection: Vasectomy     Comment: 79 " "YO male.  Not active.       Vital Signs:   /88 (BP Location: Right arm)   Pulse 82   Temp 98 °F (36.7 °C) (Oral)   Resp 18   Ht 175.3 cm (69\")   Wt 106 kg (233 lb 11 oz)   SpO2 95%   BMI 34.51 kg/m²    Review of Systems   Gastrointestinal:  Positive for abdominal pain.       Physical Exam  Vitals and nursing note reviewed.   Constitutional:       General: He is not in acute distress.     Appearance: Normal appearance. He is well-developed.   HENT:      Head: Normocephalic and atraumatic.   Eyes:      Extraocular Movements: Extraocular movements intact.      Pupils: Pupils are equal, round, and reactive to light.   Cardiovascular:      Pulses: Normal pulses.   Pulmonary:      Effort: Pulmonary effort is normal. No retractions.      Breath sounds: Normal air entry. No wheezing.   Abdominal:      General: There is no distension.      Palpations: Abdomen is soft.      Tenderness: There is abdominal tenderness.      Hernia: No hernia is present.      Comments: RUQ ttp   Musculoskeletal:         General: No swelling or deformity.      Cervical back: Neck supple.   Skin:     General: Skin is warm and dry.      Findings: No erythema.   Neurological:      General: No focal deficit present.      Mental Status: He is alert and oriented to person, place, and time.      Motor: Motor function is intact.   Psychiatric:         Mood and Affect: Mood normal.         Thought Content: Thought content normal.          Result Review :               Assessment and Plan    Diagnoses and all orders for this visit:    1. Acute cholecystitis (Primary)  -     Case Request; Standing  -     Case Request    Other orders  To OR now for removal of gallbladder   IV abx  NPO  Risks and benefits discussed with patient and allergies reviewed.      This document has been electronically signed by Sarah Bo MD  November 9, 2024 14:25 EST         "

## 2024-11-09 NOTE — ED PROVIDER NOTES
"Time: 10:19 AM EST  Date of encounter:  11/9/2024  Independent Historian/Clinical History and Information was obtained by:   Patient    History is limited by: N/A    Chief Complaint: Abdominal pain      History of Present Illness:  Patient is a 79 y.o. year old male who presents to the emergency department for evaluation of abdominal pain.  Patient was in the emergency department last night for right upper chest pain and discharged.  Now 4 hours later the patient is returning to the emergency department with complaint of right upper quadrant abdominal pain.  He states he has not had pain like this before.  He was nauseated this morning and vomited but the nausea is relieved currently.  He rates the pain as a 10 out of 10.  He states he had a bowel movement this night.      Patient Care Team  Primary Care Provider: Amaury Mcdonough Jr., MD    Past Medical History:     Allergies   Allergen Reactions    Statins Myalgia    Sulfate Hives    Meperidine Other (See Comments)     \"DOESN'T WORK\"     Past Medical History:   Diagnosis Date    Allergic 1980    Scotch broom    Allergic rhinitis     Anxiety and depression     Arthritis     Balance problem     DDD BACK NEUROMA , CANE HELPS    Benign prostatic hyperplasia Monitored    Cataract     corrected by surgery    Chronic sinusitis     NO CURRENT ISSUES    Claustrophobia     Colon polyps     NO CANCER    DDD (degenerative disc disease), cervical     C6/7 FULL ROM    Diabetes insipidus     Diabetes mellitus     Disease of thyroid gland     Diverticulitis     NO CURRENT ISSUES    Erectile dysfunction Sexually inactive    Forgetfulness     H/O degenerative disc disease     Hx of BKA, right     Hyperlipemia     Hypertension     Limb swelling     OCCASSIONAL    Lumbago     Multiple endocrine neoplasia     Plantar nerve lesion, left     RIGHT LEG BKA    Prostate CA     Sleep apnea     REPAIRED WITH SURGERY    Testosterone deficiency     Type 2 diabetes mellitus     Urinary " incontinence      Past Surgical History:   Procedure Laterality Date    ADENOIDECTOMY  1950    AMPUTATION Right 2012    R BKA    BACK SURGERY      LUMBAR MULTIPLE ABLATIONS PRIOR TO FUSION    BUNIONECTOMY N/A 2023    Procedure: FUENTES'S NEUROMA EXCISION;  Surgeon: Phillip Munguia DPM;  [place] [facility] [place] [facility] [place]  Service: Podiatry;  Laterality: N/A;    CHOLECYSTECTOMY N/A 2024    Procedure: CHOLECYSTECTOMY LAPAROSCOPIC WITH DAVINCI ROBOT- removal of gallbladder;  Surgeon: Sarah Bo MD;  Location: [facility] MAIN OR;  Service: General;  Laterality: N/A;    COLONOSCOPY      DENTAL PROCEDURE      dental surgery     EYE SURGERY Bilateral     eye implant, yes CATARACTS REMOVED    FOOT SURGERY Left     FRACTURE SURGERY      JOINT REPLACEMENT  2017    LUMBAR FUSION      [facility]    LUMBAR PUNCTURE      ORIF FEMUR FRACTURE Right     PROXIMAL    PENILE PROSTHESIS IMPLANT      PILONIDAL CYST / SINUS EXCISION      PROSTATE SURGERY  TURP date on file    ROTATOR CUFF REPAIR Bilateral     SINUS SURGERY  turbinectomy     SPINE SURGERY  Spinal Laminectomy 2012    TONSILLECTOMY  1950    TOOTH EXTRACTION Right 2024    TOTAL KNEE ARTHROPLASTY Left     TURP / TRANSURETHRAL INCISION / DRAINAGE PROSTATE      following prostate cancer    UVULOPALATOPHARYNGOPLASTY      VASECTOMY  1979     Family History   Problem Relation Age of Onset    Arthritis Mother             Cancer Mother             Arthritis Father             Cancer Father             Arthritis Brother         unknown    Cancer Brother         Unknown    Arthritis Brother     Cancer Brother         Prostate    Malig Hyperthermia Neg Hx        Home Medications:  Prior to Admission medications    Medication Sig Start Date End Date Taking? Authorizing Provider   acetaminophen (TYLENOL) 325 MG tablet Take 2 tablets by mouth Every 6 (Six) Hours As Needed for Mild Pain.    Provider, Historical,  MD   busPIRone (BUSPAR) 15 MG tablet Take 1 tablet by mouth Every Morning. 24   Amaury Mcdonough Jr., MD   docusate sodium (Colace) 100 MG capsule As Needed.    Ria Key MD   ezetimibe (ZETIA) 10 MG tablet TAKE 1 TABLET BY MOUTH DAILY 24   Amaury Mcdonough Jr., MD   famotidine (PEPCID) 20 MG tablet Take 1 tablet by mouth Every Night. 24   Juan José Gracia MD   FLUoxetine (PROzac) 40 MG capsule TAKE 1 CAPSULE BY MOUTH DAILY 24   Amaury Mcdonough Jr., MD   gabapentin (NEURONTIN) 100 MG capsule Take 1 capsule by mouth Every Night. 24   Amaury Mcdonough Jr., MD   Gemtesa 75 MG tablet Take 1 tablet by mouth Daily. 24   Ria Key MD   hydroCHLOROthiazide 12.5 MG tablet Take 1 tablet by mouth Daily. 24   Amaury Mcdonough Jr., MD   HYDROcodone-acetaminophen (NORCO) 5-325 MG per tablet As Needed. 24   Ria Key MD   HYDROcodone-acetaminophen (NORCO) 5-325 MG per tablet Take 1 tablet by mouth 4 (Four) Times a Day As Needed for Moderate Pain. 24   Juan José Gracia MD   Tirzepatide (Mounjaro) 12.5 MG/0.5ML solution auto-injector Inject 12.5 mg under the skin into the appropriate area as directed Every 7 (Seven) Days. 10/31/24   Indu Carter APRN        Social History:   Social History     Tobacco Use    Smoking status: Former     Current packs/day: 0.00     Average packs/day: 1.5 packs/day for 19.0 years (28.5 ttl pk-yrs)     Types: Cigarettes     Start date: 1960     Quit date: 1979     Years since quittin.8     Passive exposure: Past    Smokeless tobacco: Former    Tobacco comments:     Tobacco free all types   Vaping Use    Vaping status: Never Used   Substance Use Topics    Alcohol use: Not Currently     Alcohol/week: 1.0 standard drink of alcohol     Types: 1 Drinks containing 0.5 oz of alcohol per week     Comment: Occasionally    Drug use: Never         Review of Systems:  Review of Systems  "  Gastrointestinal:  Positive for abdominal pain, nausea and vomiting.        Physical Exam:  /69 (BP Location: Left arm, Patient Position: Sitting)   Pulse 80   Temp 98.8 °F (37.1 °C) (Oral)   Resp 18   Ht 175.3 cm (69\")   Wt 113 kg (248 lb 7.3 oz)   SpO2 92%   BMI 36.69 kg/m²     Physical Exam  Vitals and nursing note reviewed.   Constitutional:       General: He is not in acute distress.  Eyes:      Extraocular Movements: Extraocular movements intact.   Cardiovascular:      Rate and Rhythm: Normal rate and regular rhythm.      Heart sounds: Normal heart sounds.   Pulmonary:      Effort: Pulmonary effort is normal.      Breath sounds: Normal breath sounds.   Abdominal:      General: Abdomen is flat. Bowel sounds are decreased.      Palpations: Abdomen is soft.      Tenderness: There is abdominal tenderness in the right upper quadrant.   Musculoskeletal:         General: Normal range of motion.      Cervical back: Normal range of motion and neck supple.   Skin:     General: Skin is warm and dry.   Neurological:      Mental Status: He is alert and oriented to person, place, and time. Mental status is at baseline.                  Procedures:  Procedures      Medical Decision Making:      Comorbidities that affect care:    Diabetes    External Notes reviewed:    Previous ED Note: Patient's ED visit from last night reviewed.  His lab work showed an elevated white count.      The following orders were placed and all results were independently analyzed by me:  Orders Placed This Encounter   Procedures    CT Abdomen Pelvis With Contrast    Magnesium    CBC Auto Differential    Hepatic Function Panel    Comprehensive Metabolic Panel    CBC Auto Differential    Phosphorus    Bilirubin, Direct    Perform Chlorhexidine Skin Prep Night Before and Morning of Procedure    Antibiotic Previously Ordered    Place Sequential Compression Device    Insert Indwelling Urinary Catheter    Remove Drains / Tubes    Remove " Drains / Tubes    Discharge Follow-up with Specialty: Dr Bo; 1 Week    Discharge Follow-up with Specified Provider: Dr Rya for void trial this week    Diet: Regular/House Diet; Regular (IDDSI 7); Thin (IDDSI 0)    Driving Restrictions    Lifting Restrictions    Activity as Tolerated    Call MD With Problems / Concerns    Discharge Follow-up with PCP    PT Plan of Care Cert / Re-Cert    POC Glucose STAT    POC Glucose Once    POC Glucose Once    POC Glucose Once    Outpatient In A Bed    Discharge patient    Send To OR       Medications Given in the Emergency Department:  Medications   lactated ringers infusion (75 mL/hr Intravenous New Bag 11/10/24 0807)   lactated ringers infusion (0 mL/hr Intravenous Stopped 11/10/24 2230)   HYDROmorphone (DILAUDID) injection 1 mg (1 mg Intravenous Given 11/9/24 1110)   ondansetron (ZOFRAN) injection 4 mg (4 mg Intravenous Given 11/9/24 1109)   iopamidol (ISOVUE-370) 76 % injection 100 mL (100 mL Intravenous Given 11/9/24 1151)   indocyanine green (IC-GREEN) injection 2.5 mg (2.5 mg Intravenous Given 11/9/24 1510)   piperacillin-tazobactam (ZOSYN) IVPB 3.375 g IVPB in 100 mL NS (VTB) (0 g Intravenous Stopped 11/9/24 2152)   magnesium sulfate in D5W 1g/100mL (PREMIX) (1 g Intravenous New Bag 11/10/24 0807)        ED Course:         Labs:    Lab Results (last 24 hours)       ** No results found for the last 24 hours. **             Imaging:    No Radiology Exams Resulted Within Past 24 Hours      Differential Diagnosis and Discussion:    Abdominal Pain: Based on the patient's signs and symptoms, I considered abdominal aortic aneurysm, small bowel obstruction, pancreatitis, acute cholecystitis, acute appendecitis, peptic ulcer disease, gastritis, colitis, endocrine disorders, irritable bowel syndrome and other differential diagnosis an etiology of the patient's abdominal pain.    CT scan radiology impression was interpreted by me.    MDM  Patient has evidence for acute  cholecystitis he will be taken to the OR by Dr. Bo, general surgery, and admitted to the hospital service.          Patient Care Considerations:          Consultants/Shared Management Plan:  Patient discussed with Dr. Bo, general surgeon, who will take the patient to the OR      Hospitalist: I have discussed the case with Dr. Cabrera who agrees to accept the patient for admission.    Social Determinants of Health:    Patient is independent, reliable, and has access to care.       Disposition and Care Coordination:    Send to OR/Endoscopy        Final diagnoses:   Acute cholecystitis        ED Disposition       ED Disposition   Send to OR    Condition   --    Comment   --               This medical record created using voice recognition software.             Clive Tovar DO  11/13/24 [id number]

## 2024-11-09 NOTE — ED PROVIDER NOTES
"Time: 2:36 AM EST  Date of encounter:  11/9/2024  Independent Historian/Clinical History and Information was obtained by:   Patient    History is limited by: N/A    Chief Complaint: Chest pain      History of Present Illness:  Patient is a 79 y.o. year old male who presents to the emergency department for evaluation of chest pain    Patient complains of right-sided chest pain that started approximately 9 PM last night and has persisted since that time.  He denies any radiation of the pain.  No nausea or vomiting.  His right chest is more uncomfortable with any movement or pressure against it.  He denies abdominal pain.  He notes he takes Wegovy and his dosing was increased this week.      Patient Care Team  Primary Care Provider: Amaury Mcdonough Jr., MD    Past Medical History:     Allergies   Allergen Reactions    Statins Myalgia    Sulfate Hives    Meperidine Other (See Comments)     \"DOESN'T WORK\"     Past Medical History:   Diagnosis Date    Allergic 1980    Scotch broom    Allergic rhinitis     Anxiety and depression     Arthritis     Balance problem     DDD BACK NEUROMA , CANE HELPS    Benign prostatic hyperplasia Monitored    Cataract     corrected by surgery    Chronic sinusitis     NO CURRENT ISSUES    Claustrophobia     Colon polyps     NO CANCER    DDD (degenerative disc disease), cervical     C6/7 FULL ROM    Diabetes insipidus     Diabetes mellitus     Disease of thyroid gland     Diverticulitis     NO CURRENT ISSUES    Erectile dysfunction Sexually inactive    Forgetfulness     H/O degenerative disc disease     Hx of BKA, right     Hyperlipemia     Hypertension     Limb swelling     OCCASSIONAL    Lumbago     Multiple endocrine neoplasia     Plantar nerve lesion, left     RIGHT LEG BKA    Prostate CA     Sleep apnea     REPAIRED WITH SURGERY    Testosterone deficiency     Type 2 diabetes mellitus     Urinary incontinence      Past Surgical History:   Procedure Laterality Date    ADENOIDECTOMY  " 1950    AMPUTATION Right 2012    R BKA    BACK SURGERY      LUMBAR MULTIPLE ABLATIONS PRIOR TO FUSION    BUNIONECTOMY N/A 2023    Procedure: FUENTES'S NEUROMA EXCISION;  Surgeon: Phillip Munguia DPM;  Location: [facility] OR [facility];  Service: Podiatry;  Laterality: N/A;    COLONOSCOPY      DENTAL PROCEDURE      dental surgery     EYE SURGERY Bilateral     eye implant, yes CATARACTS REMOVED    FOOT SURGERY Left     FRACTURE SURGERY  2008    JOINT REPLACEMENT  2017    LUMBAR FUSION      [facility]    LUMBAR PUNCTURE      ORIF FEMUR FRACTURE Right     PROXIMAL    PENILE PROSTHESIS IMPLANT      PILONIDAL CYST / SINUS EXCISION      PROSTATE SURGERY  TURP date on file    ROTATOR CUFF REPAIR Bilateral     SINUS SURGERY  turbinectomy     SPINE SURGERY  Spinal Laminectomy 2012    TONSILLECTOMY  1950    TOOTH EXTRACTION Right 2024    TOTAL KNEE ARTHROPLASTY Left     TURP / TRANSURETHRAL INCISION / DRAINAGE PROSTATE      following prostate cancer    UVULOPALATOPHARYNGOPLASTY      VASECTOMY  1979     Family History   Problem Relation Age of Onset    Arthritis Mother             Cancer Mother             Arthritis Father             Cancer Father             Arthritis Brother         unknown    Cancer Brother         Unknown    Arthritis Brother     Cancer Brother         Prostate    Malig Hyperthermia Neg Hx        Home Medications:  Prior to Admission medications    Medication Sig Start Date End Date Taking? Authorizing Provider   acetaminophen (TYLENOL) 325 MG tablet Take 2 tablets by mouth Every 6 (Six) Hours As Needed for Mild Pain.    ProviderRia MD   busPIRone (BUSPAR) 15 MG tablet Take 1 tablet by mouth Every Morning. 24   Amaury Mcdonough Jr., MD   docusate sodium (Colace) 100 MG capsule As Needed.    ProviderRia MD   ezetimibe (ZETIA) 10 MG tablet TAKE 1 TABLET BY MOUTH DAILY 24   Amaury Mcdonough Jr., MD    FLUoxetine (PROzac) 40 MG capsule TAKE 1 CAPSULE BY MOUTH DAILY 24   Amaury Mcdonough Jr., MD   gabapentin (NEURONTIN) 100 MG capsule Take 1 capsule by mouth Every Night. 24   Amaury Mcdonough Jr., MD   Gemtesa 75 MG tablet Take 1 tablet by mouth Daily. 24   ProviderRia MD   hydroCHLOROthiazide 12.5 MG tablet Take 1 tablet by mouth Daily. 24   Amaury Mcdonough Jr., MD   HYDROcodone-acetaminophen (NORCO) 5-325 MG per tablet As Needed. 24   Ria Key MD   Tirzepatide (Mounjaro) 12.5 MG/0.5ML solution auto-injector Inject 12.5 mg under the skin into the appropriate area as directed Every 7 (Seven) Days. 10/31/24   Indu Carter APRN        Social History:   Social History     Tobacco Use    Smoking status: Former     Current packs/day: 0.00     Average packs/day: 1.5 packs/day for 19.0 years (28.5 ttl pk-yrs)     Types: Cigarettes     Start date: 1960     Quit date:      Years since quittin.8     Passive exposure: Past    Smokeless tobacco: Former    Tobacco comments:     Tobacco free all types   Vaping Use    Vaping status: Never Used   Substance Use Topics    Alcohol use: Not Currently     Alcohol/week: 1.0 standard drink of alcohol     Types: 1 Drinks containing 0.5 oz of alcohol per week     Comment: Occasionally    Drug use: Never         Review of Systems:  Review of Systems   Constitutional:  Negative for chills and fever.   HENT:  Negative for congestion, ear pain and sore throat.    Eyes:  Negative for pain.   Respiratory:  Negative for cough, chest tightness and shortness of breath.    Cardiovascular:  Positive for chest pain.   Gastrointestinal:  Negative for abdominal pain, diarrhea, nausea and vomiting.   Genitourinary:  Negative for flank pain and hematuria.   Musculoskeletal:  Negative for joint swelling.   Skin:  Negative for pallor.   Neurological:  Negative for seizures and headaches.   All other systems  "reviewed and are negative.       Physical Exam:  /87   Pulse 74   Temp 98.8 °F (37.1 °C) (Oral)   Resp 16   Ht 175.3 cm (69\")   Wt 106 kg (233 lb 4 oz)   SpO2 91%   BMI 34.44 kg/m²     Physical Exam  Vitals and nursing note reviewed.   Constitutional:       General: He is not in acute distress.     Appearance: Normal appearance. He is not toxic-appearing.      Comments: Patient appears uncomfortable and in pain.   HENT:      Head: Normocephalic and atraumatic.      Jaw: There is normal jaw occlusion.   Eyes:      General: Lids are normal.      Extraocular Movements: Extraocular movements intact.      Conjunctiva/sclera: Conjunctivae normal.      Pupils: Pupils are equal, round, and reactive to light.   Cardiovascular:      Rate and Rhythm: Normal rate and regular rhythm.      Pulses: Normal pulses.      Heart sounds: Normal heart sounds.   Pulmonary:      Effort: Pulmonary effort is normal. No respiratory distress.      Breath sounds: Normal breath sounds. No wheezing or rhonchi.   Chest:      Chest wall: Tenderness present.      Comments: Right chest wall is tender to palpation.  Abdominal:      General: Abdomen is flat.      Palpations: Abdomen is soft.      Tenderness: There is no abdominal tenderness. There is no guarding or rebound.      Comments: No abdominal tenderness, no right upper quadrant tenderness, negative Boothe sign   Musculoskeletal:         General: Normal range of motion.      Cervical back: Normal range of motion and neck supple.      Right lower leg: No edema.      Left lower leg: No edema.   Skin:     General: Skin is warm and dry.   Neurological:      Mental Status: He is alert and oriented to person, place, and time. Mental status is at baseline.   Psychiatric:         Mood and Affect: Mood normal.            Procedures:  Procedures      Medical Decision Making:      Comorbidities that affect care:    Hyperlipidemia, hypertension, prostate cancer, diabetes, anxiety and " depression,    External Notes reviewed:    Previous Clinic Note: Outpatient endocrinology visit 10/31/2024      The following orders were placed and all results were independently analyzed by me:  Orders Placed This Encounter   Procedures    XR Chest 1 View    Zoar Draw    High Sensitivity Troponin T    Comprehensive Metabolic Panel    Lipase    BNP    Magnesium    CBC Auto Differential    High Sensitivity Troponin T 2Hr    Undress & Gown    Continuous Pulse Oximetry    ECG 12 Lead ED Triage Standing Order; Chest Pain    CBC & Differential    Green Top (Gel)    Lavender Top    Gold Top - SST    Light Blue Top       Medications Given in the Emergency Department:  Medications   famotidine (PEPCID) injection 20 mg (20 mg Intravenous Given 11/9/24 0349)   HYDROmorphone (DILAUDID) injection 0.5 mg (0.5 mg Intravenous Given 11/9/24 0349)   ondansetron (ZOFRAN) injection 4 mg (4 mg Intravenous Given 11/9/24 0408)        ED Course:    ED Course as of 11/09/24 2158   Sat Nov 09, 2024   0313 My interpretation of EKG: Sinus rhythm 80, no acute ischemia, unchanged from previous [JS]      ED Course User Index  [JS] Juan José Gracia MD       Labs:    Lab Results (last 24 hours)       Procedure Component Value Units Date/Time    High Sensitivity Troponin T [[id number]]  (Normal) Collected: 11/09/24 0248    Specimen: Blood Updated: 11/09/24 0320     HS Troponin T 7 ng/L     Narrative:      High Sensitive Troponin T Reference Range:  <14.0 ng/L- Negative Female for AMI  <22.0 ng/L- Negative Male for AMI  >=14 - Abnormal Female indicating possible myocardial injury.  >=22 - Abnormal Male indicating possible myocardial injury.   Clinicians would have to utilize clinical acumen, EKG, Troponin, and serial changes to determine if it is an Acute Myocardial Infarction or myocardial injury due to an underlying chronic condition.         CBC & Differential [[id number]]  (Abnormal) Collected: 11/09/24 0248    Specimen: Blood Updated: 11/09/24  0255    Narrative:      The following orders were created for panel order CBC & Differential.  Procedure                               Abnormality         Status                     ---------                               -----------         ------                     CBC Auto Differential[[id number]]        Abnormal            Final result                 Please view results for these tests on the individual orders.    Comprehensive Metabolic Panel [[id number]]  (Abnormal) Collected: 11/09/24 0248    Specimen: Blood Updated: 11/09/24 0323     Glucose 198 mg/dL      BUN 19 mg/dL      Creatinine 0.82 mg/dL      Sodium 136 mmol/L      Potassium 3.4 mmol/L      Chloride 99 mmol/L      CO2 26.2 mmol/L      Calcium 9.3 mg/dL      Total Protein 7.1 g/dL      Albumin 3.9 g/dL      ALT (SGPT) 22 U/L      AST (SGOT) 20 U/L      Alkaline Phosphatase 103 U/L      Total Bilirubin 0.3 mg/dL      Globulin 3.2 gm/dL      A/G Ratio 1.2 g/dL      BUN/Creatinine Ratio 23.2     Anion Gap 10.8 mmol/L      eGFR 88.8 mL/min/1.73     Narrative:      GFR Normal >60  Chronic Kidney Disease <60  Kidney Failure <15    The GFR formula is only valid for adults with stable renal function between ages 18 and 70.    Lipase [[id number]]  (Normal) Collected: 11/09/24 0248    Specimen: Blood Updated: 11/09/24 0323     Lipase 54 U/L     BNP [[id number]]  (Normal) Collected: 11/09/24 0248    Specimen: Blood Updated: 11/09/24 0319     proBNP 66.1 pg/mL     Narrative:      This assay is used as an aid in the diagnosis of individuals suspected of having heart failure. It can be used as an aid in the diagnosis of acute decompensated heart failure (ADHF) in patients presenting with signs and symptoms of ADHF to the emergency department (ED). In addition, NT-proBNP of <300 pg/mL indicates ADHF is not likely.    Age Range Result Interpretation  NT-proBNP Concentration (pg/mL:      <50             Positive            >450                   Crocker                  300-450                    Negative             <300    50-75           Positive            >900                  Gray                300-900                  Negative            <300      >75             Positive            >1800                  Gray                300-1800                  Negative            <300    Magnesium [[id number]]  (Normal) Collected: 11/09/24 0248    Specimen: Blood Updated: 11/09/24 0323     Magnesium 1.9 mg/dL     CBC Auto Differential [[id number]]  (Abnormal) Collected: 11/09/24 0248    Specimen: Blood Updated: 11/09/24 0255     WBC 14.99 10*3/mm3      RBC 4.32 10*6/mm3      Hemoglobin 13.9 g/dL      Hematocrit 38.9 %      MCV 90.0 fL      MCH 32.2 pg      MCHC 35.7 g/dL      RDW 12.6 %      RDW-SD 41.6 fl      MPV 9.3 fL      Platelets 294 10*3/mm3      Neutrophil % 76.9 %      Lymphocyte % 8.7 %      Monocyte % 10.2 %      Eosinophil % 2.8 %      Basophil % 0.5 %      Immature Grans % 0.9 %      Neutrophils, Absolute 11.53 10*3/mm3      Lymphocytes, Absolute 1.30 10*3/mm3      Monocytes, Absolute 1.53 10*3/mm3      Eosinophils, Absolute 0.42 10*3/mm3      Basophils, Absolute 0.07 10*3/mm3      Immature Grans, Absolute 0.14 10*3/mm3      nRBC 0.0 /100 WBC     High Sensitivity Troponin T 2Hr [[id number]]  (Normal) Collected: 11/09/24 0504    Specimen: Blood Updated: 11/09/24 0531     HS Troponin T 7 ng/L      Troponin T Delta 0 ng/L     Narrative:      High Sensitive Troponin T Reference Range:  <14.0 ng/L- Negative Female for AMI  <22.0 ng/L- Negative Male for AMI  >=14 - Abnormal Female indicating possible myocardial injury.  >=22 - Abnormal Male indicating possible myocardial injury.   Clinicians would have to utilize clinical acumen, EKG, Troponin, and serial changes to determine if it is an Acute Myocardial Infarction or myocardial injury due to an underlying chronic condition.         Magnesium [[id number]]  (Normal) Collected: 11/09/24 0504    Specimen: Blood Updated: 11/09/24  2037     Magnesium 1.9 mg/dL     POC Glucose STAT [[id number]]  (Abnormal) Collected: 11/09/24 1731    Specimen: Blood Updated: 11/09/24 1733     Glucose 174 mg/dL      Comment: Serial Number: [id number]:  [id number]       POC Glucose Once [[id number]]  (Abnormal) Collected: 11/09/24 2100    Specimen: Blood Updated: 11/09/24 2102     Glucose 247 mg/dL      Comment: Serial Number: [id number]:  [id number]                Imaging:    CT Abdomen Pelvis With Contrast    Result Date: 11/9/2024  CT ABDOMEN PELVIS W CONTRAST Date of Exam: 11/9/2024 11:35 AM EST Indication: Right upper quadrant pain abdominal pain. Comparison: 4/6/2021 and prior Technique: Axial CT images were obtained of the abdomen and pelvis after the uneventful intravenous administration of iodinated contrast. Reconstructed coronal and sagittal images were also obtained. Automated exposure control and iterative construction methods were used. FINDINGS: Abdomen/Pelvis: Lower Chest: Dependent opacities are noted at the lung bases left greater than right. Atelectasis. Pneumonia cannot be excluded. No free air noted below the diaphragm. Organs: Significant distention of the gallbladder is noted with mild wall thickening and Pericholecystic fluid noted. There are stones within the gallbladder. There is no significant dilation appreciated of the common duct. No obvious stones are noted within the duct. Liver demonstrates no evidence of intrapedicle or ductal dilation. Liver demonstrates no acute abnormality. The pancreas, spleen and adrenal glands are grossly unremarkable in appearance. Small low-attenuation lesions within the kidneys are either too small to characterize are compatible with cysts. No evidence of obstructive uropathy is noted GI/Bowel: Mild reactive changes are noted of the descending portion of the duodenum. The stomach and the small bowel are otherwise grossly unremarkable appearance. Small lymph nodes are noted within the lewis  hepatic region likely reactive in nature. Mesenteric vessels appear to opacify unremarkably. Ileocecal valve is unremarkable. The appendix appears within normal limits. The colon demonstrates no acute abnormality. There are some scattered diverticuli without evidence of acute diverticulitis Pelvis: Urinary bladder is grossly unremarkable in appearance. 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 No suspicious pelvic adenopathy or fluid collection noted Peritoneum/Retroperitoneum: Aorta is normal in caliber. There is underlying atherosclerotic change. No suspicious retroperitoneal adenopathy Bones/Soft Tissues: Postsurgical changes are noted of the lumbar sacral spine. Multilevel degenerative changes are noted in the spine.     1. Abnormal appearance of the gallbladder which is distended with surrounding pericholecystic fluid. The pended small stones are noted. Findings are compatible with acute cholecystitis. 2. Dependent opacities at lung bases, left greater than right which may resent atelectasis or pneumonia 3. Other findings as above Electronically Signed: Darryl Caruso MD  11/9/2024 12:19 PM EST  Workstation ID: [id number]    XR Chest 1 View    Result Date: 11/9/2024  XR CHEST 1 VW Date of Exam: 11/9/2024 2:51 AM EST Indication: Chest Pain Triage Protocol Comparison: 6/1/2024 Findings: Cardiac and mediastinal contours are normal. Lung volumes are lower. Blunting of the left costophrenic angle may reflect a small amount of left pleural fluid. There is mild left basilar infiltrate or atelectasis. The lungs are otherwise clear. Pulmonary vascularity is normal. No pneumothorax.     Lower lung volumes with a potential small left effusion and some mild left base infiltrate or atelectasis. Electronically Signed: Jose Ghosh MD  11/9/2024 3:06 AM EST  Workstation ID: [id number]       Differential Diagnosis and Discussion:    Chest Pain:  Based on the patient's signs and  symptoms, I considered aortic dissection, myocardial infaction, pulmonary embolism, cardiac tamponade, pericarditis, pneumothorax, musculoskeletal chest pain and other differential diagnosis as an etiology of the patient's chest pain.     All labs were reviewed and interpreted by me.  All X-rays impressions were independently interpreted by me.  EKG was interpreted by me.    MDM                     Patient Care Considerations:    ANTIBIOTICS: I considered prescribing antibiotics as an outpatient however no bacterial focus of infection was found.      Consultants/Shared Management Plan:    None    Social Determinants of Health:    Patient is independent, reliable, and has access to care.       Disposition and Care Coordination:    Discharged: I considered escalation of care by admitting this patient to the hospital, however patient is low to moderate risk heart score and complete resolution of pain with negative troponin and ECG.    I have explained the patient´s condition, diagnoses and treatment plan based on the information available to me at this time. I have answered questions and addressed any concerns. The patient has a good  understanding of the patient´s diagnosis, condition, and treatment plan as can be expected at this point. The vital signs have been stable. The patient´s condition is stable and appropriate for discharge from the emergency department.      The patient will pursue further outpatient evaluation with the primary care physician or other designated or consulting physician as outlined in the discharge instructions. They are agreeable to this plan of care and follow-up instructions have been explained in detail. The patient has received these instructions in written format and has expressed an understanding of the discharge instructions. The patient is aware that any significant change in condition or worsening of symptoms should prompt an immediate return to this or the closest emergency  department or call to 1.  I have explained discharge medications and the need for follow up with the patient/caretakers. This was also printed in the discharge instructions. Patient was discharged with the following medications and follow up:      Medication List        New Prescriptions      famotidine 20 MG tablet  Commonly known as: PEPCID  Take 1 tablet by mouth Every Night.               Where to Get Your Medications        [id number] [place] - [address] AT [facility] - [phone]  - [phone]   [address] [place] [address]      Phone: [phone]   famotidine 20 MG tablet      Amaury Mcdonough Jr., MD  [address]  [address]  [facility] [id number] [phone]    Schedule an appointment as soon as possible for a visit          Final diagnoses:   Chest pain, unspecified type        ED Disposition       ED Disposition   Discharge    Condition   Stable    Comment   --               This medical record created using voice recognition software.             Juan José Gracia MD  11/09/24 [id number]

## 2024-11-09 NOTE — OP NOTE
CHOLECYSTECTOMY LAPAROSCOPIC WITH DAVINCI ROBOT  Procedure Report    Patient Name:  Jamir Porter  YOB: 1945    Date of Surgery:  11/9/2024     Indications:  necrotizing cholecystitis    Pre-op Diagnosis:   Acute cholecystitis [K81.0]       Post-Op Diagnosis Codes:     * Acute cholecystitis [K81.0]    Procedure/CPT® Codes:      Procedure(s):  CHOLECYSTECTOMY LAPAROSCOPIC WITH DAVINCI ROBOT- removal of gallbladder    Staff:  Surgeon(s):  Sarah Bo MD    Assistant: Jamir Hannon    Anesthesia: General    Estimated Blood Loss:  50 cc    Implants:    Implant Name Type Inv. Item Serial No.  Lot No. LRB No. Used Action   CLIP LIG HEMOLOK PA 6CT MD/LG GRN - [id number] Implant CLIP LIG HEMOLOK PA 6CT MD/LG GRN  [employer] [id number] N/A 1 Implanted   CLIP LIG HEMOLOK PA 6CT MD/LG GRN - [id number] Implant CLIP LIG HEMOLOK PA 6CT MD/LG GRN  [employer] [id number] N/A 1 Implanted       Specimen:          Specimens       ID Source Type Tests Collected By Collected At Frozen?    A Gallbladder Tissue TISSUE PATHOLOGY EXAM   Sarah Bo MD 11/9/24 4759     Description: Gallbladder and contents                Findings: none    Complications: none    Description of Procedure:   After informed consent was obtained the patient was taken to the operating room and placed supine on the table. The patient was prepped and draped in normal sterile fashion. A Veress needle was inserted into the left upper quadrant in standard fashion and the abdomen was insufflated without complication. We began by inserting a 8 mm periumbilical Optiview trocar under direct visualization. The abdomen was insufflated, and a just lateral to umbilical 10/12 mm port as well as 2 linear mid abdomen 8 mm ports were placed under direct visualization and at about the appropriate spacing for robotic cholecystectomy.   The robot was brought in and targeted and then docked in standard fashion and  instruments were placed under direct visualization.  The gallbladder was massively distended and on placing the camera we were able to see that it was necrotic in most of the visible portions.  The gallbladder was grasped and retracted toward the right shoulder  with the prograsp and the omental attachments were bluntly dissected off. The infundibulum was grasped  with fenestrated bipolar and retracted laterally. At this point in time I used suction to dissect out the cystic artery and cystic duct. The critical view was obtained ( and photographed) and we were able to see the liver between the cystic duct and cystic artery. IC Green and Firefly technology were used to evaluate the cystic and common bile ducts. . The cystic duct and cystic artery were both clipped twice proximally and once distally. They were ligated leaving two clips on both the cystic duct and cystic artery stumps. The gallbladder was then grasped and retracted upwards and removed from the gallbladder fossa using electrocautery. It was placed into a bag and removed from the 11 mm port.  The trocars were reinserted and the gallbladder fossa was inspected and any bleeding in the gallbladder fossa was controlled using electrocautery for hemostasis. It was also evaulated with Firefly to ensure no bile leaks. A drain was left in place given the level of severity.  The 11 mm port site had to be enlarged quite a bit to get the gallbladder out and then was closed with Mersilene in multiple figure of eight sutures and then the ports removed under direct visualization and the abdomen was desufflated. The skin sites were closed using staples and local anesthetic was injected. The patient was awakened and taken to the recovery room in good condition. All counts were correct at the end of the case.  Assistant: Jamir Hannon  was responsible for performing the following activities: Retraction, Suction, and Held/Positioned Camera and their skilled assistance  was necessary for the success of this case.    Sarah Bo MD     Date: 11/9/2024  Time: 17:47 EST

## 2024-11-09 NOTE — H&P
[facility] HISTORY AND PHYSICAL  Date: 2024   Patient Name: Jamir Porter  : 1945  MRN: [id number]  Primary Care Physician:  Amaury Mcdonough Jr., MD  Date of admission: 2024    Subjective   Subjective     Chief Complaint: Abdominal pain    HPI:    Jamir Porter is a 79 y.o. male past medical history of type 2 diabetes, obesity, overactive bladder, depression, and hypertension who presents to the ER due to new onset abdominal pain.  Patient was in the ER last night with complaints of chest pain and discharged home after negative cardiac workup.  He returned later this morning with complaints of right upper quadrant pain and some associated nausea.  Imaging revealed acute cholecystitis and patient was taken directly to the OR by general surgery.  Patient had laparoscopic cholecystectomy with findings of necrotizing cholecystitis.  Internal medicine was then consulted for medical admission and observation overnight after surgery.      Personal History     Past Medical History:  Past Medical History:   Diagnosis Date    Allergic     Scotch broom    Allergic rhinitis     Anxiety and depression     Arthritis     Balance problem     DDD BACK NEUROMA , CANE HELPS    Benign prostatic hyperplasia Monitored    Cataract     corrected by surgery    Chronic sinusitis     NO CURRENT ISSUES    Claustrophobia     Colon polyps     NO CANCER    DDD (degenerative disc disease), cervical     C6/7 FULL ROM    Diabetes insipidus     Diabetes mellitus     Disease of thyroid gland     Diverticulitis     NO CURRENT ISSUES    Erectile dysfunction Sexually inactive    Forgetfulness     H/O degenerative disc disease     Hx of BKA, right     Hyperlipemia     Hypertension     Limb swelling     OCCASSIONAL    Lumbago     Multiple endocrine neoplasia     Plantar nerve lesion, left     RIGHT LEG BKA    Prostate CA     Sleep apnea     REPAIRED WITH SURGERY    Testosterone deficiency     Type 2  diabetes mellitus     Urinary incontinence          Past Surgical History:  Past Surgical History:   Procedure Laterality Date    ADENOIDECTOMY  1950    AMPUTATION Right 2012    R BKA    BACK SURGERY      LUMBAR MULTIPLE ABLATIONS PRIOR TO FUSION    BUNIONECTOMY N/A 12/28/2023    Procedure: FUENTES'S NEUROMA EXCISION;  Surgeon: Phillip Munguia DPM;  Location: [facility];  Service: Podiatry;  Laterality: N/A;    COLONOSCOPY  2019    DENTAL PROCEDURE      dental surgery     EYE SURGERY Bilateral     eye implant, yes CATARACTS REMOVED    FOOT SURGERY Left     FRACTURE SURGERY  2008    JOINT REPLACEMENT  2017    LUMBAR FUSION  2016    [facility]    LUMBAR PUNCTURE      ORIF FEMUR FRACTURE Right     PROXIMAL    PENILE PROSTHESIS IMPLANT      PILONIDAL CYST / SINUS EXCISION      PROSTATE SURGERY  TURP date on file    ROTATOR CUFF REPAIR Bilateral     SINUS SURGERY  turbinectomy 1994    SPINE SURGERY  Spinal Laminectomy 2012    TONSILLECTOMY  1950    TOOTH EXTRACTION Right 07/30/2024    TOTAL KNEE ARTHROPLASTY Left     TURP / TRANSURETHRAL INCISION / DRAINAGE PROSTATE      following prostate cancer    UVULOPALATOPHARYNGOPLASTY      VASECTOMY  November 1979         Family History:   Reviewed and noncontributory except as mentioned in HPI    Social History:   Social Drivers of Health     Tobacco Use: Medium Risk (11/9/2024)    Patient History     Smoking Tobacco Use: Former     Smokeless Tobacco Use: Former     Passive Exposure: Past   Alcohol Use: Not At Risk (11/9/2024)    AUDIT-C     Frequency of Alcohol Consumption: Monthly or less     Average Number of Drinks: 1 or 2     Frequency of Binge Drinking: Never   Financial Resource Strain: Not on file   Food Insecurity: Not on file   Transportation Needs: Not on file   Physical Activity: Not on file   Stress: Not on file   Social Connections: Not on file   Interpersonal Safety: Not At Risk (11/9/2024)    Abuse Screen     Unsafe at Home or Work/School: no     Feels  "Threatened by Someone?: no     Does Anyone Keep You from Contacting Others or Doint Things Outside the Home?: no     Physical Sign of Abuse Present: no   Depression: At risk (8/27/2024)    PHQ-2     PHQ-2 Score: 24   Housing Stability: Unknown (11/9/2024)    Housing Stability     Current Living Arrangements: home     Potentially Unsafe Housing Conditions: Not on file   Utilities: Not on file   Health Literacy: Not on file   Employment: Not on file   Disabilities: Not At Risk (11/9/2024)    Disabilities     Concentrating, Remembering, or Making Decisions Difficulty: no     Doing Errands Independently Difficulty: no         Home Medications:  FLUoxetine, HYDROcodone-acetaminophen, Tirzepatide, Vibegron, acetaminophen, busPIRone, docusate sodium, ezetimibe, famotidine, gabapentin, and hydroCHLOROthiazide    Allergies:  Allergies   Allergen Reactions    Statins Myalgia    Sulfate Hives    Meperidine Other (See Comments)     \"DOESN'T WORK\"       Review of Systems   All systems were reviewed and negative except for: Abdominal pain, nausea    Objective   Objective     Vitals:   Temp:  [98 °F (36.7 °C)-98.8 °F (37.1 °C)] 98 °F (36.7 °C)  Heart Rate:  [70-94] 83  Resp:  [16-18] 16  BP: (115-174)/(66-95) 130/66  Flow (L/min) (Oxygen Therapy):  [2] 2    Physical Exam    Constitutional: Awake, alert, no acute distress   Eyes: Pupils equal, sclerae anicteric, no conjunctival injection   HENT: NCAT, mucous membranes moist   Neck: Supple, no thyromegaly, no lymphadenopathy, trachea midline   Respiratory: Clear to auscultation bilaterally, nonlabored respirations    Cardiovascular: RRR, no murmurs, rubs, or gallops, palpable pedal pulses bilaterally   Gastrointestinal: Positive bowel sounds, soft, nontender, nondistended.  ZORA drain in place with serosanguineous drainage   Musculoskeletal: Status post right BKA   Psychiatric: Appropriate affect, cooperative   Neurologic: Oriented x 3, strength symmetric in all extremities, Cranial " Nerves grossly intact to confrontation, speech clear   Skin: No rashes     Result Review    Result Review:  I have personally reviewed the results from the time of this admission to 11/9/2024 19:51 EST and agree with these findings:  []  Laboratory  []  Microbiology  []  Radiology  []  EKG/Telemetry   []  Cardiology/Vascular   []  Pathology  []  Old records  []  Other:      Assessment & Plan   Assessment / Plan     Assessment/Plan:   Acute gangrenous cholecystitis status post laparoscopic cholecystectomy 11/09/2024  Hypokalemia  Leukocytosis secondary to above cholecystitis  Hypertension  Hyperlipidemia  Depression  Type 2 diabetes complicated by peripheral neuropathy  BPH status post TURP  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  Likely secondary to GI losses  - Continue antibiotics per general surgery.  Discussed case with them, patient will be started on diet and assess for tolerance  - Trend leukocytosis while on antibiotics  - I will hold home HCTZ given hypokalemia and risk of CARLYN with intra-abdominal surgery, gently hydrate overnight and if blood pressure elevated in the morning with normal renal function I can restart  - Continue home statin  - Continue home medications for depression/anxiety  - Patient not on insulin for diabetes.  I have started insulin sliding scale and Accu-Cheks as well as a low-dose basal insulin        Discussed with ER Physician and Nurse    All labs/imaging studies were personally reviewed and findings are as noted above      DVT Prophylaxis: SCDs    CODE STATUS:    Code Status (Patient has no pulse and is not breathing): CPR (Attempt to Resuscitate)  Medical Interventions (Patient has pulse or is breathing): Full Support      Admission Status:  I believe this patient meets outpatient status.    Electronically signed by Demetri Cabrera MD, 11/09/24, 6:36 PM EST.

## 2024-11-09 NOTE — ANESTHESIA PREPROCEDURE EVALUATION
Anesthesia Evaluation     Patient summary reviewed and Nursing notes reviewed   no history of anesthetic complications:   NPO Solid Status: > 8 hours  NPO Liquid Status: > 2 hours           Airway   Mallampati: III  TM distance: <3 FB  Neck ROM: full  No difficulty expected and Large neck circumference  Dental      Pulmonary - normal exam    breath sounds clear to auscultation  (+) a smoker Former,sleep apnea (s/p surgical correction)  Cardiovascular - normal exam  Exercise tolerance: good (4-7 METS)    ECG reviewed  Rhythm: regular  Rate: normal    (+) hypertension, hyperlipidemia      Neuro/Psych- negative ROS  GI/Hepatic/Renal/Endo    (+) obesity, GERD well controlled, diabetes mellitus, thyroid problem hypothyroidism    ROS Comment: Acute cholecystitis      Musculoskeletal (-) negative ROS    Abdominal    Substance History - negative use     OB/GYN negative ob/gyn ROS         Other - negative ROS       ROS/Med Hx Other: PAT Nursing Notes unavailable.  Echo 2020- EF 60-65%, mild aortic stenosis,     EKG - SR                Anesthesia Plan    ASA 3 - emergent     general     (Patient understands anesthesia not responsible for dental damage.)  intravenous induction     Anesthetic plan, risks, benefits, and alternatives have been provided, discussed and informed consent has been obtained with: patient.    Use of blood products discussed with patient .    Plan discussed with CRNA.    CODE STATUS:

## 2024-11-10 LAB
ALBUMIN SERPL-MCNC: 3.2 G/DL (ref 3.5–5.2)
ALP SERPL-CCNC: 102 U/L (ref 39–117)
ALT SERPL W P-5'-P-CCNC: 67 U/L (ref 1–41)
ANION GAP SERPL CALCULATED.3IONS-SCNC: 8.4 MMOL/L (ref 5–15)
AST SERPL-CCNC: 69 U/L (ref 1–40)
BASOPHILS # BLD AUTO: 0.04 10*3/MM3 (ref 0–0.2)
BASOPHILS NFR BLD AUTO: 0.2 % (ref 0–1.5)
BILIRUB CONJ SERPL-MCNC: 0.2 MG/DL (ref 0–0.3)
BILIRUB INDIRECT SERPL-MCNC: 0.5 MG/DL
BILIRUB SERPL-MCNC: 0.7 MG/DL (ref 0–1.2)
BUN SERPL-MCNC: 13 MG/DL (ref 8–23)
BUN/CREAT SERPL: 16.3 (ref 7–25)
CALCIUM SPEC-SCNC: 9.2 MG/DL (ref 8.6–10.5)
CHLORIDE SERPL-SCNC: 98 MMOL/L (ref 98–107)
CO2 SERPL-SCNC: 26.6 MMOL/L (ref 22–29)
CREAT SERPL-MCNC: 0.8 MG/DL (ref 0.76–1.27)
DEPRECATED RDW RBC AUTO: 44 FL (ref 37–54)
EGFRCR SERPLBLD CKD-EPI 2021: 89.5 ML/MIN/1.73
EOSINOPHIL # BLD AUTO: 0.37 10*3/MM3 (ref 0–0.4)
EOSINOPHIL NFR BLD AUTO: 1.7 % (ref 0.3–6.2)
ERYTHROCYTE [DISTWIDTH] IN BLOOD BY AUTOMATED COUNT: 13 % (ref 12.3–15.4)
GLUCOSE BLDC GLUCOMTR-MCNC: 126 MG/DL (ref 70–99)
GLUCOSE BLDC GLUCOMTR-MCNC: 150 MG/DL (ref 70–99)
GLUCOSE BLDC GLUCOMTR-MCNC: 185 MG/DL (ref 70–99)
GLUCOSE BLDC GLUCOMTR-MCNC: 197 MG/DL (ref 70–99)
GLUCOSE SERPL-MCNC: 217 MG/DL (ref 65–99)
HCT VFR BLD AUTO: 39.7 % (ref 37.5–51)
HGB BLD-MCNC: 13.3 G/DL (ref 13–17.7)
IMM GRANULOCYTES # BLD AUTO: 0.12 10*3/MM3 (ref 0–0.05)
IMM GRANULOCYTES NFR BLD AUTO: 0.6 % (ref 0–0.5)
LYMPHOCYTES # BLD AUTO: 0.7 10*3/MM3 (ref 0.7–3.1)
LYMPHOCYTES NFR BLD AUTO: 3.2 % (ref 19.6–45.3)
MAGNESIUM SERPL-MCNC: 1.7 MG/DL (ref 1.6–2.4)
MCH RBC QN AUTO: 31.1 PG (ref 26.6–33)
MCHC RBC AUTO-ENTMCNC: 33.5 G/DL (ref 31.5–35.7)
MCV RBC AUTO: 93 FL (ref 79–97)
MONOCYTES # BLD AUTO: 1.78 10*3/MM3 (ref 0.1–0.9)
MONOCYTES NFR BLD AUTO: 8.2 % (ref 5–12)
NEUTROPHILS NFR BLD AUTO: 18.58 10*3/MM3 (ref 1.7–7)
NEUTROPHILS NFR BLD AUTO: 86.1 % (ref 42.7–76)
NRBC BLD AUTO-RTO: 0 /100 WBC (ref 0–0.2)
PLATELET # BLD AUTO: 286 10*3/MM3 (ref 140–450)
PMV BLD AUTO: 9.9 FL (ref 6–12)
POTASSIUM SERPL-SCNC: 4 MMOL/L (ref 3.5–5.2)
PROT SERPL-MCNC: 6.1 G/DL (ref 6–8.5)
QT INTERVAL: 376 MS
QT INTERVAL: 377 MS
QTC INTERVAL: 424 MS
QTC INTERVAL: 435 MS
RBC # BLD AUTO: 4.27 10*6/MM3 (ref 4.14–5.8)
SODIUM SERPL-SCNC: 133 MMOL/L (ref 136–145)
WBC NRBC COR # BLD AUTO: 21.59 10*3/MM3 (ref 3.4–10.8)

## 2024-11-10 PROCEDURE — 82948 REAGENT STRIP/BLOOD GLUCOSE: CPT | Performed by: STUDENT IN AN ORGANIZED HEALTH CARE EDUCATION/TRAINING PROGRAM

## 2024-11-10 PROCEDURE — 63710000001 HYDROCODONE-ACETAMINOPHEN 5-325 MG TABLET: Performed by: INTERNAL MEDICINE

## 2024-11-10 PROCEDURE — 25010000002 PIPERACILLIN SOD-TAZOBACTAM PER 1 G: Performed by: SURGERY

## 2024-11-10 PROCEDURE — A9270 NON-COVERED ITEM OR SERVICE: HCPCS | Performed by: STUDENT IN AN ORGANIZED HEALTH CARE EDUCATION/TRAINING PROGRAM

## 2024-11-10 PROCEDURE — 63710000001 BUSPIRONE 15 MG TABLET: Performed by: STUDENT IN AN ORGANIZED HEALTH CARE EDUCATION/TRAINING PROGRAM

## 2024-11-10 PROCEDURE — 99214 OFFICE O/P EST MOD 30 MIN: CPT | Performed by: INTERNAL MEDICINE

## 2024-11-10 PROCEDURE — 94761 N-INVAS EAR/PLS OXIMETRY MLT: CPT

## 2024-11-10 PROCEDURE — 25810000003 LACTATED RINGERS PER 1000 ML: Performed by: PHYSICIAN ASSISTANT

## 2024-11-10 PROCEDURE — 25010000002 MAGNESIUM SULFATE IN D5W 1G/100ML (PREMIX) 1-5 GM/100ML-% SOLUTION: Performed by: PHYSICIAN ASSISTANT

## 2024-11-10 PROCEDURE — 83735 ASSAY OF MAGNESIUM: CPT | Performed by: STUDENT IN AN ORGANIZED HEALTH CARE EDUCATION/TRAINING PROGRAM

## 2024-11-10 PROCEDURE — 82948 REAGENT STRIP/BLOOD GLUCOSE: CPT

## 2024-11-10 PROCEDURE — 80076 HEPATIC FUNCTION PANEL: CPT | Performed by: SURGERY

## 2024-11-10 PROCEDURE — 25810000003 LACTATED RINGERS PER 1000 ML: Performed by: STUDENT IN AN ORGANIZED HEALTH CARE EDUCATION/TRAINING PROGRAM

## 2024-11-10 PROCEDURE — 99024 POSTOP FOLLOW-UP VISIT: CPT | Performed by: SURGERY

## 2024-11-10 PROCEDURE — 63710000001 INSULIN GLARGINE PER 5 UNITS: Performed by: STUDENT IN AN ORGANIZED HEALTH CARE EDUCATION/TRAINING PROGRAM

## 2024-11-10 PROCEDURE — 63710000001 GABAPENTIN 100 MG CAPSULE: Performed by: STUDENT IN AN ORGANIZED HEALTH CARE EDUCATION/TRAINING PROGRAM

## 2024-11-10 PROCEDURE — 85025 COMPLETE CBC W/AUTO DIFF WBC: CPT | Performed by: STUDENT IN AN ORGANIZED HEALTH CARE EDUCATION/TRAINING PROGRAM

## 2024-11-10 PROCEDURE — 63710000001 OXYBUTYNIN XL 5 MG TABLET SUSTAINED-RELEASE 24 HOUR: Performed by: STUDENT IN AN ORGANIZED HEALTH CARE EDUCATION/TRAINING PROGRAM

## 2024-11-10 PROCEDURE — A9270 NON-COVERED ITEM OR SERVICE: HCPCS | Performed by: INTERNAL MEDICINE

## 2024-11-10 PROCEDURE — 63710000001 FLUOXETINE 20 MG CAPSULE: Performed by: STUDENT IN AN ORGANIZED HEALTH CARE EDUCATION/TRAINING PROGRAM

## 2024-11-10 PROCEDURE — 63710000001 INSULIN LISPRO (HUMAN) PER 5 UNITS: Performed by: STUDENT IN AN ORGANIZED HEALTH CARE EDUCATION/TRAINING PROGRAM

## 2024-11-10 PROCEDURE — 80048 BASIC METABOLIC PNL TOTAL CA: CPT | Performed by: STUDENT IN AN ORGANIZED HEALTH CARE EDUCATION/TRAINING PROGRAM

## 2024-11-10 PROCEDURE — 94799 UNLISTED PULMONARY SVC/PX: CPT

## 2024-11-10 PROCEDURE — 63710000001 FAMOTIDINE 20 MG TABLET: Performed by: STUDENT IN AN ORGANIZED HEALTH CARE EDUCATION/TRAINING PROGRAM

## 2024-11-10 RX ORDER — AMLODIPINE BESYLATE 5 MG/1
5 TABLET ORAL
Status: DISCONTINUED | OUTPATIENT
Start: 2024-11-10 | End: 2024-11-10

## 2024-11-10 RX ORDER — MAGNESIUM SULFATE 1 G/100ML
1 INJECTION INTRAVENOUS ONCE
Status: COMPLETED | OUTPATIENT
Start: 2024-11-10 | End: 2024-11-10

## 2024-11-10 RX ORDER — NICOTINE POLACRILEX 4 MG
15 LOZENGE BUCCAL
Status: DISCONTINUED | OUTPATIENT
Start: 2024-11-10 | End: 2024-11-11 | Stop reason: HOSPADM

## 2024-11-10 RX ORDER — HYDROCODONE BITARTRATE AND ACETAMINOPHEN 5; 325 MG/1; MG/1
1 TABLET ORAL EVERY 6 HOURS PRN
Status: DISCONTINUED | OUTPATIENT
Start: 2024-11-10 | End: 2024-11-11 | Stop reason: HOSPADM

## 2024-11-10 RX ORDER — SODIUM CHLORIDE, SODIUM LACTATE, POTASSIUM CHLORIDE, CALCIUM CHLORIDE 600; 310; 30; 20 MG/100ML; MG/100ML; MG/100ML; MG/100ML
75 INJECTION, SOLUTION INTRAVENOUS CONTINUOUS
Status: ACTIVE | OUTPATIENT
Start: 2024-11-10 | End: 2024-11-10

## 2024-11-10 RX ORDER — INSULIN LISPRO 100 [IU]/ML
3 INJECTION, SOLUTION INTRAVENOUS; SUBCUTANEOUS
Status: DISCONTINUED | OUTPATIENT
Start: 2024-11-10 | End: 2024-11-11 | Stop reason: HOSPADM

## 2024-11-10 RX ORDER — IBUPROFEN 600 MG/1
1 TABLET ORAL
Status: DISCONTINUED | OUTPATIENT
Start: 2024-11-10 | End: 2024-11-11 | Stop reason: HOSPADM

## 2024-11-10 RX ORDER — DEXTROSE MONOHYDRATE 25 G/50ML
25 INJECTION, SOLUTION INTRAVENOUS
Status: DISCONTINUED | OUTPATIENT
Start: 2024-11-10 | End: 2024-11-11 | Stop reason: HOSPADM

## 2024-11-10 RX ADMIN — HYDROCODONE BITARTRATE AND ACETAMINOPHEN 1 TABLET: 5; 325 TABLET ORAL at 11:56

## 2024-11-10 RX ADMIN — INSULIN LISPRO 2 UNITS: 100 INJECTION, SOLUTION INTRAVENOUS; SUBCUTANEOUS at 07:44

## 2024-11-10 RX ADMIN — OXYBUTYNIN CHLORIDE 10 MG: 5 TABLET, EXTENDED RELEASE ORAL at 08:07

## 2024-11-10 RX ADMIN — PIPERACILLIN AND TAZOBACTAM 3.38 G: 3; .375 INJECTION, POWDER, FOR SOLUTION INTRAVENOUS at 17:38

## 2024-11-10 RX ADMIN — INSULIN LISPRO 3 UNITS: 100 INJECTION, SOLUTION INTRAVENOUS; SUBCUTANEOUS at 17:39

## 2024-11-10 RX ADMIN — INSULIN LISPRO 2 UNITS: 100 INJECTION, SOLUTION INTRAVENOUS; SUBCUTANEOUS at 17:38

## 2024-11-10 RX ADMIN — SODIUM CHLORIDE, POTASSIUM CHLORIDE, SODIUM LACTATE AND CALCIUM CHLORIDE 75 ML/HR: 600; 310; 30; 20 INJECTION, SOLUTION INTRAVENOUS at 08:07

## 2024-11-10 RX ADMIN — GABAPENTIN 100 MG: 100 CAPSULE ORAL at 20:44

## 2024-11-10 RX ADMIN — PIPERACILLIN AND TAZOBACTAM 3.38 G: 3; .375 INJECTION, POWDER, FOR SOLUTION INTRAVENOUS at 11:55

## 2024-11-10 RX ADMIN — INSULIN LISPRO 3 UNITS: 100 INJECTION, SOLUTION INTRAVENOUS; SUBCUTANEOUS at 07:46

## 2024-11-10 RX ADMIN — FAMOTIDINE 20 MG: 20 TABLET ORAL at 20:45

## 2024-11-10 RX ADMIN — BUSPIRONE HYDROCHLORIDE 15 MG: 15 TABLET ORAL at 06:35

## 2024-11-10 RX ADMIN — HYDROCODONE BITARTRATE AND ACETAMINOPHEN 1 TABLET: 5; 325 TABLET ORAL at 18:16

## 2024-11-10 RX ADMIN — PIPERACILLIN AND TAZOBACTAM 3.38 G: 3; .375 INJECTION, POWDER, FOR SOLUTION INTRAVENOUS at 01:44

## 2024-11-10 RX ADMIN — FLUOXETINE HYDROCHLORIDE 40 MG: 20 CAPSULE ORAL at 08:07

## 2024-11-10 RX ADMIN — MAGNESIUM SULFATE HEPTAHYDRATE 1 G: 10 INJECTION, SOLUTION INTRAVENOUS at 08:07

## 2024-11-10 RX ADMIN — INSULIN GLARGINE 10 UNITS: 100 INJECTION, SOLUTION SUBCUTANEOUS at 07:44

## 2024-11-10 NOTE — ANESTHESIA POSTPROCEDURE EVALUATION
Patient: Jamir Porter    Procedure Summary       Date: 11/09/24 Room / Location: [facility] OR 08 / [facility] MAIN OR    Anesthesia Start: 1524 Anesthesia Stop: 1730    Procedure: CHOLECYSTECTOMY LAPAROSCOPIC WITH DAVINCI ROBOT POSSIBLE OPEN- removal of gallbladder (Abdomen) Diagnosis:       Acute cholecystitis      (Acute cholecystitis [K81.0])    Surgeons: Sarah Bo MD Provider: Andres Salter MD    Anesthesia Type: general ASA Status: 3 - Emergent            Anesthesia Type: general    Vitals  Vitals Value Taken Time   /66 11/09/24 1850   Temp 36.7 °C (98 °F) 11/09/24 1825   Pulse 83 11/09/24 1850   Resp 16 11/09/24 1850   SpO2 94 % 11/09/24 1850           Post Anesthesia Care and Evaluation    Patient location during evaluation: bedside  Patient participation: complete - patient participated  Level of consciousness: awake  Pain management: adequate    Airway patency: patent  PONV Status: none  Cardiovascular status: acceptable and stable  Respiratory status: acceptable  Hydration status: acceptable

## 2024-11-10 NOTE — PROGRESS NOTES
LOS: 0 days   Patient Care Team:  Amaury Mcdonough Jr., MD as PCP - General (Internal Medicine)  Lenard Ray MD as Consulting Physician (Urology)  Indu Carter APRN as Nurse Practitioner (Endocrinology)      Subjective     Interval History:   Doing well, no new issues, tolerating diet, drain with serosang output    Objective     Vital Signs  Temp:  [98 °F (36.7 °C)-98.8 °F (37.1 °C)] 98.8 °F (37.1 °C)  Heart Rate:  [69-94] 77  Resp:  [16-18] 18  BP: (115-144)/(66-81) 137/72    Physical Exam:  NAD  Abd soft, nd, inc ok, drain ok     Results Review:     I reviewed the patient's new clinical results.      Assessment & Plan       Acute cholecystitis    Cholecystitis        Plan:  Keep drain  Continue abx  Advance diet   Hopefully home leanna if wbc down

## 2024-11-10 NOTE — PLAN OF CARE
Goal Outcome Evaluation:           Progress: improving  Outcome Evaluation: Pattient alert and oriented, vital signs stable. Patient needs asssitance to turn and reposition in the bed. Patient c/o abdominal pain 8/10 withactivity. MD notified, PRN pain medications administered as ordered. Blood glucose monitored. Patient unable to urinate, over 700 ml shown n post void bladder scan. Intermittent catheter inserted, 950 ml drained. Patient reports relief from bladder discomfort.

## 2024-11-10 NOTE — PLAN OF CARE
Goal Outcome Evaluation:  Plan of Care Reviewed With: patient        Progress: improving  Outcome Evaluation: patient a/ox4, lap sites with slight shadowing. ZORA drain with 65 ml serosanguinous output. voiding in urinal. call light within reach. patient encouraged to call for any assistance. Continue plan of care.

## 2024-11-10 NOTE — PROGRESS NOTES
[facility]   Hospitalist Progress Note  Date: 11/10/2024  Patient Name: Jamir Porter  : 1945  MRN: [id number]  Date of admission: 2024      Subjective   Subjective     Chief Complaint: Abdominal pain    Summary: Patient is 79-year-old male past medical history significant for type 2 diabetes, obesity, BPH, depression, hypertension, history of right BKA presents the presents to the emergency department with chief complaint of abdominal pain evaluated in the emergency department was noted to have CT scan of the abdomen pelvis with findings of acute cholecystitis patient was taken directly to the operating room patient had laparoscopic cholecystectomy with findings of necrotizing cholecystitis ZORA drain placed has been admitted to the hospital service on antibiotics with Zosyn having some urinary retention    Interval Followup: Patient seen and examined resting comfortably remains on Zosyn having some urinary retention started on Flomax required straight catheterization also with some pain started on oral analgesics with Norco Zofran for nausea repeating labs tomorrow diet per surgery      Objective   Objective     Vitals:   Temp:  [98 °F (36.7 °C)-98.8 °F (37.1 °C)] 98.8 °F (37.1 °C)  Heart Rate:  [69-94] 77  Resp:  [16-18] 18  BP: (115-144)/(66-81) 137/72  Flow (L/min) (Oxygen Therapy):  [2] 2    Physical Exam   Constitutional: Awake alert oriented no acute distress  Respiratory: Clear  Cardiovascular: RRR  GI: Abdomen is mildly distended appropriate postoperative tenderness    Result Review    Result Review:  I have reviewed the following:  [x]  Laboratory  CBC          2024    10:53 2024    02:48 11/10/2024    04:20   CBC   WBC 8.39  14.99  21.59    RBC 4.51  4.32  4.27    Hemoglobin 14.6  13.9  13.3    Hematocrit 43.7  38.9  39.7    MCV 96.9  90.0  93.0    MCH 32.4  32.2  31.1    MCHC 33.4  35.7  33.5    RDW 11.9  12.6  13.0    Platelets 303  294  286      CMP          2024     10:53 11/9/2024    02:48 11/10/2024    04:20   CMP   Glucose 195  198  217    BUN 16  19  13    Creatinine 0.95  0.82  0.80    EGFR 80.9  88.8  89.5    Sodium 138  136  133    Potassium 3.4  3.4  4.0    Chloride 99  99  98    Calcium 9.5  9.3  9.2    Total Protein 7.3  7.1  6.1    Albumin 4.2  3.9  3.2    Globulin 3.1  3.2     Total Bilirubin 0.4  0.3  0.7    Alkaline Phosphatase 96  103  102    AST (SGOT) 27  20  69    ALT (SGPT) 23  22  67    Albumin/Globulin Ratio 1.4  1.2     BUN/Creatinine Ratio 16.8  23.2  16.3    Anion Gap 13.6  10.8  8.4        []  Microbiology  []  Radiology  []  EKG/Telemetry   []  Cardiology/Vascular   []  Pathology  []  Old records  []  Other:    Assessment & Plan   Assessment / Plan     Assessment:    Acute necrotizing cholecystitis status post laparoscopic cholecystectomy  Urinary retention  Anxiety/depression  History of right-sided BKA  BPH  Hypothyroidism  Neuropathy  Hypertension  Hyperlipidemia    Plan:    IV Zosyn  Diet per surgery  ZORA drain per surgery  Start on Flomax straight cath as needed for PVR greater than 400  Start oral analgesics with Norco  Zofran for nausea  Repeat a.m. labs  General Surgery consulted recommendations appreciated  Further treatment contingent upon his hospital course     Discussed plan with RN.    VTE Prophylaxis:  Mechanical VTE prophylaxis orders are present.        CODE STATUS:   Code Status (Patient has no pulse and is not breathing): CPR (Attempt to Resuscitate)  Medical Interventions (Patient has pulse or is breathing): Full Support        Electronically signed by MINGO Milligan, 11/10/24, 2:13 PM EST.         Attending Documentation:  Patient independently seen and evaluated, above documentation reflects plan put forth during bedside rounds.  More than 51% of the time of this patient encounter was performed by me. I discussed the care plan with BRITTANIE Painter PA-C, I agree with his findings and plan as documented, what I have added to the  care plan and modified is as follows in my documentation and my medical decision making; 79-year-old male underwent cholecystectomy for acute necrotizing cholecystitis.  Will continue on IV antibiotics.  He has some urinary retention, started on Flomax.  Will straight cath if needed but hopefully that will resolve.  Possible discharge tomorrow  Electronically signed by Ken Saldana MD, 11/10/24, 3:03 PM EST.

## 2024-11-11 ENCOUNTER — TELEPHONE (OUTPATIENT)
Dept: UROLOGY | Age: 79
End: 2024-11-11
Payer: MEDICARE

## 2024-11-11 VITALS
DIASTOLIC BLOOD PRESSURE: 69 MMHG | RESPIRATION RATE: 18 BRPM | TEMPERATURE: 98.8 F | BODY MASS INDEX: 36.8 KG/M2 | WEIGHT: 248.46 LBS | HEART RATE: 80 BPM | OXYGEN SATURATION: 92 % | HEIGHT: 69 IN | SYSTOLIC BLOOD PRESSURE: 119 MMHG

## 2024-11-11 PROBLEM — K81.0 ACUTE CHOLECYSTITIS: Status: RESOLVED | Noted: 2024-11-09 | Resolved: 2024-11-11

## 2024-11-11 PROBLEM — Z98.890 S/P ROBOT-ASSISTED SURGICAL PROCEDURE: Status: ACTIVE | Noted: 2024-11-11

## 2024-11-11 PROBLEM — K81.9 CHOLECYSTITIS: Status: RESOLVED | Noted: 2024-11-09 | Resolved: 2024-11-11

## 2024-11-11 LAB
ALBUMIN SERPL-MCNC: 2.9 G/DL (ref 3.5–5.2)
ALBUMIN/GLOB SERPL: 1 G/DL
ALP SERPL-CCNC: 102 U/L (ref 39–117)
ALT SERPL W P-5'-P-CCNC: 55 U/L (ref 1–41)
ANION GAP SERPL CALCULATED.3IONS-SCNC: 8.9 MMOL/L (ref 5–15)
AST SERPL-CCNC: 67 U/L (ref 1–40)
BASOPHILS # BLD AUTO: 0.08 10*3/MM3 (ref 0–0.2)
BASOPHILS NFR BLD AUTO: 0.5 % (ref 0–1.5)
BILIRUB CONJ SERPL-MCNC: 0.2 MG/DL (ref 0–0.3)
BILIRUB SERPL-MCNC: 0.4 MG/DL (ref 0–1.2)
BUN SERPL-MCNC: 12 MG/DL (ref 8–23)
BUN/CREAT SERPL: 16.9 (ref 7–25)
CALCIUM SPEC-SCNC: 8.5 MG/DL (ref 8.6–10.5)
CHLORIDE SERPL-SCNC: 102 MMOL/L (ref 98–107)
CO2 SERPL-SCNC: 27.1 MMOL/L (ref 22–29)
CREAT SERPL-MCNC: 0.71 MG/DL (ref 0.76–1.27)
DEPRECATED RDW RBC AUTO: 46.2 FL (ref 37–54)
EGFRCR SERPLBLD CKD-EPI 2021: 92.7 ML/MIN/1.73
EOSINOPHIL # BLD AUTO: 0.31 10*3/MM3 (ref 0–0.4)
EOSINOPHIL NFR BLD AUTO: 2 % (ref 0.3–6.2)
ERYTHROCYTE [DISTWIDTH] IN BLOOD BY AUTOMATED COUNT: 13.4 % (ref 12.3–15.4)
GLOBULIN UR ELPH-MCNC: 2.9 GM/DL
GLUCOSE BLDC GLUCOMTR-MCNC: 180 MG/DL (ref 70–99)
GLUCOSE BLDC GLUCOMTR-MCNC: 93 MG/DL (ref 70–99)
GLUCOSE SERPL-MCNC: 111 MG/DL (ref 65–99)
HCT VFR BLD AUTO: 36.2 % (ref 37.5–51)
HGB BLD-MCNC: 12.1 G/DL (ref 13–17.7)
IMM GRANULOCYTES # BLD AUTO: 0.12 10*3/MM3 (ref 0–0.05)
IMM GRANULOCYTES NFR BLD AUTO: 0.8 % (ref 0–0.5)
LYMPHOCYTES # BLD AUTO: 1.44 10*3/MM3 (ref 0.7–3.1)
LYMPHOCYTES NFR BLD AUTO: 9.2 % (ref 19.6–45.3)
MAGNESIUM SERPL-MCNC: 1.9 MG/DL (ref 1.6–2.4)
MCH RBC QN AUTO: 31.7 PG (ref 26.6–33)
MCHC RBC AUTO-ENTMCNC: 33.4 G/DL (ref 31.5–35.7)
MCV RBC AUTO: 94.8 FL (ref 79–97)
MONOCYTES # BLD AUTO: 1.63 10*3/MM3 (ref 0.1–0.9)
MONOCYTES NFR BLD AUTO: 10.4 % (ref 5–12)
NEUTROPHILS NFR BLD AUTO: 12.1 10*3/MM3 (ref 1.7–7)
NEUTROPHILS NFR BLD AUTO: 77.1 % (ref 42.7–76)
NRBC BLD AUTO-RTO: 0 /100 WBC (ref 0–0.2)
PHOSPHATE SERPL-MCNC: 2.9 MG/DL (ref 2.5–4.5)
PLATELET # BLD AUTO: 276 10*3/MM3 (ref 140–450)
PMV BLD AUTO: 9.8 FL (ref 6–12)
POTASSIUM SERPL-SCNC: 3.7 MMOL/L (ref 3.5–5.2)
PROT SERPL-MCNC: 5.8 G/DL (ref 6–8.5)
RBC # BLD AUTO: 3.82 10*6/MM3 (ref 4.14–5.8)
SODIUM SERPL-SCNC: 138 MMOL/L (ref 136–145)
WBC NRBC COR # BLD AUTO: 15.68 10*3/MM3 (ref 3.4–10.8)

## 2024-11-11 PROCEDURE — 80053 COMPREHEN METABOLIC PANEL: CPT | Performed by: SURGERY

## 2024-11-11 PROCEDURE — 97161 PT EVAL LOW COMPLEX 20 MIN: CPT

## 2024-11-11 PROCEDURE — 82948 REAGENT STRIP/BLOOD GLUCOSE: CPT

## 2024-11-11 PROCEDURE — 85025 COMPLETE CBC W/AUTO DIFF WBC: CPT | Performed by: STUDENT IN AN ORGANIZED HEALTH CARE EDUCATION/TRAINING PROGRAM

## 2024-11-11 PROCEDURE — 63710000001 OXYBUTYNIN XL 5 MG TABLET SUSTAINED-RELEASE 24 HOUR: Performed by: STUDENT IN AN ORGANIZED HEALTH CARE EDUCATION/TRAINING PROGRAM

## 2024-11-11 PROCEDURE — A9270 NON-COVERED ITEM OR SERVICE: HCPCS | Performed by: INTERNAL MEDICINE

## 2024-11-11 PROCEDURE — 99024 POSTOP FOLLOW-UP VISIT: CPT | Performed by: NURSE PRACTITIONER

## 2024-11-11 PROCEDURE — 63710000001 POLYETHYLENE GLYCOL 17 G PACK: Performed by: SURGERY

## 2024-11-11 PROCEDURE — A9270 NON-COVERED ITEM OR SERVICE: HCPCS | Performed by: STUDENT IN AN ORGANIZED HEALTH CARE EDUCATION/TRAINING PROGRAM

## 2024-11-11 PROCEDURE — 83735 ASSAY OF MAGNESIUM: CPT | Performed by: STUDENT IN AN ORGANIZED HEALTH CARE EDUCATION/TRAINING PROGRAM

## 2024-11-11 PROCEDURE — 25010000002 PIPERACILLIN SOD-TAZOBACTAM PER 1 G: Performed by: SURGERY

## 2024-11-11 PROCEDURE — 63710000001 INSULIN LISPRO (HUMAN) PER 5 UNITS: Performed by: STUDENT IN AN ORGANIZED HEALTH CARE EDUCATION/TRAINING PROGRAM

## 2024-11-11 PROCEDURE — 63710000001 FLUOXETINE 20 MG CAPSULE: Performed by: STUDENT IN AN ORGANIZED HEALTH CARE EDUCATION/TRAINING PROGRAM

## 2024-11-11 PROCEDURE — 84100 ASSAY OF PHOSPHORUS: CPT | Performed by: PHYSICIAN ASSISTANT

## 2024-11-11 PROCEDURE — 82248 BILIRUBIN DIRECT: CPT | Performed by: PHYSICIAN ASSISTANT

## 2024-11-11 PROCEDURE — 82948 REAGENT STRIP/BLOOD GLUCOSE: CPT | Performed by: STUDENT IN AN ORGANIZED HEALTH CARE EDUCATION/TRAINING PROGRAM

## 2024-11-11 PROCEDURE — A9270 NON-COVERED ITEM OR SERVICE: HCPCS | Performed by: SURGERY

## 2024-11-11 PROCEDURE — 99239 HOSP IP/OBS DSCHRG MGMT >30: CPT | Performed by: INTERNAL MEDICINE

## 2024-11-11 PROCEDURE — 63710000001 HYDROCODONE-ACETAMINOPHEN 5-325 MG TABLET: Performed by: INTERNAL MEDICINE

## 2024-11-11 PROCEDURE — 63710000001 BUSPIRONE 15 MG TABLET: Performed by: STUDENT IN AN ORGANIZED HEALTH CARE EDUCATION/TRAINING PROGRAM

## 2024-11-11 RX ORDER — DOCUSATE SODIUM 100 MG/1
100 CAPSULE, LIQUID FILLED ORAL 2 TIMES DAILY
Qty: 10 CAPSULE | Refills: 0 | Status: SHIPPED | OUTPATIENT
Start: 2024-11-11 | End: 2024-11-16

## 2024-11-11 RX ORDER — TAMSULOSIN HYDROCHLORIDE 0.4 MG/1
1 CAPSULE ORAL DAILY
Qty: 30 CAPSULE | Refills: 0 | Status: SHIPPED | OUTPATIENT
Start: 2024-11-11 | End: 2024-12-11

## 2024-11-11 RX ORDER — AMOXICILLIN 250 MG
2 CAPSULE ORAL 2 TIMES DAILY PRN
Status: DISCONTINUED | OUTPATIENT
Start: 2024-11-11 | End: 2024-11-11 | Stop reason: HOSPADM

## 2024-11-11 RX ORDER — POLYETHYLENE GLYCOL 3350 17 G/17G
17 POWDER, FOR SOLUTION ORAL DAILY
Qty: 7 PACKET | Refills: 0 | Status: SHIPPED | OUTPATIENT
Start: 2024-11-11

## 2024-11-11 RX ORDER — HYDROCODONE BITARTRATE AND ACETAMINOPHEN 5; 325 MG/1; MG/1
1 TABLET ORAL EVERY 4 HOURS PRN
Qty: 18 TABLET | Refills: 0 | Status: SHIPPED | OUTPATIENT
Start: 2024-11-11 | End: 2024-11-14

## 2024-11-11 RX ORDER — BISACODYL 10 MG
10 SUPPOSITORY, RECTAL RECTAL DAILY PRN
Status: DISCONTINUED | OUTPATIENT
Start: 2024-11-11 | End: 2024-11-11 | Stop reason: HOSPADM

## 2024-11-11 RX ORDER — BISACODYL 5 MG/1
5 TABLET, DELAYED RELEASE ORAL DAILY PRN
Status: DISCONTINUED | OUTPATIENT
Start: 2024-11-11 | End: 2024-11-11 | Stop reason: HOSPADM

## 2024-11-11 RX ORDER — POLYETHYLENE GLYCOL 3350 17 G/17G
17 POWDER, FOR SOLUTION ORAL DAILY PRN
Status: DISCONTINUED | OUTPATIENT
Start: 2024-11-11 | End: 2024-11-11 | Stop reason: HOSPADM

## 2024-11-11 RX ADMIN — PIPERACILLIN AND TAZOBACTAM 3.38 G: 3; .375 INJECTION, POWDER, FOR SOLUTION INTRAVENOUS at 10:30

## 2024-11-11 RX ADMIN — BUSPIRONE HYDROCHLORIDE 15 MG: 15 TABLET ORAL at 06:36

## 2024-11-11 RX ADMIN — POLYETHYLENE GLYCOL 3350 17 G: 17 POWDER, FOR SOLUTION ORAL at 10:30

## 2024-11-11 RX ADMIN — INSULIN LISPRO 3 UNITS: 100 INJECTION, SOLUTION INTRAVENOUS; SUBCUTANEOUS at 12:06

## 2024-11-11 RX ADMIN — INSULIN LISPRO 2 UNITS: 100 INJECTION, SOLUTION INTRAVENOUS; SUBCUTANEOUS at 12:05

## 2024-11-11 RX ADMIN — HYDROCODONE BITARTRATE AND ACETAMINOPHEN 1 TABLET: 5; 325 TABLET ORAL at 08:48

## 2024-11-11 RX ADMIN — PIPERACILLIN AND TAZOBACTAM 3.38 G: 3; .375 INJECTION, POWDER, FOR SOLUTION INTRAVENOUS at 01:49

## 2024-11-11 RX ADMIN — FLUOXETINE HYDROCHLORIDE 40 MG: 20 CAPSULE ORAL at 08:07

## 2024-11-11 RX ADMIN — HYDROCODONE BITARTRATE AND ACETAMINOPHEN 1 TABLET: 5; 325 TABLET ORAL at 02:11

## 2024-11-11 RX ADMIN — OXYBUTYNIN CHLORIDE 10 MG: 5 TABLET, EXTENDED RELEASE ORAL at 08:07

## 2024-11-11 NOTE — PLAN OF CARE
Goal Outcome Evaluation:              Outcome Evaluation: Pt continued to have difficulty voiding, evening bladder scan showed ~600 mL. In/Out cath attempted and significant resistance met, unable to pass. Per evening hospitalist, Dr. Romero, coude sol cath was ordered. Coude placed with patent urine return after significant difficulty in placement. Pt noted significant relief afterwards. Otherwise, no new/worsening pt complaints noted at this time. Otherwise, no new/worsening nursing concerns noted at this time.

## 2024-11-11 NOTE — THERAPY EVALUATION
Acute Care - Physical Therapy Initial Evaluation   [place]     Patient Name: Jamir Porter  : 1945  MRN: [id number]  Today's Date: 2024      Visit Dx:     ICD-10-CM ICD-9-CM   1. Acute cholecystitis  K81.0 575.0   2. S/P robotic  cholecystectomy  Z98.890 V45.89   3. Difficulty walking  R26.2 719.7     Patient Active Problem List   Diagnosis    Prostate cancer    Allergic rhinitis    Arthritis    Balance problem    Broken bones    Cancer    Cataracts, bilateral    Chronic sinusitis    Colon polyps    DDD (degenerative disc disease), lumbar    Diverticulitis    Headache    Mixed hyperlipidemia    Primary hypertension    Limb swelling    Low back pain    Prostate disorder    Sleep apnea    Type 2 diabetes mellitus with hyperglycemia, without long-term current use of insulin    Urge incontinence    Anxiety    History of left knee replacement    Left knee pain    Urinary retention    Benign prostatic hyperplasia with urinary retention    Lesion of bladder    Acquired hypothyroidism    Benign prostatic hyperplasia with lower urinary tract symptoms    S/P robotic  cholecystectomy     Past Medical History:   Diagnosis Date    Allergic 1980    Scotch broom    Allergic rhinitis     Anxiety and depression     Arthritis     Balance problem     DDD BACK NEUROMA , CANE HELPS    Benign prostatic hyperplasia Monitored    Cataract     corrected by surgery    Chronic sinusitis     NO CURRENT ISSUES    Claustrophobia     Colon polyps     NO CANCER    DDD (degenerative disc disease), cervical     C6/7 FULL ROM    Diabetes insipidus     Diabetes mellitus     Disease of thyroid gland     Diverticulitis     NO CURRENT ISSUES    Erectile dysfunction Sexually inactive    Forgetfulness     H/O degenerative disc disease     Hx of BKA, right     Hyperlipemia     Hypertension     Limb swelling     OCCASSIONAL    Lumbago     Multiple endocrine neoplasia     Plantar nerve lesion, left     RIGHT LEG BKA    Prostate CA     Sleep  apnea     REPAIRED WITH SURGERY    Testosterone deficiency     Type 2 diabetes mellitus     Urinary incontinence      Past Surgical History:   Procedure Laterality Date    ADENOIDECTOMY  1950    AMPUTATION Right 2012    R BKA    BACK SURGERY      LUMBAR MULTIPLE ABLATIONS PRIOR TO FUSION    BUNIONECTOMY N/A 12/28/2023    Procedure: FUENTES'S NEUROMA EXCISION;  Surgeon: Phillip Munguia DPM;  Location: [facility] OR [facility];  Service: Podiatry;  Laterality: N/A;    CHOLECYSTECTOMY N/A 11/9/2024    Procedure: CHOLECYSTECTOMY LAPAROSCOPIC WITH DAVINCI ROBOT- removal of gallbladder;  Surgeon: Sarah Bo MD;  Location: [facility] MAIN OR;  Service: General;  Laterality: N/A;    COLONOSCOPY  2019    DENTAL PROCEDURE      dental surgery     EYE SURGERY Bilateral     eye implant, yes CATARACTS REMOVED    FOOT SURGERY Left     FRACTURE SURGERY  2008    JOINT REPLACEMENT  2017    LUMBAR FUSION  2016    [facility]    LUMBAR PUNCTURE      ORIF FEMUR FRACTURE Right     PROXIMAL    PENILE PROSTHESIS IMPLANT      PILONIDAL CYST / SINUS EXCISION      PROSTATE SURGERY  TURP date on file    ROTATOR CUFF REPAIR Bilateral     SINUS SURGERY  turbinectomy 1994    SPINE SURGERY  Spinal Laminectomy 2012    TONSILLECTOMY  1950    TOOTH EXTRACTION Right 07/30/2024    TOTAL KNEE ARTHROPLASTY Left     TURP / TRANSURETHRAL INCISION / DRAINAGE PROSTATE      following prostate cancer    UVULOPALATOPHARYNGOPLASTY      VASECTOMY  November 1979     PT Assessment (Last 12 Hours)       PT Evaluation and Treatment       Row Name 11/11/24 1130          Physical Therapy Time and Intention    Document Type evaluation  -AV     Mode of Treatment individual therapy;physical therapy  -AV       Row Name 11/11/24 1130          General Information    Patient Profile Reviewed yes  -AV     Patient Observations alert;cooperative;agree to therapy  -AV     Prior Level of Function --  Assist as need donning/doffing RLE prosthesis, otherwise (I) with all ADLs.  Ambulated without an assistive device in the home, [facility] for community mobility. Has RW, canes, crutches to use if needed. No home O2. Was attending outpatient PT 2x/week.  -AV     Equipment Currently Used at Home prosthesis;cane, straight  -AV     Existing Precautions/Restrictions fall  -AV       Row Name 11/11/24 1130          Living Environment    Current Living Arrangements home  -AV     Home Accessibility stairs to enter home;stairs within home  -AV     People in Home spouse  -AV       Row Name 11/11/24 1130          Home Main Entrance    Number of Stairs, Main Entrance none  -AV       Row Name 11/11/24 1130          Stairs Within Home, Primary    Stairs, Within Home, Primary Stair lift to lower level of home  -AV       Row Name 11/11/24 1130          Cognition    Orientation Status (Cognition) oriented x 3  -AV       Row Name 11/11/24 1130          Range of Motion (ROM)    Range of Motion bilateral lower extremities;ROM is WFL  -AV       Row Name 11/11/24 1130          Strength (Manual Muscle Testing)    Strength (Manual Muscle Testing) bilateral lower extremities;strength is WFL  -AV       Row Name 11/11/24 1130          Bed Mobility    Bed Mobility supine-sit  -AV     Supine-Sit [place] (Bed Mobility) minimum assist (75% patient effort)  -AV     Bed Mobility, Safety Issues decreased use of legs for bridging/pushing  -AV     Assistive Device (Bed Mobility) head of bed elevated;bed rails  -AV       Row Name 11/11/24 1130          Transfers    Transfers sit-stand transfer;stand-sit transfer  -AV       Row Name 11/11/24 1130          Sit-Stand Transfer    Sit-Stand [place] (Transfers) contact guard  -AV     Assistive Device (Sit-Stand Transfers) walker, front-wheeled  -AV       Row Name 11/11/24 1130          Stand-Sit Transfer    Stand-Sit [place] (Transfers) contact guard  -AV     Assistive Device (Stand-Sit Transfers) walker, front-wheeled  -AV       Row Name 11/11/24 1130          Gait/Stairs  (Locomotion)    Gait/Stairs Locomotion gait/ambulation independence;gait/ambulation assistive device;distance ambulated  -AV     [place] Level (Gait) contact guard;standby assist  -AV     Assistive Device (Gait) walker, front-wheeled  -AV     Distance in Feet (Gait) 300  -AV       Row Name 11/11/24 1130          Safety Issues/Impairments Affecting Functional Mobility    Impairments Affecting Function (Mobility) balance;endurance/activity tolerance  -AV       Row Name 11/11/24 1130          Balance    Balance Assessment standing dynamic balance  -AV     Dynamic Standing Balance contact guard;standby assist  -AV     Position/Device Used, Standing Balance supported;walker, front-wheeled  -AV       Row Name             Wound 11/09/24 1651 midline abdomen    Wound - Properties Group Placement Date: 11/09/24 -HG Placement Time: 1651 -HG Orientation: midline  -HG Location: abdomen  -HG Primary Wound Type: Incision  -HG, 4 lap sites, one puncture site     Retired Wound - Properties Group Placement Date: 11/09/24 -HG Placement Time: 1651 -HG Orientation: midline  -HG Location: abdomen  -HG Primary Wound Type: Incision  -HG, 4 lap sites, one puncture site     Retired Wound - Properties Group Placement Date: 11/09/24 -HG Placement Time: 1651 -HG Orientation: midline  -HG Location: abdomen  -HG Primary Wound Type: Incision  -HG, 4 lap sites, one puncture site     Retired Wound - Properties Group Date first assessed: 11/09/24 -HG Time first assessed: 1651 -HG Location: abdomen  -HG Primary Wound Type: Incision  -HG, 4 lap sites, one puncture site       Row Name 11/11/24 1130          Plan of Care Review    Plan of Care Reviewed With patient  -AV     Progress no change  -AV     Outcome Evaluation Patient does not present with any significant decline in functional mobility requiring inpatient PT services at this time. Patient safe to return home with assist from spouse as needed and continue outpatient PT services  once medically able. Discharge PT order.  -AV       Row Name 11/11/24 1130          Positioning and Restraints    Pre-Treatment Position in bed  -AV     Post Treatment Position bed  -AV     In Bed sitting EOB;call light within reach;encouraged to call for assist  No alarms active upon therapist entry  -AV       Row Name 11/11/24 1130          Therapy Assessment/Plan (PT)    Criteria for Skilled Interventions Met (PT) no problems identified which require skilled intervention  -AV     Therapy Frequency (PT) evaluation only  -AV       Row Name 11/11/24 1130          PT Evaluation Complexity    History, PT Evaluation Complexity 1-2 personal factors and/or comorbidities  -AV     Examination of Body Systems (PT Eval Complexity) total of 4 or more elements  -AV     Clinical Presentation (PT Evaluation Complexity) stable  -AV     Clinical Decision Making (PT Evaluation Complexity) low complexity  -AV     Overall Complexity (PT Evaluation Complexity) low complexity  -AV       Row Name 11/11/24 1130          Therapy Plan Review/Discharge Plan (PT)    Therapy Plan Review (PT) evaluation/treatment results reviewed;patient  -AV       Row Name 11/11/24 1130          Physical Therapy Goals    Problem Specific Goal Selection (PT) problem specific goal 1, PT  -AV       Row Name 11/11/24 1130          Problem Specific Goal 1 (PT)    Problem Specific Goal 1 (PT) Complete PT evaluation  -AV     Time Frame (Problem Specific Goal 1, PT) 1 day  -AV     Progress/Outcome (Problem Specific Goal 1, PT) goal met  -AV               User Key  (r) = Recorded By, (t) = Taken By, (c) = Cosigned By      Initials Name Provider Type    Pina Burgess, RN Registered Nurse    AV Teja Schulte, PT Physical Therapist                    Physical Therapy Education       Title: PT OT SLP Therapies (In Progress)       Topic: Physical Therapy (In Progress)       Point: Mobility training (Done)       Learning Progress Summary            Patient  Acceptance, E,TB, VU by AV at 11/11/2024 1441                      Point: Home exercise program (Not Started)       Learner Progress:  Not documented in this visit.              Point: Body mechanics (Done)       Learning Progress Summary            Patient Acceptance, E,TB, VU by AV at 11/11/2024 1441                      Point: Precautions (Done)       Learning Progress Summary            Patient Acceptance, E,TB, VU by AV at 11/11/2024 1441                                      User Key       Initials Effective Dates Name Provider Type Discipline     06/11/21 -  Teja Schulte, PT Physical Therapist PT                  PT Recommendation and Plan  Anticipated Discharge Disposition (PT): home with outpatient therapy services  Therapy Frequency (PT): evaluation only  Plan of Care Reviewed With: patient  Progress: no change  Outcome Evaluation: Patient does not present with any significant decline in functional mobility requiring inpatient PT services at this time. Patient safe to return home with assist from spouse as needed and continue outpatient PT services once medically able. Discharge PT order.   Outcome Measures       Row Name 11/11/24 1400             How much help from another person do you currently need...    Turning from your back to your side while in flat bed without using bedrails? 4  -AV      Moving from lying on back to sitting on the side of a flat bed without bedrails? 3  -AV      Moving to and from a bed to a chair (including a wheelchair)? 3  -AV      Standing up from a chair using your arms (e.g., wheelchair, bedside chair)? 3  -AV      Climbing 3-5 steps with a railing? 2  -AV      To walk in hospital room? 3  -AV      AM-PAC 6 Clicks Score (PT) 18  -AV         Functional Assessment    Outcome Measure Options AM-PAC 6 Clicks Basic Mobility (PT)  -AV                User Key  (r) = Recorded By, (t) = Taken By, (c) = Cosigned By      Initials Name Provider Type    AV Teja Schulte, PT  Physical Therapist                     Time Calculation:    PT Charges       Row Name 11/11/24 1440             Time Calculation    PT Received On 11/11/24  -AV         Untimed Charges    PT Eval/Re-eval Minutes 35  -AV         Total Minutes    Untimed Charges Total Minutes 35  -AV       Total Minutes 35  -AV                User Key  (r) = Recorded By, (t) = Taken By, (c) = Cosigned By      Initials Name Provider Type    AV Teja Schulte, PT Physical Therapist                  Therapy Charges for Today       Code Description Service Date Service Provider Modifiers Qty    89017319828 HC PT EVAL LOW COMPLEXITY 3 11/11/2024 Teja Schulte, PT GP 1            PT G-Codes  Outcome Measure Options: AM-PAC 6 Clicks Basic Mobility (PT)  AM-PAC 6 Clicks Score (PT): 18    Teja Schulte, PT  11/11/2024

## 2024-11-11 NOTE — TELEPHONE ENCOUNTER
CALLED PT TO SCHEDULE 11/18 @ 8AM W/ GALLAGHER PER ISA.    NO ANSWER, LMOM    CALLED HOSPITAL AND SPOKE W/ CALEB @ 5E, SHE WILL PROVIDE PATIENT APPT DETAILS. PT DOES NOT NEED TO CALL US BACK.

## 2024-11-11 NOTE — PLAN OF CARE
Goal Outcome Evaluation:              Outcome Evaluation: VSS. UOP, sol in place; d/c with sweta and appt to f/u outpt with Dr Ray. Pain controlled per patient; see MAR. Ambulating in [place] with walker. Discharge home today self care.

## 2024-11-11 NOTE — TELEPHONE ENCOUNTER
Provider: DR GALLAGHER    Caller: [facility]    Relationship to Patient: PROVIDER    Reason for Call: PATIENT DISCHARGING FROM THE HOSPITAL TODAY WITH POST OP URINARY RETENTION, WITH WANG CATH. NEEDS A ONE WEEK APPOINTMENT WITH VOID TRIAL SCHEDULED. PLEASE REACH OUT TO THE PATIENT AT HOME.    HUB AGENT UNABLE TO WARM TRANSFER TO SCHEDULE     When was the patient last seen: DC HOME TODAY    BEST NUMBER [phone]

## 2024-11-11 NOTE — DISCHARGE INSTR - ACTIVITY
Call the office or go to the ER if:  Fever over 101  Flu-like symptoms  Redness, swelling, foul odor, or pus-like drainage from incisions  Uncontrolled pain or bleeding

## 2024-11-11 NOTE — PROGRESS NOTES
"POST OP PROGRESS NOTE     Patient Name:  Jamir Porter  YOB: 1945  [id number]   LOS: 0 days   2 Days Post-Op  Patient Care Team:  Amaury Mcdonough Jr., MD as PCP - General (Internal Medicine)  Lenard Ray MD as Consulting Physician (Urology)  Indu Carter APRN as Nurse Practitioner (Endocrinology)          Subjective     Interval History:   VSS, afebrile, pain controlled, tolerating diet    Review of Systems:    A complete review of systems was performed and all are negative except what is documented in the HPI.       Objective     Constitutional:  well nourished, no acute distress, appears stated age /80 (BP Location: Right arm, Patient Position: Lying)   Pulse 86   Temp 97.7 °F (36.5 °C) (Oral)   Resp 18   Ht 175.3 cm (69\")   Wt 113 kg (248 lb 7.3 oz)   SpO2 92%   BMI 36.69 kg/m²    Eyes:  anicteric sclerae, moist conjunctivae, no lid lag, PERRLA  ENMT:  oropharynx clear, moist mucous membranes, good dentition  Neck:   full ROM, trachea midline, no thyromegaly  Cardiovascular: RRR, S1 and S2 present, no MRG, heart rate 86, no pedal edema  Respiratory: lungs CTA, respirations even and unlabored  GI:  Abdomen soft, appropriately tender, ZORA drain with serosang output, nondistended, no HSM     Skin:  warm and dry, normal turgor, no rashes  Psychiatric:  alert and oriented x3, intact judgment and insight, cooperative          Results Review:       I reviewed the patient's new clinical results including  CBC, BMP.     WBC   Date Value Ref Range Status   11/11/2024 15.68 (H) 3.40 - 10.80 10*3/mm3 Final     RBC   Date Value Ref Range Status   11/11/2024 3.82 (L) 4.14 - 5.80 10*6/mm3 Final     Hemoglobin   Date Value Ref Range Status   11/11/2024 12.1 (L) 13.0 - 17.7 g/dL Final     Hematocrit   Date Value Ref Range Status   11/11/2024 36.2 (L) 37.5 - 51.0 % Final     MCV   Date Value Ref Range Status   11/11/2024 94.8 79.0 - 97.0 fL Final     MCH   Date Value Ref Range " Status   11/11/2024 31.7 26.6 - 33.0 pg Final     MCHC   Date Value Ref Range Status   11/11/2024 33.4 31.5 - 35.7 g/dL Final     RDW   Date Value Ref Range Status   11/11/2024 13.4 12.3 - 15.4 % Final     RDW-SD   Date Value Ref Range Status   11/11/2024 46.2 37.0 - 54.0 fl Final     MPV   Date Value Ref Range Status   11/11/2024 9.8 6.0 - 12.0 fL Final     Platelets   Date Value Ref Range Status   11/11/2024 276 140 - 450 10*3/mm3 Final     Neutrophil %   Date Value Ref Range Status   11/11/2024 77.1 (H) 42.7 - 76.0 % Final     Lymphocyte %   Date Value Ref Range Status   11/11/2024 9.2 (L) 19.6 - 45.3 % Final     Monocyte %   Date Value Ref Range Status   11/11/2024 10.4 5.0 - 12.0 % Final     Eosinophil %   Date Value Ref Range Status   11/11/2024 2.0 0.3 - 6.2 % Final     Basophil %   Date Value Ref Range Status   11/11/2024 0.5 0.0 - 1.5 % Final     Immature Grans %   Date Value Ref Range Status   11/11/2024 0.8 (H) 0.0 - 0.5 % Final     Neutrophils, Absolute   Date Value Ref Range Status   11/11/2024 12.10 (H) 1.70 - 7.00 10*3/mm3 Final     Lymphocytes, Absolute   Date Value Ref Range Status   11/11/2024 1.44 0.70 - 3.10 10*3/mm3 Final     Monocytes, Absolute   Date Value Ref Range Status   11/11/2024 1.63 (H) 0.10 - 0.90 10*3/mm3 Final     Eosinophils, Absolute   Date Value Ref Range Status   11/11/2024 0.31 0.00 - 0.40 10*3/mm3 Final     Basophils, Absolute   Date Value Ref Range Status   11/11/2024 0.08 0.00 - 0.20 10*3/mm3 Final     Immature Grans, Absolute   Date Value Ref Range Status   11/11/2024 0.12 (H) 0.00 - 0.05 10*3/mm3 Final     nRBC   Date Value Ref Range Status   11/11/2024 0.0 0.0 - 0.2 /100 WBC Final         Basic Metabolic Panel    Sodium Sodium   Date Value Ref Range Status   11/11/2024 138 136 - 145 mmol/L Final   11/10/2024 133 (L) 136 - 145 mmol/L Final   11/09/2024 136 136 - 145 mmol/L Final      Potassium Potassium   Date Value Ref Range Status   11/11/2024 3.7 3.5 - 5.2 mmol/L Final  "  11/10/2024 4.0 3.5 - 5.2 mmol/L Final   11/09/2024 3.4 (L) 3.5 - 5.2 mmol/L Final      Chloride Chloride   Date Value Ref Range Status   11/11/2024 102 98 - 107 mmol/L Final   11/10/2024 98 98 - 107 mmol/L Final   11/09/2024 99 98 - 107 mmol/L Final      Bicarbonate No results found for: \"PLASMABICARB\"   BUN BUN   Date Value Ref Range Status   11/11/2024 12 8 - 23 mg/dL Final   11/10/2024 13 8 - 23 mg/dL Final   11/09/2024 19 8 - 23 mg/dL Final      Creatinine Creatinine   Date Value Ref Range Status   11/11/2024 0.71 (L) 0.76 - 1.27 mg/dL Final   11/10/2024 0.80 0.76 - 1.27 mg/dL Final   11/09/2024 0.82 0.76 - 1.27 mg/dL Final      Calcium Calcium   Date Value Ref Range Status   11/11/2024 8.5 (L) 8.6 - 10.5 mg/dL Final   11/10/2024 9.2 8.6 - 10.5 mg/dL Final   11/09/2024 9.3 8.6 - 10.5 mg/dL Final      Glucose      No components found for: \"GLUCOSE.*\"       Lab Results   Component Value Date    GLUCOSE 111 (H) 11/11/2024    BUN 12 11/11/2024    CREATININE 0.71 (L) 11/11/2024     11/11/2024    K 3.7 11/11/2024     11/11/2024    CALCIUM 8.5 (L) 11/11/2024    PROTEINTOT 5.8 (L) 11/11/2024    ALBUMIN 2.9 (L) 11/11/2024    ALT 55 (H) 11/11/2024    AST 67 (H) 11/11/2024    ALKPHOS 102 11/11/2024    BILITOT 0.4 11/11/2024    GLOB 2.9 11/11/2024    AGRATIO 1.0 11/11/2024    BCR 16.9 11/11/2024    ANIONGAP 8.9 11/11/2024    EGFR 92.7 11/11/2024       IMAGING:  Imaging Results (Last 72 Hours)       Procedure Component Value Units Date/Time    CT Abdomen Pelvis With Contrast [[id number]] Collected: 11/09/24 1154     Updated: 11/09/24 1221    Narrative:      CT ABDOMEN PELVIS W CONTRAST    Date of Exam: 11/9/2024 11:35 AM EST    Indication: Right upper quadrant pain  abdominal pain.    Comparison: 4/6/2021 and prior    Technique: Axial CT images were obtained of the abdomen and pelvis after the uneventful intravenous administration of iodinated contrast. Reconstructed coronal and sagittal images were also " obtained. Automated exposure control and iterative construction   methods were used.        FINDINGS:    Abdomen/Pelvis:    Lower Chest: Dependent opacities are noted at the lung bases left greater than right.    Atelectasis. Pneumonia cannot be excluded.    No free air noted below the diaphragm.    Organs: Significant distention of the gallbladder is noted with mild wall thickening and    Pericholecystic fluid noted. There are stones within the gallbladder. There is no significant dilation appreciated of the common duct. No obvious stones are noted within the duct.    Liver demonstrates no evidence of intrapedicle or ductal dilation. Liver demonstrates no acute abnormality. The pancreas, spleen and adrenal glands are grossly unremarkable in appearance. Small low-attenuation lesions within the kidneys are either too   small to characterize are compatible with cysts. No evidence of obstructive uropathy is noted    GI/Bowel: Mild reactive changes are noted of the descending portion of the duodenum. The stomach and the small bowel are otherwise grossly unremarkable appearance. Small lymph nodes are noted within the lewis hepatic region likely reactive in nature.   Mesenteric vessels appear to opacify unremarkably. Ileocecal valve is unremarkable. The appendix appears within normal limits. The colon demonstrates no acute abnormality. There are some scattered diverticuli without evidence of acute diverticulitis    Pelvis: Urinary bladder is grossly unremarkable in appearance. 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 No suspicious pelvic adenopathy or fluid collection noted    Peritoneum/Retroperitoneum: Aorta is normal in caliber. There is underlying atherosclerotic change. No suspicious retroperitoneal adenopathy    Bones/Soft Tissues: Postsurgical changes are noted of the lumbar sacral spine. Multilevel degenerative changes are noted in the spine.      Impression:        1.  Abnormal appearance of the gallbladder which is distended with surrounding pericholecystic fluid. The pended small stones are noted. Findings are compatible with acute cholecystitis.    2. Dependent opacities at lung bases, left greater than right which may resent atelectasis or pneumonia    3. Other findings as above          Electronically Signed: Darryl Caruso MD    11/9/2024 12:19 PM EST    Workstation ID: [id number]            Medications:    Current Facility-Administered Medications:     sennosides-docusate (PERICOLACE) 8.6-50 MG per tablet 2 tablet, 2 tablet, Oral, BID PRN **AND** polyethylene glycol (MIRALAX) packet 17 g, 17 g, Oral, Daily PRN **AND** bisacodyl (DULCOLAX) EC tablet 5 mg, 5 mg, Oral, Daily PRN **AND** bisacodyl (DULCOLAX) suppository 10 mg, 10 mg, Rectal, Daily PRN, Sarah Bo MD    busPIRone (BUSPAR) tablet 15 mg, 15 mg, Oral, QAM, Demetri Cabrera MD, 15 mg at 11/11/24 0636    dextrose (D50W) (25 g/50 mL) IV injection 25 g, 25 g, Intravenous, Q15 Min PRN, Demetri Cabrera MD    dextrose (D50W) (25 g/50 mL) IV injection 25 g, 25 g, Intravenous, Q15 Min PRN, Demetri Cabrera MD    dextrose (GLUTOSE) oral gel 15 g, 15 g, Oral, Q15 Min PRN, Demetri Cabrera MD    famotidine (PEPCID) tablet 20 mg, 20 mg, Oral, Nightly, Demetri Cabrera MD, 20 mg at 11/10/24 2045    FLUoxetine (PROzac) capsule 40 mg, 40 mg, Oral, Daily, Demetri Cabrera MD, 40 mg at 11/11/24 0807    gabapentin (NEURONTIN) capsule 100 mg, 100 mg, Oral, Nightly, Demetri Cabrera MD, 100 mg at 11/10/24 2044    glucagon (GLUCAGEN) injection 1 mg, 1 mg, Intramuscular, Q15 Min PRN, Demetri Cabrera MD    HYDROcodone-acetaminophen (NORCO) 5-325 MG per tablet 1 tablet, 1 tablet, Oral, Q6H PRN, Ken Saldana MD, 1 tablet at 11/11/24 0848    insulin glargine (LANTUS, SEMGLEE) injection 10 Units, 10 Units, Subcutaneous, Daily, IttyDemetri MD, 10 Units at 11/10/24 0744    Insulin Lispro (humaLOG) injection 2-7 Units, 2-7 Units, Subcutaneous, 4x Daily AC & at  Bedtime, Demetri Cabrera MD, 2 Units at 11/10/24 1738    Insulin Lispro (humaLOG) injection 3 Units, 3 Units, Subcutaneous, TID With Meals, Demetri Cabrera MD, 3 Units at 11/10/24 1739    ondansetron (ZOFRAN) injection 4 mg, 4 mg, Intravenous, Q6H PRN, Sarah Bo MD    oxybutynin XL (DITROPAN-XL) 24 hr tablet 10 mg, 10 mg, Oral, Daily, Demetri Cabrera MD, 10 mg at 11/11/24 0807    piperacillin-tazobactam (ZOSYN) IVPB 3.375 g IVPB in 100 mL NS (VTB), 3.375 g, Intravenous, Q8H, Sarah Bo MD, 3.375 g at 11/11/24 0149    [COMPLETED] Insert Peripheral IV, , , Once **AND** sodium chloride 0.9 % flush 10 mL, 10 mL, Intravenous, PRN, Sarah Bo MD    Assessment & Plan       Acute cholecystitis    Cholecystitis  S/P robotic cholecystectomy   ARIADNA ZORA drain   Keep sol in place as patient says he has issues with this and sees Dr Ray for it, follow up with Dr Ray for void trial   Ok to DC home   Follow up in one week      Electronically signed by Adriana Branham, LISA, 11/11/24, 9:00 AM EST.

## 2024-11-11 NOTE — DISCHARGE SUMMARY
[facility]         HOSPITALIST  DISCHARGE SUMMARY    Patient Name: Jamir Porter  : 1945  MRN: [id number]    Date of Admission: 2024  Date of Discharge: 2024  Primary Care Physician: Amaury Mcdonough Jr., MD  Reason for admission:  Abdominal pain    Final diagnosis:    Acute necrotizing cholecystitis status post laparoscopic cholecystectomy  Urinary retention Orozco catheter placed patient to follow-up outpatient with urology for voiding trial  BPH  Hyperlipidemia  Hypertension  Neuropathy  History of right BKA    Consults       Date and Time Order Name Status Description    2024  1:23 PM General MD Inpatient Consult              Active and Resolved Hospital Problems:  Active Hospital Problems    Diagnosis POA    S/P robotic  cholecystectomy [Z98.890] Not Applicable      Resolved Hospital Problems    Diagnosis POA    **Acute cholecystitis [K81.0] Unknown    Cholecystitis [K81.9] Yes       Hospital Course     Hospital Course:  Jamir Porter is a 79 y.o. male presents to the emergency department with abdominal pain patient was evaluated emergency department was noted to have evidence of cholecystitis patient taken to the operating room laparoscopic cholecystectomy was completed without complication findings consistent with necrotizing cholecystitis.  Patient admitted postoperatively On IV antibiotics with Zosyn given IV fluids ZORA drain placed but is subsequently been removed.  Patient developed urinary retention started on Flomax Orozco catheter had to be placed patient will follow-up with his urologist Dr. Ray in the outpatient setting for voiding trial.  Patient seen and examined 2024 tolerating regular diet hemodynamics are stable patient will be discharged in stable condition with orders to follow-up with his PCP in 1 week follow-up with general surgery as directed patient is to continue all postoperative orders per general surgery patient to follow-up  with urology for voiding trial        DISCHARGE Follow Up Recommendations for labs and diagnostics: As above      Day of Discharge     Vital Signs:  Temp:  [97.7 °F (36.5 °C)-98.8 °F (37.1 °C)] 98.8 °F (37.1 °C)  Heart Rate:  [70-86] 80  Resp:  [16-18] 18  BP: (119-148)/(68-81) 119/69  Flow (L/min) (Oxygen Therapy):  [0] 0  Physical Exam:   Constitutional: Awake alert oriented  Respiratory: Clear  Cardiovascular: RRR  GI abdomen soft appropriate postoperative tenderness noted   Orozco catheter in place      Discharge Details        Discharge Medications        New Medications        Instructions Start Date   naloxone 4 MG/0.1ML nasal spray  Commonly known as: NARCAN   Call 911. Don't prime. [place] in 1 nostril for overdose. Repeat in 2-3 minutes in other nostril if no or minimal breathing/responsiveness.      polyethylene glycol 17 g packet  Commonly known as: MIRALAX   17 g, Oral, Daily      tamsulosin 0.4 MG capsule 24 hr capsule  Commonly known as: FLOMAX   0.4 mg, Oral, Daily             Changes to Medications        Instructions Start Date   docusate sodium 100 MG capsule  Commonly known as: Colace  What changed: You were already taking a medication with the same name, and this prescription was added. Make sure you understand how and when to take each.   100 mg, Oral, 2 Times Daily      Colace 100 MG capsule  Generic drug: docusate sodium  What changed: Another medication with the same name was added. Make sure you understand how and when to take each.   100 mg, 2 Times Daily PRN      HYDROcodone-acetaminophen 5-325 MG per tablet  Commonly known as: NORCO  What changed:   when to take this  reasons to take this   1 tablet, Oral, Every 4 Hours PRN             Continue These Medications        Instructions Start Date   acetaminophen 325 MG tablet  Commonly known as: TYLENOL   650 mg, Every 6 Hours PRN      busPIRone 15 MG tablet  Commonly known as: BUSPAR   15 mg, Oral, Every Morning      ezetimibe 10 MG  "tablet  Commonly known as: ZETIA   10 mg, Oral, Daily      famotidine 20 MG tablet  Commonly known as: PEPCID   20 mg, Oral, Nightly      FLUoxetine 40 MG capsule  Commonly known as: PROzac   40 mg, Oral, Daily      gabapentin 100 MG capsule  Commonly known as: NEURONTIN   100 mg, Oral, Nightly      Gemtesa 75 MG tablet  Generic drug: Vibegron   1 tablet, Daily      hydroCHLOROthiazide 12.5 MG tablet   12.5 mg, Oral, Daily      Mounjaro 12.5 MG/0.5ML solution auto-injector  Generic drug: Tirzepatide   12.5 mg, Subcutaneous, Every 7 Days               Allergies   Allergen Reactions    Statins Myalgia    Sulfate Hives    Meperidine Other (See Comments)     \"DOESN'T WORK\"       Discharge Disposition:  Home or Self Care    Diet:  Hospital:  Diet Order   Procedures    Diet: Diabetic; Consistent Carbohydrate; Fluid Consistency: Thin (IDDSI 0)       Discharge Activity:   Activity Instructions       Activity as Tolerated      Driving Restrictions      Type of Restriction: Driving    Driving Restrictions: No Driving While Taking Narcotics    Lifting Restrictions      Type of Restriction: Lifting    Lifting Restrictions: Lifting Restriction (Indicate Limit)    Weight Limit (Pounds): 15    Length of Lifting Restriction: 2 weeks   Additional Activity Instructions:    Call the office or go to the ER if:  Fever over 101  Flu-like symptoms  Redness, swelling, foul odor, or pus-like drainage from incisions  Uncontrolled pain or bleeding             CODE STATUS:  Code Status and Medical Interventions: CPR (Attempt to Resuscitate); Full Support   Ordered at: 11/09/24 1904     Code Status (Patient has no pulse and is not breathing):    CPR (Attempt to Resuscitate)     Medical Interventions (Patient has pulse or is breathing):    Full Support         Future Appointments   Date Time Provider Department [place]   11/18/2024  8:00 AM Lenard Ray MD [facility]   11/18/2024  9:50 AM Sarah Bo MD [facility] "   12/5/2024  2:00 PM [facility] MRI 1 BH [facility] MR BR None   12/20/2024 10:45 AM Lenard Ray MD [facility] [place]TR[place]   12/31/2024  1:45 PM Amaury Mcdonough Jr., MD [facility] [place] ETWN [place]   1/30/2025 11:00 AM Indu Carter APRN [facility] DIAB ET [place]ACHEL   2/25/2025 11:15 AM Juan José Beth MD [facility] YUE ETWN RACHEL       Additional Instructions for the Follow-ups that You Need to Schedule       Call MD With Problems / Concerns   As directed      Instructions: call if  you develop a fever, chills, body aches, abdominal pain uncontrolled with pain medication, yellowing of the skin or eyes    Order Comments: Instructions: call if  you develop a fever, chills, body aches, abdominal pain uncontrolled with pain medication, yellowing of the skin or eyes         Discharge Follow-up with PCP   As directed       Currently Documented PCP:    Amaury Mcdonough Jr., MD    PCP Phone Number:    [phone]     Follow Up Details: one week        Discharge Follow-up with Specialty: Dr Bo; 1 Week   As directed      Specialty: Dr Bo   Follow Up: 1 Week        Discharge Follow-up with Specified Provider: Dr Ray for void trial this week   As directed      To: Dr Ray for void trial this week                Pertinent  and/or Most Recent Results     PROCEDURES:   Laparoscopic cholecystectomy    LAB RESULTS:      Lab 11/11/24  0412 11/10/24  0420 11/09/24  0248   WBC 15.68* 21.59* 14.99*   HEMOGLOBIN 12.1* 13.3 13.9   HEMATOCRIT 36.2* 39.7 38.9   PLATELETS 276 286 294   NEUTROS ABS 12.10* 18.58* 11.53*   IMMATURE GRANS (ABS) 0.12* 0.12* 0.14*   LYMPHS ABS 1.44 0.70 1.30   MONOS ABS 1.63* 1.78* 1.53*   EOS ABS 0.31 0.37 0.42*   MCV 94.8 93.0 90.0         Lab 11/11/24  0412 11/10/24  0420 11/09/24  0504 11/09/24  0248   SODIUM 138 133*  --  136   POTASSIUM 3.7 4.0  --  3.4*   CHLORIDE 102 98  --  99   CO2 27.1 26.6  --  26.2   ANION GAP 8.9 8.4  --  10.8   BUN 12 13  --  19   CREATININE 0.71* 0.80  --  0.82    EGFR 92.7 89.5  --  88.8   GLUCOSE 111* 217*  --  198*   CALCIUM 8.5* 9.2  --  9.3   MAGNESIUM 1.9 1.7 1.9 1.9   PHOSPHORUS 2.9  --   --   --          Lab 11/11/24  0412 11/10/24  0420 11/09/24  0248   TOTAL PROTEIN 5.8* 6.1 7.1   ALBUMIN 2.9* 3.2* 3.9   GLOBULIN 2.9  --  3.2   ALT (SGPT) 55* 67* 22   AST (SGOT) 67* 69* 20   BILIRUBIN 0.4 0.7 0.3   INDIRECT BILIRUBIN  --  0.5  --    BILIRUBIN DIRECT 0.2 0.2  --    ALK PHOS 102 102 103   LIPASE  --   --  54         Lab 11/09/24  0504 11/09/24  0248   PROBNP  --  66.1   HSTROP T 7 7                 Brief Urine Lab Results  (Last result in the past 365 days)        Color   Clarity   Blood   Leuk Est   Nitrite   Protein   CREAT   Urine HCG        06/24/24 1013 Yellow   Clear   Negative   Negative   Negative   Negative                 Microbiology Results (last 10 days)       ** No results found for the last 240 hours. **            CT Abdomen Pelvis With Contrast    Result Date: 11/9/2024  Impression: 1. Abnormal appearance of the gallbladder which is distended with surrounding pericholecystic fluid. The pended small stones are noted. Findings are compatible with acute cholecystitis. 2. Dependent opacities at lung bases, left greater than right which may resent atelectasis or pneumonia 3. Other findings as above Electronically Signed: Darryl Caruso MD  11/9/2024 12:19 PM EST  Workstation ID: [id number]    XR Chest 1 View    Result Date: 11/9/2024  Impression: Lower lung volumes with a potential small left effusion and some mild left base infiltrate or atelectasis. Electronically Signed: Jose Ghosh MD  11/9/2024 3:06 AM EST  Workstation ID: [id number]                 Labs Pending at Discharge:  Pending Labs       Order Current Status    Tissue Pathology Exam In process              Time spent on Discharge including face to face service: 35 minutes    Electronically signed by MINGO Milligan, 11/11/24, 12:28 PM EST.    Attending Documentation:  Patient  independently seen and evaluated, above documentation reflects plan put forth during bedside rounds.  More than 51% of the time of this patient encounter was performed by me. I discussed the care plan with BRITTANIE Painter PA-C, I agree with his findings and plan as documented, what I have added to the care plan and modified is as follows in my documentation and my medical decision making; 79-year-old male here with abdominal pain, underwent cholecystectomy for cholecystitis, ZORA drain remains in place.  Will discharge with Orozco as well, follow-up with urology for voiding trial.  Electronically signed by Ken Saldana MD, 11/11/24, 12:56 PM EST.

## 2024-11-11 NOTE — PLAN OF CARE
Goal Outcome Evaluation:  Plan of Care Reviewed With: patient        Progress: no change  Outcome Evaluation: Patient does not present with any significant decline in functional mobility requiring inpatient PT services at this time. Patient safe to return home with assist from spouse as needed and continue outpatient PT services once medically able. Discharge PT order.    Anticipated Discharge Disposition (PT): home with outpatient therapy services

## 2024-11-14 PROBLEM — R39.15 URGENCY OF URINATION: Status: ACTIVE | Noted: 2024-11-14

## 2024-11-14 NOTE — PROGRESS NOTES
Chief Complaint    Urologic complaint    Subjective          Jamir Porter presents to [facility] UROLOGY  Dysuria          79-year-old  gentleman       History of prostate cancer clinical T1c, on active surveillance, s/p TURP patient also with ED, penile prosthesis   Urinary retention - does intermittent CIC      Catheter in place currenlty      11/11/2024 discharged from hospital with catheter    11/9/2024 cholecystectomy  11/9/2024 CT abdomen/pelvis with - distended gallbladder with pericholecystic fluid.  Acute cholecystitis.            2023 started on Gemtesa daily.  Helping a lot with urge incontinence      before cholecystectomy    Some hesitancy.  Stream.  No straining.          No cardiopulmonary history.  Patient does not smoke.  He is on ibuprofen for back pain. No DM.        PVR     6/24     117  12/23   138  5/23       054  12/22   024  9/22 cystoscopy-4 cm prostate-open at the bladder neck but left side still has some fairly large adenoma that does coapt when the scope was brought past the verumontanum.  Normal cystoscopy  9/22   [facility]  CIC   9/22   60  11/21  65  3/21  TURP -3+3, 3/130, < 1%  4/21  200  2/20   25          Previous    Myrbetriq causes diarrhea    Patient does not know how to do CIC, was having trouble with retention when he was dealing with constipation      2/23 cystoscopy-4 cm prostate - open at bladder neck but left side still has some fairly large adenoma that does coapt.   Large bladder minor trabeculation no pathology    9/22 2 episodes of 1 retention    No longer following adrenal mass.    4/21 CT abdomen/pelvis with and without -9 mm benign lipid poor adrenal adenoma, no change since 8/19.    No erections. Has IPP.    H/o retention  - 1.5 L    oxybutynin  - did stop his incontinence but did give him side effects that he could not tolerate, GI.  Myrebetriq Cause -  diarrhea    8/26/2019 CT abdomen with and withoutadrenal mass protocolsmall right 0.9  cm adrenal nodule.  Favor adrenal adenoma.   cortisol less than 1.0, plasma metanephrines negative  7/3/2019 CT abdomen/pelvis with9 mm right adrenal nodule.  Which demonstrates enhancement above the background of the adrenal gland.  Penile prosthesis reservoir partially herniates the left inguinal ring.    Father  pancreatic CA,  brother with prostate cancer.      Penile prothesis - functions, he does not use.    Father  at 64, grandfather  at 85          H/o prostate CA              5.2        12/15/2023 MRI prostate - 12 g -PI-RADS 4 lesion 6 mm right posterior lateral apex peripheral zone.         4.5          3.6       3.6      2022 MRI prostate-negative.     10/21 MRI prostate-17 g, previously 58, negative otherwise    3/21  TURP -3+3, 3/130, < 1% - stopped finasteride     cystoscopy -4 cm prostate, moderate trabeculations, negative otherwise     prostate biopsy -53 g, right apex 3+3, 1/2, 6%, negative otherwise    3.56    MRI  prostate - 58 g , neg    0.91    2019 MRI [id number] g -negative      1.40     2.32 -on finasteride   MRI [id number] g, no targetable lesion seen    5.09    5.41  10/17 5.5    prostate [id number] g - left, 3+3, 50% of core, 5 mm; right - high-grade PIN    5.5  10/16 4.9    10/16 MRI [id number] g, no targetable lesions seen.     4.4   prostate [id number] g   Pathology  Right basenegative  Right base lateral 3+3, 20%, 4 mm  Right mid, mid lateral, apex, apex lateralnegative  Left basenegative  Left mid3+3, 10%  Left mid lateralnegative  Left apex3+3, 15%   left apex3+3, 10%     5.39     3.68                 Assessment and Plan    Diagnoses and all orders for this visit:    1. Benign prostatic hyperplasia with urinary retention (Primary)    2. Urgency of urination    3. Prostate cancer           Urinary retention/BPH    Voiding trial today.  Understands cannot void to come back to the clinic  or go to emergency room.  He also knows how to do CIC       Urgency      Cont Gemtesa.          Prostate cancer on active surveillance         We discussed that active surveillance is an option for a patient with low risk prostate cancer.  The risks of surveillance include progression of disease, failure of clinical staging to detect advanced disease, need for strict followup, need for repeat prostate biopsies, and patient anxiety related to PSA.  We did discuss that the evidence suggests that patient's who progress to treatment on surveillance have survival similar to those treated at diagnosis.      PSA is a little higher,   at this time we will continue active surveillance we will have him follow-up in 1 months with PSA and MRI of his prostate.    At that time we will consider rebiopsy        PVR at  follow-up

## 2024-11-15 RX ORDER — DULAGLUTIDE 3 MG/.5ML
INJECTION, SOLUTION SUBCUTANEOUS
COMMUNITY

## 2024-11-18 ENCOUNTER — OFFICE VISIT (OUTPATIENT)
Dept: UROLOGY | Age: 79
End: 2024-11-18
Payer: MEDICARE

## 2024-11-18 ENCOUNTER — OFFICE VISIT (OUTPATIENT)
Dept: SURGERY | Facility: CLINIC | Age: 79
End: 2024-11-18
Payer: MEDICARE

## 2024-11-18 VITALS — RESPIRATION RATE: 16 BRPM | HEIGHT: 69 IN | WEIGHT: 249 LBS | BODY MASS INDEX: 36.88 KG/M2

## 2024-11-18 VITALS — BODY MASS INDEX: 36.79 KG/M2 | WEIGHT: 249.12 LBS

## 2024-11-18 DIAGNOSIS — N40.1 BENIGN PROSTATIC HYPERPLASIA WITH URINARY RETENTION: Primary | ICD-10-CM

## 2024-11-18 DIAGNOSIS — R33.8 BENIGN PROSTATIC HYPERPLASIA WITH URINARY RETENTION: Primary | ICD-10-CM

## 2024-11-18 DIAGNOSIS — Z98.890 S/P ROBOT-ASSISTED SURGICAL PROCEDURE: Primary | ICD-10-CM

## 2024-11-18 DIAGNOSIS — C61 PROSTATE CANCER: ICD-10-CM

## 2024-11-18 DIAGNOSIS — R39.15 URGENCY OF URINATION: ICD-10-CM

## 2024-11-18 PROCEDURE — 99024 POSTOP FOLLOW-UP VISIT: CPT | Performed by: SURGERY

## 2024-11-18 PROCEDURE — 99213 OFFICE O/P EST LOW 20 MIN: CPT | Performed by: UROLOGY

## 2024-11-18 PROCEDURE — 1159F MED LIST DOCD IN RCRD: CPT | Performed by: UROLOGY

## 2024-11-18 PROCEDURE — 1160F RVW MEDS BY RX/DR IN RCRD: CPT | Performed by: UROLOGY

## 2024-11-18 PROCEDURE — 1159F MED LIST DOCD IN RCRD: CPT | Performed by: SURGERY

## 2024-11-18 PROCEDURE — 1160F RVW MEDS BY RX/DR IN RCRD: CPT | Performed by: SURGERY

## 2024-11-18 RX ORDER — DOXYCYCLINE 100 MG/1
100 CAPSULE ORAL 2 TIMES DAILY
Qty: 20 CAPSULE | Refills: 0 | Status: SHIPPED | OUTPATIENT
Start: 2024-11-18 | End: 2024-11-28

## 2024-11-18 NOTE — PROGRESS NOTES
"Chief Complaint  Follow-up    Subjective          Follow-up    The patient is here to follow up on robotic cholecystectomy.  They are doing well and have no complaints.  Pathology is shown below:    Results for orders placed or performed during the hospital encounter of 11/09/24   Magnesium    Collection Time: 11/09/24  5:04 AM    Specimen: Blood   Result Value Ref Range    Magnesium 1.9 1.6 - 2.4 mg/dL   Tissue Pathology Exam    Collection Time: 11/09/24  4:57 PM    Specimen: Gallbladder; Tissue   Result Value Ref Range    Case Report       Surgical Pathology Report                         Case: [id number]                                  Authorizing Provider:  Sarah Bo MD    Collected:           11/09/2024 04:57 PM          Ordering Location:     [facility] MAIN Received:            11/11/2024 08:34 AM                                 OR                                                                           Pathologist:           Camila Davis MD                                                     Specimen:    Gallbladder, Gallbladder and contents                                                      Clinical Information       Acute cholecystitis      Final Diagnosis       Gallbladder, cholecystectomy:   - Acute necrotizing cholecystitis   - Cholelithiasis        Gross Description       1. Gallbladder.  Received in formalin and labeled \"gallbladder and contents\" is a 13.0 x 6.0 x 5.0 cm disrupted gallbladder with a clipped cystic duct margin.  The dusky serosa is smooth, and the opposing cauterized tissue is shaggy.  The cystic duct is obstructed by palpable stones.  Opening reveals tan, tenacious bile fluid with 100 black, nodular gallstones (measuring up to 1.5 cm), dark brown, hemorrhagic mucosa, and an average wall thickness of 0.3 cm.  No distinct lesions or lymph nodes are identified.  Representative sections are submitted in 1 cassette.   DAVIS      Microscopic Description      "  Microscopic examination performed.     POC Glucose STAT    Collection Time: 11/09/24  5:31 PM    Specimen: Blood   Result Value Ref Range    Glucose 174 (H) 70 - 99 mg/dL   POC Glucose Once    Collection Time: 11/09/24  9:00 PM    Specimen: Blood   Result Value Ref Range    Glucose 247 (H) 70 - 99 mg/dL   Basic Metabolic Panel    Collection Time: 11/10/24  4:20 AM    Specimen: Arm, Right; Blood   Result Value Ref Range    Glucose 217 (H) 65 - 99 mg/dL    BUN 13 8 - 23 mg/dL    Creatinine 0.80 0.76 - 1.27 mg/dL    Sodium 133 (L) 136 - 145 mmol/L    Potassium 4.0 3.5 - 5.2 mmol/L    Chloride 98 98 - 107 mmol/L    CO2 26.6 22.0 - 29.0 mmol/L    Calcium 9.2 8.6 - 10.5 mg/dL    BUN/Creatinine Ratio 16.3 7.0 - 25.0    Anion Gap 8.4 5.0 - 15.0 mmol/L    eGFR 89.5 >60.0 mL/min/1.73   Magnesium    Collection Time: 11/10/24  4:20 AM    Specimen: Arm, Right; Blood   Result Value Ref Range    Magnesium 1.7 1.6 - 2.4 mg/dL   CBC Auto Differential    Collection Time: 11/10/24  4:20 AM    Specimen: Arm, Right; Blood   Result Value Ref Range    WBC 21.59 (H) 3.40 - 10.80 10*3/mm3    RBC 4.27 4.14 - 5.80 10*6/mm3    Hemoglobin 13.3 13.0 - 17.7 g/dL    Hematocrit 39.7 37.5 - 51.0 %    MCV 93.0 79.0 - 97.0 fL    MCH 31.1 26.6 - 33.0 pg    MCHC 33.5 31.5 - 35.7 g/dL    RDW 13.0 12.3 - 15.4 %    RDW-SD 44.0 37.0 - 54.0 fl    MPV 9.9 6.0 - 12.0 fL    Platelets 286 140 - 450 10*3/mm3    Neutrophil % 86.1 (H) 42.7 - 76.0 %    Lymphocyte % 3.2 (L) 19.6 - 45.3 %    Monocyte % 8.2 5.0 - 12.0 %    Eosinophil % 1.7 0.3 - 6.2 %    Basophil % 0.2 0.0 - 1.5 %    Immature Grans % 0.6 (H) 0.0 - 0.5 %    Neutrophils, Absolute 18.58 (H) 1.70 - 7.00 10*3/mm3    Lymphocytes, Absolute 0.70 0.70 - 3.10 10*3/mm3    Monocytes, Absolute 1.78 (H) 0.10 - 0.90 10*3/mm3    Eosinophils, Absolute 0.37 0.00 - 0.40 10*3/mm3    Basophils, Absolute 0.04 0.00 - 0.20 10*3/mm3    Immature Grans, Absolute 0.12 (H) 0.00 - 0.05 10*3/mm3    nRBC 0.0 0.0 - 0.2 /100 WBC    Hepatic Function Panel    Collection Time: 11/10/24  4:20 AM    Specimen: Arm, Right; Blood   Result Value Ref Range    Total Protein 6.1 6.0 - 8.5 g/dL    Albumin 3.2 (L) 3.5 - 5.2 g/dL    ALT (SGPT) 67 (H) 1 - 41 U/L    AST (SGOT) 69 (H) 1 - 40 U/L    Alkaline Phosphatase 102 39 - 117 U/L    Total Bilirubin 0.7 0.0 - 1.2 mg/dL    Bilirubin, Direct 0.2 0.0 - 0.3 mg/dL    Bilirubin, Indirect 0.5 mg/dL   POC Glucose 4x Daily Before Meals & at Bedtime    Collection Time: 11/10/24  7:11 AM    Specimen: Blood   Result Value Ref Range    Glucose 197 (H) 70 - 99 mg/dL   POC Glucose 4x Daily Before Meals & at Bedtime    Collection Time: 11/10/24 12:11 PM    Specimen: Blood   Result Value Ref Range    Glucose 150 (H) 70 - 99 mg/dL   POC Glucose 4x Daily Before Meals & at Bedtime    Collection Time: 11/10/24  5:10 PM    Specimen: Blood   Result Value Ref Range    Glucose 185 (H) 70 - 99 mg/dL   POC Glucose Once    Collection Time: 11/10/24  8:57 PM    Specimen: Blood   Result Value Ref Range    Glucose 126 (H) 70 - 99 mg/dL   Magnesium    Collection Time: 11/11/24  4:12 AM    Specimen: Hand, Right; Blood   Result Value Ref Range    Magnesium 1.9 1.6 - 2.4 mg/dL   Comprehensive Metabolic Panel    Collection Time: 11/11/24  4:12 AM    Specimen: Hand, Right; Blood   Result Value Ref Range    Glucose 111 (H) 65 - 99 mg/dL    BUN 12 8 - 23 mg/dL    Creatinine 0.71 (L) 0.76 - 1.27 mg/dL    Sodium 138 136 - 145 mmol/L    Potassium 3.7 3.5 - 5.2 mmol/L    Chloride 102 98 - 107 mmol/L    CO2 27.1 22.0 - 29.0 mmol/L    Calcium 8.5 (L) 8.6 - 10.5 mg/dL    Total Protein 5.8 (L) 6.0 - 8.5 g/dL    Albumin 2.9 (L) 3.5 - 5.2 g/dL    ALT (SGPT) 55 (H) 1 - 41 U/L    AST (SGOT) 67 (H) 1 - 40 U/L    Alkaline Phosphatase 102 39 - 117 U/L    Total Bilirubin 0.4 0.0 - 1.2 mg/dL    Globulin 2.9 gm/dL    A/G Ratio 1.0 g/dL    BUN/Creatinine Ratio 16.9 7.0 - 25.0    Anion Gap 8.9 5.0 - 15.0 mmol/L    eGFR 92.7 >60.0 mL/min/1.73   CBC Auto  "Differential    Collection Time: 11/11/24  4:12 AM    Specimen: Hand, Right; Blood   Result Value Ref Range    WBC 15.68 (H) 3.40 - 10.80 10*3/mm3    RBC 3.82 (L) 4.14 - 5.80 10*6/mm3    Hemoglobin 12.1 (L) 13.0 - 17.7 g/dL    Hematocrit 36.2 (L) 37.5 - 51.0 %    MCV 94.8 79.0 - 97.0 fL    MCH 31.7 26.6 - 33.0 pg    MCHC 33.4 31.5 - 35.7 g/dL    RDW 13.4 12.3 - 15.4 %    RDW-SD 46.2 37.0 - 54.0 fl    MPV 9.8 6.0 - 12.0 fL    Platelets 276 140 - 450 10*3/mm3    Neutrophil % 77.1 (H) 42.7 - 76.0 %    Lymphocyte % 9.2 (L) 19.6 - 45.3 %    Monocyte % 10.4 5.0 - 12.0 %    Eosinophil % 2.0 0.3 - 6.2 %    Basophil % 0.5 0.0 - 1.5 %    Immature Grans % 0.8 (H) 0.0 - 0.5 %    Neutrophils, Absolute 12.10 (H) 1.70 - 7.00 10*3/mm3    Lymphocytes, Absolute 1.44 0.70 - 3.10 10*3/mm3    Monocytes, Absolute 1.63 (H) 0.10 - 0.90 10*3/mm3    Eosinophils, Absolute 0.31 0.00 - 0.40 10*3/mm3    Basophils, Absolute 0.08 0.00 - 0.20 10*3/mm3    Immature Grans, Absolute 0.12 (H) 0.00 - 0.05 10*3/mm3    nRBC 0.0 0.0 - 0.2 /100 WBC   Phosphorus    Collection Time: 11/11/24  4:12 AM    Specimen: Hand, Right; Blood   Result Value Ref Range    Phosphorus 2.9 2.5 - 4.5 mg/dL   Bilirubin, Direct    Collection Time: 11/11/24  4:12 AM    Specimen: Hand, Right; Blood   Result Value Ref Range    Bilirubin, Direct 0.2 0.0 - 0.3 mg/dL   POC Glucose 4x Daily Before Meals & at Bedtime    Collection Time: 11/11/24  7:15 AM    Specimen: Blood   Result Value Ref Range    Glucose 93 70 - 99 mg/dL   POC Glucose Once    Collection Time: 11/11/24 11:11 AM    Specimen: Blood   Result Value Ref Range    Glucose 180 (H) 70 - 99 mg/dL        Objective   Vital Signs:   Resp 16   Ht 175.3 cm (69\")   Wt 113 kg (249 lb)   BMI 36.77 kg/m²     Physical Exam  Vitals and nursing note reviewed.   Constitutional:       General: He is not in acute distress.     Appearance: Normal appearance. He is well-developed.   HENT:      Head: Normocephalic and atraumatic.   Eyes:     "  Extraocular Movements: Extraocular movements intact.      Pupils: Pupils are equal, round, and reactive to light.   Cardiovascular:      Pulses: Normal pulses.   Pulmonary:      Effort: Pulmonary effort is normal. No retractions.      Breath sounds: Normal air entry. No wheezing.   Abdominal:      General: There is no distension.      Palpations: Abdomen is soft.      Tenderness: There is no abdominal tenderness.      Hernia: No hernia is present.   Musculoskeletal:         General: No swelling or deformity.      Cervical back: Neck supple.   Skin:     General: Skin is warm and dry.      Findings: No erythema.      Comments: Surgical Incision Healing Well   Neurological:      General: No focal deficit present.      Mental Status: He is alert and oriented to person, place, and time.      Motor: Motor function is intact.   Psychiatric:         Mood and Affect: Mood normal.         Thought Content: Thought content normal.            Result Review :                 Assessment and Plan    Diagnoses and all orders for this visit:    1. S/P robotic  cholecystectomy (Primary)    Other orders  -     doxycycline (VIBRAMYCIN) 100 MG capsule; Take 1 capsule by mouth 2 (Two) Times a Day for 10 days.  Dispense: 20 capsule; Refill: 0    Followup prn    Follow Up   No follow-ups on file.  Patient was given instructions and counseling regarding his condition or for health maintenance advice. Please see specific information pulled into the AVS if appropriate.     Answers submitted by the patient for this visit:  Other (Submitted on 11/12/2024)  Please describe your symptoms.: Follow up appt. after successful removal of gall bladder.  Have you had these symptoms before?: Yes  How long have you been having these symptoms?: 1-4 days  Please list any medications you are currently taking for this condition.: Prozac, Tylenol, Ibuprofen, Colace, tamsulosin, Miralax, docusate sodium, Turmeric (OTC), Prevagen (OTC)  Please describe any  probable cause for these symptoms. : Long story.  Starting in about 2012, I've been seen for chronic back pain and had a spinal laminectomy.   Back pain has continued despite P.T. and epidurals to present and am being seen by [facility] for ongoing treatment.  Primary Reason for Visit (Submitted on 11/12/2024)  What is the primary reason for your visit?: Problem Not Listed

## 2024-11-20 ENCOUNTER — DOCUMENTATION (OUTPATIENT)
Dept: INTERNAL MEDICINE | Facility: CLINIC | Age: 79
End: 2024-11-20
Payer: MEDICARE

## 2024-12-02 ENCOUNTER — PATIENT MESSAGE (OUTPATIENT)
Dept: DIABETES SERVICES | Facility: HOSPITAL | Age: 79
End: 2024-12-02
Payer: MEDICARE

## 2024-12-09 ENCOUNTER — TELEPHONE (OUTPATIENT)
Dept: INTERNAL MEDICINE | Facility: CLINIC | Age: 79
End: 2024-12-09
Payer: MEDICARE

## 2024-12-09 NOTE — TELEPHONE ENCOUNTER
Caller: Jamir Porter    Relationship: Self    Best call back number: [phone]     Which medication are you concerned about: Tirzepatide (Mounjaro) 12.5 MG/0.5ML solution auto-injector     Who prescribed you this medication:  LAST PRESCRIBING PHYSICIAN Indu Carter APRN     What are your concerns: PATIENT STATES THAT HAD TO HAVE GALLBLADDER REMOVED 11.9.2024 AND DID STOP TAKING MEDICATION LISTED. PATIENT STATES THAT MD BRIONES WAS WHO HAD ORIGINALLY PRESCRIBED MEDICATION AND WOULD LIKE TO DISCUSS WITH MD BRIONES ABOUT BEING PRESCRIBED AN ORAL MEDICATION INSTEAD.

## 2024-12-11 NOTE — TELEPHONE ENCOUNTER
There is not quite an oral equivalent of mounjaro. The closest thing would be rybelsus, which is closer to an oral option of ozempic. We can certainly discuss options at upcoming appointment.

## 2024-12-11 NOTE — TELEPHONE ENCOUNTER
Tried to call patient no answer left Vm    OKAY FOR HUB TO READ/ADVISE     There is not quite an oral equivalent of mounjaro. The closest thing would be rybelsus, which is closer to an oral option of ozempic. We can certainly discuss options at upcoming appointment.

## 2024-12-31 ENCOUNTER — OFFICE VISIT (OUTPATIENT)
Dept: INTERNAL MEDICINE | Facility: CLINIC | Age: 79
End: 2024-12-31
Payer: MEDICARE

## 2024-12-31 VITALS
OXYGEN SATURATION: 95 % | HEART RATE: 71 BPM | BODY MASS INDEX: 33.86 KG/M2 | HEIGHT: 69 IN | TEMPERATURE: 97.7 F | WEIGHT: 228.6 LBS | DIASTOLIC BLOOD PRESSURE: 73 MMHG | SYSTOLIC BLOOD PRESSURE: 119 MMHG

## 2024-12-31 DIAGNOSIS — E11.65 TYPE 2 DIABETES MELLITUS WITH HYPERGLYCEMIA, WITHOUT LONG-TERM CURRENT USE OF INSULIN: ICD-10-CM

## 2024-12-31 DIAGNOSIS — E78.2 MIXED HYPERLIPIDEMIA: ICD-10-CM

## 2024-12-31 DIAGNOSIS — Z29.11 NEED FOR RSV VACCINATION: ICD-10-CM

## 2024-12-31 DIAGNOSIS — F33.2 SEVERE EPISODE OF RECURRENT MAJOR DEPRESSIVE DISORDER, WITHOUT PSYCHOTIC FEATURES: ICD-10-CM

## 2024-12-31 DIAGNOSIS — Z23 NEED FOR COVID-19 VACCINE: ICD-10-CM

## 2024-12-31 DIAGNOSIS — Z00.00 ANNUAL PHYSICAL EXAM: Primary | ICD-10-CM

## 2024-12-31 DIAGNOSIS — I10 PRIMARY HYPERTENSION: ICD-10-CM

## 2024-12-31 PROCEDURE — G0439 PPPS, SUBSEQ VISIT: HCPCS | Performed by: INTERNAL MEDICINE

## 2024-12-31 PROCEDURE — 99214 OFFICE O/P EST MOD 30 MIN: CPT | Performed by: INTERNAL MEDICINE

## 2024-12-31 PROCEDURE — 3074F SYST BP LT 130 MM HG: CPT | Performed by: INTERNAL MEDICINE

## 2024-12-31 PROCEDURE — 3078F DIAST BP <80 MM HG: CPT | Performed by: INTERNAL MEDICINE

## 2024-12-31 PROCEDURE — 1125F AMNT PAIN NOTED PAIN PRSNT: CPT | Performed by: INTERNAL MEDICINE

## 2024-12-31 RX ORDER — BUSPIRONE HYDROCHLORIDE 15 MG/1
15 TABLET ORAL EVERY MORNING
Qty: 90 TABLET | Refills: 3 | Status: SHIPPED | OUTPATIENT
Start: 2024-12-31

## 2024-12-31 NOTE — PROGRESS NOTES
Subjective   The ABCs of the Annual Wellness Visit  Medicare Wellness Visit      Jamir Porter is a 80 y.o. patient who presents for a Medicare Wellness Visit.    The following portions of the patient's history were reviewed and   updated as appropriate: allergies, current medications, past family history, past medical history, past social history, past surgical history, and problem list.    Compared to one year ago, the patient's physical   health is worse.  Compared to one year ago, the patient's mental   health is worse.    Recent Hospitalizations:  He was not admitted to the hospital during the last year.     Current Medical Providers:  Patient Care Team:  Amaury Mcdonough Jr., MD as PCP - General (Internal Medicine)  Lenard Ray MD as Consulting Physician (Urology)  Indu Carter APRN as Nurse Practitioner (Endocrinology)    Outpatient Medications Prior to Visit   Medication Sig Dispense Refill    acetaminophen (TYLENOL) 325 MG tablet Take 2 tablets by mouth Every 6 (Six) Hours As Needed for Mild Pain.      empagliflozin (Jardiance) 25 MG tablet tablet Take 1 tablet by mouth Daily for 180 days. 90 tablet 1    ezetimibe (ZETIA) 10 MG tablet TAKE 1 TABLET BY MOUTH DAILY 90 tablet 3    famotidine (PEPCID) 20 MG tablet Take 1 tablet by mouth Every Night. 20 tablet 0    FLUoxetine (PROzac) 40 MG capsule TAKE 1 CAPSULE BY MOUTH DAILY 90 capsule 3    Gemtesa 75 MG tablet Take 1 tablet by mouth Daily.      hydroCHLOROthiazide 12.5 MG tablet Take 1 tablet by mouth Daily. 90 tablet 1    polyethylene glycol (MIRALAX) 17 g packet Take 17 g by mouth Daily. 7 packet 0    busPIRone (BUSPAR) 15 MG tablet Take 1 tablet by mouth Every Morning. 30 tablet 0    docusate sodium (Colace) 100 MG capsule Take 1 capsule by mouth 2 (Two) Times a Day As Needed.      gabapentin (NEURONTIN) 100 MG capsule Take 1 capsule by mouth Every Night. 30 capsule 0    naloxone (NARCAN) 4 MG/0.1ML nasal spray Call 911. Don't  prime. Spray in 1 nostril for overdose. Repeat in 2-3 minutes in other nostril if no or minimal breathing/responsiveness. 2 each 0    Tirzepatide (Mounjaro) 12.5 MG/0.5ML solution auto-injector Inject 12.5 mg under the skin into the appropriate area as directed Every 7 (Seven) Days. 2 mL 5     No facility-administered medications prior to visit.     No opioid medication identified on active medication list. I have reviewed chart for other potential  high risk medication/s and harmful drug interactions in the elderly.      Aspirin is not on active medication list.  Aspirin use is indicated based on review of current medical condition/s. Pros and cons of this therapy have been discussed with this patient. Benefits of this medication outweigh potential harm.  Patient has been instructed to start taking this medication..    Patient Active Problem List   Diagnosis    Prostate cancer    Allergic rhinitis    Arthritis    Balance problem    Broken bones    Cancer    Cataracts, bilateral    Chronic sinusitis    Colon polyps    DDD (degenerative disc disease), lumbar    Diverticulitis    Headache    Mixed hyperlipidemia    Primary hypertension    Limb swelling    Low back pain    Prostate disorder    Sleep apnea    Type 2 diabetes mellitus with hyperglycemia, without long-term current use of insulin    Urge incontinence    Anxiety    History of left knee replacement    Left knee pain    Urinary retention    Benign prostatic hyperplasia with urinary retention    Lesion of bladder    Acquired hypothyroidism    Benign prostatic hyperplasia with lower urinary tract symptoms    S/P robotic  cholecystectomy    Urgency of urination     Advance Care Planning Advance Directive is on file.        Objective   Vitals:    12/31/24 1335   BP: 119/73   BP Location: Left arm   Patient Position: Sitting   Cuff Size: Adult   Pulse: 71   Temp: 97.7 °F (36.5 °C)   TempSrc: Temporal   SpO2: 95%   Weight: 104 kg (228 lb 9.6 oz)   Height: 175.3 cm  "(69\")       Estimated body mass index is 33.76 kg/m² as calculated from the following:    Height as of this encounter: 175.3 cm (69\").    Weight as of this encounter: 104 kg (228 lb 9.6 oz).       Does the patient have evidence of cognitive impairment? No  Lab Results   Component Value Date    HGBA1C 6.2 (A) 10/31/2024                                                                                                Health  Risk Assessment    Smoking Status:  Social History     Tobacco Use   Smoking Status Former    Current packs/day: 0.00    Average packs/day: 1.5 packs/day for 19.0 years (28.5 ttl pk-yrs)    Types: Cigarettes    Start date: 1960    Quit date:     Years since quittin.0    Passive exposure: Past   Smokeless Tobacco Former   Tobacco Comments    Tobacco free all types     Alcohol Consumption:  Social History     Substance and Sexual Activity   Alcohol Use Not Currently    Alcohol/week: 1.0 standard drink of alcohol    Types: 1 Drinks containing 0.5 oz of alcohol per week    Comment: Occasionally       Fall Risk Screen  STEADI Fall Risk Assessment has not been completed.    Depression Screening   Little interest or pleasure in doing things? Not at all   Feeling down, depressed, or hopeless? Several days   PHQ-2 Total Score 1      Health Habits and Functional and Cognitive Screenin/31/2024     1:41 PM   Functional & Cognitive Status   Do you have difficulty preparing food and eating? No   Do you have difficulty bathing yourself, getting dressed or grooming yourself? No   Do you have difficulty using the toilet? No   Do you have difficulty moving around from place to place? Yes   Do you have trouble with steps or getting out of a bed or a chair? Yes   Current Diet Well Balanced Diet   Dental Exam Up to date   Eye Exam Up to date   Exercise (times per week) 0 times per week   Current Exercises Include No Regular Exercise   Do you need help using the phone?  No   Are you deaf or do you " have serious difficulty hearing?  No   Do you need help to go to places out of walking distance? No   Do you need help shopping? No   Do you need help preparing meals?  No   Do you need help with housework?  No   Do you need help with laundry? No   Do you need help taking your medications? No   Do you need help managing money? No   Do you ever drive or ride in a car without wearing a seat belt? No   Have you felt unusual stress, anger or loneliness in the last month? No   Who do you live with? Spouse   If you need help, do you have trouble finding someone available to you? No   Have you been bothered in the last four weeks by sexual problems? No   Do you have difficulty concentrating, remembering or making decisions? No           Age-appropriate Screening Schedule:  Refer to the list below for future screening recommendations based on patient's age, sex and/or medical conditions. Orders for these recommended tests are listed in the plan section. The patient has been provided with a written plan.    Health Maintenance List  Health Maintenance   Topic Date Due    TDAP/TD VACCINES (1 - Tdap) Never done    ZOSTER VACCINE (1 of 2) Never done    RSV Vaccine - Adults (1 - 1-dose 75+ series) Never done    COVID-19 Vaccine (3 - 2024-25 season) 09/01/2024    HEMOGLOBIN A1C  04/30/2025    BMI FOLLOWUP  05/30/2025    URINE MICROALBUMIN  06/17/2025    DIABETIC EYE EXAM  08/12/2025    LIPID PANEL  08/27/2025    ANNUAL WELLNESS VISIT  12/31/2025    COLORECTAL CANCER SCREENING  06/01/2026    INFLUENZA VACCINE  Completed    Pneumococcal Vaccine 65+  Completed                                                                                                                                                CMS Preventative Services Quick Reference  Risk Factors Identified During Encounter  Fall Risk-High or Moderate:  patient has new prosthesis he is growing accustom to.    The above risks/problems have been discussed with the  "patient.  Pertinent information has been shared with the patient in the After Visit Summary.  An After Visit Summary and PPPS were made available to the patient.    Follow Up:   Next Medicare Wellness visit to be scheduled in 1 year.        Additional E&M Note during same encounter follows:  Patient has multiple medical problems which are significant and separately identifiable that require additional work above and beyond the Medicare Wellness Visit.      Chief Complaint  Medicare Wellness-subsequent, Hypertension, and Diabetes (Type 2 follow up )    Jamir Porter is a 80 y.o. male who presents to [facility]   DM2- patient is no longer taking mounjaro due to cholecystitis. Patient is now on jardiance instead. Patient has been losing weight. Patient would like to lose more weight. Patient reports checking blood glucose each morning with 150-180 range.   Hypertension- patient denies headaches, dizziness, chest pain.   Patient is getting back injections from [facility] mgmt.   Mental health- patient is doing well.  Patient does report some ongoing depression/anxiety.    Objective   Vital Signs:   Vitals:    12/31/24 1335   BP: 119/73   BP Location: Left arm   Patient Position: Sitting   Cuff Size: Adult   Pulse: 71   Temp: 97.7 °F (36.5 °C)   TempSrc: Temporal   SpO2: 95%   Weight: 104 kg (228 lb 9.6 oz)   Height: 175.3 cm (69\")       Wt Readings from Last 3 Encounters:   01/10/25 103 kg (228 lb)   12/31/24 104 kg (228 lb 9.6 oz)   11/18/24 113 kg (249 lb)     BP Readings from Last 3 Encounters:   12/31/24 119/73   11/11/24 119/69   11/09/24 156/87       Physical Exam  Appearance: No acute distress, well-nourished  Head: normocephalic, atraumatic  Eyes: extraocular movements intact, no scleral icterus, no conjunctival injection  Ears, Nose, and Throat: external ears normal, nares patent, moist mucous membranes  Cardiovascular: regular rate and rhythm. no murmurs, " rubs, or gallops. no edema  Respiratory: breathing comfortably, symmetric chest rise, clear to auscultation bilaterally. No wheezes, rales, or rhonchi.  Neuro: alert and oriented to time, place, and person. Normal gait  Psych: normal mood and affect     The following data was reviewed by Amaury Mcdonough Jr, MD on 12/31/2024  Common Labs   Common labs          11/10/2024    04:20 11/11/2024    04:12 1/10/2025    10:59   Common Labs   Glucose 217  111     BUN 13  12     Creatinine 0.80  0.71     Sodium 133  138     Potassium 4.0  3.7     Chloride 98  102     Calcium 9.2  8.5     Albumin 3.2  2.9     Total Bilirubin 0.7  0.4     Alkaline Phosphatase 102  102     AST (SGOT) 69  67     ALT (SGPT) 67  55     WBC 21.59  15.68     Hemoglobin 13.3  12.1     Hematocrit 39.7  36.2     Platelets 286  276     PSA   4.500        Assessment & Plan   Diagnoses and all orders for this visit:    1. Annual physical exam (Primary)    2. Severe episode of recurrent major depressive disorder, without psychotic features  Comments:  inc BuSpar with Prozac.  Follow-up in 3 months  Orders:  -     busPIRone (BUSPAR) 15 MG tablet; Take 1 tablet by mouth Every Morning.  Dispense: 90 tablet; Refill: 3    3. Primary hypertension  Comments:  Well-controlled on regimen.    4. Mixed hyperlipidemia  Comments:  Intolerant to statins.  Continue Zetia.  Discussed PCSK9 inhibitors.    5. Type 2 diabetes mellitus with hyperglycemia, without long-term current use of insulin  Comments:  Off GLP-1 due to cholecystitis.  Will start Januvia instead.  Continue Jardiance.  Orders:  -     SITagliptin (Januvia) 100 MG tablet; Take 1 tablet by mouth Daily.  Dispense: 90 tablet; Refill: 3    6. Need for RSV vaccination    7. Need for COVID-19 vaccine  Comments:  Patient defers.               FOLLOW UP  Return in about 4 months (around 4/30/2025) for DM, HTN.  Patient was given instructions and counseling regarding his condition or for health maintenance  advice. Please see specific information pulled into the AVS if appropriate.     Amaury Mcdonough Jr, MD  01/13/25  07:51 EST

## 2025-01-07 NOTE — PROGRESS NOTES
Chief Complaint    Urologic complaint    Subjective          Jamir Porter presents to [facility] UROLOGY  Dysuria        80-year-old  gentleman       History of prostate cancer clinical T1c, on active surveillance, s/p TURP patient also with ED, penile prosthesis   Urinary retention - does intermittent CIC        Voiding without straining.  He does have a slow stream with intermittency.      Has not had to do CIC      2023 started on Gemtesa daily.  Helping        11/11/2024 discharged from hospital with catheter    11/9/2024 cholecystectomy  11/9/2024 CT abdomen/pelvis with - distended gallbladder with pericholecystic fluid.  Acute cholecystitis.      No cardiopulmonary history.  Patient does not smoke.  He is on ibuprofen for back pain. No DM.        PVR     12/25   106  6/24     117  12/23   138  5/23       054  12/22   024  9/22 cystoscopy-4 cm prostate-open at the bladder neck but left side still has some fairly large adenoma that does coapt when the scope was brought past the verumontanum.  Normal cystoscopy  9/22   [place]  CIC   9/22   60  11/21  65  3/21  TURP -3+3, 3/130, < 1%  4/21  200  2/20   25          Previous    Myrbetriq causes diarrhea    Patient does not know how to do CIC, was having trouble with retention when he was dealing with constipation      2/23 cystoscopy-4 cm prostate - open at bladder neck but left side still has some fairly large adenoma that does coapt.   Large bladder minor trabeculation no pathology    9/22 2 episodes of 1 retention    No longer following adrenal mass.    4/21 CT abdomen/pelvis with and without -9 mm benign lipid poor adrenal adenoma, no change since 8/19.    No erections. Has IPP.    H/o retention  - 1.5 L    oxybutynin  - did stop his incontinence but did give him side effects that he could not tolerate, GI.  Myrebetriq Cause -  diarrhea    8/26/2019 CT abdomen with and withoutadrenal mass protocolsmall right 0.9 cm adrenal nodule.   Favor adrenal adenoma.   cortisol less than 1.0, plasma metanephrines negative  7/3/2019 CT abdomen/pelvis with9 mm right adrenal nodule.  Which demonstrates enhancement above the background of the adrenal gland.  Penile prosthesis reservoir partially herniates the left inguinal ring.    Father  pancreatic CA,  brother with prostate cancer.      Penile prothesis - functions, he does not use.    Father  at 64, grandfather  at 85          H/o prostate CA      MRI prostate - 14 g, PSAd 0.4,  PI - RADS 4-right peripheral zone apex, 10 x 4 mm, no EPE         5.2        12/15/2023 MRI prostate - 12 g -PI-RADS 4 lesion 6 mm right posterior lateral apex peripheral zone.         4.5          3.6       3.6      2022 MRI prostate-negative.     10/21 MRI prostate-17 g, previously 58, negative otherwise    3/21  TURP -3+3, 3/130, < 1% - stopped finasteride     cystoscopy -4 cm prostate, moderate trabeculations, negative otherwise     prostate biopsy -53 g, right apex 3+3, 1/2, 6%, negative otherwise    3.56    MRI  prostate - 58 g , neg    0.91    2019 MRI [id number] g -negative      1.40     2.32 -on finasteride   MRI [id number] g, no targetable lesion seen    5.09    5.41  10/17 5.5    prostate [id number] g - left, 3+3, 50% of core, 5 mm; right - high-grade PIN    5.5  10/16 4.9    10/16 MRI [id number] g, no targetable lesions seen.     4.4   prostate [id number] g   Pathology  Right basenegative  Right base lateral 3+3, 20%, 4 mm  Right mid, mid lateral, apex, apex lateralnegative  Left basenegative  Left mid3+3, 10%  Left mid lateralnegative  Left apex3+3, 15%   left apex3+3, 10%     5.39     3.68                 Assessment and Plan    Diagnoses and all orders for this visit:    1. Prostate cancer (Primary)    2. Benign prostatic hyperplasia with lower urinary tract symptoms, symptom details unspecified           Urinary  retention/BPH    CIC if needed           Urgency      Cont Gemtesa.   Patient has had a little bit slower stream with intermittency a lot lately, he may try to stop Gemtesa for a few days and see if this makes any difference            Prostate cancer on active surveillance         We discussed that active surveillance is an option for a patient with low risk prostate cancer.  The risks of surveillance include progression of disease, failure of clinical staging to detect advanced disease, need for strict followup, need for repeat prostate biopsies, and patient anxiety related to PSA.  We did discuss that the evidence suggests that patient's who progress to treatment on surveillance have survival similar to those treated at diagnosis.      PSA now PSA in 6 months at this time we will continue conservative approach to his active surveillance as we have been following for many years and MRI is unchanged.      PVR at  follow-up

## 2025-01-10 ENCOUNTER — LAB (OUTPATIENT)
Facility: HOSPITAL | Age: 80
End: 2025-01-10
Payer: MEDICARE

## 2025-01-10 ENCOUNTER — OFFICE VISIT (OUTPATIENT)
Dept: UROLOGY | Age: 80
End: 2025-01-10
Payer: MEDICARE

## 2025-01-10 VITALS — RESPIRATION RATE: 12 BRPM | HEIGHT: 69 IN | BODY MASS INDEX: 33.77 KG/M2 | WEIGHT: 228 LBS

## 2025-01-10 DIAGNOSIS — C61 PROSTATE CANCER: ICD-10-CM

## 2025-01-10 DIAGNOSIS — C61 PROSTATE CANCER: Primary | ICD-10-CM

## 2025-01-10 DIAGNOSIS — N40.1 BENIGN PROSTATIC HYPERPLASIA WITH LOWER URINARY TRACT SYMPTOMS, SYMPTOM DETAILS UNSPECIFIED: ICD-10-CM

## 2025-01-10 LAB
PSA SERPL-MCNC: 4.5 NG/ML (ref 0–4)
URINE VOLUME: 106

## 2025-01-10 PROCEDURE — 84153 ASSAY OF PSA TOTAL: CPT

## 2025-01-10 PROCEDURE — 36415 COLL VENOUS BLD VENIPUNCTURE: CPT

## 2025-01-21 ENCOUNTER — TELEPHONE (OUTPATIENT)
Dept: INTERNAL MEDICINE | Facility: CLINIC | Age: 80
End: 2025-01-21
Payer: MEDICARE

## 2025-01-21 NOTE — TELEPHONE ENCOUNTER
Patient called to see what his most recent A1C was, he is at another Doctor appointment and they were needing to know.

## 2025-01-22 ENCOUNTER — TRANSCRIBE ORDERS (OUTPATIENT)
Dept: ADMINISTRATIVE | Facility: HOSPITAL | Age: 80
End: 2025-01-22
Payer: MEDICARE

## 2025-01-22 ENCOUNTER — LAB (OUTPATIENT)
Dept: LAB | Facility: HOSPITAL | Age: 80
End: 2025-01-22
Payer: MEDICARE

## 2025-01-22 DIAGNOSIS — H20.9 UVEITIS: ICD-10-CM

## 2025-01-22 DIAGNOSIS — H20.9 UVEITIS: Primary | ICD-10-CM

## 2025-01-22 LAB
CHROMATIN AB SERPL-ACNC: <10 IU/ML (ref 0–14)
CRP SERPL-MCNC: 1.03 MG/DL (ref 0–0.5)
ERYTHROCYTE [SEDIMENTATION RATE] IN BLOOD: 5 MM/HR (ref 0–20)

## 2025-01-22 PROCEDURE — 86038 ANTINUCLEAR ANTIBODIES: CPT

## 2025-01-22 PROCEDURE — 86225 DNA ANTIBODY NATIVE: CPT

## 2025-01-22 PROCEDURE — 85652 RBC SED RATE AUTOMATED: CPT

## 2025-01-22 PROCEDURE — 86592 SYPHILIS TEST NON-TREP QUAL: CPT

## 2025-01-22 PROCEDURE — 86431 RHEUMATOID FACTOR QUANT: CPT

## 2025-01-22 PROCEDURE — 86140 C-REACTIVE PROTEIN: CPT

## 2025-01-22 PROCEDURE — 36415 COLL VENOUS BLD VENIPUNCTURE: CPT

## 2025-01-22 PROCEDURE — 82164 ANGIOTENSIN I ENZYME TEST: CPT

## 2025-01-23 LAB
ACE SERPL-CCNC: 79 U/L (ref 14–82)
ANA SER QL: POSITIVE
DSDNA AB SER-ACNC: 1 IU/ML (ref 0–9)
Lab: NORMAL
RPR SER QL: NON REACTIVE

## 2025-01-27 LAB — HLA-B27 QL NAA+PROBE: NEGATIVE

## 2025-01-29 LAB
HLA-A: NORMAL
HLA-A: NORMAL
REF LAB TEST METHOD: NORMAL

## 2025-01-30 DIAGNOSIS — G62.9 NEUROPATHY: ICD-10-CM

## 2025-01-30 RX ORDER — GABAPENTIN 100 MG/1
100 CAPSULE ORAL NIGHTLY
Qty: 30 CAPSULE | OUTPATIENT
Start: 2025-01-30

## 2025-01-30 NOTE — TELEPHONE ENCOUNTER
The original prescription was discontinued on 12/31/2024 by Amaury Mcdonough Jr., MD for the following reason: *Therapy completed.

## 2025-02-03 ENCOUNTER — OFFICE VISIT (OUTPATIENT)
Dept: DIABETES SERVICES | Facility: HOSPITAL | Age: 80
End: 2025-02-03
Payer: MEDICARE

## 2025-02-03 VITALS
TEMPERATURE: 98 F | OXYGEN SATURATION: 93 % | BODY MASS INDEX: 33.18 KG/M2 | WEIGHT: 224 LBS | HEART RATE: 63 BPM | HEIGHT: 69 IN | SYSTOLIC BLOOD PRESSURE: 137 MMHG | DIASTOLIC BLOOD PRESSURE: 73 MMHG

## 2025-02-03 DIAGNOSIS — E11.42 CONTROLLED TYPE 2 DIABETES MELLITUS WITH DIABETIC POLYNEUROPATHY, WITHOUT LONG-TERM CURRENT USE OF INSULIN: Primary | ICD-10-CM

## 2025-02-03 DIAGNOSIS — E66.9 OBESITY (BMI 30-39.9): ICD-10-CM

## 2025-02-03 LAB
EXPIRATION DATE: ABNORMAL
GLUCOSE BLDC GLUCOMTR-MCNC: 246 MG/DL (ref 70–99)
HBA1C MFR BLD: 7 % (ref 4.5–5.7)
Lab: ABNORMAL

## 2025-02-03 PROCEDURE — 82948 REAGENT STRIP/BLOOD GLUCOSE: CPT | Performed by: NURSE PRACTITIONER

## 2025-02-03 PROCEDURE — 3075F SYST BP GE 130 - 139MM HG: CPT | Performed by: NURSE PRACTITIONER

## 2025-02-03 PROCEDURE — 99213 OFFICE O/P EST LOW 20 MIN: CPT | Performed by: NURSE PRACTITIONER

## 2025-02-03 PROCEDURE — G0463 HOSPITAL OUTPT CLINIC VISIT: HCPCS | Performed by: NURSE PRACTITIONER

## 2025-02-03 PROCEDURE — 1159F MED LIST DOCD IN RCRD: CPT | Performed by: NURSE PRACTITIONER

## 2025-02-03 PROCEDURE — 83036 HEMOGLOBIN GLYCOSYLATED A1C: CPT | Performed by: NURSE PRACTITIONER

## 2025-02-03 PROCEDURE — 3078F DIAST BP <80 MM HG: CPT | Performed by: NURSE PRACTITIONER

## 2025-02-03 PROCEDURE — 1160F RVW MEDS BY RX/DR IN RCRD: CPT | Performed by: NURSE PRACTITIONER

## 2025-02-03 RX ORDER — IBUPROFEN 200 MG
200 TABLET ORAL EVERY 6 HOURS PRN
COMMUNITY

## 2025-02-03 NOTE — PROGRESS NOTES
Chief Complaint  Diabetes (Med management, A1C)    Referred By: TOMMY Anand*    Subjective          Patient or patient representative verbalized consent for the use of Ambient Listening during the visit with  LISA Anand for chart documentation. 2/3/2025  10:14 NISHA Porter presents to [facility] for diabetes medication management    History of Present Illness    Visit type:  follow-up  Diabetes type:  Type 2  Current diabetes status/concerns/issues:     History of Present Illness  The patient presents for evaluation of diabetes mellitus.    He has been managing his diabetes with Januvia 100 mg daily and Jardiance 25 mg daily. He monitors his blood glucose levels at home, typically in the morning, which usually range in the 150s. This morning, his blood glucose level was 153, but it increased to 220 after breakfast.  He reports no episodes of hypoglycemia (blood glucose below 70). He has noticed an increase in thirst but does not consider it excessive. He maintains a diet low in sugar, occasionally indulging in iced tea sweetened with Splenda. He admits to consuming sweets and candy. His weight has been stable, with a high of 242 pounds and a current weight of 224 pounds. He does not require refills of his Jardiance and Januvia prescriptions at this time. He discontinued Mounjaro due to concerns following gallbladder removal.    He is currently under the care of Dr. Rosenberg for a retinal infection, which has caused blurry vision. He has received one injection in his left eye and is scheduled for another tomorrow. He has been informed that this condition is not related to his diabetes.    He is a patient at [facility], where he recently underwent an ablation procedure. He experiences phantom pain at his amputation site, describing it as an electric nerve sensation along the area where his calf used to be. He had not experienced this pain for  some time until a recent fall triggered it. He experiences this pain once or twice a month. He has one more follow-up appointment at [facility] but is dissatisfied with the care there and plans to try acupuncture. His pain primarily extends from about the L4 vertebra to the right hip, which he believes is due to a pinched nerve. He was informed that this could be managed with ablation, but the treatment has not been effective for him. He was told that the effects of the ablation should last 6 months to a year, but they have not lasted a week.        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  GI: Constipation  Cardiovascular: Hypertension and Hyperlipidemia  ED: Patient Reported has had penile implant  Other: None  Hypoglycemia:  None reported at this time  Hypoglycemia Symptoms:  No hypoglycemia at this time  Current diabetes treatment:  Januvia 100 mg once a day and Jardiance 25 mg once a day  Blood glucose device:  Meter  Blood glucose monitoring frequency:  1  Blood glucose range/average:   150s  Glucose Source: Patient Reported  Diet:  Limits high carb/sweet foods, Avoids sugary drinks  Activity/Exercise:  None    Past Medical History:   Diagnosis Date    Allergic 1980    Scotch broom    Allergic rhinitis     Anxiety and depression     Arthritis     Balance problem     DDD BACK NEUROMA , CANE HELPS    Benign prostatic hyperplasia Monitored    Cataract     corrected by surgery    Cholelithiasis Surgery to remove 11/09/24    Chronic sinusitis     NO CURRENT ISSUES     Claustrophobia     Colon polyps     NO CANCER    DDD (degenerative disc disease), cervical     C6/7 FULL ROM    Diabetes insipidus     Diabetes mellitus     Disease of thyroid gland     Diverticulitis     NO CURRENT ISSUES    Erectile dysfunction Sexually inactive    Forgetfulness     H/O degenerative disc disease     History of transfusion Transfusion date 12/82    Hx of BKA, right     Hyperlipemia     Hypertension     Limb swelling     OCCASSIONAL    Lumbago     Multiple endocrine neoplasia     Plantar nerve lesion, left     RIGHT LEG BKA    Prostate CA     Prostatitis 2021    TURP procedure    Sleep apnea     REPAIRED WITH SURGERY    Testosterone deficiency     Type 2 diabetes mellitus     Urinary incontinence      Past Surgical History:   Procedure Laterality Date    ADENOIDECTOMY  1950    AMPUTATION Right 2012    R BKA    BACK SURGERY      LUMBAR MULTIPLE ABLATIONS PRIOR TO FUSION    BLADDER SURGERY  gallbladder removed  11/09/24    BUNIONECTOMY N/A 12/28/2023    Procedure: FUENTES'S NEUROMA EXCISION;  Surgeon: Phillip Munguia DPM;  Location: [facility] OR [facility];  Service: Podiatry;  Laterality: N/A;    CHOLECYSTECTOMY N/A 11/09/2024    Procedure: CHOLECYSTECTOMY LAPAROSCOPIC WITH DAVINCI ROBOT- removal of gallbladder;  Surgeon: Sarah Bo MD;  Location: [facility] MAIN OR;  Service: General;  Laterality: N/A;    COLONOSCOPY  2019    DENTAL PROCEDURE      dental surgery     EYE SURGERY Bilateral     eye implant, yes CATARACTS REMOVED    FOOT SURGERY Left     FRACTURE SURGERY  2008    GALLBLADDER SURGERY  11/09/2024    JOINT REPLACEMENT  2017    LUMBAR FUSION  2016    [facility]    LUMBAR PUNCTURE      ORIF FEMUR FRACTURE Right     PROXIMAL    PENILE PROSTHESIS IMPLANT      PILONIDAL CYST / SINUS EXCISION      PROSTATE SURGERY  TURP date on file    ROTATOR CUFF REPAIR Bilateral     SINUS SURGERY  turbinectomy 1994    SPINE SURGERY  Spinal Laminectomy 2012    TONSILLECTOMY  1950    TOOTH EXTRACTION Right  "2024    TOTAL KNEE ARTHROPLASTY Left     TURP / TRANSURETHRAL INCISION / DRAINAGE PROSTATE      following prostate cancer    UVULOPALATOPHARYNGOPLASTY      VASECTOMY  1979     Family History   Problem Relation Age of Onset    Arthritis Mother             Cancer Mother              Arthritis Father             Cancer Father              Arthritis Brother         unknown    Cancer Brother         Unknown    Arthritis Brother     Cancer Brother         Prostate    Malig Hyperthermia Neg Hx      Social History     Socioeconomic History    Marital status:    Tobacco Use    Smoking status: Former     Current packs/day: 0.00     Average packs/day: 1.5 packs/day for 19.0 years (28.5 ttl pk-yrs)     Types: Cigarettes     Start date: 1960     Quit date: 1979     Years since quittin.6     Passive exposure: Past    Smokeless tobacco: Former    Tobacco comments:     Tobacco free all types   Vaping Use    Vaping status: Never Used   Substance and Sexual Activity    Alcohol use: Not Currently     Alcohol/week: 1.0 standard drink of alcohol     Comment: Occasionally    Drug use: Never    Sexual activity: Not Currently     Partners: Female     Birth control/protection: Vasectomy     Comment: 79 YO male.  Not active.     Allergies   Allergen Reactions    Statins Myalgia and Unknown - High Severity    Sulfate Hives    Meperidine Other (See Comments)     \"DOESN'T WORK\"       Current Outpatient Medications:     acetaminophen (TYLENOL) 325 MG tablet, Take 2 tablets by mouth Every 6 (Six) Hours As Needed for Mild Pain., Disp: , Rfl:     busPIRone (BUSPAR) 15 MG tablet, Take 1 tablet by mouth Every Morning., Disp: 90 tablet, Rfl: 3    empagliflozin (Jardiance) 25 MG tablet tablet, Take 1 tablet by mouth Daily for 180 days., Disp: 90 tablet, Rfl: 1    ezetimibe (ZETIA) 10 MG tablet, TAKE 1 TABLET BY MOUTH DAILY, Disp: 90 tablet, Rfl: 3    FLUoxetine (PROzac) 40 MG " "capsule, TAKE 1 CAPSULE BY MOUTH DAILY, Disp: 90 capsule, Rfl: 3    Gemtesa 75 MG tablet, Take 1 tablet by mouth Daily., Disp: , Rfl:     hydroCHLOROthiazide 12.5 MG tablet, Take 1 tablet by mouth Daily., Disp: 90 tablet, Rfl: 1    ibuprofen (ADVIL,MOTRIN) 200 MG tablet, Take 1 tablet by mouth Every 6 (Six) Hours As Needed for Mild Pain., Disp: , Rfl:     famotidine (PEPCID) 20 MG tablet, Take 1 tablet by mouth Every Night. (Patient not taking: Reported on 2/3/2025), Disp: 20 tablet, Rfl: 0    SITagliptin (Januvia) 100 MG tablet, Take 1 tablet by mouth Daily., Disp: 90 tablet, Rfl: 3    Objective     Vitals:    02/03/25 0941   BP: 137/73   BP Location: Right arm   Patient Position: Sitting   Cuff Size: Adult   Pulse: 63   Temp: 98 °F (36.7 °C)   TempSrc: Temporal   SpO2: 93%   Weight: 102 kg (224 lb)   Height: 175.3 cm (69\")     Body mass index is 33.08 kg/m².    Physical Exam  Constitutional:       Appearance: Normal appearance. He is obese.      Comments: Obesity (BMI 30 - 39.9) Pt Current BMI = 33.08    HENT:      Head: Normocephalic and atraumatic.      Right Ear: External ear normal.      Left Ear: External ear normal.      Nose: Nose normal.   Eyes:      Extraocular Movements: Extraocular movements intact.      Conjunctiva/sclera: Conjunctivae normal.   Pulmonary:      Effort: Pulmonary effort is normal.   Musculoskeletal:         General: Normal range of motion.      Cervical back: Normal range of motion.   Skin:     General: Skin is warm and dry.   Neurological:      General: No focal deficit present.      Mental Status: He is alert and oriented to person, place, and time. Mental status is at baseline.   Psychiatric:         Mood and Affect: Mood normal.         Behavior: Behavior normal.         Thought Content: Thought content normal.         Judgment: Judgment normal.             Result Review :   The following data was reviewed by: LISA Anand on 01/30/2025:    Most Recent A1C          " 2/3/2025    09:48   HGBA1C Most Recent   Hemoglobin A1C 7.0        A1C Last 3 Results          8/27/2024    10:53 10/31/2024    11:30 2/3/2025    09:48   HGBA1C Last 3 Results   Hemoglobin A1C 6.20  6.2  7.0      A1c collected in the office today is 7%, indicating Controlled Type II diabetes.  This result is up from the prior result of 6.2% collected on 10/31/24     Glucose   Date Value Ref Range Status   02/03/2025 246 (H) 70 - 99 mg/dL Final     Comment:     Serial Number: [id number]:  [id number]     Point of care glucose in the office today is  elevated at 246 mg/dL    Creatinine   Date Value Ref Range Status   11/11/2024 0.71 (L) 0.76 - 1.27 mg/dL Final   11/10/2024 0.80 0.76 - 1.27 mg/dL Final     eGFR   Date Value Ref Range Status   11/11/2024 92.7 >60.0 mL/min/1.73 Final   11/10/2024 89.5 >60.0 mL/min/1.73 Final     Labs collected on 11/11/24 show Normal values              Diagnoses and all orders for this visit:    1. Controlled type 2 diabetes mellitus with diabetic polyneuropathy, without long-term current use of insulin (Primary)  -     POC Glycosylated Hemoglobin (Hb A1C)    2. Obesity (BMI 30-39.9)    Other orders  -     POC Glucose        Assessment & Plan  1. Diabetes mellitus.  His A1c level has increased to 7, from previous readings of 6.2 in August 2024 and October 2024. This could be attributed to the discontinuation of Mounjaro. However, his weight has decreased by 4 pounds since the last visit, which is a positive outcome. He was advised to monitor his portion sizes and avoid overindulgence.  No additional medication is deemed necessary at this point.    2. Phantom pain.  He experiences phantom pain at his amputation site, which is managed with gabapentin. He was advised to consult with his pain clinic regarding the prescription of gabapentin. If the pain clinic approves, a prescription for gabapentin will be provided.    3. Retinal infection.  He is currently under the care of Dr. Rosenberg  for a retinal infection, which is not related to diabetes. He has already received one injection in the left eye and is scheduled for another injection tomorrow.          The patient will monitor his blood glucose levels  1 - 2 times daily.  If he develops problematic hyperglycemia or hypoglycemia or adverse drug reactions, he will contact the office for further instructions.        Follow Up     Return in about 3 months (around 5/3/2025) for Medication Management.    Patient was given instructions and counseling regarding his condition or for health maintenance advice. Please see specific information pulled into the AVS if appropriate.     Indu Carter, LISA  02/03/2025        Dictated Utilizing Dragon Dictation.  Please note that portions of this note were completed with a voice recognition program.  Part of this note may be an electronic transcription/translation of spoken language to printed text using the Dragon Dictation System.

## 2025-02-24 DIAGNOSIS — I10 PRIMARY HYPERTENSION: ICD-10-CM

## 2025-02-24 RX ORDER — HYDROCHLOROTHIAZIDE 12.5 MG/1
12.5 TABLET ORAL DAILY
Qty: 90 TABLET | Refills: 1 | Status: SHIPPED | OUTPATIENT
Start: 2025-02-24

## 2025-03-20 ENCOUNTER — DOCUMENTATION (OUTPATIENT)
Dept: INTERNAL MEDICINE | Facility: CLINIC | Age: 80
End: 2025-03-20
Payer: MEDICARE

## 2025-04-11 ENCOUNTER — TELEPHONE (OUTPATIENT)
Dept: UROLOGY | Age: 80
End: 2025-04-11
Payer: MEDICARE

## 2025-04-11 DIAGNOSIS — R33.9 URINARY RETENTION: ICD-10-CM

## 2025-04-11 DIAGNOSIS — R39.15 URGENCY OF URINATION: Primary | ICD-10-CM

## 2025-04-11 NOTE — TELEPHONE ENCOUNTER
PATIENT WALKED INTO CLINIC STATING THAT HE WENT TO [facility] AND THE COST OF GEMTESSA HAS GONE UP $400.     HE WOULD LIKE TO KNOW IF HE COULD HAVE SAMPLES AND MAKE DUE WITH THAT OR IF THERE ARE ALTERNATIVES.     INFORMED DR. GALLAGHER IS IN CLINIC AND I WOULD SEND MESSAGE FOR [employer].

## 2025-04-14 RX ORDER — VIBEGRON 75 MG/1
1 TABLET, FILM COATED ORAL DAILY
Qty: 28 TABLET | Refills: 0 | COMMUNITY
Start: 2025-04-14 | End: 2025-04-16 | Stop reason: SDUPTHER

## 2025-04-14 NOTE — TELEPHONE ENCOUNTER
PATIENT CALLED BACK TO CHECK ON THE MESSAGE. HE WANTS TO COME BY TODAY AROUND 1:00 TO GET SOME SAMPLES.

## 2025-04-16 DIAGNOSIS — R39.15 URGENCY OF URINATION: ICD-10-CM

## 2025-04-16 DIAGNOSIS — R33.9 URINARY RETENTION: ICD-10-CM

## 2025-04-16 RX ORDER — VIBEGRON 75 MG/1
1 TABLET, FILM COATED ORAL DAILY
Qty: 90 TABLET | Refills: 3 | Status: SHIPPED | OUTPATIENT
Start: 2025-04-16

## 2025-04-16 NOTE — TELEPHONE ENCOUNTER
LVM for patient. Informed patient that the pharmacy wants him to try to refill this after 4/22 for insurance purposes.

## 2025-05-05 ENCOUNTER — OFFICE VISIT (OUTPATIENT)
Dept: DIABETES SERVICES | Facility: HOSPITAL | Age: 80
End: 2025-05-05
Payer: MEDICARE

## 2025-05-05 VITALS
HEART RATE: 68 BPM | SYSTOLIC BLOOD PRESSURE: 134 MMHG | HEIGHT: 69 IN | WEIGHT: 227 LBS | BODY MASS INDEX: 33.62 KG/M2 | DIASTOLIC BLOOD PRESSURE: 67 MMHG | OXYGEN SATURATION: 94 %

## 2025-05-05 DIAGNOSIS — E11.42 CONTROLLED TYPE 2 DIABETES MELLITUS WITH DIABETIC POLYNEUROPATHY, WITHOUT LONG-TERM CURRENT USE OF INSULIN: Primary | ICD-10-CM

## 2025-05-05 DIAGNOSIS — E66.9 OBESITY (BMI 30-39.9): ICD-10-CM

## 2025-05-05 LAB
EXPIRATION DATE: ABNORMAL
GLUCOSE BLDC GLUCOMTR-MCNC: 196 MG/DL (ref 70–99)
HBA1C MFR BLD: 7.2 % (ref 4.5–5.7)
Lab: ABNORMAL

## 2025-05-05 PROCEDURE — G2211 COMPLEX E/M VISIT ADD ON: HCPCS | Performed by: NURSE PRACTITIONER

## 2025-05-05 PROCEDURE — 99214 OFFICE O/P EST MOD 30 MIN: CPT | Performed by: NURSE PRACTITIONER

## 2025-05-05 PROCEDURE — G0463 HOSPITAL OUTPT CLINIC VISIT: HCPCS | Performed by: NURSE PRACTITIONER

## 2025-05-05 PROCEDURE — 1159F MED LIST DOCD IN RCRD: CPT | Performed by: NURSE PRACTITIONER

## 2025-05-05 PROCEDURE — 1160F RVW MEDS BY RX/DR IN RCRD: CPT | Performed by: NURSE PRACTITIONER

## 2025-05-05 PROCEDURE — 82948 REAGENT STRIP/BLOOD GLUCOSE: CPT | Performed by: NURSE PRACTITIONER

## 2025-05-05 PROCEDURE — 83036 HEMOGLOBIN GLYCOSYLATED A1C: CPT | Performed by: NURSE PRACTITIONER

## 2025-05-05 PROCEDURE — 3075F SYST BP GE 130 - 139MM HG: CPT | Performed by: NURSE PRACTITIONER

## 2025-05-05 PROCEDURE — 3078F DIAST BP <80 MM HG: CPT | Performed by: NURSE PRACTITIONER

## 2025-05-05 RX ORDER — METFORMIN HYDROCHLORIDE 500 MG/1
1000 TABLET, EXTENDED RELEASE ORAL
Qty: 180 TABLET | Refills: 1 | Status: SHIPPED | OUTPATIENT
Start: 2025-05-05 | End: 2025-11-01

## 2025-05-05 NOTE — PROGRESS NOTES
"Chief Complaint  Diabetes (Med management, A1C, states that BG has been \"creeping up\", stopped taking Mounjaro)    Referred By: Amaury Mcdonough *    Subjective          Patient or patient representative verbalized consent for the use of Ambient Listening during the visit with  LISA Anand for chart documentation. 5/5/2025  10:50 EDT    Jamir Porter presents to [facility] for diabetes medication management    History of Present Illness    Visit type:  follow-up  Diabetes type:  Type 2  Current diabetes status/concerns/issues:     History of Present Illness  The patient presents for evaluation of diabetes mellitus.    He previously discontinued the use of Mounjaro due to concerns after having his gallbladder removed, despite not experiencing any adverse effects such as nausea, vomiting, or diarrhea. He experienced severe pain, initially thought to be acid-related, but was later diagnosed as a gallbladder issue. His current regimen includes Januvia 100 mg and Jardiance 25 mg, both administered once daily, which appear to be effective. He has previously been on metformin, but it was not effective at that time. He monitors his blood glucose levels at home daily, typically recording readings above 150, with a peak of approximately 179. This morning, his reading was 161. His blood glucose trend generally falls between 150 and 180. He maintains a diet low in sweets and strives to stay active within his physical limitations.    He reports no new health concerns. He had an eye infection, which has resolved. He is seeing Dr. Rosenberg tomorrow for an assessment.      Current Diabetes symptoms:    Polyuria: No   Polydipsia: No   Polyphagia: No   Blurred vision: No   Excessive fatigue: No  Known Diabetes complications:  Neuropathy: Tingling; Location: Other: left foot  Renal: Normal eGFR per current labs and Microalbuminuria - NEGATIVE  Eyes: No Retinopathy reported on current " eye exam; Location: N/A; Date of Last Exam: 8/12/24  Amputation/Wounds: prior amputation to RLL due to traumatic injury  GI: Constipation  Cardiovascular: Hypertension and Hyperlipidemia  ED: Patient Reported has had penile implant  Other: None  Hypoglycemia:  None reported at this time  Hypoglycemia Symptoms:  No hypoglycemia at this time  Current diabetes treatment:  Januvia 100 mg once a day and Jardiance 25 mg once a day   Blood glucose device:  Meter  Blood glucose monitoring frequency:  1  Blood glucose range/average:   150-170 in the AM  Glucose Source: Patient Reported  Diet:  Limits high carb/sweet foods, Avoids sugary drinks  Activity/Exercise:   limited in activity    Past Medical History:   Diagnosis Date    Allergic 1980    Scotch broom    Allergic rhinitis     Anxiety and depression     Arthritis     Arthritis of neck 2020    Balance problem     DDD BACK NEUROMA , CANE HELPS    Benign prostatic hyperplasia Monitored    Cataract     corrected by surgery    Cholelithiasis Surgery to remove 11/09/24    Chronic sinusitis     NO CURRENT ISSUES    Claustrophobia     Colon polyps     NO CANCER    DDD (degenerative disc disease), cervical     C6/7 FULL ROM    Diabetes insipidus     Diabetes mellitus     Difficulty walking Limited    Disease of thyroid gland     Diverticulitis     NO CURRENT ISSUES    Erectile dysfunction Sexually inactive    Forgetfulness     Fracture, femur 2017    Fracture, tibia and fibula 1982    Frozen shoulder 2009    H/O degenerative disc disease     Hip arthrosis 2020    History of transfusion Transfusion date 12/82    Hx of BKA, right     Hyperlipemia     Hypertension     Injury of back 2000    Degenerative disc    Injury of neck 2010    C6-C7 degenerative disc    Knee swelling 2020    Limb swelling     OCCASSIONAL    Lumbago     Lumbosacral disc disease 2000    Multiple endocrine neoplasia     Periarthritis of shoulder 2020    Plantar nerve lesion, left     RIGHT LEG BKA    Prostate  CA     Prostatitis     TURP procedure    Rotator cuff syndrome 2009    Shingles 2010    Sleep apnea     REPAIRED WITH SURGERY    Testosterone deficiency     Type 2 diabetes mellitus     Urinary incontinence      Past Surgical History:   Procedure Laterality Date    ADENOIDECTOMY  1950    AMPUTATION Right 2012    R BKA    BACK SURGERY      LUMBAR MULTIPLE ABLATIONS PRIOR TO FUSION    BLADDER SURGERY  gallbladder removed  24    BUNIONECTOMY N/A 2023    Procedure: FUENTES'S NEUROMA EXCISION;  Surgeon: Phillip Munguia DPM;  Location: [facility] OR [facility] [place];  Service: Podiatry;  Laterality: N/A;    CHOLECYSTECTOMY N/A 2024    Procedure: CHOLECYSTECTOMY LAPAROSCOPIC WITH [employer] ROBOT- removal of gallbladder;  Surgeon: Sarah Bo MD;  Location: [facility] MAIN OR;  Service: General;  Laterality: N/A;    COLONOSCOPY  2019    DENTAL PROCEDURE      dental surgery     EYE SURGERY Bilateral     eye implant, yes CATARACTS REMOVED    FOOT SURGERY Left     FRACTURE SURGERY  2008    GALLBLADDER SURGERY  2024    HAND SURGERY  2017    JOINT REPLACEMENT  2017    LAMINECTOMY      Spinal Laminectomy 2012    LUMBAR FUSION  2016    [facility]    LUMBAR PUNCTURE      ORIF FEMUR FRACTURE Right     PROXIMAL    PENILE PROSTHESIS IMPLANT      PILONIDAL CYST / SINUS EXCISION      PROSTATE SURGERY  TURP date on file    ROTATOR CUFF REPAIR Bilateral     SHOULDER SURGERY  [id number]    SINUS SURGERY  turbinectomy     SPINE SURGERY  Spinal Laminectomy 2012    TONSILLECTOMY  1950    TOOTH EXTRACTION Right 2024    TOTAL KNEE ARTHROPLASTY Left     TURP / TRANSURETHRAL INCISION / DRAINAGE PROSTATE      following prostate cancer    UVULOPALATOPHARYNGOPLASTY      VASECTOMY  1979     Family History   Problem Relation Age of Onset    Arthritis Mother             Cancer Mother             Arthritis Father             Cancer Father             Arthritis Brother          "unknown    Cancer Brother         Unknown    Arthritis Brother     Cancer Brother         Prostate    Malig Hyperthermia Neg Hx      Social History     Socioeconomic History    Marital status:    Tobacco Use    Smoking status: Former     Current packs/day: 0.00     Average packs/day: 1.5 packs/day for 19.0 years (28.5 ttl pk-yrs)     Types: Cigarettes     Start date: 1960     Quit date: 1979     Years since quittin.9     Passive exposure: Past    Smokeless tobacco: Former    Tobacco comments:     Tobacco free all types   Vaping Use    Vaping status: Never Used   Substance and Sexual Activity    Alcohol use: Not Currently     Alcohol/week: 1.0 standard drink of alcohol     Comment: Occasionally    Drug use: Never    Sexual activity: Not Currently     Partners: Female     Birth control/protection: Vasectomy     Comment: 79 YO male.  Not active.     Allergies   Allergen Reactions    Statins Myalgia and Unknown - High Severity    Sulfate Hives    Meperidine Other (See Comments)     \"DOESN'T WORK\"       Current Outpatient Medications:     acetaminophen (TYLENOL) 325 MG tablet, Take 2 tablets by mouth Every 6 (Six) Hours As Needed for Mild Pain., Disp: , Rfl:     busPIRone (BUSPAR) 15 MG tablet, Take 1 tablet by mouth Every Morning., Disp: 90 tablet, Rfl: 3    empagliflozin (Jardiance) 25 MG tablet tablet, Take 1 tablet by mouth Daily for 180 days., Disp: 90 tablet, Rfl: 1    ezetimibe (ZETIA) 10 MG tablet, TAKE 1 TABLET BY MOUTH DAILY, Disp: 90 tablet, Rfl: 3    FLUoxetine (PROzac) 40 MG capsule, TAKE 1 CAPSULE BY MOUTH DAILY, Disp: 90 capsule, Rfl: 3    Gemtesa 75 MG tablet, Take 1 tablet by mouth Daily., Disp: 90 tablet, Rfl: 3    hydroCHLOROthiazide 12.5 MG tablet, Take 1 tablet by mouth Daily., Disp: 90 tablet, Rfl: 1    SITagliptin (Januvia) 100 MG tablet, Take 1 tablet by mouth Daily., Disp: 90 tablet, Rfl: 3    famotidine (PEPCID) 20 MG tablet, Take 1 tablet by mouth Every Night. (Patient not " "taking: Reported on 5/5/2025), Disp: 20 tablet, Rfl: 0    ibuprofen (ADVIL,MOTRIN) 200 MG tablet, Take 1 tablet by mouth Every 6 (Six) Hours As Needed for Mild Pain. (Patient not taking: Reported on 5/5/2025), Disp: , Rfl:     metFORMIN ER (GLUCOPHAGE-XR) 500 MG 24 hr tablet, Take 2 tablets by mouth Daily With Breakfast for 180 days., Disp: 180 tablet, Rfl: 1    Objective     Vitals:    05/05/25 1016   BP: 134/67   BP Location: Left arm   Patient Position: Sitting   Cuff Size: Adult   Pulse: 68   SpO2: 94%   Weight: 103 kg (227 lb)   Height: 175.3 cm (69\")     Body mass index is 33.52 kg/m².    Physical Exam  Constitutional:       Appearance: Normal appearance. He is obese.      Comments: Obesity (BMI 30 - 39.9) Pt Current BMI = 33.52   HENT:      Head: Normocephalic and atraumatic.      Right Ear: External ear normal.      Left Ear: External ear normal.      Nose: Nose normal.   Eyes:      Extraocular Movements: Extraocular movements intact.      Conjunctiva/sclera: Conjunctivae normal.   Pulmonary:      Effort: Pulmonary effort is normal.   Musculoskeletal:         General: Normal range of motion.      Cervical back: Normal range of motion.   Skin:     General: Skin is warm and dry.   Neurological:      General: No focal deficit present.      Mental Status: He is alert and oriented to person, place, and time. Mental status is at baseline.   Psychiatric:         Mood and Affect: Mood normal.         Behavior: Behavior normal.         Thought Content: Thought content normal.         Judgment: Judgment normal.             Result Review :   The following data was reviewed by: LISA Anand on 05/05/2025:    Most Recent A1C          5/5/2025    10:39   HGBA1C Most Recent   Hemoglobin A1C 7.2        A1C Last 3 Results          10/31/2024    11:30 2/3/2025    09:48 5/5/2025    10:39   HGBA1C Last 3 Results   Hemoglobin A1C 6.2  7.0  7.2      A1c collected in the office today is 7.0%, indicating Controlled " Type II diabetes.  This result is up from the prior result of 6.2% collected on 10/31/2024    Glucose   Date Value Ref Range Status   05/05/2025 196 (H) 70 - 99 mg/dL Final     Comment:     Serial Number: [id number]:  [id number]     Point of care glucose in the office today is  elevated at 246              Diagnoses and all orders for this visit:    1. Controlled type 2 diabetes mellitus with diabetic polyneuropathy, without long-term current use of insulin (Primary)  -     POC Glycosylated Hemoglobin (Hb A1C)  -     POC Glucose  -     metFORMIN ER (GLUCOPHAGE-XR) 500 MG 24 hr tablet; Take 2 tablets by mouth Daily With Breakfast for 180 days.  Dispense: 180 tablet; Refill: 1  -     empagliflozin (Jardiance) 25 MG tablet tablet; Take 1 tablet by mouth Daily for 180 days.  Dispense: 90 tablet; Refill: 1  -     SITagliptin (Januvia) 100 MG tablet; Take 1 tablet by mouth Daily.  Dispense: 90 tablet; Refill: 3    2. Obesity (BMI 30-39.9)        Assessment & Plan  1. Diabetes Mellitus:   - A1c level has increased from 6.2 in 10/2024 to 7.0 currently, indicating a slight elevation but still within a controlled range.  - Blood glucose level today is 246, which may be attributed to the consumption of banana nut bread. Weight has increased by 3 pounds.  - Currently on the maximum doses of Januvia 100 mg once a day and Jardiance 25 mg once a day.   - Initiated on metformin 1000 mg extended-release, to be taken once daily in the morning with food. Potential side effects of metformin, including gastrointestinal upset, were discussed.  If stomach upset occurs, the dose can be divided into two 500 mg tablets to be taken twice daily before breakfast and supper. Prescription for metformin sent to pharmacy. Continue monitoring blood glucose levels at home and maintain an active lifestyle as tolerated. If metformin does not yield desired results, consideration will be given to reintroducing Mounjaro.    2. Eye Infection:  resolved.  - No new symptoms reported.  - No abnormalities noted on physical exam.  - Patient will see Dr. Rosenberg for an assessment and dilated eye exam.  - Request for Dr. Rosenberg to send a copy of the exam results to our office.        The patient will monitor his blood glucose levels 1 time daily.  If he develops problematic hyperglycemia or hypoglycemia or adverse drug reactions, he will contact the office for further instructions.        Follow Up     Return in about 3 months (around 8/5/2025) for Medication Management.    Patient was given instructions and counseling regarding his condition or for health maintenance advice. Please see specific information pulled into the AVS if appropriate.     Indu Carter, APRN  05/05/2025        Dictated Utilizing Dragon Dictation.  Please note that portions of this note were completed with a voice recognition program.  Part of this note may be an electronic transcription/translation of spoken language to printed text using the Dragon Dictation System.

## 2025-05-16 DIAGNOSIS — F33.2 SEVERE EPISODE OF RECURRENT MAJOR DEPRESSIVE DISORDER, WITHOUT PSYCHOTIC FEATURES: ICD-10-CM

## 2025-05-16 RX ORDER — FLUOXETINE HYDROCHLORIDE 40 MG/1
40 CAPSULE ORAL DAILY
Qty: 90 CAPSULE | Refills: 3 | Status: SHIPPED | OUTPATIENT
Start: 2025-05-16

## 2025-05-16 RX ORDER — BUSPIRONE HYDROCHLORIDE 15 MG/1
15 TABLET ORAL EVERY MORNING
Qty: 90 TABLET | Refills: 3 | Status: SHIPPED | OUTPATIENT
Start: 2025-05-16

## 2025-06-23 ENCOUNTER — TELEPHONE (OUTPATIENT)
Dept: UROLOGY | Age: 80
End: 2025-06-23
Payer: MEDICARE

## 2025-06-23 DIAGNOSIS — R39.15 URGENCY OF URINATION: ICD-10-CM

## 2025-06-23 DIAGNOSIS — R33.9 URINARY RETENTION: ICD-10-CM

## 2025-06-23 NOTE — TELEPHONE ENCOUNTER
Caller Name: Jamir Porter   Relationship: SELF  Best Contact Number: [phone]     Patient is requesting samples of GEMTESA  How many days of medication do you have left? NONE    Additional Information:   PT IS OUT OF MEDICATION AND IT'S OVER $230 PER MONTH. HE SAYS IT WORKS WELL BUT HE IS ASKING IF ANY SAMPLES ARE AVAILABLE.    ALSO, ARE THERE ANY PT ASSISTANCE PROGRAMS OR PRE-AUTH FOR THIS THAT HE CAN GET?    PLEASE GIVE PT A CALL TO ADVISE.

## 2025-06-24 RX ORDER — VIBEGRON 75 MG/1
1 TABLET, FILM COATED ORAL DAILY
Qty: 42 TABLET | Refills: 0 | COMMUNITY
Start: 2025-06-24

## 2025-06-24 NOTE — TELEPHONE ENCOUNTER
Pt answered.   I let the Pt know we have Gemtesa samples available for Pt to  tomorrow. Pt stated they would be here tomorrow to  samples. I let the Pt know we would have to get with the gemtesa rep to see about pre authorization or patient assistance. Pt verbalized understanding.

## 2025-07-05 DIAGNOSIS — R33.9 URINARY RETENTION: ICD-10-CM

## 2025-07-05 DIAGNOSIS — R39.15 URGENCY OF URINATION: ICD-10-CM

## 2025-07-07 ENCOUNTER — LAB (OUTPATIENT)
Dept: LAB | Facility: HOSPITAL | Age: 80
End: 2025-07-07
Payer: MEDICARE

## 2025-07-07 DIAGNOSIS — C61 PROSTATE CANCER: ICD-10-CM

## 2025-07-07 LAB — PSA SERPL-MCNC: 5.05 NG/ML (ref 0–4)

## 2025-07-07 PROCEDURE — 84153 ASSAY OF PSA TOTAL: CPT

## 2025-07-07 PROCEDURE — 36415 COLL VENOUS BLD VENIPUNCTURE: CPT

## 2025-07-07 RX ORDER — VIBEGRON 75 MG/1
1 TABLET, FILM COATED ORAL DAILY
Qty: 30 TABLET | OUTPATIENT
Start: 2025-07-07

## 2025-07-08 NOTE — PROGRESS NOTES
Chief Complaint    Urologic complaint    Subjective          Jamir Porter presents to [facility]  Dysuria              80-year-old  gentleman       History of prostate cancer clinical T1c, on active surveillance, s/p TURP patient also with ED, penile prosthesis   Urinary retention - does intermittent CIC        Voiding ok    Knows how to CIC - has not done recently      2023 started on Gemtesa daily.  Helping a lot         11/11/2024 discharged from hospital with catheter    11/9/2024 cholecystectomy  11/9/2024 CT abdomen/pelvis with - distended gallbladder with pericholecystic fluid.  Acute cholecystitis.      No cardiopulmonary history.    Nonsmoker  No DM.        PVR     7/25     035  12/25   106  6/24     117  12/23   138  5/23       054  12/22   024  9/22 cystoscopy-4 cm prostate-open at the bladder neck but left side still has some fairly large adenoma that does coapt when the scope was brought past the verumontanum.  Normal cystoscopy  9/22   Noel  CIC   9/22   60  11/21  65  3/21  TURP -3+3, 3/130, < 1%  4/21  200  2/20   25          Previous    Myrbetriq causes diarrhea    Patient does not know how to do CIC, was having trouble with retention when he was dealing with constipation      2/23 cystoscopy-4 cm prostate - open at bladder neck but left side still has some fairly large adenoma that does coapt.   Large bladder minor trabeculation no pathology    9/22 2 episodes of 1 retention    No longer following adrenal mass.    4/21 CT abdomen/pelvis with and without -9 mm benign lipid poor adrenal adenoma, no change since 8/19.    No erections. Has IPP.    H/o retention  - 1.5 L    oxybutynin  - did stop his incontinence but did give him side effects that he could not tolerate, GI.  Myrebetriq Cause -  diarrhea    8/26/2019 CT abdomen with and withoutadrenal mass protocolsmall right 0.9 cm adrenal nodule.  Favor adrenal adenoma.  8/19 cortisol less than 1.0, plasma  metanephrines negative  7/3/2019 CT abdomen/pelvis with9 mm right adrenal nodule.  Which demonstrates enhancement above the background of the adrenal gland.  Penile prosthesis reservoir partially herniates the left inguinal ring.    Father  pancreatic CA,  brother with prostate cancer.      Penile prothesis - functions, he does not use.    Father  at 64, grandfather  at 85          H/o prostate CA           5.0          4.5      MRI prostate - 14 g, PSAd 0.4,  PI - RADS 4-right peripheral zone apex, 10 x 4 mm, no EPE         5.2        12/15/2023 MRI prostate - 12 g -PI-RADS 4 lesion 6 mm right posterior lateral apex peripheral zone.         4.5          3.6       3.6      2022 MRI prostate-negative.     10/21 MRI prostate-17 g, previously 58, negative otherwise    3/21  TURP -3+3, 3/130, < 1% - stopped finasteride     cystoscopy -4 cm prostate, moderate trabeculations, negative otherwise     prostate biopsy -53 g, right apex 3+3, 1/2, 6%, negative otherwise    3.56    MRI  prostate - 58 g , neg    0.91    2019 MRI [id number] g -negative      1.40     2.32 -on finasteride   MRI [id number] g, no targetable lesion seen    5.09    5.41  10/17 5.5    prostate [id number] g - left, 3+3, 50% of core, 5 mm; right - high-grade PIN    5.5  10/16 4.9    10/16 MRI [id number] g, no targetable lesions seen.     4.4   prostate [id number] g   Pathology  Right basenegative  Right base lateral 3+3, 20%, 4 mm  Right mid, mid lateral, apex, apex lateralnegative  Left basenegative  Left mid3+3, 10%  Left mid lateralnegative  Left apex3+3, 15%   left apex3+3, 10%     5.39     3.68                 Assessment and Plan    Diagnoses and all orders for this visit:    1. Prostate cancer (Primary)    2. Benign prostatic hyperplasia with lower urinary tract symptoms, symptom details unspecified           Urinary retention/BPH    Doing  ok  CIC if needed           Urgency      Cont Gemtesa.   Refilled Today.          Prostate cancer on active surveillance         We discussed that active surveillance is an option for a patient with low risk prostate cancer.  The risks of surveillance include progression of disease, failure of clinical staging to detect advanced disease, need for strict followup, need for repeat prostate biopsies, and patient anxiety related to PSA.  We did discuss that the evidence suggests that patient's who progress to treatment on surveillance have survival similar to those treated at diagnosis.      PSA  in 6 months with MRI prostate      PVR at  follow-up

## 2025-07-11 ENCOUNTER — OFFICE VISIT (OUTPATIENT)
Dept: UROLOGY | Age: 80
End: 2025-07-11
Payer: MEDICARE

## 2025-07-11 VITALS — HEIGHT: 69 IN | BODY MASS INDEX: 33.12 KG/M2 | WEIGHT: 223.6 LBS

## 2025-07-11 DIAGNOSIS — C61 PROSTATE CANCER: Primary | ICD-10-CM

## 2025-07-11 DIAGNOSIS — R33.9 URINARY RETENTION: ICD-10-CM

## 2025-07-11 DIAGNOSIS — N40.1 BENIGN PROSTATIC HYPERPLASIA WITH LOWER URINARY TRACT SYMPTOMS, SYMPTOM DETAILS UNSPECIFIED: ICD-10-CM

## 2025-07-11 DIAGNOSIS — R39.15 URGENCY OF URINATION: ICD-10-CM

## 2025-07-11 LAB — URINE VOLUME: 35

## 2025-07-11 RX ORDER — VIBEGRON 75 MG/1
1 TABLET, FILM COATED ORAL DAILY
Qty: 90 TABLET | Refills: 3 | Status: SHIPPED | OUTPATIENT
Start: 2025-07-11

## 2025-08-05 ENCOUNTER — OFFICE VISIT (OUTPATIENT)
Dept: DIABETES SERVICES | Facility: HOSPITAL | Age: 80
End: 2025-08-05
Payer: MEDICARE

## 2025-08-05 VITALS
OXYGEN SATURATION: 98 % | BODY MASS INDEX: 33.83 KG/M2 | SYSTOLIC BLOOD PRESSURE: 139 MMHG | DIASTOLIC BLOOD PRESSURE: 76 MMHG | HEIGHT: 69 IN | TEMPERATURE: 97.6 F | WEIGHT: 228.4 LBS | HEART RATE: 54 BPM

## 2025-08-05 DIAGNOSIS — E11.42 CONTROLLED TYPE 2 DIABETES MELLITUS WITH DIABETIC POLYNEUROPATHY, WITHOUT LONG-TERM CURRENT USE OF INSULIN: Primary | ICD-10-CM

## 2025-08-05 DIAGNOSIS — E66.9 OBESITY (BMI 30-39.9): ICD-10-CM

## 2025-08-05 LAB
EXPIRATION DATE: ABNORMAL
GLUCOSE BLDC GLUCOMTR-MCNC: 130 MG/DL (ref 70–99)
HBA1C MFR BLD: 6.6 % (ref 4.5–5.7)
Lab: ABNORMAL

## 2025-08-05 PROCEDURE — 82948 REAGENT STRIP/BLOOD GLUCOSE: CPT | Performed by: NURSE PRACTITIONER

## 2025-08-05 PROCEDURE — 99214 OFFICE O/P EST MOD 30 MIN: CPT | Performed by: NURSE PRACTITIONER

## 2025-08-05 PROCEDURE — 3078F DIAST BP <80 MM HG: CPT | Performed by: NURSE PRACTITIONER

## 2025-08-05 PROCEDURE — G2211 COMPLEX E/M VISIT ADD ON: HCPCS | Performed by: NURSE PRACTITIONER

## 2025-08-05 PROCEDURE — G0463 HOSPITAL OUTPT CLINIC VISIT: HCPCS | Performed by: NURSE PRACTITIONER

## 2025-08-05 PROCEDURE — 3075F SYST BP GE 130 - 139MM HG: CPT | Performed by: NURSE PRACTITIONER

## 2025-08-05 PROCEDURE — 1160F RVW MEDS BY RX/DR IN RCRD: CPT | Performed by: NURSE PRACTITIONER

## 2025-08-05 PROCEDURE — 83036 HEMOGLOBIN GLYCOSYLATED A1C: CPT | Performed by: NURSE PRACTITIONER

## 2025-08-05 PROCEDURE — 1159F MED LIST DOCD IN RCRD: CPT | Performed by: NURSE PRACTITIONER

## 2025-08-05 RX ORDER — GLIPIZIDE 2.5 MG/1
2.5 TABLET, EXTENDED RELEASE ORAL DAILY
Qty: 90 TABLET | Refills: 1 | Status: SHIPPED | OUTPATIENT
Start: 2025-08-05

## 2025-08-06 ENCOUNTER — DOCUMENTATION (OUTPATIENT)
Dept: INTERNAL MEDICINE | Facility: CLINIC | Age: 80
End: 2025-08-06
Payer: MEDICARE

## 2025-08-25 ENCOUNTER — TELEPHONE (OUTPATIENT)
Dept: UROLOGY | Age: 80
End: 2025-08-25
Payer: MEDICARE

## 2025-08-28 ENCOUNTER — TELEPHONE (OUTPATIENT)
Dept: UROLOGY | Age: 80
End: 2025-08-28
Payer: MEDICARE

## (undated) DEVICE — EXTREMITY-LF: Brand: MEDLINE INDUSTRIES, INC.

## (undated) DEVICE — SUT MNCRYL 4/0 PS2 18 IN

## (undated) DEVICE — BANDAGE,GAUZE,CONFORMING,3"X75",STRL,LF: Brand: MEDLINE

## (undated) DEVICE — SLV SCD KN/LEN ADJ EXPRSS BLENDED MD 1P/U

## (undated) DEVICE — DISPOSABLE TOURNIQUET CUFF SINGLE BLADDER, SINGLE PORT AND QUICK CONNECT CONNECTOR: Brand: COLOR CUFF

## (undated) DEVICE — SYR LL TP 10ML STRL

## (undated) DEVICE — SUT MERSILENE POLYSTR UR6 BR 0 75CM GRN

## (undated) DEVICE — STERILE POLYISOPRENE POWDER-FREE SURGICAL GLOVES WITH EMOLLIENT COATING: Brand: PROTEXIS

## (undated) DEVICE — GLV SURG SENSICARE PI ORTHO SZ8 LF STRL

## (undated) DEVICE — ENDOPATH PNEUMONEEDLE INSUFFLATION NEEDLES WITH LUER LOCK CONNECTORS 120MM: Brand: ENDOPATH

## (undated) DEVICE — 2, DISPOSABLE SUCTION/IRRIGATOR WITHOUT DISPOSABLE TIP: Brand: STRYKEFLOW

## (undated) DEVICE — SOL NACL 0.9PCT 1000ML

## (undated) DEVICE — GAUZE,SPONGE,4"X4",32PLY,XRAY,STRL,LF: Brand: MEDLINE

## (undated) DEVICE — ARM DRAPE

## (undated) DEVICE — ANTIBACTERIAL UNDYED BRAIDED (POLYGLACTIN 910), SYNTHETIC ABSORBABLE SUTURE: Brand: COATED VICRYL

## (undated) DEVICE — BNDG ELAS ECON W/CLIP 4IN 5YD LF STRL

## (undated) DEVICE — STRIP CLS WND SUTURESTRIP/PLS 0.5X4IN TP1103

## (undated) DEVICE — PENCL ES MEGADINE EZ/CLEAN BUTN W/HOLSTR 10FT

## (undated) DEVICE — BNDG ESMARK 4IN 12FT LF STRL BLU

## (undated) DEVICE — NON-WOVEN ADHESIVE WOUND DRESSING: Brand: PRIMAPORE ADHESIVE WOUND DRSG 7.2*5CM

## (undated) DEVICE — COVER,LIGHT HANDLE,FLX,1/PK: Brand: MEDLINE INDUSTRIES, INC.

## (undated) DEVICE — TISSUE RETRIEVAL SYSTEM: Brand: INZII RETRIEVAL SYSTEM

## (undated) DEVICE — BNDG ELAS CO-FLEX SLF ADHR 4IN5YD LF STRL

## (undated) DEVICE — GOWN,REINF,POLY,SIRUS,BRTH SLV,XLNG/XXL: Brand: MEDLINE

## (undated) DEVICE — REDUCER: Brand: ENDOWRIST

## (undated) DEVICE — GLV SURG ULTRAFREE MAX PF LTX SZ9

## (undated) DEVICE — APPL CHLORAPREP HI/LITE 26ML ORNG

## (undated) DEVICE — BANDAGE,GAUZE,BULKEE II,4.5"X4.1YD,STRL: Brand: MEDLINE

## (undated) DEVICE — SEAL

## (undated) DEVICE — BLAKE SILICONE DRAIN, 19 FR ROUND, HUBLESS WITH 1/4" TROCAR: Brand: BLAKE

## (undated) DEVICE — JACKSON-PRATT 100CC BULB RESERVOIR: Brand: CARDINAL HEALTH

## (undated) DEVICE — SUT MNCRYL PLS ANTIB UD 3/0 PS2 27IN

## (undated) DEVICE — PENCL E/S SMOKEEVAC W/TELESCP CANN

## (undated) DEVICE — DRESSING,GAUZE,XEROFORM,CURAD,1"X8",ST: Brand: CURAD

## (undated) DEVICE — ANTIBACTERIAL VIOLET BRAIDED (POLYGLACTIN 910), SYNTHETIC ABSORBABLE SUTURE: Brand: COATED VICRYL

## (undated) DEVICE — STANDARD HYPODERMIC NEEDLE,POLYPROPYLENE HUB: Brand: MONOJECT

## (undated) DEVICE — DAVINCI-LF: Brand: MEDLINE INDUSTRIES, INC.

## (undated) DEVICE — TIP COVER ACCESSORY

## (undated) DEVICE — DARCO® ALL PURPOSE BOOT: Brand: DEROYAL

## (undated) DEVICE — PROXIMATE RH ROTATING HEAD SKIN STAPLERS (35 WIDE) CONTAINS 35 STAINLESS STEEL STAPLES: Brand: PROXIMATE

## (undated) DEVICE — GAUZE,SPONGE,4"X4",16PLY,STRL,LF,10/TRAY: Brand: MEDLINE

## (undated) DEVICE — SYR LUERLOK 30CC

## (undated) DEVICE — GOWN,REINFORCE,POLY,SIRUS,BREATH SLV,XLG: Brand: MEDLINE

## (undated) DEVICE — SHEET,DRAPE,70X85,STERILE: Brand: MEDLINE

## (undated) DEVICE — SYS CLOSE PORTII CARTR/THOMASN XL

## (undated) DEVICE — INTENDED FOR TISSUE SEPARATION, AND OTHER PROCEDURES THAT REQUIRE A SHARP SURGICAL BLADE TO PUNCTURE OR CUT.: Brand: BARD-PARKER ® CARBON RIB-BACK BLADES

## (undated) DEVICE — BLADELESS OBTURATOR: Brand: WECK VISTA

## (undated) DEVICE — FLTR SMOKE EVAC SEECLEAR CHARCOAL 8L/MIN

## (undated) DEVICE — DRAPE,TOWEL,LARGE,INVISISHIELD: Brand: MEDLINE

## (undated) DEVICE — LAPAROVUE VISIBILITY SYSTEM LAPAROSCOPIC SOLUTIONS: Brand: LAPAROVUE

## (undated) DEVICE — SUCTION IRRIGATOR: Brand: ENDOWRIST